# Patient Record
Sex: MALE | Race: WHITE | HISPANIC OR LATINO | Employment: UNEMPLOYED | URBAN - NONMETROPOLITAN AREA
[De-identification: names, ages, dates, MRNs, and addresses within clinical notes are randomized per-mention and may not be internally consistent; named-entity substitution may affect disease eponyms.]

---

## 2023-07-16 ENCOUNTER — APPOINTMENT (EMERGENCY)
Dept: RADIOLOGY | Facility: HOSPITAL | Age: 88
End: 2023-07-16
Payer: MEDICARE

## 2023-07-16 ENCOUNTER — APPOINTMENT (EMERGENCY)
Dept: CT IMAGING | Facility: HOSPITAL | Age: 88
End: 2023-07-16
Payer: MEDICARE

## 2023-07-16 ENCOUNTER — HOSPITAL ENCOUNTER (EMERGENCY)
Facility: HOSPITAL | Age: 88
Discharge: HOME | End: 2023-07-16
Attending: EMERGENCY MEDICINE
Payer: MEDICARE

## 2023-07-16 VITALS
SYSTOLIC BLOOD PRESSURE: 137 MMHG | BODY MASS INDEX: 23.94 KG/M2 | HEIGHT: 71 IN | RESPIRATION RATE: 23 BRPM | OXYGEN SATURATION: 98 % | WEIGHT: 171 LBS | HEART RATE: 53 BPM | TEMPERATURE: 97.6 F | DIASTOLIC BLOOD PRESSURE: 60 MMHG

## 2023-07-16 DIAGNOSIS — R07.9 CHEST PAIN: ICD-10-CM

## 2023-07-16 DIAGNOSIS — C79.51 PROSTATE CANCER METASTATIC TO BONE (HCC): ICD-10-CM

## 2023-07-16 DIAGNOSIS — C61 PROSTATE CANCER METASTATIC TO BONE (HCC): ICD-10-CM

## 2023-07-16 DIAGNOSIS — R42 VERTIGO: Primary | ICD-10-CM

## 2023-07-16 LAB
2HR DELTA HS TROPONIN: -1 NG/L
4HR DELTA HS TROPONIN: -1 NG/L
ALBUMIN SERPL BCP-MCNC: 4.2 G/DL (ref 3.5–5)
ALP SERPL-CCNC: 75 U/L (ref 34–104)
ALT SERPL W P-5'-P-CCNC: 8 U/L (ref 7–52)
ANION GAP SERPL CALCULATED.3IONS-SCNC: 6 MMOL/L
AST SERPL W P-5'-P-CCNC: 17 U/L (ref 13–39)
BACTERIA UR QL AUTO: ABNORMAL /HPF
BASOPHILS # BLD AUTO: 0.03 THOUSANDS/ÂΜL (ref 0–0.1)
BASOPHILS NFR BLD AUTO: 1 % (ref 0–1)
BILIRUB SERPL-MCNC: 0.53 MG/DL (ref 0.2–1)
BILIRUB UR QL STRIP: NEGATIVE
BNP SERPL-MCNC: 519 PG/ML (ref 0–100)
BUN SERPL-MCNC: 29 MG/DL (ref 5–25)
CALCIUM SERPL-MCNC: 8.6 MG/DL (ref 8.4–10.2)
CARDIAC TROPONIN I PNL SERPL HS: 6 NG/L
CARDIAC TROPONIN I PNL SERPL HS: 6 NG/L
CARDIAC TROPONIN I PNL SERPL HS: 7 NG/L
CHLORIDE SERPL-SCNC: 105 MMOL/L (ref 96–108)
CLARITY UR: CLEAR
CO2 SERPL-SCNC: 26 MMOL/L (ref 21–32)
COLOR UR: YELLOW
CREAT SERPL-MCNC: 1.24 MG/DL (ref 0.6–1.3)
EOSINOPHIL # BLD AUTO: 0.06 THOUSAND/ÂΜL (ref 0–0.61)
EOSINOPHIL NFR BLD AUTO: 1 % (ref 0–6)
ERYTHROCYTE [DISTWIDTH] IN BLOOD BY AUTOMATED COUNT: 13 % (ref 11.6–15.1)
GFR SERPL CREATININE-BSD FRML MDRD: 49 ML/MIN/1.73SQ M
GLUCOSE SERPL-MCNC: 109 MG/DL (ref 65–140)
GLUCOSE UR STRIP-MCNC: NEGATIVE MG/DL
HCT VFR BLD AUTO: 30.6 % (ref 36.5–49.3)
HGB BLD-MCNC: 10.4 G/DL (ref 12–17)
HGB UR QL STRIP.AUTO: ABNORMAL
IMM GRANULOCYTES # BLD AUTO: 0.03 THOUSAND/UL (ref 0–0.2)
IMM GRANULOCYTES NFR BLD AUTO: 1 % (ref 0–2)
KETONES UR STRIP-MCNC: NEGATIVE MG/DL
LEUKOCYTE ESTERASE UR QL STRIP: NEGATIVE
LYMPHOCYTES # BLD AUTO: 0.87 THOUSANDS/ÂΜL (ref 0.6–4.47)
LYMPHOCYTES NFR BLD AUTO: 16 % (ref 14–44)
MCH RBC QN AUTO: 33.5 PG (ref 26.8–34.3)
MCHC RBC AUTO-ENTMCNC: 34 G/DL (ref 31.4–37.4)
MCV RBC AUTO: 99 FL (ref 82–98)
MONOCYTES # BLD AUTO: 0.64 THOUSAND/ÂΜL (ref 0.17–1.22)
MONOCYTES NFR BLD AUTO: 12 % (ref 4–12)
NEUTROPHILS # BLD AUTO: 3.78 THOUSANDS/ÂΜL (ref 1.85–7.62)
NEUTS SEG NFR BLD AUTO: 69 % (ref 43–75)
NITRITE UR QL STRIP: NEGATIVE
NON-SQ EPI CELLS URNS QL MICRO: ABNORMAL /HPF
NRBC BLD AUTO-RTO: 0 /100 WBCS
PH UR STRIP.AUTO: 5.5 [PH]
PLATELET # BLD AUTO: 159 THOUSANDS/UL (ref 149–390)
PMV BLD AUTO: 8.7 FL (ref 8.9–12.7)
POTASSIUM SERPL-SCNC: 3.9 MMOL/L (ref 3.5–5.3)
PROT SERPL-MCNC: 6.6 G/DL (ref 6.4–8.4)
PROT UR STRIP-MCNC: NEGATIVE MG/DL
RBC # BLD AUTO: 3.1 MILLION/UL (ref 3.88–5.62)
RBC #/AREA URNS AUTO: ABNORMAL /HPF
SODIUM SERPL-SCNC: 137 MMOL/L (ref 135–147)
SP GR UR STRIP.AUTO: <=1.005 (ref 1–1.03)
UROBILINOGEN UR QL STRIP.AUTO: 0.2 E.U./DL
WBC # BLD AUTO: 5.41 THOUSAND/UL (ref 4.31–10.16)
WBC #/AREA URNS AUTO: ABNORMAL /HPF

## 2023-07-16 PROCEDURE — 93005 ELECTROCARDIOGRAM TRACING: CPT

## 2023-07-16 PROCEDURE — 70496 CT ANGIOGRAPHY HEAD: CPT

## 2023-07-16 PROCEDURE — 99285 EMERGENCY DEPT VISIT HI MDM: CPT | Performed by: EMERGENCY MEDICINE

## 2023-07-16 PROCEDURE — G1004 CDSM NDSC: HCPCS

## 2023-07-16 PROCEDURE — 71250 CT THORAX DX C-: CPT

## 2023-07-16 PROCEDURE — 85025 COMPLETE CBC W/AUTO DIFF WBC: CPT | Performed by: EMERGENCY MEDICINE

## 2023-07-16 PROCEDURE — 83880 ASSAY OF NATRIURETIC PEPTIDE: CPT | Performed by: EMERGENCY MEDICINE

## 2023-07-16 PROCEDURE — 81001 URINALYSIS AUTO W/SCOPE: CPT | Performed by: EMERGENCY MEDICINE

## 2023-07-16 PROCEDURE — 99285 EMERGENCY DEPT VISIT HI MDM: CPT

## 2023-07-16 PROCEDURE — 71045 X-RAY EXAM CHEST 1 VIEW: CPT

## 2023-07-16 PROCEDURE — 87086 URINE CULTURE/COLONY COUNT: CPT | Performed by: EMERGENCY MEDICINE

## 2023-07-16 PROCEDURE — 36415 COLL VENOUS BLD VENIPUNCTURE: CPT | Performed by: EMERGENCY MEDICINE

## 2023-07-16 PROCEDURE — 70498 CT ANGIOGRAPHY NECK: CPT

## 2023-07-16 PROCEDURE — 84484 ASSAY OF TROPONIN QUANT: CPT | Performed by: EMERGENCY MEDICINE

## 2023-07-16 PROCEDURE — 80053 COMPREHEN METABOLIC PANEL: CPT | Performed by: EMERGENCY MEDICINE

## 2023-07-16 RX ORDER — ALENDRONATE SODIUM 70 MG/1
1 TABLET ORAL WEEKLY
COMMUNITY

## 2023-07-16 RX ORDER — PRIMIDONE 50 MG/1
50 TABLET ORAL 4 TIMES DAILY
COMMUNITY

## 2023-07-16 RX ORDER — ANTIOX #8/OM3/DHA/EPA/LUT/ZEAX 250-2.5 MG
CAPSULE ORAL
COMMUNITY

## 2023-07-16 RX ORDER — MECLIZINE HYDROCHLORIDE 25 MG/1
25 TABLET ORAL ONCE
Status: COMPLETED | OUTPATIENT
Start: 2023-07-16 | End: 2023-07-16

## 2023-07-16 RX ORDER — NITROGLYCERIN 0.4 MG/1
0.4 TABLET SUBLINGUAL
COMMUNITY

## 2023-07-16 RX ORDER — FOLIC ACID 1 MG/1
1 TABLET ORAL DAILY
COMMUNITY

## 2023-07-16 RX ORDER — CLOPIDOGREL BISULFATE 75 MG/1
75 TABLET ORAL DAILY
COMMUNITY

## 2023-07-16 RX ORDER — VIT A/VIT C/VIT E/ZINC/COPPER 4296-226
1 CAPSULE ORAL DAILY
COMMUNITY

## 2023-07-16 RX ORDER — MECLIZINE HYDROCHLORIDE 25 MG/1
25 TABLET ORAL EVERY 8 HOURS PRN
Qty: 30 TABLET | Refills: 0 | Status: SHIPPED | OUTPATIENT
Start: 2023-07-16

## 2023-07-16 RX ORDER — MULTIVIT-MINERALS/FA/LYCOPENE 0.4 MG-6
1 TABLET ORAL DAILY
COMMUNITY

## 2023-07-16 RX ADMIN — IOHEXOL 100 ML: 350 INJECTION, SOLUTION INTRAVENOUS at 10:54

## 2023-07-16 RX ADMIN — MECLIZINE HYDROCHLORIDE 25 MG: 25 TABLET ORAL at 10:10

## 2023-07-16 NOTE — ED PROVIDER NOTES
History  Chief Complaint   Patient presents with   • Dizziness     Dizziness and loss of balance since 0130 this morning     Mr. Massimo Lester is a very pleasant 60-year-old male presents emergency room with chief complaint of dizziness that began around 130 this morning when he woke up to go to the bathroom. Patient is also having urinary frequency. He does not painful. Patient reports that he felt as if he needed to hold onto objects in order to ambulate. He also reports that he needed to many more times last evening to go the bathroom than he normally would. Patient is also recently seen his PCP when he complained of intermittent episodes of chest pressure ongoing for short periods of time for several days prior to the office visit. Patient did not complain of any chest pressure last evening. No shortness of breath. No diaphoresis. Nausea or vomiting. No fevers or chills. Is not had similar episodes. He does deny unilateral weakness. History provided by:  Patient  Dizziness  Quality:  Lightheadedness  Severity:  Moderate  Onset quality:  Unable to specify  Progression:  Partially resolved  Chronicity:  New  Context: physical activity and standing up    Relieved by:  Lying down  Worsened by: Movement and standing up  Associated symptoms: chest pain and hearing loss (Chronic)    Associated symptoms: no blood in stool, no diarrhea, no headaches, no nausea, no palpitations, no shortness of breath, no syncope, no tinnitus, no vomiting and no weakness    Chest pain:     Quality: pressure      Quality comment:  None today      Prior to Admission Medications   Prescriptions Last Dose Informant Patient Reported? Taking?    Calcium Carbonate 1500 (600 Ca) MG TABS   Yes Yes   Sig: Take 1,500 mg by mouth daily   Ergocalciferol 50 MCG (2000 UT) TABS   Yes No   Sig: Take 2,000 Units by mouth daily   Multiple Vitamins-Minerals (PX Mens Multivitamins) TABS   Yes No   Sig: Take 1 tablet by mouth daily   Multiple Vitamins-Minerals (PreserVision AREDS 2) CAPS   Yes No   Sig: Take by mouth   Multiple Vitamins-Minerals (PreserVision AREDS) CAPS   Yes No   Sig: Take 1 capsule by mouth daily   alendronate (FOSAMAX) 70 mg tablet   Yes No   Sig: Take 1 tablet by mouth once a week   clopidogrel (PLAVIX) 75 mg tablet   Yes No   Sig: Take 75 mg by mouth daily   dronedarone (Multaq) 400 mg tablet   Yes No   Sig: Take 400 mg by mouth   folic acid (FOLVITE) 1 mg tablet   Yes No   Sig: Take 1 tablet by mouth daily   nitroglycerin (NITROSTAT) 0.4 mg SL tablet   Yes Yes   Sig: Place 0.4 mg under the tongue every 5 (five) minutes as needed for chest pain   primidone (MYSOLINE) 50 mg tablet   Yes No   Sig: Take 50 mg by mouth 4 (four) times a day      Facility-Administered Medications: None       Past Medical History:   Diagnosis Date   • A-fib (HCC)    • History of angina    • Prostate cancer ([address] [facility])        Past Surgical History:   Procedure Laterality Date   • CORONARY ARTERY BYPASS GRAFT  1995   • TONSILLECTOMY         History reviewed. No pertinent family history. I have reviewed and agree with the history as documented. E-Cigarette/Vaping     E-Cigarette/Vaping Substances     Social History     Tobacco Use   • Smoking status: Never   • Smokeless tobacco: Never   Substance Use Topics   • Alcohol use: Not Currently   • Drug use: Not Currently       Review of Systems   HENT: Positive for hearing loss (Chronic). Negative for tinnitus. Respiratory: Negative. Negative for chest tightness and shortness of breath. Cardiovascular: Positive for chest pain. Negative for palpitations and syncope. Gastrointestinal: Negative for blood in stool, diarrhea, nausea and vomiting. Neurological: Positive for dizziness. Negative for syncope, weakness, light-headedness, numbness and headaches. All other systems reviewed and are negative. Physical Exam  Physical Exam  Vitals and nursing note reviewed.    Constitutional:       Appearance: Normal appearance. He is well-developed. He is not ill-appearing or toxic-appearing. HENT:      Head: Normocephalic and atraumatic. Hair is normal.      Jaw: No pain on movement. Right Ear: External ear normal.      Left Ear: External ear normal.      Nose: Nose normal. No congestion. Mouth/Throat:      Mouth: Mucous membranes are moist.   Eyes:      General: Lids are normal. No scleral icterus. Extraocular Movements: Extraocular movements intact. Conjunctiva/sclera: Conjunctivae normal.      Pupils: Pupils are equal, round, and reactive to light. Cardiovascular:      Rate and Rhythm: Normal rate and regular rhythm. Heart sounds: Normal heart sounds. No murmur heard. Pulmonary:      Effort: Pulmonary effort is normal. No respiratory distress. Breath sounds: Normal breath sounds. No decreased breath sounds, wheezing, rhonchi or rales. Abdominal:      General: Abdomen is flat. There is no distension. Palpations: Abdomen is soft. Abdomen is not rigid. Tenderness: There is no abdominal tenderness. There is no guarding or rebound. Musculoskeletal:         General: No swelling, tenderness, deformity or signs of injury. Normal range of motion. Cervical back: Normal range of motion and neck supple. Skin:     General: Skin is warm and dry. Coloration: Skin is not pale. Findings: No rash. Neurological:      General: No focal deficit present. Mental Status: He is alert and oriented to person, place, and time. Mental status is at baseline. Cranial Nerves: Cranial nerves 2-12 are intact. Sensory: Sensation is intact. Motor: Motor function is intact. No weakness.       Coordination: Coordination normal. Finger-Nose-Finger Test normal.   Psychiatric:         Attention and Perception: Attention normal.         Mood and Affect: Mood normal.         Speech: Speech normal.         Behavior: Behavior normal.         Vital Signs  ED Triage Vitals Temperature Pulse Respirations Blood Pressure SpO2   07/16/23 0959 07/16/23 0942 07/16/23 0942 07/16/23 0942 07/16/23 0942   97.6 °F (36.4 °C) 65 18 146/67 99 %      Temp Source Heart Rate Source Patient Position - Orthostatic VS BP Location FiO2 (%)   07/16/23 0959 07/16/23 0942 07/16/23 1245 07/16/23 0942 --   Tympanic Monitor Lying Right arm       Pain Score       --                  Vitals:    07/16/23 1315 07/16/23 1330 07/16/23 1345 07/16/23 1400   BP: 145/66 141/63 147/63 137/60   Pulse: (!) 54 55 (!) 53 (!) 53   Patient Position - Orthostatic VS:    Lying         Visual Acuity  Visual Acuity    Flowsheet Row Most Recent Value   L Pupil Size (mm) 3   R Pupil Size (mm) 3          ED Medications  Medications   meclizine (ANTIVERT) tablet 25 mg (25 mg Oral Given 7/16/23 1010)   iohexol (OMNIPAQUE) 350 MG/ML injection (SINGLE-DOSE) 100 mL (100 mL Intravenous Given 7/16/23 1054)       Diagnostic Studies  Results Reviewed     Procedure Component Value Units Date/Time    HS Troponin I 4hr [[id number]] Collected: 07/16/23 1419    Lab Status:  In process Specimen: Blood from Arm, Left Updated: 07/16/23 1421    HS Troponin I 2hr [[id number]]  (Normal) Collected: 07/16/23 1204    Lab Status: Final result Specimen: Blood from Arm, Left Updated: 07/16/23 1232     hs TnI 2hr 6 ng/L      Delta 2hr hsTnI -1 ng/L     Urine Microscopic [[id number]]  (Abnormal) Collected: 07/16/23 1121    Lab Status: Final result Specimen: Urine, Clean Catch Updated: 07/16/23 1150     RBC, UA 10-20 /hpf      WBC, UA None Seen /hpf      Epithelial Cells Occasional /hpf      Bacteria, UA None Seen /hpf     UA (URINE) with reflex to Scope [[id number]]  (Abnormal) Collected: 07/16/23 1121    Lab Status: Final result Specimen: Urine, Clean Catch Updated: 07/16/23 1130     Color, UA Yellow     Clarity, UA Clear     Specific Gravity, UA <=1.005     pH, UA 5.5     Leukocytes, UA Negative     Nitrite, UA Negative     Protein, UA Negative mg/dl Glucose, UA Negative mg/dl      Ketones, UA Negative mg/dl      Urobilinogen, UA 0.2 E.U./dl      Bilirubin, UA Negative     Occult Blood, UA Moderate    Urine culture [[id number]] Collected: 07/16/23 1121    Lab Status:  In process Specimen: Urine, Clean Catch Updated: 07/16/23 1124    B-Type Natriuretic Peptide(BNP) [[id number]]  (Abnormal) Collected: 07/16/23 1009    Lab Status: Final result Specimen: Blood from Arm, Left Updated: 07/16/23 1046      pg/mL     HS Troponin 0hr (reflex protocol) [[id number]]  (Normal) Collected: 07/16/23 1009    Lab Status: Final result Specimen: Blood from Arm, Left Updated: 07/16/23 1041     hs TnI 0hr 7 ng/L     Comprehensive metabolic panel [[id number]]  (Abnormal) Collected: 07/16/23 1009    Lab Status: Final result Specimen: Blood from Arm, Left Updated: 07/16/23 1034     Sodium 137 mmol/L      Potassium 3.9 mmol/L      Chloride 105 mmol/L      CO2 26 mmol/L      ANION GAP 6 mmol/L      BUN 29 mg/dL      Creatinine 1.24 mg/dL      Glucose 109 mg/dL      Calcium 8.6 mg/dL      AST 17 U/L      ALT 8 U/L      Alkaline Phosphatase 75 U/L      Total Protein 6.6 g/dL      Albumin 4.2 g/dL      Total Bilirubin 0.53 mg/dL      eGFR 49 ml/min/1.73sq m     Narrative:      [facility] guidelines for Chronic Kidney Disease (CKD):   •  Stage 1 with normal or high GFR (GFR > 90 mL/min/1.73 square meters)  •  Stage 2 Mild CKD (GFR = 60-89 mL/min/1.73 square meters)  •  Stage 3A Moderate CKD (GFR = 45-59 mL/min/1.73 square meters)  •  Stage 3B Moderate CKD (GFR = 30-44 mL/min/1.73 square meters)  •  Stage 4 Severe CKD (GFR = 15-29 mL/min/1.73 square meters)  •  Stage 5 End Stage CKD (GFR <15 mL/min/1.73 square meters)  Note: GFR calculation is accurate only with a steady state creatinine    CBC and differential [[id number]]  (Abnormal) Collected: 07/16/23 1009    Lab Status: Final result Specimen: Blood from Arm, Left Updated: 07/16/23 1018     WBC 5.41 Thousand/uL RBC 3.10 Million/uL      Hemoglobin 10.4 g/dL      Hematocrit 30.6 %      MCV 99 fL      MCH 33.5 pg      MCHC 34.0 g/dL      RDW 13.0 %      MPV 8.7 fL      Platelets 534 Thousands/uL      nRBC 0 /100 WBCs      Neutrophils Relative 69 %      Immat GRANS % 1 %      Lymphocytes Relative 16 %      Monocytes Relative 12 %      Eosinophils Relative 1 %      Basophils Relative 1 %      Neutrophils Absolute 3.78 Thousands/µL      Immature Grans Absolute 0.03 Thousand/uL      Lymphocytes Absolute 0.87 Thousands/µL      Monocytes Absolute 0.64 Thousand/µL      Eosinophils Absolute 0.06 Thousand/µL      Basophils Absolute 0.03 Thousands/µL                  CT chest without contrast   Final Result by Luz Marina Bobby DO (07/16 2104)      1. No consolidation or infiltrate to suggest pneumonia. Chest x-ray findings are likely artifactual due to superimposition of shadows and background of sclerotic osseous metastases. 2. 8 mm right lower lobe nodule. Based on current Fleischner Society 2017 Guidelines on incidental pulmonary nodule, followup non-contrast CT is recommended at 6 months from the initial examination and, if stable at that time, an additional followup is    recommended for 18-24 months from the initial examination. 3. Several incidental findings as detailed above. The study was marked in [facility] for immediate notification and follow-up. Workstation performed: [id number]         CTA head and neck with and without contrast   Final Result by Mary Celeste MD (07/16 8389)      1. 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 No significant mass effect or midline shift. 2.  Chronic microangiopathic change. 3.  Patent major vasculature of the [place] without high-grade stenosis. No aneurysm. 4.  No hemodynamically significant stenosis or dissection of cervical carotid and vertebral arteries.    5.  Diffuse sclerotic and lytic osseous lesions of the visualized spine and chest wall consistent with metastasis. 6.  1.9 cm subpleural wedge-shaped pulmonary nodule in the superior segment of left lower lobe, most likely likely focal atelectasis or scarring. Correlate with prior outside chest CT for stability. I personally discussed this study with Tali Lugo on 7/16/2023 12:22 PM.               Workstation performed: [id number]         XR chest 1 view portable   Final Result by Luly Larsen MD (07/16 1126)      Bilateral patchy infiltrates suggested. Follow-up or CT chest recommended. Workstation performed: [id number]                    Procedures  ECG 12 Lead Documentation Only    Date/Time: 7/16/2023 10:39 AM    Performed by: Tali Lugo DO  Authorized by: Tali Lugo DO    ECG reviewed by me, the ED Provider: yes    Patient location:  ED  Previous ECG:     Previous ECG:  Unavailable  Interpretation:     Interpretation: non-specific    Rate:     ECG rate assessment: normal    Rhythm:     Rhythm: sinus rhythm    Ectopy:     Ectopy: none    QRS:     QRS axis:  Normal  Conduction:     Conduction: normal    ST segments:     ST segments:  Non-specific  T waves:     T waves: non-specific               ED Course  ED Course as of 07/16/23 1435   Sun Jul 16, 2023   1149 CT showing bilateral patchy infiltrates. Radiology recommending CT.   3918 CT of the head and neck showing evidence of metastatic disease. 1238 There is also a small right-sided hygroma versus chronic subdural with no midline shift. 1238 Patient reports that he is feeling better after medication. 1417 Patient CT shows no evidence of pneumonia. Findings are likely superimposed metastatic disease. 1419 I reviewed patient's record. Patient has a history of metastatic prostate cancer. [address] conversation with both the patient and his niece who is present at bedside at this time.   Patient has several previous traumatic brain injuries. Hygroma is likely secondary to that as patient was advised at 1 point that he may require intracranial surgery which was never done. [phone] Patient is also aware of his metastatic prostate cancer. We will follow-up with his primary care doctor. He was advised to stop the new medication he was given as this is likely contributing to his dizziness and the chest discomfort is likely secondary to the metastatic disease in the chest.   1433 Blood, UA(!): Moderate  In the absence of any bacteria and urinary symptoms this is likely secondary to the patient's chronic disease state. HEART Risk Score    Flowsheet Row Most Recent Value   Heart Score Risk Calculator    History 0 Filed at: 07/16/2023 1435   ECG 1 Filed at: 07/16/2023 1435   Age 2 Filed at: 07/16/2023 1435   Risk Factors 2 Filed at: 07/16/2023 1435   Troponin 0 Filed at: 07/16/2023 1435   HEART Score 5 Filed at: 07/16/2023 1435                        SBIRT 20yo+    Flowsheet Row Most Recent Value   Initial Alcohol Screen: US AUDIT-C     1. How often do you have a drink containing alcohol? 0 Filed at: 07/16/2023 1423   2. How many drinks containing alcohol do you have on a typical day you are drinking? 0 Filed at: 07/16/2023 1423   3a. Male UNDER 65: How often do you have five or more drinks on one occasion? 0 Filed at: 07/16/2023 1423   3b. FEMALE Any Age, or MALE 65+: How often do you have 4 or more drinks on one occassion? 0 Filed at: 07/16/2023 1423   Audit-C Score 0 Filed at: 07/16/2023 1423   AMBAR: How many times in the past year have you. .. Used an illegal drug or used a prescription medication for non-medical reasons? Never Filed at: 07/16/2023 1423                    Medical Decision Making  Patient presented to the emergency department and a MSE was performed. The patient was evaluated for complaint related to acute vertigo.   Patient is potentially at risk for, but not limited to, thrombotic stroke, embolic stroke, hemorrhagic stroke, hypertensive emergency, hypoglycemic episode, or migraine variant. Several of these diagnoses have been evaluated and ruled out by history and physical.  As needed, patient will be further evaluated with laboratory and imaging studies. Higher level diagnostics, such as CT imaging or ultrasound, may also be required. Please see work-up portion of the note for further evaluation of patient's risk. Socioeconomic factors were also considered as part of the decision-making process. Unless otherwise stated in the chart or patient is admitted as elsewhere documented, any previously prescribed medications will be maintained. Chest pain: complicated acute illness or injury with systemic symptoms  Prostate cancer metastatic to [facility]): chronic illness or injury with exacerbation, progression, or side effects of treatment  Vertigo: complicated acute illness or injury with systemic symptoms  Amount and/or Complexity of Data Reviewed  Labs: ordered. Decision-making details documented in ED Course. Radiology: ordered. Risk  Prescription drug management. Disposition  Final diagnoses:   Vertigo   Chest pain   Prostate cancer metastatic to [facility])     Time reflects when diagnosis was documented in both MDM as applicable and the Disposition within this note     Time User Action Codes Description Comment    7/16/2023  2:33 PM Virgin Cipro Add [R42] Vertigo     7/16/2023  2:33 PM Virgin Cipro Add [R07.9] Chest pain     7/16/2023  2:33 PM Virgin Cipro Add [C61,  C79.51] Prostate cancer metastatic to [facility])       ED Disposition     ED Disposition   Discharge    Condition   Stable    Date/Time   Sun Jul 16, 2023  2:30 PM    Comment   Maria C Garber discharge to home/self care.                Follow-up Information     Follow up With Specialties Details Why Contact Cayden Newton MD   As scheduled [address] [facility] [address]  [phone]            Patient's Medications   Discharge Prescriptions    MECLIZINE (ANTIVERT) 25 MG TABLET    Take 1 tablet (25 mg total) by mouth every 8 (eight) hours as needed for dizziness       Start Date: 7/16/2023 End Date: --       Order Dose: 25 mg       Quantity: 30 tablet    Refills: 0       No discharge procedures on file.     PDMP Review     None          ED Provider  Electronically Signed by           Mirian Pulido DO  07/16/23 1265

## 2023-07-16 NOTE — DISCHARGE INSTRUCTIONS
Keep your scheduled appointment with your primary care provider. Please stop the new medication that he provided to you for chest pain. Chest pain is likely secondary to the metastatic prostate cancer. We have prescribed you medication for dizziness which you may use as needed. Please return with any worsening.

## 2023-07-17 LAB
ATRIAL RATE: 67 BPM
P AXIS: 81 DEGREES
PR INTERVAL: 170 MS
QRS AXIS: 80 DEGREES
QRSD INTERVAL: 84 MS
QT INTERVAL: 392 MS
QTC INTERVAL: 414 MS
T WAVE AXIS: 39 DEGREES
VENTRICULAR RATE: 67 BPM

## 2023-07-17 PROCEDURE — 93010 ELECTROCARDIOGRAM REPORT: CPT | Performed by: INTERNAL MEDICINE

## 2023-07-18 LAB — BACTERIA UR CULT: NORMAL

## 2023-12-13 ENCOUNTER — APPOINTMENT (EMERGENCY)
Dept: CT IMAGING | Facility: HOSPITAL | Age: 88
End: 2023-12-13
Payer: MEDICARE

## 2023-12-13 ENCOUNTER — HOSPITAL ENCOUNTER (INPATIENT)
Facility: HOSPITAL | Age: 88
LOS: 22 days | Discharge: NURSING, REHAB OR LONG-TERM CARE | End: 2024-01-04
Attending: EMERGENCY MEDICINE | Admitting: FAMILY MEDICINE
Payer: MEDICARE

## 2023-12-13 DIAGNOSIS — I95.1 ORTHOSTATIC HYPOTENSION: ICD-10-CM

## 2023-12-13 DIAGNOSIS — K52.89 STERCORAL COLITIS: ICD-10-CM

## 2023-12-13 DIAGNOSIS — R11.10 ABDOMINAL PAIN, VOMITING, AND DIARRHEA: Primary | ICD-10-CM

## 2023-12-13 DIAGNOSIS — R78.81 GRAM-NEGATIVE BACTEREMIA: ICD-10-CM

## 2023-12-13 DIAGNOSIS — C79.51 PROSTATE CANCER METASTATIC TO BONE (HCC): ICD-10-CM

## 2023-12-13 DIAGNOSIS — I48.21 PERMANENT ATRIAL FIBRILLATION (HCC): ICD-10-CM

## 2023-12-13 DIAGNOSIS — C61 PROSTATE CANCER METASTATIC TO BONE (HCC): ICD-10-CM

## 2023-12-13 DIAGNOSIS — I95.2 HYPOTENSION DUE TO DRUGS: ICD-10-CM

## 2023-12-13 DIAGNOSIS — R10.9 ABDOMINAL PAIN, VOMITING, AND DIARRHEA: Primary | ICD-10-CM

## 2023-12-13 DIAGNOSIS — K56.41 FECAL IMPACTION (HCC): ICD-10-CM

## 2023-12-13 DIAGNOSIS — R19.7 ABDOMINAL PAIN, VOMITING, AND DIARRHEA: Primary | ICD-10-CM

## 2023-12-13 DIAGNOSIS — R31.0 GROSS HEMATURIA: ICD-10-CM

## 2023-12-13 DIAGNOSIS — R33.8 ACUTE URINARY RETENTION: ICD-10-CM

## 2023-12-13 DIAGNOSIS — C61 PROSTATE CANCER (HCC): ICD-10-CM

## 2023-12-13 DIAGNOSIS — D64.9 ACUTE ON CHRONIC ANEMIA: ICD-10-CM

## 2023-12-13 DIAGNOSIS — E87.1 HYPONATREMIA: ICD-10-CM

## 2023-12-13 LAB
ALBUMIN SERPL BCP-MCNC: 3.9 G/DL (ref 3.5–5)
ALP SERPL-CCNC: 62 U/L (ref 34–104)
ALT SERPL W P-5'-P-CCNC: 8 U/L (ref 7–52)
ANION GAP SERPL CALCULATED.3IONS-SCNC: 5 MMOL/L
AST SERPL W P-5'-P-CCNC: 17 U/L (ref 13–39)
BASOPHILS # BLD AUTO: 0.03 THOUSANDS/ÂΜL (ref 0–0.1)
BASOPHILS NFR BLD AUTO: 0 % (ref 0–1)
BILIRUB SERPL-MCNC: 0.56 MG/DL (ref 0.2–1)
BILIRUB UR QL STRIP: NEGATIVE
BUN SERPL-MCNC: 20 MG/DL (ref 5–25)
CALCIUM SERPL-MCNC: 8.4 MG/DL (ref 8.4–10.2)
CHLORIDE SERPL-SCNC: 98 MMOL/L (ref 96–108)
CLARITY UR: CLEAR
CO2 SERPL-SCNC: 25 MMOL/L (ref 21–32)
COLOR UR: YELLOW
CREAT SERPL-MCNC: 0.94 MG/DL (ref 0.6–1.3)
EOSINOPHIL # BLD AUTO: 0.07 THOUSAND/ÂΜL (ref 0–0.61)
EOSINOPHIL NFR BLD AUTO: 1 % (ref 0–6)
ERYTHROCYTE [DISTWIDTH] IN BLOOD BY AUTOMATED COUNT: 14.5 % (ref 11.6–15.1)
GFR SERPL CREATININE-BSD FRML MDRD: 68 ML/MIN/1.73SQ M
GLUCOSE SERPL-MCNC: 109 MG/DL (ref 65–140)
GLUCOSE UR STRIP-MCNC: NEGATIVE MG/DL
HCT VFR BLD AUTO: 30.1 % (ref 36.5–49.3)
HGB BLD-MCNC: 10.3 G/DL (ref 12–17)
HGB UR QL STRIP.AUTO: NEGATIVE
IMM GRANULOCYTES # BLD AUTO: 0.06 THOUSAND/UL (ref 0–0.2)
IMM GRANULOCYTES NFR BLD AUTO: 1 % (ref 0–2)
KETONES UR STRIP-MCNC: NEGATIVE MG/DL
LACTATE SERPL-SCNC: 1.1 MMOL/L (ref 0.5–2)
LEUKOCYTE ESTERASE UR QL STRIP: NEGATIVE
LYMPHOCYTES # BLD AUTO: 0.92 THOUSANDS/ÂΜL (ref 0.6–4.47)
LYMPHOCYTES NFR BLD AUTO: 10 % (ref 14–44)
MCH RBC QN AUTO: 32 PG (ref 26.8–34.3)
MCHC RBC AUTO-ENTMCNC: 34.2 G/DL (ref 31.4–37.4)
MCV RBC AUTO: 94 FL (ref 82–98)
MONOCYTES # BLD AUTO: 1.1 THOUSAND/ÂΜL (ref 0.17–1.22)
MONOCYTES NFR BLD AUTO: 12 % (ref 4–12)
NEUTROPHILS # BLD AUTO: 6.76 THOUSANDS/ÂΜL (ref 1.85–7.62)
NEUTS SEG NFR BLD AUTO: 76 % (ref 43–75)
NITRITE UR QL STRIP: NEGATIVE
NRBC BLD AUTO-RTO: 0 /100 WBCS
PH UR STRIP.AUTO: 7 [PH]
PLATELET # BLD AUTO: 221 THOUSANDS/UL (ref 149–390)
PMV BLD AUTO: 8.3 FL (ref 8.9–12.7)
POTASSIUM SERPL-SCNC: 4.5 MMOL/L (ref 3.5–5.3)
PROCALCITONIN SERPL-MCNC: <0.05 NG/ML
PROT SERPL-MCNC: 6.7 G/DL (ref 6.4–8.4)
PROT UR STRIP-MCNC: NEGATIVE MG/DL
RBC # BLD AUTO: 3.22 MILLION/UL (ref 3.88–5.62)
SODIUM SERPL-SCNC: 128 MMOL/L (ref 135–147)
SP GR UR STRIP.AUTO: 1.02 (ref 1–1.03)
UROBILINOGEN UR QL STRIP.AUTO: 0.2 E.U./DL
WBC # BLD AUTO: 8.94 THOUSAND/UL (ref 4.31–10.16)

## 2023-12-13 PROCEDURE — G1004 CDSM NDSC: HCPCS

## 2023-12-13 PROCEDURE — 80053 COMPREHEN METABOLIC PANEL: CPT | Performed by: EMERGENCY MEDICINE

## 2023-12-13 PROCEDURE — 99284 EMERGENCY DEPT VISIT MOD MDM: CPT

## 2023-12-13 PROCEDURE — 99285 EMERGENCY DEPT VISIT HI MDM: CPT | Performed by: EMERGENCY MEDICINE

## 2023-12-13 PROCEDURE — 87147 CULTURE TYPE IMMUNOLOGIC: CPT | Performed by: FAMILY MEDICINE

## 2023-12-13 PROCEDURE — 96374 THER/PROPH/DIAG INJ IV PUSH: CPT

## 2023-12-13 PROCEDURE — 36415 COLL VENOUS BLD VENIPUNCTURE: CPT | Performed by: EMERGENCY MEDICINE

## 2023-12-13 PROCEDURE — 81003 URINALYSIS AUTO W/O SCOPE: CPT | Performed by: EMERGENCY MEDICINE

## 2023-12-13 PROCEDURE — 87081 CULTURE SCREEN ONLY: CPT | Performed by: FAMILY MEDICINE

## 2023-12-13 PROCEDURE — 99222 1ST HOSP IP/OBS MODERATE 55: CPT | Performed by: FAMILY MEDICINE

## 2023-12-13 PROCEDURE — 74177 CT ABD & PELVIS W/CONTRAST: CPT

## 2023-12-13 PROCEDURE — 0T9B70Z DRAINAGE OF BLADDER WITH DRAINAGE DEVICE, VIA NATURAL OR ARTIFICIAL OPENING: ICD-10-PCS | Performed by: EMERGENCY MEDICINE

## 2023-12-13 PROCEDURE — 84145 PROCALCITONIN (PCT): CPT | Performed by: EMERGENCY MEDICINE

## 2023-12-13 PROCEDURE — 96361 HYDRATE IV INFUSION ADD-ON: CPT

## 2023-12-13 PROCEDURE — 85025 COMPLETE CBC W/AUTO DIFF WBC: CPT | Performed by: EMERGENCY MEDICINE

## 2023-12-13 PROCEDURE — 83605 ASSAY OF LACTIC ACID: CPT | Performed by: EMERGENCY MEDICINE

## 2023-12-13 PROCEDURE — 87040 BLOOD CULTURE FOR BACTERIA: CPT | Performed by: EMERGENCY MEDICINE

## 2023-12-13 RX ORDER — LIDOCAINE HYDROCHLORIDE 20 MG/ML
1 JELLY TOPICAL ONCE
Status: COMPLETED | OUTPATIENT
Start: 2023-12-13 | End: 2023-12-13

## 2023-12-13 RX ORDER — PANTOPRAZOLE SODIUM 40 MG/1
40 TABLET, DELAYED RELEASE ORAL DAILY
COMMUNITY

## 2023-12-13 RX ORDER — TAMSULOSIN HYDROCHLORIDE 0.4 MG/1
0.4 CAPSULE ORAL
Status: DISCONTINUED | OUTPATIENT
Start: 2023-12-13 | End: 2023-12-17

## 2023-12-13 RX ORDER — PANTOPRAZOLE SODIUM 40 MG/1
40 TABLET, DELAYED RELEASE ORAL DAILY
Status: DISCONTINUED | OUTPATIENT
Start: 2023-12-13 | End: 2024-01-04 | Stop reason: HOSPADM

## 2023-12-13 RX ORDER — PRIMIDONE 50 MG/1
50 TABLET ORAL EVERY 12 HOURS SCHEDULED
Status: DISCONTINUED | OUTPATIENT
Start: 2023-12-13 | End: 2024-01-04 | Stop reason: HOSPADM

## 2023-12-13 RX ORDER — CLOPIDOGREL BISULFATE 75 MG/1
75 TABLET ORAL DAILY
Status: DISCONTINUED | OUTPATIENT
Start: 2023-12-13 | End: 2023-12-18

## 2023-12-13 RX ORDER — ACETAMINOPHEN 325 MG/1
650 TABLET ORAL EVERY 6 HOURS PRN
Status: DISCONTINUED | OUTPATIENT
Start: 2023-12-13 | End: 2023-12-17

## 2023-12-13 RX ORDER — ONDANSETRON 2 MG/ML
4 INJECTION INTRAMUSCULAR; INTRAVENOUS EVERY 6 HOURS PRN
Status: DISCONTINUED | OUTPATIENT
Start: 2023-12-13 | End: 2024-01-04 | Stop reason: HOSPADM

## 2023-12-13 RX ORDER — BISACODYL 10 MG
10 SUPPOSITORY, RECTAL RECTAL 2 TIMES DAILY
Status: DISCONTINUED | OUTPATIENT
Start: 2023-12-13 | End: 2023-12-16

## 2023-12-13 RX ORDER — SODIUM CHLORIDE 9 MG/ML
50 INJECTION, SOLUTION INTRAVENOUS CONTINUOUS
Status: DISCONTINUED | OUTPATIENT
Start: 2023-12-13 | End: 2023-12-14

## 2023-12-13 RX ORDER — FOLIC ACID 1 MG/1
1000 TABLET ORAL DAILY
Status: DISCONTINUED | OUTPATIENT
Start: 2023-12-13 | End: 2024-01-04 | Stop reason: HOSPADM

## 2023-12-13 RX ORDER — POLYETHYLENE GLYCOL 3350 17 G/17G
17 POWDER, FOR SOLUTION ORAL DAILY
Status: DISCONTINUED | OUTPATIENT
Start: 2023-12-13 | End: 2023-12-18

## 2023-12-13 RX ORDER — ENOXAPARIN SODIUM 100 MG/ML
30 INJECTION SUBCUTANEOUS DAILY
Status: DISCONTINUED | OUTPATIENT
Start: 2023-12-13 | End: 2023-12-15

## 2023-12-13 RX ORDER — DIGOXIN 125 MCG
125 TABLET ORAL DAILY
Status: DISCONTINUED | OUTPATIENT
Start: 2023-12-13 | End: 2023-12-21

## 2023-12-13 RX ORDER — ONDANSETRON 2 MG/ML
4 INJECTION INTRAMUSCULAR; INTRAVENOUS ONCE
Status: COMPLETED | OUTPATIENT
Start: 2023-12-13 | End: 2023-12-13

## 2023-12-13 RX ORDER — DIGOXIN 250 MCG
125 TABLET ORAL DAILY
COMMUNITY
End: 2024-01-04

## 2023-12-13 RX ORDER — SENNOSIDES 8.6 MG
1 TABLET ORAL 2 TIMES DAILY
Status: DISCONTINUED | OUTPATIENT
Start: 2023-12-13 | End: 2023-12-16

## 2023-12-13 RX ORDER — HYDROCORTISONE 25 MG/G
CREAM TOPICAL 4 TIMES DAILY PRN
Status: DISCONTINUED | OUTPATIENT
Start: 2023-12-13 | End: 2024-01-04 | Stop reason: HOSPADM

## 2023-12-13 RX ADMIN — LIDOCAINE HYDROCHLORIDE 1 APPLICATION: 20 JELLY TOPICAL at 10:00

## 2023-12-13 RX ADMIN — ENOXAPARIN SODIUM 30 MG: 30 INJECTION SUBCUTANEOUS at 18:06

## 2023-12-13 RX ADMIN — DRONEDARONE 400 MG: 400 TABLET, FILM COATED ORAL at 18:06

## 2023-12-13 RX ADMIN — ONDANSETRON 4 MG: 2 INJECTION INTRAMUSCULAR; INTRAVENOUS at 09:35

## 2023-12-13 RX ADMIN — PRIMIDONE 50 MG: 50 TABLET ORAL at 18:06

## 2023-12-13 RX ADMIN — IOHEXOL 100 ML: 350 INJECTION, SOLUTION INTRAVENOUS at 10:20

## 2023-12-13 RX ADMIN — SENNOSIDES 8.6 MG: 8.6 TABLET ORAL at 17:43

## 2023-12-13 RX ADMIN — SODIUM CHLORIDE 50 ML/HR: 0.9 INJECTION, SOLUTION INTRAVENOUS at 16:13

## 2023-12-13 RX ADMIN — SODIUM CHLORIDE 1000 ML: 0.9 INJECTION, SOLUTION INTRAVENOUS at 09:35

## 2023-12-13 RX ADMIN — BISACODYL 10 MG: 10 SUPPOSITORY RECTAL at 17:43

## 2023-12-13 NOTE — H&P
[facility]  H&P  Name: Miguel Angel Ortega 89 y.o. male I MRN: [id number]  Unit/Bed#: [facility] I Date of Admission: 12/13/2023   Date of Service: 12/13/2023 I Hospital Day: 0      Assessment/Plan   * Stercoral colitis  Assessment & Plan  Patient presents with symptoms of constipation for the last few days.  He saw GI outpatient.  He was started on MiraLAX patient states he went 26 times to the bathroom yesterday had very small amounts of stool and felt like he just could not empty out his bowel movement.  Also had a fall yesterday.  Ct abd pelvis shows Abundant stool in the colon and rectal vault compatible with constipation in the appropriate clinical context. No bowel obstruction.     Minimal perirectal fat stranding suggestive of mild or early stercoral proctitis. No perirectal abscess.    Placed on MiraLAX daily and placed on Dulcolax suppository x 2 daily and also placed 3 enemas for today.  Patient has a large stool burden and will need to evacuate him.  No need for antibiotics at this time      Acute urinary retention  Assessment & Plan  Acute urinary retention secondary to severe constipation also known history of prostate cancer.  Ascencio catheter placed as patient bladder scan for 800 cc on presentation to ED and patient is feeling a little better now.  Also placed on Flomax and will do voiding trial in 2 to 3 days  After Ascencio catheter placed 1800 mL has come out so far      Hyponatremia  Assessment & Plan  secondary to urinary retention decreased appetite and constipation.  Will place on gentle hydration and recheck BMP tomorrow    Permanent atrial fibrillation (HCC)  Assessment & Plan  Continue Multaq, digoxin which are his chronic meds.  Not on anticoagulation    Prostate cancer metastatic to bone (HCC)  Assessment & Plan  Further outpatient follow-up with urology.  Placed on Flomax for acute urinary retention hold Casodex while inpatient       VTE Prophylaxis: Enoxaparin (Lovenox)  /  sequential compression device   Code Status: DNR/DNI  POLST: There is no POLST form on file for this patient (pre-hospital)  Discussion with family: Discussed with POA    Anticipated Length of Stay:  Patient will be admitted on an Inpatient basis with an anticipated length of stay of more than 2 midnights.   Justification for Hospital Stay: Severe constipation    Total Time for Visit, including Counseling / Coordination of Care: 60 minutes.  Greater than 50% of this total time spent on direct patient counseling and coordination of care.    Chief Complaint:   Constipation    History of Present Illness:    Miguel Angel Ortega is a 89 y.o. male who presents with constipation.  Patient states that he has not had a good bowel movement the last few days.  He was seen by GI outpatient and recommended MiraLAX daily.  Patient states that last night he went 26 times to the bathroom but could not defecate also having difficulty urinating.  Came to the ED bladder scan for 800 mL however 1800 mL came out when Ascencio catheter placed.  Denies any fevers or chills or sick contacts but states that he did feel weak and fell yesterday as he was trying multiple times to go to the bathroom.  He currently resides in an assisted living facility.    Review of Systems:    Review of Systems   Constitutional:  Positive for appetite change and fatigue. Negative for chills and fever.   HENT:  Negative for hearing loss, sore throat and trouble swallowing.    Eyes:  Negative for photophobia, discharge and visual disturbance.   Respiratory:  Negative for chest tightness and shortness of breath.    Cardiovascular:  Negative for chest pain and palpitations.   Gastrointestinal:  Positive for constipation. Negative for abdominal pain, blood in stool and vomiting.   Endocrine: Negative for polydipsia and polyuria.   Genitourinary:  Positive for difficulty urinating. Negative for dysuria, flank pain and hematuria.   Musculoskeletal:  Negative for back pain  and gait problem.   Skin:  Negative for rash.   Allergic/Immunologic: Negative for environmental allergies and food allergies.   Neurological:  Positive for weakness. Negative for dizziness, seizures, syncope and headaches.   Hematological:  Does not bruise/bleed easily.   Psychiatric/Behavioral:  Negative for behavioral problems.    All other systems reviewed and are negative.      Past Medical and Surgical History:     Past Medical History:   Diagnosis Date    A-fib (HCC)     History of angina     Prostate cancer (HCC)        Past Surgical History:   Procedure Laterality Date    CORONARY ARTERY BYPASS GRAFT  1995    TONSILLECTOMY         Meds/Allergies:    Prior to Admission medications    Medication Sig Start Date End Date Taking? Authorizing Provider   digoxin (LANOXIN) 0.25 mg tablet Take 125 mcg by mouth daily   Yes Historical Provider, MD   pantoprazole (PROTONIX) 40 mg tablet Take 40 mg by mouth daily   Yes Historical Provider, MD   alendronate (FOSAMAX) 70 mg tablet Take 1 tablet by mouth once a week    Historical Provider, MD   Calcium Carbonate 1500 (600 Ca) MG TABS Take 1,500 mg by mouth daily 1/26/23   Historical Provider, MD   clopidogrel (PLAVIX) 75 mg tablet Take 75 mg by mouth daily    Historical Provider, MD   dronedarone (Multaq) 400 mg tablet Take 400 mg by mouth 2 (two) times a day with meals    Historical Provider, MD   Ergocalciferol 50 MCG (2000 UT) TABS Take 2,000 Units by mouth daily    Historical Provider, MD   folic acid (FOLVITE) 1 mg tablet Take 1 tablet by mouth daily    Historical Provider, MD   meclizine (ANTIVERT) 25 mg tablet Take 1 tablet (25 mg total) by mouth every 8 (eight) hours as needed for dizziness 7/16/23   Ruben Toussaint,    Multiple Vitamins-Minerals (PreserVision AREDS 2) CAPS Take by mouth    Historical Provider, MD   Multiple Vitamins-Minerals (PreserVision AREDS) CAPS Take 1 capsule by mouth daily    Historical Provider, MD   Multiple Vitamins-Minerals (PX Mens  "Multivitamins) TABS Take 1 tablet by mouth daily    Historical Provider, MD   nitroglycerin (NITROSTAT) 0.4 mg SL tablet Place 0.4 mg under the tongue every 5 (five) minutes as needed for chest pain    Historical Provider, MD   primidone (MYSOLINE) 50 mg tablet Take 50 mg by mouth every 12 (twelve) hours    Historical Provider, MD     I have reviewed home medications with a medical source (PCP, Pharmacy, other).    Allergies: No Known Allergies    Social History:     Marital Status: Single     Social History     Substance and Sexual Activity   Alcohol Use Not Currently     Social History     Tobacco Use   Smoking Status Never   Smokeless Tobacco Never     Social History     Substance and Sexual Activity   Drug Use Not Currently       Family History:    Family History   Problem Relation Age of Onset    Hypertension Father        Physical Exam:     Vitals:   Blood Pressure: (!) 172/70 (12/13/23 1400)  Pulse: 67 (12/13/23 1400)  Temperature: (!) 97.4 °F (36.3 °C) (12/13/23 0909)  Temp Source: Temporal (12/13/23 0909)  Respirations: 16 (12/13/23 1400)  Height: 5' 11\" (180.3 cm) (12/13/23 0909)  Weight - Scale: 80.5 kg (177 lb 7.5 oz) (12/13/23 0909)  SpO2: 98 % (12/13/23 1400)    Physical Exam  Vitals and nursing note reviewed.   Constitutional:       Appearance: He is ill-appearing.   HENT:      Head: Normocephalic and atraumatic.      Right Ear: External ear normal.      Left Ear: External ear normal.      Nose: Nose normal.      Mouth/Throat:      Pharynx: Oropharynx is clear.   Eyes:      Pupils: Pupils are equal, round, and reactive to light.   Cardiovascular:      Rate and Rhythm: Normal rate and regular rhythm.      Heart sounds: Normal heart sounds.   Pulmonary:      Effort: Pulmonary effort is normal.      Breath sounds: Normal breath sounds.   Abdominal:      General: Bowel sounds are normal.      Palpations: Abdomen is soft.      Tenderness: There is abdominal tenderness.   Musculoskeletal:      Cervical " back: Normal range of motion and neck supple.      Right lower leg: No edema.      Left lower leg: No edema.   Skin:     General: Skin is warm and dry.      Capillary Refill: Capillary refill takes less than 2 seconds.   Neurological:      Mental Status: He is alert and oriented to person, place, and time. Mental status is at baseline.   Psychiatric:         Mood and Affect: Mood normal.           Additional Data:     Lab Results: I have personally reviewed pertinent reports.      Results from last 7 days   Lab Units 12/13/23  0928   WBC Thousand/uL 8.94   HEMOGLOBIN g/dL 10.3*   HEMATOCRIT % 30.1*   PLATELETS Thousands/uL 221   NEUTROS PCT % 76*   LYMPHS PCT % 10*   MONOS PCT % 12   EOS PCT % 1     Results from last 7 days   Lab Units 12/13/23  0928   SODIUM mmol/L 128*   POTASSIUM mmol/L 4.5   CHLORIDE mmol/L 98   CO2 mmol/L 25   BUN mg/dL 20   CREATININE mg/dL 0.94   ANION GAP mmol/L 5   CALCIUM mg/dL 8.4   ALBUMIN g/dL 3.9   TOTAL BILIRUBIN mg/dL 0.56   ALK PHOS U/L 62   ALT U/L 8   AST U/L 17   GLUCOSE RANDOM mg/dL 109                 Results from last 7 days   Lab Units 12/13/23  0939 12/13/23  0928   LACTIC ACID mmol/L 1.1  --    PROCALCITONIN ng/ml  --  <0.05       Imaging: I have personally reviewed pertinent reports.      CT abdomen pelvis w contrast   Final Result by Jorge Ardon MD (12/13 1122)      Trace basilar right lower lobe groundglass opacities suggestive of inflammatory or infectious bronchiolitis.      Abundant stool in the colon and rectal vault compatible with constipation in the appropriate clinical context. No bowel obstruction.      Minimal perirectal fat stranding suggestive of mild or early stercoral proctitis. No perirectal abscess.      Diffuse osseous sclerotic metastatic disease.      Additional chronic findings and negatives as above.      This study demonstrates a significant  finding and was documented as such in [facility] for liaison and referring practitioner  notification.      Workstation performed: [id number]             EKG, Pathology, and Other Studies Reviewed on Admission:   EKG: reviewed    Allscripts / Epic Records Reviewed: Yes     ** Please Note: This note has been constructed using a voice recognition system. **

## 2023-12-13 NOTE — ASSESSMENT & PLAN NOTE
Acute urinary retention secondary to severe constipation also known history of prostate cancer.  Ascencio catheter placed as patient bladder scan for 800 cc on presentation to ED and patient is feeling a little better now.  Also placed on Flomax and will do voiding trial in 2 to 3 days  After Ascencio catheter placed 1800 mL has come out so far

## 2023-12-13 NOTE — ASSESSMENT & PLAN NOTE
Further outpatient follow-up with urology.  Placed on Flomax for acute urinary retention hold Casodex while inpatient

## 2023-12-13 NOTE — ED PROVIDER NOTES
History  Chief Complaint   Patient presents with    Fall     Patient complains of urinary frequency over night with constipation, hemorrhoids. Skin tear to right leg per SNF. Takes plavix, no head strike.      89-year-old male via EMS accompanied by caregiver/power of  describes recent history of nausea and vomiting with some lower abdominal pain worse after MiraLAX this morning around 8 AM for constipation, fecal impaction per PA at assisted living and recent visit with gastroenterologist, Dr. Carter.  No chest pain or dyspnea.  No fever.  Also mentions urinary frequency is worse.      History provided by:  Patient  Medical Problem  Location:  Abdomen  Quality:  Constipation, diarrhea, urinary frequency  Severity:  Severe  Onset quality:  Gradual  Timing:  Constant  Progression:  Worsening  Chronicity:  Chronic  Relieved by:  Nothing  Worsened by:  Vomiting  Ineffective treatments:  Miralax  Associated symptoms: abdominal pain, diarrhea, nausea and vomiting    Associated symptoms: no chest pain, no fever and no shortness of breath        Prior to Admission Medications   Prescriptions Last Dose Informant Patient Reported? Taking?   Calcium Carbonate 1500 (600 Ca) MG TABS 12/13/2023  Yes Yes   Sig: Take 1,500 mg by mouth daily   Ergocalciferol 50 MCG (2000 UT) TABS 12/13/2023  Yes Yes   Sig: Take 2,000 Units by mouth daily   Multiple Vitamins-Minerals (PX Mens Multivitamins) TABS 12/13/2023  Yes Yes   Sig: Take 1 tablet by mouth daily   Multiple Vitamins-Minerals (PreserVision AREDS 2) CAPS 12/13/2023  Yes Yes   Sig: Take 1 capsule by mouth in the morning   Multiple Vitamins-Minerals (PreserVision AREDS) CAPS 12/13/2023  Yes Yes   Sig: Take 1 capsule by mouth daily   alendronate (FOSAMAX) 70 mg tablet Past Week  Yes Yes   Sig: Take 1 tablet by mouth once a week   clopidogrel (PLAVIX) 75 mg tablet 12/13/2023  Yes Yes   Sig: Take 75 mg by mouth daily   digoxin (LANOXIN) 0.25 mg tablet 12/13/2023  Yes Yes   Sig:  Take 125 mcg by mouth daily   dronedarone (Multaq) 400 mg tablet 12/13/2023  Yes Yes   Sig: Take 400 mg by mouth 2 (two) times a day with meals   folic acid (FOLVITE) 1 mg tablet 12/13/2023  Yes Yes   Sig: Take 1 tablet by mouth daily   meclizine (ANTIVERT) 25 mg tablet Unknown  No No   Sig: Take 1 tablet (25 mg total) by mouth every 8 (eight) hours as needed for dizziness   nitroglycerin (NITROSTAT) 0.4 mg SL tablet Unknown  Yes No   Sig: Place 0.4 mg under the tongue every 5 (five) minutes as needed for chest pain   pantoprazole (PROTONIX) 40 mg tablet 12/13/2023  Yes Yes   Sig: Take 40 mg by mouth daily   primidone (MYSOLINE) 50 mg tablet 12/13/2023  Yes Yes   Sig: Take 50 mg by mouth every 12 (twelve) hours      Facility-Administered Medications: None       Past Medical History:   Diagnosis Date    A-fib (HCC)     History of angina     Prostate cancer (HCC)        Past Surgical History:   Procedure Laterality Date    CORONARY ARTERY BYPASS GRAFT  1995    TONSILLECTOMY         Family History   Problem Relation Age of Onset    Hypertension Father      I have reviewed and agree with the history as documented.    E-Cigarette/Vaping    E-Cigarette Use Never User      E-Cigarette/Vaping Substances     Social History     Tobacco Use    Smoking status: Never    Smokeless tobacco: Never   Vaping Use    Vaping status: Never Used   Substance Use Topics    Alcohol use: Not Currently    Drug use: Not Currently       Review of Systems   Constitutional:  Negative for fever.   Respiratory:  Negative for shortness of breath.    Cardiovascular:  Negative for chest pain.   Gastrointestinal:  Positive for abdominal pain, diarrhea, nausea and vomiting.   All other systems reviewed and are negative.      Physical Exam  Physical Exam  Vitals and nursing note reviewed.   Constitutional:       General: He is in acute distress.      Comments: Pleasant, uncomfortable-appearing, actively vomiting   HENT:      Head: Normocephalic and  atraumatic.      Mouth/Throat:      Mouth: Mucous membranes are moist.      Pharynx: Oropharynx is clear.   Eyes:      Conjunctiva/sclera: Conjunctivae normal.      Pupils: Pupils are equal, round, and reactive to light.   Cardiovascular:      Rate and Rhythm: Normal rate and regular rhythm.      Heart sounds: Normal heart sounds.   Pulmonary:      Effort: Pulmonary effort is normal.      Breath sounds: Normal breath sounds.   Abdominal:      General: Bowel sounds are normal. There is distension.      Palpations: Abdomen is soft.      Tenderness: There is abdominal tenderness. There is no right CVA tenderness or left CVA tenderness.      Comments: Suprapubic tenderness, brown stool at rectum, diaper   Musculoskeletal:         General: No deformity.      Cervical back: Neck supple.      Right lower leg: No edema.      Left lower leg: No edema.   Skin:     General: Skin is warm and dry.      Findings: No rash.   Neurological:      General: No focal deficit present.      Mental Status: He is alert and oriented to person, place, and time.      Cranial Nerves: No cranial nerve deficit.      Coordination: Coordination normal.   Psychiatric:         Behavior: Behavior normal.         Thought Content: Thought content normal.         Judgment: Judgment normal.         Vital Signs  ED Triage Vitals   Temperature Pulse Respirations Blood Pressure SpO2   12/13/23 0909 12/13/23 0909 12/13/23 0909 12/13/23 0909 12/13/23 0909   (!) 97.4 °F (36.3 °C) 85 16 149/69 97 %      Temp Source Heart Rate Source Patient Position - Orthostatic VS BP Location FiO2 (%)   12/13/23 0909 12/13/23 0909 12/13/23 0909 12/13/23 0909 --   Temporal Monitor Lying Right arm       Pain Score       12/13/23 1453       No Pain           Vitals:    12/15/23 2213 12/16/23 0604 12/16/23 0908 12/16/23 1139   BP: (!) 115/43 (!) 118/43  137/53   Pulse: 57  68 65   Patient Position - Orthostatic VS:             Visual Acuity  Visual Acuity      Flowsheet Row Most  Recent Value   L Pupil Size (mm) 3   R Pupil Size (mm) 3            ED Medications  Medications   clopidogrel (PLAVIX) tablet 75 mg (75 mg Oral Given 12/16/23 0908)   digoxin (LANOXIN) tablet 125 mcg (125 mcg Oral Given 12/16/23 0908)   dronedarone (MULTAQ) tablet 400 mg (400 mg Oral Given 12/16/23 0914)   folic acid (FOLVITE) tablet 1,000 mcg (1,000 mcg Oral Given 12/16/23 0909)   pantoprazole (PROTONIX) EC tablet 40 mg (40 mg Oral Given 12/16/23 0909)   primidone (MYSOLINE) tablet 50 mg (50 mg Oral Given 12/16/23 0908)   acetaminophen (TYLENOL) tablet 650 mg (has no administration in time range)   polyethylene glycol (MIRALAX) packet 17 g (17 g Oral Given 12/16/23 0914)   ondansetron (ZOFRAN) injection 4 mg (has no administration in time range)   tamsulosin (FLOMAX) capsule 0.4 mg (0.4 mg Oral Given 12/15/23 1705)   hydrocortisone (ANUSOL-HC) 2.5 % rectal cream (has no administration in time range)   glycerin-hypromellose- (ARTIFICIAL TEARS) ophthalmic solution 1 drop (has no administration in time range)   cyanocobalamin injection 1,000 mcg (1,000 mcg Subcutaneous Given 12/16/23 0909)   iron sucrose (VENOFER) 100 mg in sodium chloride 0.9 % 100 mL IVPB (100 mg Intravenous New Bag 12/15/23 1824)   cyanocobalamin (VITAMIN B-12) tablet 1,000 mcg (1,000 mcg Oral Given 12/16/23 1156)   midodrine (PROAMATINE) tablet 2.5 mg (has no administration in time range)   senna-docusate sodium (SENOKOT S) 8.6-50 mg per tablet 1 tablet (has no administration in time range)   bisacodyl (DULCOLAX) rectal suppository 10 mg (has no administration in time range)   ondansetron (ZOFRAN) injection 4 mg (4 mg Intravenous Given 12/13/23 0935)   sodium chloride 0.9 % bolus 1,000 mL (0 mL Intravenous Stopped 12/13/23 1328)   lidocaine (URO-JET) 2 % urethral/mucosal gel 1 Application (1 Application Urethral Given 12/13/23 1000)   iohexol (OMNIPAQUE) 350 MG/ML injection (MULTI-DOSE) 100 mL (100 mL Intravenous Given 12/13/23 1020)        Diagnostic Studies  Results Reviewed       Procedure Component Value Units Date/Time    Blood culture #1 [[id number]] Collected: 12/13/23 0942    Lab Status: Preliminary result Specimen: Blood from Arm, Right Updated: 12/16/23 1301     Blood Culture No Growth at 72 hrs.    Blood culture #2 [[id number]] Collected: 12/13/23 0934    Lab Status: Preliminary result Specimen: Blood from Arm, Left Updated: 12/16/23 1301     Blood Culture No Growth at 72 hrs.    TSH, 3rd generation [[id number]]  (Abnormal) Collected: 12/14/23 0546    Lab Status: Final result Specimen: Blood from Arm, Right Updated: 12/14/23 0645     TSH 3RD GENERATON 4.950 uIU/mL     Comprehensive metabolic panel [[id number]]  (Abnormal) Collected: 12/14/23 0546    Lab Status: Final result Specimen: Blood from Arm, Right Updated: 12/14/23 0624     Sodium 133 mmol/L      Potassium 4.3 mmol/L      Chloride 105 mmol/L      CO2 25 mmol/L      ANION GAP 3 mmol/L      BUN 15 mg/dL      Creatinine 0.94 mg/dL      Glucose 89 mg/dL      Calcium 7.7 mg/dL      Corrected Calcium 8.3 mg/dL      AST 15 U/L      ALT 7 U/L      Alkaline Phosphatase 50 U/L      Total Protein 5.3 g/dL      Albumin 3.2 g/dL      Total Bilirubin 0.46 mg/dL      eGFR 68 ml/min/1.73sq m     Narrative:      National Kidney Disease Foundation guidelines for Chronic Kidney Disease (CKD):     Stage 1 with normal or high GFR (GFR > 90 mL/min/1.73 square meters)    Stage 2 Mild CKD (GFR = 60-89 mL/min/1.73 square meters)    Stage 3A Moderate CKD (GFR = 45-59 mL/min/1.73 square meters)    Stage 3B Moderate CKD (GFR = 30-44 mL/min/1.73 square meters)    Stage 4 Severe CKD (GFR = 15-29 mL/min/1.73 square meters)    Stage 5 End Stage CKD (GFR <15 mL/min/1.73 square meters)  Note: GFR calculation is accurate only with a steady state creatinine    CBC (With Platelets) [[id number]]  (Abnormal) Collected: 12/14/23 0546    Lab Status: Final result Specimen: Blood from Arm, Right Updated: 12/14/23 0602      WBC 6.30 Thousand/uL      RBC 2.73 Million/uL      Hemoglobin 8.7 g/dL      Hematocrit 26.3 %      MCV 96 fL      MCH 31.9 pg      MCHC 33.1 g/dL      RDW 14.6 %      Platelets 173 Thousands/uL      MPV 8.4 fL     UA w Reflex to Microscopic w Reflex to Culture [[id number]] Collected: 12/13/23 1000    Lab Status: Final result Specimen: Urine, Indwelling Ascencio Catheter Updated: 12/13/23 1008     Color, UA Yellow     Clarity, UA Clear     Specific Gravity, UA 1.020     pH, UA 7.0     Leukocytes, UA Negative     Nitrite, UA Negative     Protein, UA Negative mg/dl      Glucose, UA Negative mg/dl      Ketones, UA Negative mg/dl      Urobilinogen, UA 0.2 E.U./dl      Bilirubin, UA Negative     Occult Blood, UA Negative    Procalcitonin [[id number]]  (Normal) Collected: 12/13/23 0928    Lab Status: Final result Specimen: Blood from Arm, Right Updated: 12/13/23 1003     Procalcitonin <0.05 ng/ml     Comprehensive metabolic panel [[id number]]  (Abnormal) Collected: 12/13/23 0928    Lab Status: Final result Specimen: Blood from Arm, Right Updated: 12/13/23 0953     Sodium 128 mmol/L      Potassium 4.5 mmol/L      Chloride 98 mmol/L      CO2 25 mmol/L      ANION GAP 5 mmol/L      BUN 20 mg/dL      Creatinine 0.94 mg/dL      Glucose 109 mg/dL      Calcium 8.4 mg/dL      AST 17 U/L      ALT 8 U/L      Alkaline Phosphatase 62 U/L      Total Protein 6.7 g/dL      Albumin 3.9 g/dL      Total Bilirubin 0.56 mg/dL      eGFR 68 ml/min/1.73sq m     Narrative:      National Kidney Disease Foundation guidelines for Chronic Kidney Disease (CKD):     Stage 1 with normal or high GFR (GFR > 90 mL/min/1.73 square meters)    Stage 2 Mild CKD (GFR = 60-89 mL/min/1.73 square meters)    Stage 3A Moderate CKD (GFR = 45-59 mL/min/1.73 square meters)    Stage 3B Moderate CKD (GFR = 30-44 mL/min/1.73 square meters)    Stage 4 Severe CKD (GFR = 15-29 mL/min/1.73 square meters)    Stage 5 End Stage CKD (GFR <15 mL/min/1.73 square meters)  Note: GFR  calculation is accurate only with a steady state creatinine    Lactic acid, plasma (w/reflex if result > 2.0) [[id number]]  (Normal) Collected: 12/13/23 0939    Lab Status: Final result Specimen: Blood Updated: 12/13/23 0953     LACTIC ACID 1.1 mmol/L     Narrative:      Result may be elevated if tourniquet was used during collection.    CBC and differential [[id number]]  (Abnormal) Collected: 12/13/23 0928    Lab Status: Final result Specimen: Blood from Arm, Right Updated: 12/13/23 0937     WBC 8.94 Thousand/uL      RBC 3.22 Million/uL      Hemoglobin 10.3 g/dL      Hematocrit 30.1 %      MCV 94 fL      MCH 32.0 pg      MCHC 34.2 g/dL      RDW 14.5 %      MPV 8.3 fL      Platelets 221 Thousands/uL      nRBC 0 /100 WBCs      Neutrophils Relative 76 %      Immat GRANS % 1 %      Lymphocytes Relative 10 %      Monocytes Relative 12 %      Eosinophils Relative 1 %      Basophils Relative 0 %      Neutrophils Absolute 6.76 Thousands/µL      Immature Grans Absolute 0.06 Thousand/uL      Lymphocytes Absolute 0.92 Thousands/µL      Monocytes Absolute 1.10 Thousand/µL      Eosinophils Absolute 0.07 Thousand/µL      Basophils Absolute 0.03 Thousands/µL                    CT abdomen pelvis w contrast   Final Result by Jorge Ardon MD (12/13 1122)      Trace basilar right lower lobe groundglass opacities suggestive of inflammatory or infectious bronchiolitis.      Abundant stool in the colon and rectal vault compatible with constipation in the appropriate clinical context. No bowel obstruction.      Minimal perirectal fat stranding suggestive of mild or early stercoral proctitis. No perirectal abscess.      Diffuse osseous sclerotic metastatic disease.      Additional chronic findings and negatives as above.      This study demonstrates a significant  finding and was documented as such in [facility] for liaison and referring practitioner notification.      Workstation performed: [id number]                     Procedures  Procedures         ED Course  ED Course as of 12/16/23 1506   Wed Dec 13, 2023   0939 Bladder scan over 800 cc, likely constipation type changes impeding urine output and urinary catheter required   1032 LACTIC ACID: 1.1   1032 Leukocytes, UA: Negative   1032 Nitrite, UA: Negative   1032 Procalcitonin: <0.05   1032 Sodium(!): 128   1617 Nausea and comfort improved, abdomen benign, Ascencio output over 1 L.  Patient and caregiver agreeable with inpatient care                                SBIRT 22yo+      Flowsheet Row Most Recent Value   Initial Alcohol Screen: US AUDIT-C     1. How often do you have a drink containing alcohol? 0 Filed at: 12/13/2023 1327   2. How many drinks containing alcohol do you have on a typical day you are drinking?  0 Filed at: 12/13/2023 1327   3a. Male UNDER 65: How often do you have five or more drinks on one occasion? 0 Filed at: 12/13/2023 0911   3b. FEMALE Any Age, or MALE 65+: How often do you have 4 or more drinks on one occassion? 0 Filed at: 12/13/2023 1327   Audit-C Score 0 Filed at: 12/13/2023 1327   AMBAR: How many times in the past year have you...    Used an illegal drug or used a prescription medication for non-medical reasons? Never Filed at: 12/13/2023 1327                      Medical Decision Making  Amount and/or Complexity of Data Reviewed  Labs: ordered. Decision-making details documented in ED Course.  Radiology: ordered. Decision-making details documented in ED Course.  ECG/medicine tests: ordered and independent interpretation performed. Decision-making details documented in ED Course.    Risk  Prescription drug management.  Decision regarding hospitalization.             Disposition  Final diagnoses:   Abdominal pain, vomiting, and diarrhea   Fecal impaction (HCC)   Hyponatremia   Prostate cancer (HCC)     Time reflects when diagnosis was documented in both MDM as applicable and the Disposition within this note       Time User Action Codes  Description Comment    12/13/2023 11:52 Bret Burden Add [R10.9,  R11.10,  R19.7] Abdominal pain, vomiting, and diarrhea     12/13/2023 11:53 Bret Burden Add [K56.41] Fecal impaction (HCC)     12/13/2023 11:53 Bret Burden Add [E87.1] Hyponatremia     12/13/2023 11:53 Bret Burden Add [C61] Prostate cancer (HCC)           ED Disposition       ED Disposition   Admit    Condition   Stable    Date/Time   Wed Dec 13, 2023 1203    Comment   Case was discussed with Zarina and the patient's admission status was agreed to be Admission Status: inpatient status to the service of Dr. Martinez.                 Follow-up Information    None         Current Discharge Medication List        CONTINUE these medications which have NOT CHANGED    Details   alendronate (FOSAMAX) 70 mg tablet Take 1 tablet by mouth once a week      Calcium Carbonate 1500 (600 Ca) MG TABS Take 1,500 mg by mouth daily      clopidogrel (PLAVIX) 75 mg tablet Take 75 mg by mouth daily      digoxin (LANOXIN) 0.25 mg tablet Take 125 mcg by mouth daily      dronedarone (Multaq) 400 mg tablet Take 400 mg by mouth 2 (two) times a day with meals      Ergocalciferol 50 MCG (2000 UT) TABS Take 2,000 Units by mouth daily      folic acid (FOLVITE) 1 mg tablet Take 1 tablet by mouth daily      !! Multiple Vitamins-Minerals (PreserVision AREDS 2) CAPS Take 1 capsule by mouth in the morning      !! Multiple Vitamins-Minerals (PreserVision AREDS) CAPS Take 1 capsule by mouth daily      Multiple Vitamins-Minerals (PX Mens Multivitamins) TABS Take 1 tablet by mouth daily      pantoprazole (PROTONIX) 40 mg tablet Take 40 mg by mouth daily      primidone (MYSOLINE) 50 mg tablet Take 50 mg by mouth every 12 (twelve) hours      meclizine (ANTIVERT) 25 mg tablet Take 1 tablet (25 mg total) by mouth every 8 (eight) hours as needed for dizziness  Qty: 30 tablet, Refills: 0    Associated Diagnoses: Vertigo      nitroglycerin (NITROSTAT) 0.4 mg SL tablet  Place 0.4 mg under the tongue every 5 (five) minutes as needed for chest pain       !! - Potential duplicate medications found. Please discuss with provider.          No discharge procedures on file.    PDMP Review       None            ED Provider  Electronically Signed by             Bret Lawrence DO  12/16/23 5967

## 2023-12-13 NOTE — ASSESSMENT & PLAN NOTE
secondary to urinary retention decreased appetite and constipation.  Will place on gentle hydration and recheck BMP tomorrow

## 2023-12-13 NOTE — PLAN OF CARE
Problem: Potential for Falls  Goal: Patient will remain free of falls  Description: INTERVENTIONS:  - Educate patient/family on patient safety including physical limitations  - Instruct patient to call for assistance with activity   - Consult OT/PT to assist with strengthening/mobility   - Keep Call bell within reach  - Keep bed low and locked with side rails adjusted as appropriate  - Keep care items and personal belongings within reach  - Initiate and maintain comfort rounds  - Make Fall Risk Sign visible to staff  - Offer Toileting every 3 Hours, in advance of need  - Initiate/Maintain bedalarm  - Obtain necessary fall risk management equipment: walker  - Apply yellow socks and bracelet for high fall risk patients  - Consider moving patient to room near nurses station  Outcome: Progressing     Problem: PAIN - ADULT  Goal: Verbalizes/displays adequate comfort level or baseline comfort level  Description: Interventions:  - Encourage patient to monitor pain and request assistance  - Assess pain using appropriate pain scale  - Administer analgesics based on type and severity of pain and evaluate response  - Implement non-pharmacological measures as appropriate and evaluate response  - Consider cultural and social influences on pain and pain management  - Notify physician/advanced practitioner if interventions unsuccessful or patient reports new pain  Outcome: Progressing     Problem: INFECTION - ADULT  Goal: Absence or prevention of progression during hospitalization  Description: INTERVENTIONS:  - Assess and monitor for signs and symptoms of infection  - Monitor lab/diagnostic results  - Monitor all insertion sites, i.e. indwelling lines, tubes, and drains  - Monitor endotracheal if appropriate and nasal secretions for changes in amount and color  - [place] appropriate cooling/warming therapies per order  - Administer medications as ordered  - Instruct and encourage patient and family to use good hand hygiene  technique  - Identify and instruct in appropriate isolation precautions for identified infection/condition  Outcome: Progressing     Problem: SAFETY ADULT  Goal: Patient will remain free of falls  Description: INTERVENTIONS:  - Educate patient/family on patient safety including physical limitations  - Instruct patient to call for assistance with activity   - Consult OT/PT to assist with strengthening/mobility   - Keep Call bell within reach  - Keep bed low and locked with side rails adjusted as appropriate  - Keep care items and personal belongings within reach  - Initiate and maintain comfort rounds  - Make Fall Risk Sign visible to staff  - Offer Toileting every 3 Hours, in advance of need  - Initiate/Maintain bedalarm  - Obtain necessary fall risk management equipment: walker  - Apply yellow socks and bracelet for high fall risk patients  - Consider moving patient to room near nurses station  Outcome: Progressing  Goal: Maintain or return to baseline ADL function  Description: INTERVENTIONS:  -  Assess patient's ability to carry out ADLs; assess patient's baseline for ADL function and identify physical deficits which impact ability to perform ADLs (bathing, care of mouth/teeth, toileting, grooming, dressing, etc.)  - Assess/evaluate cause of self-care deficits   - Assess range of motion  - Assess patient's mobility; develop plan if impaired  - Assess patient's need for assistive devices and provide as appropriate  - Encourage maximum independence but intervene and supervise when necessary  - Involve family in performance of ADLs  - Assess for home care needs following discharge   - Consider OT consult to assist with ADL evaluation and planning for discharge  - Provide patient education as appropriate  Outcome: Progressing  Goal: Maintains/Returns to pre admission functional level  Description: INTERVENTIONS:  - Perform AM-PAC 6 Click Basic Mobility/ Daily Activity assessment daily.  - Set and communicate daily  mobility goal to care team and patient/family/caregiver.   - Collaborate with rehabilitation services on mobility goals if consulted  - Perform Range of Motion 3 times a day.  - Reposition patient every 3 hours.  - Dangle patient 3 times a day  - Stand patient 3 times a day  - Ambulate patient 3 times a day  - Out of bed to chair 3 times a day   - Out of bed for meals 3 times a day  - Out of bed for toileting  - Record patient progress and toleration of activity level   Outcome: Progressing     Problem: DISCHARGE PLANNING  Goal: Discharge to home or other facility with appropriate resources  Description: INTERVENTIONS:  - Identify barriers to discharge w/patient and caregiver  - Arrange for needed discharge resources and transportation as appropriate  - Identify discharge learning needs (meds, wound care, etc.)  - Arrange for interpretive services to assist at discharge as needed  - Refer to Case Management Department for coordinating discharge planning if the patient needs post-hospital services based on physician/advanced practitioner order or complex needs related to functional status, cognitive ability, or social support system  Outcome: Progressing     Problem: Knowledge Deficit  Goal: Patient/family/caregiver demonstrates understanding of disease process, treatment plan, medications, and discharge instructions  Description: Complete learning assessment and assess knowledge base.  Interventions:  - Provide teaching at level of understanding  - Provide teaching via preferred learning methods  Outcome: Progressing     Problem: GASTROINTESTINAL - ADULT  Goal: Minimal or absence of nausea and/or vomiting  Description: INTERVENTIONS:  - Administer IV fluids if ordered to ensure adequate hydration  - Maintain NPO status until nausea and vomiting are resolved  - Nasogastric tube if ordered  - Administer ordered antiemetic medications as needed  - Provide nonpharmacologic comfort measures as appropriate  - Advance diet  as tolerated, if ordered  - Consider nutrition services referral to assist patient with adequate nutrition and appropriate food choices  Outcome: Progressing  Goal: Maintains or returns to baseline bowel function  Description: INTERVENTIONS:  - Assess bowel function  - Encourage oral fluids to ensure adequate hydration  - Administer IV fluids if ordered to ensure adequate hydration  - Administer ordered medications as needed  - Encourage mobilization and activity  - Consider nutritional services referral to assist patient with adequate nutrition and appropriate food choices  Outcome: Progressing  Goal: Maintains adequate nutritional intake  Description: INTERVENTIONS:  - Monitor percentage of each meal consumed  - Identify factors contributing to decreased intake, treat as appropriate  - Assist with meals as needed  - Monitor I&O, weight, and lab values if indicated  - Obtain nutrition services referral as needed  Outcome: Progressing  Goal: Oral mucous membranes remain intact  Description: INTERVENTIONS  - Assess oral mucosa and hygiene practices  - Implement preventative oral hygiene regimen  - Implement oral medicated treatments as ordered  - Initiate Nutrition services referral as needed  Outcome: Progressing

## 2023-12-13 NOTE — ASSESSMENT & PLAN NOTE
Patient presents with symptoms of constipation for the last few days.  He saw GI outpatient.  He was started on MiraLAX patient states he went 26 times to the bathroom yesterday had very small amounts of stool and felt like he just could not empty out his bowel movement.  Also had a fall yesterday.  Ct abd pelvis shows Abundant stool in the colon and rectal vault compatible with constipation in the appropriate clinical context. No bowel obstruction.     Minimal perirectal fat stranding suggestive of mild or early stercoral proctitis. No perirectal abscess.    Placed on MiraLAX daily and placed on Dulcolax suppository x 2 daily and also placed 3 enemas for today.  Patient has a large stool burden and will need to evacuate him.  No need for antibiotics at this time

## 2023-12-14 PROBLEM — D64.9 ACUTE ON CHRONIC ANEMIA: Status: ACTIVE | Noted: 2023-12-14

## 2023-12-14 LAB
ALBUMIN SERPL BCP-MCNC: 3.2 G/DL (ref 3.5–5)
ALP SERPL-CCNC: 50 U/L (ref 34–104)
ALT SERPL W P-5'-P-CCNC: 7 U/L (ref 7–52)
ANION GAP SERPL CALCULATED.3IONS-SCNC: 3 MMOL/L
AST SERPL W P-5'-P-CCNC: 15 U/L (ref 13–39)
BILIRUB SERPL-MCNC: 0.46 MG/DL (ref 0.2–1)
BUN SERPL-MCNC: 15 MG/DL (ref 5–25)
CALCIUM ALBUM COR SERPL-MCNC: 8.3 MG/DL (ref 8.3–10.1)
CALCIUM SERPL-MCNC: 7.7 MG/DL (ref 8.4–10.2)
CHLORIDE SERPL-SCNC: 105 MMOL/L (ref 96–108)
CO2 SERPL-SCNC: 25 MMOL/L (ref 21–32)
CREAT SERPL-MCNC: 0.94 MG/DL (ref 0.6–1.3)
ERYTHROCYTE [DISTWIDTH] IN BLOOD BY AUTOMATED COUNT: 14.6 % (ref 11.6–15.1)
FERRITIN SERPL-MCNC: 163 NG/ML (ref 24–336)
GFR SERPL CREATININE-BSD FRML MDRD: 68 ML/MIN/1.73SQ M
GLUCOSE SERPL-MCNC: 89 MG/DL (ref 65–140)
HCT VFR BLD AUTO: 26.3 % (ref 36.5–49.3)
HGB BLD-MCNC: 8.7 G/DL (ref 12–17)
IRON SERPL-MCNC: 22 UG/DL (ref 50–212)
MCH RBC QN AUTO: 31.9 PG (ref 26.8–34.3)
MCHC RBC AUTO-ENTMCNC: 33.1 G/DL (ref 31.4–37.4)
MCV RBC AUTO: 96 FL (ref 82–98)
PLATELET # BLD AUTO: 173 THOUSANDS/UL (ref 149–390)
PMV BLD AUTO: 8.4 FL (ref 8.9–12.7)
POTASSIUM SERPL-SCNC: 4.3 MMOL/L (ref 3.5–5.3)
PROT SERPL-MCNC: 5.3 G/DL (ref 6.4–8.4)
RBC # BLD AUTO: 2.73 MILLION/UL (ref 3.88–5.62)
SODIUM SERPL-SCNC: 133 MMOL/L (ref 135–147)
TIBC SERPL-MCNC: <77 UG/DL (ref 250–450)
TSH SERPL DL<=0.05 MIU/L-ACNC: 4.95 UIU/ML (ref 0.45–4.5)
UIBC SERPL-MCNC: <55 UG/DL (ref 155–355)
VIT B12 SERPL-MCNC: 233 PG/ML (ref 180–914)
WBC # BLD AUTO: 6.3 THOUSAND/UL (ref 4.31–10.16)

## 2023-12-14 PROCEDURE — 83550 IRON BINDING TEST: CPT | Performed by: FAMILY MEDICINE

## 2023-12-14 PROCEDURE — 83540 ASSAY OF IRON: CPT | Performed by: FAMILY MEDICINE

## 2023-12-14 PROCEDURE — 97167 OT EVAL HIGH COMPLEX 60 MIN: CPT

## 2023-12-14 PROCEDURE — 80053 COMPREHEN METABOLIC PANEL: CPT | Performed by: FAMILY MEDICINE

## 2023-12-14 PROCEDURE — 84443 ASSAY THYROID STIM HORMONE: CPT | Performed by: FAMILY MEDICINE

## 2023-12-14 PROCEDURE — 99232 SBSQ HOSP IP/OBS MODERATE 35: CPT | Performed by: FAMILY MEDICINE

## 2023-12-14 PROCEDURE — 97535 SELF CARE MNGMENT TRAINING: CPT

## 2023-12-14 PROCEDURE — 82607 VITAMIN B-12: CPT | Performed by: FAMILY MEDICINE

## 2023-12-14 PROCEDURE — 97116 GAIT TRAINING THERAPY: CPT

## 2023-12-14 PROCEDURE — 85027 COMPLETE CBC AUTOMATED: CPT | Performed by: FAMILY MEDICINE

## 2023-12-14 PROCEDURE — 82728 ASSAY OF FERRITIN: CPT | Performed by: FAMILY MEDICINE

## 2023-12-14 PROCEDURE — 97163 PT EVAL HIGH COMPLEX 45 MIN: CPT

## 2023-12-14 RX ADMIN — SENNOSIDES 8.6 MG: 8.6 TABLET ORAL at 08:27

## 2023-12-14 RX ADMIN — BISACODYL 10 MG: 10 SUPPOSITORY RECTAL at 18:10

## 2023-12-14 RX ADMIN — POLYETHYLENE GLYCOL 3350 17 G: 17 POWDER, FOR SOLUTION ORAL at 08:27

## 2023-12-14 RX ADMIN — TAMSULOSIN HYDROCHLORIDE 0.4 MG: 0.4 CAPSULE ORAL at 17:11

## 2023-12-14 RX ADMIN — CLOPIDOGREL 75 MG: 75 TABLET ORAL at 08:27

## 2023-12-14 RX ADMIN — ENOXAPARIN SODIUM 30 MG: 30 INJECTION SUBCUTANEOUS at 08:27

## 2023-12-14 RX ADMIN — PRIMIDONE 50 MG: 50 TABLET ORAL at 08:27

## 2023-12-14 RX ADMIN — DRONEDARONE 400 MG: 400 TABLET, FILM COATED ORAL at 17:11

## 2023-12-14 RX ADMIN — FOLIC ACID 1000 MCG: 1 TABLET ORAL at 08:27

## 2023-12-14 RX ADMIN — BISACODYL 10 MG: 10 SUPPOSITORY RECTAL at 08:27

## 2023-12-14 RX ADMIN — PANTOPRAZOLE SODIUM 40 MG: 40 TABLET, DELAYED RELEASE ORAL at 08:27

## 2023-12-14 RX ADMIN — DRONEDARONE 400 MG: 400 TABLET, FILM COATED ORAL at 09:15

## 2023-12-14 RX ADMIN — PRIMIDONE 50 MG: 50 TABLET ORAL at 20:21

## 2023-12-14 RX ADMIN — DIGOXIN 125 MCG: 125 TABLET ORAL at 08:27

## 2023-12-14 RX ADMIN — SENNOSIDES 8.6 MG: 8.6 TABLET ORAL at 17:11

## 2023-12-14 NOTE — PLAN OF CARE
Problem: GASTROINTESTINAL - ADULT  Goal: Maintains or returns to baseline bowel function  Description: INTERVENTIONS:  - Assess bowel function  - Encourage oral fluids to ensure adequate hydration  - Administer IV fluids if ordered to ensure adequate hydration  - Administer ordered medications as needed  - Encourage mobilization and activity  - Consider nutritional services referral to assist patient with adequate nutrition and appropriate food choices  Outcome: Progressing

## 2023-12-14 NOTE — PLAN OF CARE
Problem: PHYSICAL THERAPY ADULT  Goal: Performs mobility at highest level of function for planned discharge setting.  See evaluation for individualized goals.  Description: Treatment/Interventions: ADL retraining, Functional transfer training, LE strengthening/ROM, Elevations, Therapeutic exercise, Endurance training, Patient/family training, Equipment eval/education, Bed mobility, Gait training, Compensatory technique education, Spoke to nursing, OT          See flowsheet documentation for full assessment, interventions and recommendations.  Note: Prognosis: Good  Problem List: Decreased strength, Decreased endurance, Impaired balance, Decreased mobility  Assessment: Pt is a 89 y.o. male seen for PT evaluation s/p admission to [facility] on 12/13/2023 with Stercoral colitis.  Order placed for PT services.  Upon evaluation: Pt is presenting with impaired functional mobility due to decreased strength, decreased endurance, impaired balance, gait deviations, and fall risk requiring  steadying - min assistance for transfers and ambulation with RW . Pt's clinical presentation is currently unstable/unpredictable given the functional mobility deficits above, especially gait deviations and decreased activity tolerance, coupled with fall risks as indicated by AM-PAC 6-Clicks: 18/24 as well as hx of falls and impaired balance and combined with medical complications of abnormal H&H, abnormal CBC, abnormal sodium values, and need for input for mobility technique/safety.  Pt's PMHx and comorbidities that may affect physical performance and progress include: A fib and cancer history and/or treatment. Pt will benefit from continued skilled PT services to address deficits as defined above and to maximize level of functional mobility to facilitate return toward PLOF and improved QOL. From PT/mobility standpoint, recommendation at time of d/c would be Level III (Minimum Resource Intensity) and with walker pending  progress in order to reduce fall risk and maximize pt's functional independence and consistency with mobility in order to facilitate return to PLOF.  Recommend trial with walker next 1-2 sessions and ther ex next 1-2 sessions.  Barriers to Discharge: Other (Comment) (Fall risk, recent fall)     Rehab Resource Intensity Level, PT: III (Minimum Resource Intensity)    See flowsheet documentation for full assessment.

## 2023-12-14 NOTE — ASSESSMENT & PLAN NOTE
Acute urinary retention secondary to severe constipation also known history of prostate cancer.  Zuniga catheter placed as patient bladder scan for 800 cc on presentation to ED and patient is feeling a little better now.  1800 ml removed in zuniga.Also placed on Flomax and will do voiding trial tomorrow.

## 2023-12-14 NOTE — PLAN OF CARE
Problem: Potential for Falls  Goal: Patient will remain free of falls  Description: INTERVENTIONS:  - Educate patient/family on patient safety including physical limitations  - Instruct patient to call for assistance with activity   - Consult OT/PT to assist with strengthening/mobility   - Keep Call bell within reach  - Keep bed low and locked with side rails adjusted as appropriate  - Keep care items and personal belongings within reach  - Initiate and maintain comfort rounds  - Make Fall Risk Sign visible to staff  - Offer Toileting every 2 Hours, in advance of need  - Initiate/Maintain 2alarm  - Obtain necessary fall risk management equipment: 2  - Apply yellow socks and bracelet for high fall risk patients  - Consider moving patient to room near nurses station  Outcome: Progressing

## 2023-12-14 NOTE — PHYSICAL THERAPY NOTE
PHYSICAL THERAPY EVALUATION    NAME:  Miguel Angel Ortega  DATE: 12/14/23    AGE:   89 y.o.  Mrn:   [id number]  ADMIT DX:  Fecal impaction (HCC) [K56.41]  Hyponatremia [E87.1]  Prostate cancer (HCC) [C61]  Abdominal pain, vomiting, and diarrhea [R10.9, R11.10, R19.7]    Past Medical History:   Diagnosis Date    A-fib (HCC)     History of angina     Prostate cancer (HCC)      Length Of Stay: 1  Performed at least 2 patient identifiers during session: Name and Birthday  PHYSICAL THERAPY EVALUATION:       12/14/23 1017   PT Last Visit   PT Visit Date 12/14/23   Note Type   Note type Evaluation   Pain Assessment   Pain Assessment Tool 0-10   Pain Score No Pain   Restrictions/Precautions   Weight Bearing Precautions Per Order No   Other Precautions Fall Risk;Multiple lines;Chair Alarm;Bed Alarm   Home Living   Type of Home Assisted living   Home Layout One level;Performs ADLs on one level;Able to live on main level with bedroom/bathroom   Bathroom Shower/Tub Walk-in shower   Bathroom Toilet Standard   Bathroom Equipment Grab bars around toilet;Grab bars in shower;Shower chair   Bathroom Accessibility Accessible via wheelchair   Home Equipment Wheelchair-manual;Other (Comment)  (rollator)   Prior Function   Level of [place] Needs assistance with ADLs;Needs assistance with IADLS;Modified independent with wheelchair;Independent with functional mobility  (Needs assist with donning/doffing shoes, getting clothing set up)   Lives With Facility staff   Receives Help From Personal care attendant   IADLs Family/Friend/Other provides meals;Family/Friend/Other provides medication management;Family/Friend/Other provides transportation   Falls in the last 6 months 1 to 4   Comments Takes W/C to dining room mod (I).  Transfers W/C level IND.  IND.  Reports using rollator short distances IND as well; however states he is receiving PT currently.   General   Family/Caregiver Present No   Cognition   Overall Cognitive Status WFL    Arousal/Participation Cooperative   Attention Within functional limits   Orientation Level Oriented X4   RLE Assessment   RLE Assessment WFL   LLE Assessment   LLE Assessment WFL   Light Touch   RLE Light Touch Grossly intact   LLE Light Touch Grossly intact   Bed Mobility   Additional Comments Not assessed.  Pt OOB in recliner chair upon arrival to room.   Transfers   Sit to Stand   (steadying assist)   Additional items Assist x 1;Verbal cues;Increased time required   Stand to Sit   (steadying assist)   Additional items Assist x 1;Increased time required;Verbal cues   Stand pivot   (steadying assist)   Additional items Assist x 1;Increased time required;Verbal cues   Additional Comments Using RW   Ambulation/Elevation   Gait pattern Decreased foot clearance;Improper Weight shift;Redundant gait at times;Inconsistent geoff;Short stride  (inconsistently step-to and step-through)   Gait Assistance   (steadying - min A)   Additional items Assist x 1;Verbal cues   Assistive Device Rolling walker   Distance 100 ft   Balance   Static Sitting Good   Dynamic Sitting Fair +   Static Standing Poor +   Dynamic Standing Poor   Ambulatory Poor   Endurance Deficit   Endurance Deficit Yes   Activity Tolerance   Activity Tolerance Patient limited by fatigue   Medical Staff Made Aware OT [place]   Nurse Made Aware Yes   Assessment   Prognosis Good   Problem List Decreased strength;Decreased endurance;Impaired balance;Decreased mobility   Barriers to Discharge Other (Comment)  (Fall risk, recent fall)   Goals   Patient Goals go back to [facility] Expiration Date 12/28/23   PT Treatment Day 1   Plan   Treatment/Interventions ADL retraining;Functional transfer training;LE strengthening/ROM;Elevations;Therapeutic exercise;Endurance training;Patient/family training;Equipment eval/education;Bed mobility;Gait training;Compensatory technique education;Spoke to nursing;OT   PT Frequency 3-5x/wk   Discharge Recommendation   Rehab  Resource Intensity Level, PT III (Minimum Resource Intensity)   Additional Comments Have assistance for mobility at [facility] to reduce fall risk.   AM-PAC Basic Mobility Inpatient   Turning in Flat Bed Without Bedrails 3   Lying on Back to Sitting on Edge of Flat Bed Without Bedrails 3   Moving Bed to Chair 3   Standing Up From Chair Using Arms 3   Walk in Room 3   Climb 3-5 Stairs With Railing 2   Basic Mobility Inpatient Raw Score 17   Basic Mobility Standardized Score 39.67   Highest Level Of Mobility   -HL Goal 5: Stand one or more mins   -HLM Achieved 7: Walk 25 feet or more   Additional Treatment Session   Start Time 1036   End Time 1047   Treatment Assessment Pt tolerates tx session fairly well.  Pt limited by standing balance deficits especially when not having B UE support.  Pt fully aware of such deficit.  PT recommending that pt have assistance at [facility] to reduce fall risk.  No reports of lightheadedness noted.   Equipment Use Pt performs sit to stand transfer with steadying - min A due to slight posterior LOB.  Pt ambulates 18 ft with RW and steadying assist.  Pt performs stand > sit transition with steadying - min A with cues for eccentric control on descent.   End of Consult   Patient Position at End of Consult Bedside chair;Bed/Chair alarm activated;All needs within reach     (Please find full objective findings from PT assessment regarding body systems outlined above).     Assessment: Pt is a 89 y.o. male seen for PT evaluation s/p admission to [facility] on 12/13/2023 with Stercoral colitis.  Order placed for PT services.  Upon evaluation: Pt is presenting with impaired functional mobility due to decreased strength, decreased endurance, impaired balance, gait deviations, and fall risk requiring  steadying - min assistance for transfers and ambulation with RW . Pt's clinical presentation is currently unstable/unpredictable given the functional mobility deficits above, especially gait  deviations and decreased activity tolerance, coupled with fall risks as indicated by -PAC 6-Clicks: 18/24 as well as hx of falls and impaired balance and combined with medical complications of abnormal H&H, abnormal CBC, abnormal sodium values, and need for input for mobility technique/safety.  Pt's PMHx and comorbidities that may affect physical performance and progress include: A fib and cancer history and/or treatment. Pt will benefit from continued skilled PT services to address deficits as defined above and to maximize level of functional mobility to facilitate return toward PLOF and improved QOL. From PT/mobility standpoint, recommendation at time of d/c would be Level III (Minimum Resource Intensity) and with walker pending progress in order to reduce fall risk and maximize pt's functional independence and consistency with mobility in order to facilitate return to PLOF.  Recommend trial with walker next 1-2 sessions and ther ex next 1-2 sessions.     The patient's -[facility] Basic Mobility Inpatient Short Form Raw Score is 17. A Raw score of greater than 16 suggests the patient may benefit from discharge to home. Please also refer to the recommendation of the Physical Therapist for safe discharge planning.       Goals: Pt will: Perform bed mobility tasks with modified I to reposition in bed and prepare for transfers. Pt will perform transfers with modified I to increase Indep in prior living environment and prepare for ambulation. Pt will ambulate with RW for >/= 150 ft with  Supervision  to decrease risk for falls and improve gait quality and to access home environment.  Pt will participate in objective balance assessment to determine baseline fall risk.        Steve Bills, PT,DPT

## 2023-12-14 NOTE — PROGRESS NOTES
[facility]  Progress Note  Name: Miguel Angel Ortega I  MRN: [id number]  Unit/Bed#: -Theo I Date of Admission: 12/13/2023   Date of Service: 12/14/2023 I Hospital Day: 1    Assessment/Plan   * Stercoral colitis  Assessment & Plan  Patient presents with symptoms of constipation for the last few days.  He saw GI outpatient.  He was started on MiraLAX patient states he went 26 times to the bathroom yesterday had very small amounts of stool and felt like he just could not empty out his bowel movement.  Also had a fall yesterday.  Ct abd pelvis shows Abundant stool in the colon and rectal vault compatible with constipation in the appropriate clinical context. No bowel obstruction.     Minimal perirectal fat stranding suggestive of mild or early stercoral proctitis. No perirectal abscess.    Placed on MiraLAX daily and placed on Dulcolax suppository x 2 daily and also placed 3 enemas  Patient has a large stool burden and will need to evacuate him.  No need for antibiotics at this time.  Has had 3-4 large bowel movements so far is feeling better.  Will continue MiraLAX and Dulcolax suppositories for now and see how he does      Acute urinary retention  Assessment & Plan  Acute urinary retention secondary to severe constipation also known history of prostate cancer.  Zuniga catheter placed as patient bladder scan for 800 cc on presentation to ED and patient is feeling a little better now.  1800 ml removed in zuniga.Also placed on Flomax and will do voiding trial tomorrow.        Acute on chronic anemia  Assessment & Plan  Hemoglobin dropped from 10.3-8.7.  Will check iron and B12 levels and repeat CBC in a.m. no evidence of bleeding noted    Hyponatremia  Assessment & Plan  secondary to urinary retention decreased appetite and constipation.  Will place on gentle hydration and recheck sodium 133.stop iv fluids and observe    Permanent atrial fibrillation (HCC)  Assessment & Plan  Continue Multaq,  digoxin which are his chronic meds.  Not on anticoagulation    Prostate cancer metastatic to bone (HCC)  Assessment & Plan  Further outpatient follow-up with urology.  Placed on Flomax for acute urinary retention hold Casodex while inpatient               VTE Pharmacologic Prophylaxis:   Moderate Risk (Score 3-4) - Pharmacological DVT Prophylaxis Ordered: enoxaparin (Lovenox).    Mobility:   Basic Mobility Inpatient Raw Score: 16  JH-HLM Goal: 5: Stand one or more mins  JH-HLM Achieved: 6: Walk 10 steps or more  HLM Goal achieved. Continue to encourage appropriate mobility.    Patient Centered Rounds: I performed bedside rounds with nursing staff today.   Discussions with Specialists or Other Care Team Provider: none    Education and Discussions with Family / Patient: Updated  (poa) via phone.    Total Time Spent on Date of Encounter in care of patient: 35 mins. This time was spent on one or more of the following: performing physical exam; counseling and coordination of care; obtaining or reviewing history; documenting in the medical record; reviewing/ordering tests, medications or procedures; communicating with other healthcare professionals and discussing with patient's family/caregivers.    Current Length of Stay: 1 day(s)  Current Patient Status: Inpatient   Certification Statement: The patient will continue to require additional inpatient hospital stay due to stercoral colitis  Discharge Plan: Anticipate discharge in 24-48 hrs to prior assisted or independent living facility.    Code Status: Level 3 - DNAR and DNI    Subjective:   Patient states he had several bowel movements yesterday and is feeling much better and had a good night sleep.    Objective:     Vitals:   Temp (24hrs), Av.7 °F (36.5 °C), Min:96.3 °F (35.7 °C), Max:98.8 °F (37.1 °C)    Temp:  [96.3 °F (35.7 °C)-98.8 °F (37.1 °C)] 96.3 °F (35.7 °C)  HR:  [63-66] 63  Resp:  [18] 18  BP: (123-129)/(45-48) 123/46  SpO2:  [97 %-99 %] 99  %  Body mass index is 24.75 kg/m².     Input and Output Summary (last 24 hours):     Intake/Output Summary (Last 24 hours) at 12/14/2023 1407  Last data filed at 12/14/2023 0900  Gross per 24 hour   Intake 420 ml   Output 3400 ml   Net -2980 ml       Physical Exam:   Physical Exam  Vitals and nursing note reviewed.   Constitutional:       Appearance: Normal appearance.   HENT:      Head: Normocephalic and atraumatic.      Right Ear: External ear normal.      Left Ear: External ear normal.      Nose: Nose normal.      Mouth/Throat:      Pharynx: Oropharynx is clear.   Eyes:      Pupils: Pupils are equal, round, and reactive to light.   Cardiovascular:      Rate and Rhythm: Normal rate and regular rhythm.      Heart sounds: Normal heart sounds.   Pulmonary:      Effort: Pulmonary effort is normal.      Breath sounds: Normal breath sounds.   Abdominal:      General: Bowel sounds are normal.      Palpations: Abdomen is soft.      Tenderness: There is no abdominal tenderness.   Musculoskeletal:         General: Normal range of motion.      Cervical back: Normal range of motion and neck supple.   Skin:     General: Skin is warm and dry.      Capillary Refill: Capillary refill takes less than 2 seconds.   Neurological:      Mental Status: He is alert. Mental status is at baseline.   Psychiatric:         Mood and Affect: Mood normal.            Additional Data:     Labs:  Results from last 7 days   Lab Units 12/14/23  0546 12/13/23  0928   WBC Thousand/uL 6.30 8.94   HEMOGLOBIN g/dL 8.7* 10.3*   HEMATOCRIT % 26.3* 30.1*   PLATELETS Thousands/uL 173 221   NEUTROS PCT %  --  76*   LYMPHS PCT %  --  10*   MONOS PCT %  --  12   EOS PCT %  --  1     Results from last 7 days   Lab Units 12/14/23  0546   SODIUM mmol/L 133*   POTASSIUM mmol/L 4.3   CHLORIDE mmol/L 105   CO2 mmol/L 25   BUN mg/dL 15   CREATININE mg/dL 0.94   ANION GAP mmol/L 3   CALCIUM mg/dL 7.7*   ALBUMIN g/dL 3.2*   TOTAL BILIRUBIN mg/dL 0.46   ALK PHOS U/L 50    ALT U/L 7   AST U/L 15   GLUCOSE RANDOM mg/dL 89                 Results from last 7 days   Lab Units 12/13/23  0939 12/13/23  0928   LACTIC ACID mmol/L 1.1  --    PROCALCITONIN ng/ml  --  <0.05       Lines/Drains:  Invasive Devices       Peripheral Intravenous Line  Duration             Peripheral IV 12/13/23 Left Antecubital 1 day              Drain  Duration             Urethral Catheter Latex 18 Fr. 1 day                  Urinary Catheter:  Goal for removal: Voiding trial when ambulation improves               Imaging: Reviewed radiology reports from this admission including: abdominal/pelvic CT    Recent Cultures (last 7 days):   Results from last 7 days   Lab Units 12/13/23  0942 12/13/23  0934   BLOOD CULTURE  No Growth at 24 hrs. No Growth at 24 hrs.       Last 24 Hours Medication List:   Current Facility-Administered Medications   Medication Dose Route Frequency Provider Last Rate    acetaminophen  650 mg Oral Q6H PRN Sherita Martinez MD      bisacodyl  10 mg Rectal BID Sherita Martinez MD      clopidogrel  75 mg Oral Daily Sherita Martinez MD      digoxin  125 mcg Oral Daily Sherita Martinez MD      dronedarone  400 mg Oral BID With Meals Sherita Martinez MD      enoxaparin  30 mg Subcutaneous Daily Sherita Martinez MD      folic acid  1,000 mcg Oral Daily Sherita Martinez MD      glycerin-hypromellose-  1 drop Both Eyes Q4H PRN Sherita Martinez MD      hydrocortisone   Topical 4x Daily PRN Sherita Martinez MD      ondansetron  4 mg Intravenous Q6H PRN Sherita Martinez MD      pantoprazole  40 mg Oral Daily Sherita Martinez MD      polyethylene glycol  17 g Oral Daily Sherita Martinez MD      primidone  50 mg Oral Q12H YAMINI Sherita Martinez MD      senna  1 tablet Oral BID Sherita Martinez MD      tamsulosin  0.4 mg Oral Daily With Dinner Sherita Martinez MD          Today, Patient Was Seen By: Sherita Martinez MD    **Please Note: This note may have been constructed using a voice recognition system.**

## 2023-12-14 NOTE — PLAN OF CARE
Problem: OCCUPATIONAL THERAPY ADULT  Goal: Performs self-care activities at highest level of function for planned discharge setting.  See evaluation for individualized goals.  Description: Treatment Interventions: ADL retraining, Functional transfer training, UE strengthening/ROM, Endurance training, Patient/family training, Equipment evaluation/education, Compensatory technique education, Continued evaluation, Energy conservation, Activityengagement          See flowsheet documentation for full assessment, interventions and recommendations.   Outcome: Progressing  Note: Limitation: Decreased ADL status, Decreased UE strength, Decreased Safe judgement during ADL, Decreased endurance, Decreased self-care trans, Decreased high-level ADLs     Assessment: Pt is a 89 y.o. male, admitted to [facility] 12/13/2023 d/t experiencing constipation and a fall. Dx: Stercoral colitis. Pt with PMHx impacting their performance during ADL tasks, including: acute urinary retention, hyponatremia, permanent Afib, prostate cancer metastatic to bone, angina, s/p CABG 1995. Prior to admission to the hospital Pt was performing ADLs without physical assistance. IADLs with physical assistance. Functional transfers/ambulation without physical assistance. Cognitive status PTA was WFL. OT order placed to assess Pt's ADLs, cognitive status, and performance during functional tasks in order to maximize safety and independence while making most appropriate d/c recommendations. PT/OT co-evaluation completed at this time d/t significant mobility deficits and safety concerns. Pt's clinical presentation is currently unstable/unpredictable given new onset deficits that affect Pt's occupational performance and ability to safely return to PLOF including decrease activity tolerance, decrease standing balance, decrease performance during ADL tasks, decrease safety awareness , decrease UB MS, decrease generalized strength, decrease activity engagement, decrease  performance during functional transfers, limited family support, high fall risk, and limited insight to deficits combined with medical complications of A-fib, abnormal H&H, abnormal CBC, abnormal sodium values, wounds, decreased skin integrity, incontinence, and need for input for mobility technique/safety.  Personal factors affecting Pt at time of initial evaluation include: limited home support, advanced age, inability to perform IADLs, inability to navigate community distances, limited insight into impairments, decreased initiation and engagement, and recent fall(s)/fall history. Pt will benefit from continued skilled OT services to address deficits as defined above and to maximize level independence/participation during ADLs and functional tasks to facilitate return toward PLOF and improved quality of life. From an occupational therapy standpoint, Level III (Minimum Resource Intensity) is recommended upon d/c.     Rehab Resource Intensity Level, OT: III (Minimum Resource Intensity)

## 2023-12-14 NOTE — ASSESSMENT & PLAN NOTE
secondary to urinary retention decreased appetite and constipation.  Will place on gentle hydration and recheck sodium 133.stop iv fluids and observe

## 2023-12-14 NOTE — ASSESSMENT & PLAN NOTE
Hemoglobin dropped from 10.3-8.7.  Will check iron and B12 levels and repeat CBC in a.m. no evidence of bleeding noted

## 2023-12-14 NOTE — ASSESSMENT & PLAN NOTE
Patient presents with symptoms of constipation for the last few days.  He saw GI outpatient.  He was started on MiraLAX patient states he went 26 times to the bathroom yesterday had very small amounts of stool and felt like he just could not empty out his bowel movement.  Also had a fall yesterday.  Ct abd pelvis shows Abundant stool in the colon and rectal vault compatible with constipation in the appropriate clinical context. No bowel obstruction.     Minimal perirectal fat stranding suggestive of mild or early stercoral proctitis. No perirectal abscess.    Placed on MiraLAX daily and placed on Dulcolax suppository x 2 daily and also placed 3 enemas  Patient has a large stool burden and will need to evacuate him.  No need for antibiotics at this time.  Has had 3-4 large bowel movements so far is feeling better.  Will continue MiraLAX and Dulcolax suppositories for now and see how he does

## 2023-12-14 NOTE — PLAN OF CARE
Problem: PHYSICAL THERAPY ADULT  Goal: Performs mobility at highest level of function for planned discharge setting.  See evaluation for individualized goals.  Description: Treatment/Interventions: ADL retraining, Functional transfer training, LE strengthening/ROM, Elevations, Therapeutic exercise, Endurance training, Patient/family training, Equipment eval/education, Bed mobility, Gait training, Compensatory technique education, Spoke to nursing, OT          See flowsheet documentation for full assessment, interventions and recommendations.  12/14/2023 1437 by Steve Bills PT  Outcome: Progressing  Note: Prognosis: Good  Problem List: Decreased strength, Decreased endurance, Impaired balance, Decreased mobility  Pt tolerates tx session fairly well.  Pt limited by standing balance deficits especially when not having B UE support.  Pt fully aware of such deficit.  PT recommending that pt have assistance at [facility] to reduce fall risk.  No reports of lightheadedness noted.  Barriers to Discharge: Other (Comment) (Fall risk, recent fall)     Rehab Resource Intensity Level, PT: III (Minimum Resource Intensity)    See flowsheet documentation for full assessment.

## 2023-12-14 NOTE — CASE MANAGEMENT
Case Management Assessment & Discharge Planning Note    Patient name Miguel Angel Ortega  Location /-01 MRN [id number]  : 1928 Date 2023       Current Admission Date: 2023  Current Admission Diagnosis:Stercoral colitis   Patient Active Problem List    Diagnosis Date Noted    Stercoral colitis 2023    Acute urinary retention 2023    Permanent atrial fibrillation (HCC) 2023    Hyponatremia 2023    Prostate cancer metastatic to bone (HCC) 2023      LOS (days): 1  Geometric Mean LOS (GMLOS) (days): 2.6  Days to GMLOS:1.5     OBJECTIVE:    Risk of Unplanned Readmission Score: 21.02         Current admission status: Inpatient       Preferred Pharmacy:   [id number] [address] [address]  Phone: [phone] Fax: [phone]    Primary Care Provider: Kisha Armstrong MD    Primary Insurance: MEDICARE  Secondary Insurance: [facility]    ASSESSMENT:  Active Health Care Proxies    There are no active Health Care Proxies on file.       Advance Directives  Does patient have a Health Care POA?: Yes  Does patient have Advance Directives?: Yes  Advance Directives: Living will  Primary Contact: mina Luna         Readmission Root Cause  30 Day Readmission: No    Patient Information  Admitted from:: Other (comment) ([facility])  Mental Status: Alert  During Assessment patient was accompanied by: Not accompanied during assessment  Assessment information provided by:: Patient  Primary Caregiver: Self  Support Systems: Other (Comment), Friend ([facility] staff)  County of Residence: [facility]  What [facility] do you live in?: [place]  Home entry access options. Select all that apply.: No steps to enter home  Type of Current Residence: Other (Comment) ([facility])  Living Arrangements: Other (Comment) ([facility])  Is patient a ?: Yes ([employer])  [facility] with [facility]  ( [facility])?: Yes  Is patient service connected?: No    Activities of Daily Living Prior to Admission  Functional Status: Assistance  Completes ADLs independently?: No  Level of ADL dependence: Assistance  Ambulates independently?: No  Level of ambulatory dependence: Assistance  Does patient use assisted devices?: Yes  Assisted Devices (DME) used: Wheelchair, Walker  Does patient currently own DME?: Yes  What DME does the patient currently own?: Wheelchair, Walker  Does patient have a history of Outpatient Therapy (PT/OT)?: Yes  Does the patient have a history of Short-Term Rehab?: Yes ([facility])  Does patient have a history of HHC?: No  Does patient currently have HHC?: No         Patient Information Continued  Income Source: Pension/residential  Does patient have prescription coverage?: Yes  Does patient receive dialysis treatments?: No  Does patient have a history of substance abuse?: No  Does patient have a history of Mental Health Diagnosis?: No         Means of Transportation  Means of Transport to Appts:: Friends      Housing Stability: High Risk (12/14/2023)    Housing Stability Vital Sign     Unable to Pay for Housing in the Last Year: No     Number of Places Lived in the Last Year: 3     Unstable Housing in the Last Year: No   Food Insecurity: No Food Insecurity (12/14/2023)    Hunger Vital Sign     Worried About Running Out of Food in the Last Year: Never true     Ran Out of Food in the Last Year: Never true   Transportation Needs: No Transportation Needs (12/14/2023)    PRAPARE - Transportation     Lack of Transportation (Medical): No     Lack of Transportation (Non-Medical): No   Utilities: Not At Risk (12/14/2023)    [facility] Utilities     Threatened with loss of utilities: No       DISCHARGE DETAILS:    Discharge planning discussed with:: patient  Freedom of Choice: Yes     CM contacted family/caregiver?: No- see comments (pt declined)  Were Treatment Team discharge recommendations  reviewed with patient/caregiver?: Yes  Did patient/caregiver verbalize understanding of patient care needs?: Yes  Were patient/caregiver advised of the risks associated with not following Treatment Team discharge recommendations?: Yes         Requested Home Health Care         Is the patient interested in HHC at discharge?: No    DME Referral Provided  Referral made for DME?: No    Other Referral/Resources/Interventions Provided:  Interventions: Outpatient OT, Outpatient PT  Referral Comments: Therapy at [facility]    Would you like to participate in our [facility] service program?  : No - Declined    Treatment Team Recommendation: Facility Return, Home  Discharge Destination Plan:: Facility Return, Home              CM met with patient at the bedside, baseline information was obtained. CM discussed the role of CM in helping the patient develop a discharge plan and assist the patient in carry out their plan.     Patient owns a home in [place] but in October 2023 patient went to [facility] and discharged to [facility] where patient desires to return upon discharge due to mobility issues.    CM to follow for CM discharge referral needs.

## 2023-12-14 NOTE — OCCUPATIONAL THERAPY NOTE
"Occupational Therapy Evaluation     Patient Name: Miguel Angel Ortega  Today's Date: 12/14/2023  Problem List  Principal Problem:    Stercoral colitis  Active Problems:    Prostate cancer metastatic to bone (HCC)    Acute urinary retention    Permanent atrial fibrillation (HCC)    Hyponatremia    Past Medical History  Past Medical History:   Diagnosis Date    A-fib (HCC)     History of angina     Prostate cancer (HCC)      Past Surgical History  Past Surgical History:   Procedure Laterality Date    CORONARY ARTERY BYPASS GRAFT  1995    TONSILLECTOMY          12/14/23 1018   Note Type   Note type Evaluation   Pain Assessment   Pain Assessment Tool 0-10   Pain Score No Pain   Restrictions/Precautions   Other Precautions Chair Alarm;Bed Alarm;Fall Risk;Multiple lines;Hard of hearing  (B hearing aides)   Home Living   Type of Home Assisted living  (Pending sale to [facility])   Home Layout One level;Performs ADLs on one level;Able to live on main level with bedroom/bathroom   Bathroom Shower/Tub Walk-in shower   Bathroom Toilet Standard   Bathroom Equipment Grab bars in shower;Grab bars around toilet;Shower chair   Bathroom Accessibility Accessible via wheelchair   Home Equipment Wheelchair-manual;Other (Comment)  (Rollator)   Additional Comments Pt reports living at [facility]. Pt reports using rollator and W/C for functional mobility (will typically use rollator in his room and W/C when going down to the dining room).   Prior Function   Level of [place] Modified independent with wheelchair;Independent with functional mobility;Independent with ADLs;Needs assistance with IADLS   Lives With Facility staff   Receives Help From Other (Comment)  (Facility staff)   IADLs Family/Friend/Other provides transportation;Family/Friend/Other provides meals;Family/Friend/Other provides medication management   Falls in the last 6 months 1 to 4   Comments PTA, pt reports that he completes \"95%\" of ADLs, had assistance for shoes only, " independence with functional mobility with rollator / W/C, has assistance for IADLs.   ADL   UB Dressing Assistance 5  Supervision/Setup   LB Dressing Assistance 5  Supervision/Setup   LB Dressing Deficit Don/doff R sock   Additional Comments Pt able to doff/don R sock while seated in chair with cues. LB ADLs in standing at steadying/min assist. UB ADLs with setup.   Bed Mobility   Additional Comments Not assessed, pt received sitting OOB in recliner chair upon start of session.   Transfers   Sit to Stand   (Steadying assist)   Additional items Assist x 1;Increased time required;Verbal cues   Stand to Sit   (Steadying assist)   Additional items Assist x 1;Increased time required;Verbal cues   Stand pivot   (Steadying assist)   Additional items Assist x 1;Increased time required;Verbal cues   Additional Comments All functional transfers with RW. Pt sit > stand from recliner chair with steadying assist and increased time. Posterior lean noted. Pt spt in room with steadying assist and increased time.   Functional Mobility   Functional Mobility   (Steadying assist)   Additional Comments Pt participated in short functional distance in room, walking from chair to bathroom with steadying assist, RW and increased time.   Balance   Static Sitting Good   Dynamic Sitting Fair +   Static Standing Fair -   Dynamic Standing Poor +   Activity Tolerance   Activity Tolerance Patient limited by fatigue   Medical Staff Made Aware PT, Steve   Nurse Made Aware RNLiz Assessment   RUE Assessment WFL  (Strength grossly 4-/5)   LUE Assessment   LUE Assessment WFL  (Strength grossly 4-/5)   Hand Function   Gross Motor Coordination Functional   Fine Motor Coordination Functional   Hand Function Comments Pt is R hand dominant.   Vision-Basic Assessment   Current Vision Wears glasses all the time   Psychosocial   Psychosocial (WDL) WDL   Cognition   Overall Cognitive Status WFL   Arousal/Participation Alert;Cooperative   Attention  Within functional limits   Orientation Level Oriented X4   Memory Within functional limits   Following Commands Follows one step commands without difficulty   Assessment   Limitation Decreased ADL status;Decreased UE strength;Decreased Safe judgement during ADL;Decreased endurance;Decreased self-care trans;Decreased high-level ADLs   Assessment Pt is a 89 y.o. male, admitted to [facility] 12/13/2023 d/t experiencing constipation and a fall. Dx: Stercoral colitis. Pt with PMHx impacting their performance during ADL tasks, including: acute urinary retention, hyponatremia, permanent Afib, prostate cancer metastatic to bone, angina, s/p CABG 1995. Prior to admission to the hospital Pt was performing ADLs without physical assistance. IADLs with physical assistance. Functional transfers/ambulation without physical assistance. Cognitive status PTA was WFL. OT order placed to assess Pt's ADLs, cognitive status, and performance during functional tasks in order to maximize safety and independence while making most appropriate d/c recommendations. PT/OT co-evaluation completed at this time d/t significant mobility deficits and safety concerns. Pt's clinical presentation is currently unstable/unpredictable given new onset deficits that affect Pt's occupational performance and ability to safely return to OF including decrease activity tolerance, decrease standing balance, decrease performance during ADL tasks, decrease safety awareness , decrease UB MS, decrease generalized strength, decrease activity engagement, decrease performance during functional transfers, limited family support, high fall risk, and limited insight to deficits combined with medical complications of A-fib, abnormal H&H, abnormal CBC, abnormal sodium values, wounds, decreased skin integrity, incontinence, and need for input for mobility technique/safety.  Personal factors affecting Pt at time of initial evaluation include: limited home support, advanced age,  inability to perform IADLs, inability to navigate community distances, limited insight into impairments, decreased initiation and engagement, and recent fall(s)/fall history. Pt will benefit from continued skilled OT services to address deficits as defined above and to maximize level independence/participation during ADLs and functional tasks to facilitate return toward PLOF and improved quality of life. From an occupational therapy standpoint, Level III (Minimum Resource Intensity) is recommended upon d/c.   Plan   Treatment Interventions ADL retraining;Functional transfer training;UE strengthening/ROM;Endurance training;Patient/family training;Equipment evaluation/education;Compensatory technique education;Continued evaluation;Energy conservation;Activityengagement   Goal Expiration Date 12/28/23   OT Treatment Day 1   OT Frequency 2-3x/wk   Discharge Recommendation   Rehab Resource Intensity Level, OT III (Minimum Resource Intensity)   AM-PAC Daily Activity Inpatient   Lower Body Dressing 3   Bathing 3   Toileting 2   Upper Body Dressing 3   Grooming 3   Eating 4   Daily Activity Raw Score 18   Daily Activity Standardized Score (Calc for Raw Score >=11) 38.66   AM-PAC Applied Cognition Inpatient   Following a Speech/Presentation 3   Understanding Ordinary Conversation 4   Taking Medications 2   Remembering Where Things Are Placed or Put Away 3   Remembering List of 4-5 Errands 2   Taking Care of Complicated Tasks 2   Applied Cognition Raw Score 16   Applied Cognition Standardized Score 35.03   Additional Treatment Session   Start Time 1038   End Time 1048   Treatment Assessment Pt participated in tx session #1 focused on ADLs and functional mobility. PT/OT co-treat completed due to safety concerns and mobility deficits. Pt alert and agreeable to participate. All functional transfers/mobility with RW. Pt participated in functional transfer to/from standard toilet with steadying assist and increased time. Pt  participated in perineal hygiene in standing with assist from therapist for thoroughness as when pt attempting to wipe, mild posterior LOB noted requiring min assist to recover. Pt requiring minimal assistance for donning new briefs at this time. Pt participated in short functional distance in room and in hallway with steadying assist. Pt tolerated treatment fairly but is limited by fatigue. Pt requiring verbal cues for safe transfer techniques and for hand placement on RW at times. At end of session, pt seated in chair with chair alarm on, call bell in reach, and all needs met.     The patient's raw score on the -PAC Daily Activity Inpatient Short Form is 18. A raw score of less than 19 suggests the patient may benefit from discharge to post-acute rehabilitation services. Please refer to the recommendation of the Occupational Therapist for safe discharge planning.    Pt goals to be met by 12/28/2023:    Pt will demonstrate ability to complete grooming/hygiene tasks @ mod I after set-up.  Pt will demonstrate ability to complete supine<>sit @ mod I in order to increase safety and independence during ADL tasks.  Pt will demonstrate ability to complete UB ADLs including washing/dressing @ mod I in order to increase performance and participation during meaningful tasks  Pt will demonstrate ability to complete LB dressing @ mod I in order to increase safety and independence during meaningful tasks.   Pt will demonstrate ability to bernardo/doff socks/shoes while sitting EOB/chair @ mod I in order to increase safety and independence during meaningful tasks.   Pt will demonstrate ability to complete toileting tasks including CM and pericare @ mod I in order to increase safety and independence during meaningful tasks.  Pt will demonstrate ability to complete EOB, chair, toilet/commode transfers @ mod I in order to increase performance and participation during functional tasks.  Pt will demonstrate ability to stand for 10  minutes while maintaining F+ balance with use of RW for UB support PRN.  Pt will demonstrate ability to tolerate 30 minute OT session with no vc'ing for deep breathing or use of energy conservation techniques in order to increase activity tolerance during functional tasks.   Pt will demonstrate Good carryover of use of energy conservation/compensatory strategies during ADLs and functional tasks in order to increase safety and reduce risk for falls.   Pt will demonstrate Good attention and participation in continued evaluation of functional ambulation house hold distances in order to assist with safe d/c planning.  Pt will attend to continued cognitive assessments 100% of the time in order to provide most appropriate d/c recommendations.   Pt will follow 100% simple 2-step commands and be A&O x4 consistently with environmental cues to increase participation in functional activities.   Pt will identify 3 areas of interest/hobbies and 1 intervention on how to incorporate into daily life in order to increase interaction with environment and peers as well as increase participation in meaningful tasks.   Pt will demonstrate 100% carryover of BUE HEP in order to increase BUE MS and increase performance during functional tasks upon d/c home.    Haile Augustin MS, OTR/L

## 2023-12-15 LAB
ANION GAP SERPL CALCULATED.3IONS-SCNC: 5 MMOL/L
BUN SERPL-MCNC: 19 MG/DL (ref 5–25)
CALCIUM SERPL-MCNC: 7.8 MG/DL (ref 8.4–10.2)
CHLORIDE SERPL-SCNC: 105 MMOL/L (ref 96–108)
CO2 SERPL-SCNC: 24 MMOL/L (ref 21–32)
CREAT SERPL-MCNC: 0.96 MG/DL (ref 0.6–1.3)
DIGOXIN SERPL-MCNC: 1.1 NG/ML (ref 0.8–2)
ERYTHROCYTE [DISTWIDTH] IN BLOOD BY AUTOMATED COUNT: 14.6 % (ref 11.6–15.1)
GFR SERPL CREATININE-BSD FRML MDRD: 66 ML/MIN/1.73SQ M
GLUCOSE SERPL-MCNC: 101 MG/DL (ref 65–140)
HCT VFR BLD AUTO: 25.9 % (ref 36.5–49.3)
HGB BLD-MCNC: 8.7 G/DL (ref 12–17)
MCH RBC QN AUTO: 31.9 PG (ref 26.8–34.3)
MCHC RBC AUTO-ENTMCNC: 33.6 G/DL (ref 31.4–37.4)
MCV RBC AUTO: 95 FL (ref 82–98)
MRSA NOSE QL CULT: ABNORMAL
MRSA NOSE QL CULT: ABNORMAL
PLATELET # BLD AUTO: 159 THOUSANDS/UL (ref 149–390)
PMV BLD AUTO: 8.4 FL (ref 8.9–12.7)
POTASSIUM SERPL-SCNC: 4.1 MMOL/L (ref 3.5–5.3)
RBC # BLD AUTO: 2.73 MILLION/UL (ref 3.88–5.62)
SODIUM SERPL-SCNC: 134 MMOL/L (ref 135–147)
WBC # BLD AUTO: 5.55 THOUSAND/UL (ref 4.31–10.16)

## 2023-12-15 PROCEDURE — 80048 BASIC METABOLIC PNL TOTAL CA: CPT | Performed by: FAMILY MEDICINE

## 2023-12-15 PROCEDURE — 99232 SBSQ HOSP IP/OBS MODERATE 35: CPT | Performed by: FAMILY MEDICINE

## 2023-12-15 PROCEDURE — 85027 COMPLETE CBC AUTOMATED: CPT | Performed by: FAMILY MEDICINE

## 2023-12-15 PROCEDURE — 80162 ASSAY OF DIGOXIN TOTAL: CPT | Performed by: FAMILY MEDICINE

## 2023-12-15 RX ORDER — CYANOCOBALAMIN 1000 UG/ML
1000 INJECTION, SOLUTION INTRAMUSCULAR; SUBCUTANEOUS DAILY
Status: COMPLETED | OUTPATIENT
Start: 2023-12-15 | End: 2023-12-17

## 2023-12-15 RX ORDER — MIDODRINE HYDROCHLORIDE 5 MG/1
5 TABLET ORAL
Status: DISCONTINUED | OUTPATIENT
Start: 2023-12-15 | End: 2023-12-16

## 2023-12-15 RX ADMIN — TAMSULOSIN HYDROCHLORIDE 0.4 MG: 0.4 CAPSULE ORAL at 17:05

## 2023-12-15 RX ADMIN — CYANOCOBALAMIN 1000 MCG: 1000 INJECTION, SOLUTION INTRAMUSCULAR; SUBCUTANEOUS at 17:05

## 2023-12-15 RX ADMIN — MIDODRINE HYDROCHLORIDE 5 MG: 5 TABLET ORAL at 17:04

## 2023-12-15 RX ADMIN — DRONEDARONE 400 MG: 400 TABLET, FILM COATED ORAL at 08:40

## 2023-12-15 RX ADMIN — POLYETHYLENE GLYCOL 3350 17 G: 17 POWDER, FOR SOLUTION ORAL at 08:40

## 2023-12-15 RX ADMIN — ENOXAPARIN SODIUM 30 MG: 30 INJECTION SUBCUTANEOUS at 08:40

## 2023-12-15 RX ADMIN — DRONEDARONE 400 MG: 400 TABLET, FILM COATED ORAL at 17:05

## 2023-12-15 RX ADMIN — CLOPIDOGREL 75 MG: 75 TABLET ORAL at 08:40

## 2023-12-15 RX ADMIN — FOLIC ACID 1000 MCG: 1 TABLET ORAL at 08:40

## 2023-12-15 RX ADMIN — PANTOPRAZOLE SODIUM 40 MG: 40 TABLET, DELAYED RELEASE ORAL at 08:40

## 2023-12-15 RX ADMIN — DIGOXIN 125 MCG: 125 TABLET ORAL at 08:40

## 2023-12-15 RX ADMIN — PRIMIDONE 50 MG: 50 TABLET ORAL at 08:40

## 2023-12-15 RX ADMIN — SENNOSIDES 8.6 MG: 8.6 TABLET ORAL at 17:04

## 2023-12-15 RX ADMIN — IRON SUCROSE 100 MG: 20 INJECTION, SOLUTION INTRAVENOUS at 18:24

## 2023-12-15 RX ADMIN — PRIMIDONE 50 MG: 50 TABLET ORAL at 19:55

## 2023-12-15 RX ADMIN — MIDODRINE HYDROCHLORIDE 5 MG: 5 TABLET ORAL at 11:40

## 2023-12-15 NOTE — ASSESSMENT & PLAN NOTE
Continue Multaq, digoxin which are his chronic meds.  Not on anticoagulation  Dig level acceptable

## 2023-12-15 NOTE — ASSESSMENT & PLAN NOTE
Acute urinary retention secondary to severe constipation also known history of prostate cancer.  Zuniga catheter placed as patient bladder scan for 800 cc on presentation to ED and 1800 ml removed in zuniga.Also placed on Flomax   Zuniga removed this morning and placed on voiding trial so far patient is having 25 to 50 mL of urine with 190 mL noted on bladder scans.  Will continue voiding trial today.  Some hypotension noted today which may be secondary to Flomax and placed on midodrine and observe

## 2023-12-15 NOTE — PROGRESS NOTES
[facility]  Progress Note  Name: Miguel Angel Ortega I  MRN: [id number]  Unit/Bed#: -01 I Date of Admission: 12/13/2023   Date of Service: 12/15/2023 I Hospital Day: 2    Assessment/Plan   * Stercoral colitis  Assessment & Plan  Patient presents with symptoms of constipation for the last few days.  He saw GI outpatient.  He was started on MiraLAX patient states he went 26 times to the bathroom yesterday had very small amounts of stool and felt like he just could not empty out his bowel movement.  Also had a fall yesterday.  Ct abd pelvis shows Abundant stool in the colon and rectal vault compatible with constipation in the appropriate clinical context. No bowel obstruction.     Minimal perirectal fat stranding suggestive of mild or early stercoral proctitis. No perirectal abscess.    Placed on MiraLAX daily and placed on Dulcolax suppository x 2 daily and also placed 3 enemas  Patient has a large stool burden and will need to evacuate him.  No need for antibiotics at this time.  Has had 3-4 large bowel movements so far is feeling better.  Will continue MiraLAX and Dulcolax suppositories for now and see how he does      Acute urinary retention  Assessment & Plan  Acute urinary retention secondary to severe constipation also known history of prostate cancer.  Zuniga catheter placed as patient bladder scan for 800 cc on presentation to ED and 1800 ml removed in zuniga.Also placed on Flomax   Zuniga removed this morning and placed on voiding trial so far patient is having 25 to 50 mL of urine with 190 mL noted on bladder scans.  Will continue voiding trial today.  Some hypotension noted today which may be secondary to Flomax and placed on midodrine and observe        Acute on chronic anemia  Assessment & Plan  Hemoglobin dropped from 10.3-8.7.  low iron and B12 levels .placed on vit b12 and venofer and repeat CBC in a.m. no evidence of bleeding noted  Ordered stool  occult    Hyponatremia  Assessment & Plan  secondary to urinary retention decreased appetite and constipation.  Will place on gentle hydration and recheck sodium 133.stop iv fluids and observe    Permanent atrial fibrillation (HCC)  Assessment & Plan  Continue Multaq, digoxin which are his chronic meds.  Not on anticoagulation  Dig level acceptable    Prostate cancer metastatic to bone (HCC)  Assessment & Plan  Further outpatient follow-up with urology.  Placed on Flomax for acute urinary retention hold Casodex while inpatient               VTE Pharmacologic Prophylaxis:   Moderate Risk (Score 3-4) - Pharmacological DVT Prophylaxis Contraindicated. Sequential Compression Devices Ordered.    Mobility:   Basic Mobility Inpatient Raw Score: 17  JH-HLM Goal: 5: Stand one or more mins  JH-HLM Achieved: 6: Walk 10 steps or more  HLM Goal achieved. Continue to encourage appropriate mobility.    Patient Centered Rounds: I performed bedside rounds with nursing staff today.   Discussions with Specialists or Other Care Team Provider: none    Education and Discussions with Family / Patient:  update poa.     Total Time Spent on Date of Encounter in care of patient: 35 mins. This time was spent on one or more of the following: performing physical exam; counseling and coordination of care; obtaining or reviewing history; documenting in the medical record; reviewing/ordering tests, medications or procedures; communicating with other healthcare professionals and discussing with patient's family/caregivers.    Current Length of Stay: 2 day(s)  Current Patient Status: Inpatient   Certification Statement: The patient will continue to require additional inpatient hospital stay due to stercoral colitis  Discharge Plan: Anticipate discharge in 24-48 hrs to prior assisted or independent living facility.    Code Status: Level 3 - DNAR and DNI    Subjective:   Patient was having some low blood pressures this morning he states he did have  bowel movements and feels comfortable and now had his Ascencio tube removed and is trying to urinate.  Denies any evidence of bleeding    Objective:     Vitals:   Temp (24hrs), Av.9 °F (36.6 °C), Min:97.5 °F (36.4 °C), Max:98.2 °F (36.8 °C)    Temp:  [97.5 °F (36.4 °C)-98.2 °F (36.8 °C)] 97.9 °F (36.6 °C)  HR:  [61-69] 61  Resp:  [16-18] 18  BP: ()/(42-52) 133/44  SpO2:  [96 %-99 %] 97 %  Body mass index is 24.75 kg/m².     Input and Output Summary (last 24 hours):     Intake/Output Summary (Last 24 hours) at 12/15/2023 1449  Last data filed at 12/15/2023 1224  Gross per 24 hour   Intake 1200 ml   Output 400 ml   Net 800 ml       Physical Exam:   Physical Exam  Vitals and nursing note reviewed.   Constitutional:       Appearance: Normal appearance.   HENT:      Head: Normocephalic and atraumatic.      Right Ear: External ear normal.      Left Ear: External ear normal.      Nose: Nose normal.      Mouth/Throat:      Pharynx: Oropharynx is clear.   Cardiovascular:      Rate and Rhythm: Normal rate and regular rhythm.      Heart sounds: Normal heart sounds.   Pulmonary:      Effort: Pulmonary effort is normal.      Breath sounds: Normal breath sounds.   Abdominal:      General: Bowel sounds are normal.      Palpations: Abdomen is soft.      Tenderness: There is no abdominal tenderness.   Musculoskeletal:         General: Normal range of motion.      Cervical back: Normal range of motion and neck supple.   Skin:     General: Skin is warm and dry.      Capillary Refill: Capillary refill takes less than 2 seconds.   Neurological:      General: No focal deficit present.      Mental Status: He is alert and oriented to person, place, and time.   Psychiatric:         Mood and Affect: Mood normal.            Additional Data:     Labs:  Results from last 7 days   Lab Units 12/15/23  0453 23  0546 23  0928   WBC Thousand/uL 5.55   < > 8.94   HEMOGLOBIN g/dL 8.7*   < > 10.3*   HEMATOCRIT % 25.9*   < > 30.1*    PLATELETS Thousands/uL 159   < > 221   NEUTROS PCT %  --   --  76*   LYMPHS PCT %  --   --  10*   MONOS PCT %  --   --  12   EOS PCT %  --   --  1    < > = values in this interval not displayed.     Results from last 7 days   Lab Units 12/15/23  0453 12/14/23  0546   SODIUM mmol/L 134* 133*   POTASSIUM mmol/L 4.1 4.3   CHLORIDE mmol/L 105 105   CO2 mmol/L 24 25   BUN mg/dL 19 15   CREATININE mg/dL 0.96 0.94   ANION GAP mmol/L 5 3   CALCIUM mg/dL 7.8* 7.7*   ALBUMIN g/dL  --  3.2*   TOTAL BILIRUBIN mg/dL  --  0.46   ALK PHOS U/L  --  50   ALT U/L  --  7   AST U/L  --  15   GLUCOSE RANDOM mg/dL 101 89                 Results from last 7 days   Lab Units 12/13/23  0939 12/13/23  0928   LACTIC ACID mmol/L 1.1  --    PROCALCITONIN ng/ml  --  <0.05       Lines/Drains:  Invasive Devices       Peripheral Intravenous Line  Duration             Peripheral IV 12/13/23 Left Antecubital 2 days                          Imaging: Reviewed radiology reports from this admission including: abdominal/pelvic CT    Recent Cultures (last 7 days):   Results from last 7 days   Lab Units 12/13/23  0942 12/13/23  0934   BLOOD CULTURE  No Growth at 48 hrs. No Growth at 48 hrs.       Last 24 Hours Medication List:   Current Facility-Administered Medications   Medication Dose Route Frequency Provider Last Rate    acetaminophen  650 mg Oral Q6H PRN Sherita Martinez MD      bisacodyl  10 mg Rectal BID Sherita Martinez MD      clopidogrel  75 mg Oral Daily Sherita Martinez MD      cyanocobalamin  1,000 mcg Subcutaneous Daily Sherita Martinez MD      digoxin  125 mcg Oral Daily Sherita Martinez MD      dronedarone  400 mg Oral BID With Meals Sherita Martinez MD      folic acid  1,000 mcg Oral Daily Sherita Martinez MD      glycerin-hypromellose-  1 drop Both Eyes Q4H PRN Sherita Martinez MD      hydrocortisone   Topical 4x Daily PRN Sherita Martinez MD      iron sucrose  100 mg Intravenous Daily Sherita Martinez MD      midodrine  5 mg Oral TID AC Sherita Martinez MD      ondansetron   4 mg Intravenous Q6H PRN Sherita Martinez MD      pantoprazole  40 mg Oral Daily Sherita Martinez MD      polyethylene glycol  17 g Oral Daily Sherita Martinez MD      primidone  50 mg Oral Q12H YAMINI Sherita Martinez MD      senna  1 tablet Oral BID Sherita Martinez MD      tamsulosin  0.4 mg Oral Daily With Dinner Sherita Martinez MD          Today, Patient Was Seen By: Sherita Martinez MD    **Please Note: This note may have been constructed using a voice recognition system.**

## 2023-12-15 NOTE — ASSESSMENT & PLAN NOTE
Hemoglobin dropped from 10.3-8.7.  low iron and B12 levels .placed on vit b12 and venofer and repeat CBC in a.m. no evidence of bleeding noted  Ordered stool occult

## 2023-12-15 NOTE — PLAN OF CARE

## 2023-12-16 LAB
ERYTHROCYTE [DISTWIDTH] IN BLOOD BY AUTOMATED COUNT: 14.3 % (ref 11.6–15.1)
HCT VFR BLD AUTO: 26.2 % (ref 36.5–49.3)
HGB BLD-MCNC: 8.9 G/DL (ref 12–17)
MCH RBC QN AUTO: 32.4 PG (ref 26.8–34.3)
MCHC RBC AUTO-ENTMCNC: 34 G/DL (ref 31.4–37.4)
MCV RBC AUTO: 95 FL (ref 82–98)
PLATELET # BLD AUTO: 183 THOUSANDS/UL (ref 149–390)
PMV BLD AUTO: 8.6 FL (ref 8.9–12.7)
RBC # BLD AUTO: 2.75 MILLION/UL (ref 3.88–5.62)
WBC # BLD AUTO: 6.79 THOUSAND/UL (ref 4.31–10.16)

## 2023-12-16 PROCEDURE — 85027 COMPLETE CBC AUTOMATED: CPT | Performed by: FAMILY MEDICINE

## 2023-12-16 PROCEDURE — 99232 SBSQ HOSP IP/OBS MODERATE 35: CPT | Performed by: FAMILY MEDICINE

## 2023-12-16 PROCEDURE — 82272 OCCULT BLD FECES 1-3 TESTS: CPT | Performed by: FAMILY MEDICINE

## 2023-12-16 RX ORDER — AMOXICILLIN 250 MG
1 CAPSULE ORAL
Status: DISCONTINUED | OUTPATIENT
Start: 2023-12-17 | End: 2023-12-21

## 2023-12-16 RX ORDER — MIDODRINE HYDROCHLORIDE 5 MG/1
2.5 TABLET ORAL
Status: DISCONTINUED | OUTPATIENT
Start: 2023-12-16 | End: 2023-12-17

## 2023-12-16 RX ORDER — BISACODYL 10 MG
10 SUPPOSITORY, RECTAL RECTAL DAILY
Status: DISCONTINUED | OUTPATIENT
Start: 2023-12-17 | End: 2023-12-21

## 2023-12-16 RX ADMIN — CLOPIDOGREL 75 MG: 75 TABLET ORAL at 09:08

## 2023-12-16 RX ADMIN — SENNOSIDES 8.6 MG: 8.6 TABLET ORAL at 09:09

## 2023-12-16 RX ADMIN — FOLIC ACID 1000 MCG: 1 TABLET ORAL at 09:09

## 2023-12-16 RX ADMIN — GLYCERIN 1 DROP: .002; .002; .01 SOLUTION/ DROPS OPHTHALMIC at 21:44

## 2023-12-16 RX ADMIN — CYANOCOBALAMIN 1000 MCG: 1000 INJECTION, SOLUTION INTRAMUSCULAR; SUBCUTANEOUS at 09:09

## 2023-12-16 RX ADMIN — PRIMIDONE 50 MG: 50 TABLET ORAL at 09:08

## 2023-12-16 RX ADMIN — CYANOCOBALAMIN TAB 500 MCG 1000 MCG: 500 TAB at 11:56

## 2023-12-16 RX ADMIN — DIGOXIN 125 MCG: 125 TABLET ORAL at 09:08

## 2023-12-16 RX ADMIN — BISACODYL 10 MG: 10 SUPPOSITORY RECTAL at 09:10

## 2023-12-16 RX ADMIN — MIDODRINE HYDROCHLORIDE 5 MG: 5 TABLET ORAL at 06:04

## 2023-12-16 RX ADMIN — TAMSULOSIN HYDROCHLORIDE 0.4 MG: 0.4 CAPSULE ORAL at 16:47

## 2023-12-16 RX ADMIN — IRON SUCROSE 100 MG: 20 INJECTION, SOLUTION INTRAVENOUS at 17:24

## 2023-12-16 RX ADMIN — DRONEDARONE 400 MG: 400 TABLET, FILM COATED ORAL at 16:47

## 2023-12-16 RX ADMIN — DRONEDARONE 400 MG: 400 TABLET, FILM COATED ORAL at 09:14

## 2023-12-16 RX ADMIN — PRIMIDONE 50 MG: 50 TABLET ORAL at 21:37

## 2023-12-16 RX ADMIN — POLYETHYLENE GLYCOL 3350 17 G: 17 POWDER, FOR SOLUTION ORAL at 09:14

## 2023-12-16 RX ADMIN — PANTOPRAZOLE SODIUM 40 MG: 40 TABLET, DELAYED RELEASE ORAL at 09:09

## 2023-12-16 NOTE — ASSESSMENT & PLAN NOTE
secondary to urinary retention decreased appetite and constipation.  Will place on gentle hydration and recheck sodium 134.  Now resolved

## 2023-12-16 NOTE — CASE MANAGEMENT
Case Management Discharge Planning Note    Patient name Miguel Angel Ortega  Location /-01 MRN [id number]  : 1928 Date 2023       Current Admission Date: 2023  Current Admission Diagnosis:Stercoral colitis   Patient Active Problem List    Diagnosis Date Noted    Acute on chronic anemia 2023    Stercoral colitis 2023    Acute urinary retention 2023    Permanent atrial fibrillation (HCC) 2023    Hyponatremia 2023    Prostate cancer metastatic to bone (HCC) 2023      LOS (days): 3  Geometric Mean LOS (GMLOS) (days): 2.6  Days to GMLOS:-0.4     OBJECTIVE:  Risk of Unplanned Readmission Score: 21.82         Current admission status: Inpatient   Preferred Pharmacy:   [id number] [address] [address]  Phone: [phone] Fax: [phone]    Primary Care Provider: Kisha Armstrong MD    Primary Insurance: MEDICARE  Secondary Insurance: [facility]    DISCHARGE DETAILS:    Discharge planning discussed with:: patient and Kendra, [facility] Admissions  [facility] of Choice: Yes  Comments - Freedom of Choice: return to [facility]  CM contacted family/caregiver?: Yes  Were Treatment Team discharge recommendations reviewed with patient/caregiver?: Yes  Did patient/caregiver verbalize understanding of patient care needs?: Yes  Were patient/caregiver advised of the risks associated with not following Treatment Team discharge recommendations?: Yes         Requested Home Health Care         Is the patient interested in C at discharge?: No    DME Referral Provided  Referral made for DME?: No    Other Referral/Resources/Interventions Provided:  Interventions: Assisted Living, Facility Return, Outpatient PT, Outpatient OT  Referral Comments: [facility] with outpatient prescriptions    Would you like to participate in our [facility] service program?  : No - Declined    Treatment  Team Recommendation: Facility Return, Assisted Living  Discharge Destination Plan:: Assisted Living, Facility Return  Transport at Discharge : Family                             IMM Given (Date):: 12/16/23  IMM Given to:: Patient  Family notified:: appeal rights reviewed with patient           10:30 CM contacted [facility], inquiring if patient able to return to [facility] today.  She will attempt to locate DON on today and get back to .

## 2023-12-16 NOTE — PROGRESS NOTES
[facility]  Progress Note  Name: Miguel Angel Ortega I  MRN: [id number]  Unit/Bed#: -[facility]o I Date of Admission: 12/13/2023   Date of Service: 12/16/2023 I Hospital Day: 3    Assessment/Plan   * Stercoral colitis  Assessment & Plan  Patient presents with symptoms of constipation for the last few days.  He saw GI outpatient.  He was started on MiraLAX patient states he went 26 times to the bathroom yesterday had very small amounts of stool and felt like he just could not empty out his bowel movement.  Also had a fall yesterday.  Ct abd pelvis shows Abundant stool in the colon and rectal vault compatible with constipation in the appropriate clinical context. No bowel obstruction.     Minimal perirectal fat stranding suggestive of mild or early stercoral proctitis. No perirectal abscess.    Placed on MiraLAX daily and placed on Dulcolax suppository x 2 daily and also placed 3 enemas  Patient has a large stool burden and will need to evacuate him.  No need for antibiotics at this time.  Has had 3-4 large bowel movements so far is feeling better.  Will continue MiraLAX and Dulcolax suppositories for now and see how he does      Acute urinary retention  Assessment & Plan  Acute urinary retention secondary to severe constipation also known history of prostate cancer.  Zuniga catheter placed as patient bladder scan for 800 cc on presentation to ED and 1800 ml removed in zuniga.Also placed on Flomax   Zuniga removed yesterday morning and placed on voiding trial so far patient is voiding but he woke up 6 times to urinate overnight .Will continue voiding trial today.  Some hypotension noted which may be secondary to Flomax and placed on midodrine and now improving        Acute on chronic anemia  Assessment & Plan  Hemoglobin dropped from 10.3-8.7.  low iron and B12 levels .placed on vit b12 and venofer and repeat CBC in a.m.hb stable. no evidence of bleeding noted  Ordered stool  occult    Hyponatremia  Assessment & Plan  secondary to urinary retention decreased appetite and constipation.  Will place on gentle hydration and recheck sodium 134.  Now resolved    Permanent atrial fibrillation (HCC)  Assessment & Plan  Continue Multaq, digoxin which are his chronic meds.  Not on anticoagulation  Dig level acceptable    Prostate cancer metastatic to bone (HCC)  Assessment & Plan  Further outpatient follow-up with urology.  Placed on Flomax for acute urinary retention hold Casodex while inpatient               VTE Pharmacologic Prophylaxis:   Moderate Risk (Score 3-4) - Pharmacological DVT Prophylaxis Contraindicated. Sequential Compression Devices Ordered.    Mobility:   Basic Mobility Inpatient Raw Score: 17  JH-HLM Goal: 5: Stand one or more mins  JH-HLM Achieved: 7: Walk 25 feet or more  HLM Goal achieved. Continue to encourage appropriate mobility.    Patient Centered Rounds: I performed bedside rounds with nursing staff today.   Discussions with Specialists or Other Care Team Provider: none    Education and Discussions with Family / Patient: Updated  (friend) at bedside.    Total Time Spent on Date of Encounter in care of patient: 35 mins. This time was spent on one or more of the following: performing physical exam; counseling and coordination of care; obtaining or reviewing history; documenting in the medical record; reviewing/ordering tests, medications or procedures; communicating with other healthcare professionals and discussing with patient's family/caregivers.    Current Length of Stay: 3 day(s)  Current Patient Status: Inpatient   Certification Statement: The patient will continue to require additional inpatient hospital stay due to stercoral colitis  Discharge Plan: Anticipate discharge tomorrow to prior assisted or independent living facility.    Code Status: Level 3 - DNAR and DNI    Subjective:   Patient states that he woke up 6 times last night to urinate  otherwise he feels better.  Denies any chest pain or shortness of breath    Objective:     Vitals:   Temp (24hrs), Av.6 °F (36.4 °C), Min:97.3 °F (36.3 °C), Max:97.9 °F (36.6 °C)    Temp:  [97.3 °F (36.3 °C)-97.9 °F (36.6 °C)] 97.3 °F (36.3 °C)  HR:  [57-69] 65  Resp:  [18-19] 19  BP: (115-145)/(43-53) 137/53  SpO2:  [96 %-98 %] 97 %  Body mass index is 24.75 kg/m².     Input and Output Summary (last 24 hours):     Intake/Output Summary (Last 24 hours) at 2023 1146  Last data filed at 2023 1029  Gross per 24 hour   Intake 240 ml   Output 2148 ml   Net -1908 ml       Physical Exam:   Physical Exam  Vitals and nursing note reviewed.   Constitutional:       Appearance: Normal appearance.   HENT:      Head: Normocephalic and atraumatic.      Right Ear: External ear normal.      Left Ear: External ear normal.      Nose: Nose normal.      Mouth/Throat:      Pharynx: Oropharynx is clear.   Cardiovascular:      Rate and Rhythm: Normal rate and regular rhythm.      Heart sounds: Normal heart sounds.   Pulmonary:      Effort: Pulmonary effort is normal.      Breath sounds: Normal breath sounds.   Abdominal:      General: Bowel sounds are normal.      Palpations: Abdomen is soft.      Tenderness: There is no abdominal tenderness.   Musculoskeletal:         General: Normal range of motion.      Cervical back: Normal range of motion and neck supple.   Skin:     General: Skin is warm and dry.      Capillary Refill: Capillary refill takes less than 2 seconds.   Neurological:      General: No focal deficit present.      Mental Status: He is alert and oriented to person, place, and time.   Psychiatric:         Mood and Affect: Mood normal.            Additional Data:     Labs:  Results from last 7 days   Lab Units 23  0459 23  0546 23  0928   WBC Thousand/uL 6.79   < > 8.94   HEMOGLOBIN g/dL 8.9*   < > 10.3*   HEMATOCRIT % 26.2*   < > 30.1*   PLATELETS Thousands/uL 183   < > 221   NEUTROS PCT %  --    --  76*   LYMPHS PCT %  --   --  10*   MONOS PCT %  --   --  12   EOS PCT %  --   --  1    < > = values in this interval not displayed.     Results from last 7 days   Lab Units 12/15/23  0453 12/14/23  0546   SODIUM mmol/L 134* 133*   POTASSIUM mmol/L 4.1 4.3   CHLORIDE mmol/L 105 105   CO2 mmol/L 24 25   BUN mg/dL 19 15   CREATININE mg/dL 0.96 0.94   ANION GAP mmol/L 5 3   CALCIUM mg/dL 7.8* 7.7*   ALBUMIN g/dL  --  3.2*   TOTAL BILIRUBIN mg/dL  --  0.46   ALK PHOS U/L  --  50   ALT U/L  --  7   AST U/L  --  15   GLUCOSE RANDOM mg/dL 101 89                 Results from last 7 days   Lab Units 12/13/23  0939 12/13/23  0928   LACTIC ACID mmol/L 1.1  --    PROCALCITONIN ng/ml  --  <0.05       Lines/Drains:  Invasive Devices       Peripheral Intravenous Line  Duration             Peripheral IV 12/13/23 Left Antecubital 3 days                          Imaging: Reviewed radiology reports from this admission including: abdominal/pelvic CT    Recent Cultures (last 7 days):   Results from last 7 days   Lab Units 12/13/23  0942 12/13/23  0934   BLOOD CULTURE  No Growth at 48 hrs. No Growth at 48 hrs.       Last 24 Hours Medication List:   Current Facility-Administered Medications   Medication Dose Route Frequency Provider Last Rate    acetaminophen  650 mg Oral Q6H PRN Sherita Martinez MD      bisacodyl  10 mg Rectal BID Sherita Martinez MD      clopidogrel  75 mg Oral Daily Sherita Martinez MD      cyanocobalamin  1,000 mcg Oral Daily Sherita Martinez MD      cyanocobalamin  1,000 mcg Subcutaneous Daily Sherita Martinez MD      digoxin  125 mcg Oral Daily Sherita Martinez MD      dronedarone  400 mg Oral BID With Meals Sherita Martinez MD      folic acid  1,000 mcg Oral Daily Sherita Martinez MD      glycerin-hypromellose-  1 drop Both Eyes Q4H PRN Sherita Martinez MD      hydrocortisone   Topical 4x Daily PRN Sherita Martinez MD      iron sucrose  100 mg Intravenous Daily Sherita Martinez MD      midodrine  2.5 mg Oral BID AC Sherita Martinez MD      ondansetron   4 mg Intravenous Q6H PRN Sherita Mratinez MD      pantoprazole  40 mg Oral Daily Sherita Martinez MD      polyethylene glycol  17 g Oral Daily Sherita Martinez MD      primidone  50 mg Oral Q12H [facility] Sherita Martinez MD      [START ON 12/17/2023] senna-docusate sodium  1 tablet Oral Daily With Breakfast Sherita Martinez MD      tamsulosin  0.4 mg Oral Daily With Dinner Sherita Martinez MD          Today, Patient Was Seen By: Sherita Martinez MD    **Please Note: This note may have been constructed using a voice recognition system.**

## 2023-12-16 NOTE — PLAN OF CARE
Problem: Potential for Falls  Goal: Patient will remain free of falls  Description: INTERVENTIONS:  - Educate patient/family on patient safety including physical limitations  - Instruct patient to call for assistance with activity   - Consult OT/PT to assist with strengthening/mobility   - Keep Call bell within reach  - Keep bed low and locked with side rails adjusted as appropriate  - Keep care items and personal belongings within reach  - Initiate and maintain comfort rounds  - Make Fall Risk Sign visible to staff  -

## 2023-12-16 NOTE — ASSESSMENT & PLAN NOTE
Acute urinary retention secondary to severe constipation also known history of prostate cancer.  Zuniga catheter placed as patient bladder scan for 800 cc on presentation to ED and 1800 ml removed in zuniga.Also placed on Flomax   Zuniga removed yesterday morning and placed on voiding trial so far patient is voiding but he woke up 6 times to urinate overnight .Will continue voiding trial today.  Some hypotension noted which may be secondary to Flomax and placed on midodrine and now improving

## 2023-12-16 NOTE — ASSESSMENT & PLAN NOTE
Hemoglobin dropped from 10.3-8.7.  low iron and B12 levels .placed on vit b12 and venofer and repeat CBC in a.m.hb stable. no evidence of bleeding noted  Ordered stool occult

## 2023-12-16 NOTE — PLAN OF CARE
Problem: Potential for Falls  Goal: Patient will remain free of falls  Description: INTERVENTIONS:  - Educate patient/family on patient safety including physical limitations  - Instruct patient to call for assistance with activity   - Consult OT/PT to assist with strengthening/mobility   - Keep Call bell within reach  - Keep bed low and locked with side rails adjusted as appropriate  - Keep care items and personal belongings within reach  - Initiate and maintain comfort rounds  - Make Fall Risk Sign visible to staff  - Offer Toileting every 2 Hours, in advance of need  - Initiate/Maintain bed/chair alarm  - Obtain necessary fall risk management equipment: bed/chair alarm, walker  - Apply yellow socks and bracelet for high fall risk patients  - Consider moving patient to room near nurses station  Outcome: Progressing     Problem: PAIN - ADULT  Goal: Verbalizes/displays adequate comfort level or baseline comfort level  Description: Interventions:  - Encourage patient to monitor pain and request assistance  - Assess pain using appropriate pain scale  - Administer analgesics based on type and severity of pain and evaluate response  - Implement non-pharmacological measures as appropriate and evaluate response  - Consider cultural and social influences on pain and pain management  - Notify physician/advanced practitioner if interventions unsuccessful or patient reports new pain  Outcome: Progressing     Problem: SAFETY ADULT  Goal: Patient will remain free of falls  Description: INTERVENTIONS:  - Educate patient/family on patient safety including physical limitations  - Instruct patient to call for assistance with activity   - Consult OT/PT to assist with strengthening/mobility   - Keep Call bell within reach  - Keep bed low and locked with side rails adjusted as appropriate  - Keep care items and personal belongings within reach  - Initiate and maintain comfort rounds  - Make Fall Risk Sign visible to staff  - Offer  Toileting every 2 Hours, in advance of need  - Initiate/Maintain bed/chair alarm  - Obtain necessary fall risk management equipment: bed/chair alarm, walker  - Apply yellow socks and bracelet for high fall risk patients  - Consider moving patient to room near nurses station  Outcome: Progressing     Problem: DISCHARGE PLANNING  Goal: Discharge to home or other facility with appropriate resources  Description: INTERVENTIONS:  - Identify barriers to discharge w/patient and caregiver  - Arrange for needed discharge resources and transportation as appropriate  - Identify discharge learning needs (meds, wound care, etc.)  - Arrange for interpretive services to assist at discharge as needed  - Refer to Case Management Department for coordinating discharge planning if the patient needs post-hospital services based on physician/advanced practitioner order or complex needs related to functional status, cognitive ability, or social support system  Outcome: Progressing

## 2023-12-16 NOTE — PLAN OF CARE
Problem: Potential for Falls  Goal: Patient will remain free of falls  Description: INTERVENTIONS:  - Educate patient/family on patient safety including physical limitations  - Instruct patient to call for assistance with activity   - Consult OT/PT to assist with strengthening/mobility   - Keep Call bell within reach  - Keep bed low and locked with side rails adjusted as appropriate  - Keep care items and personal belongings within reach  - Initiate and maintain comfort rounds  - Make Fall Risk Sign visible to staff  - Offer Toileting every 2 Hours, in advance of need  - Initiate/Maintain bed/chair alarm  - Obtain necessary fall risk management equipment: non skid socks  - Apply yellow socks and bracelet for high fall risk patients  - Consider moving patient to room near nurses station  Outcome: Progressing     Problem: PAIN - ADULT  Goal: Verbalizes/displays adequate comfort level or baseline comfort level  Description: Interventions:  - Encourage patient to monitor pain and request assistance  - Assess pain using appropriate pain scale  - Administer analgesics based on type and severity of pain and evaluate response  - Implement non-pharmacological measures as appropriate and evaluate response  - Consider cultural and social influences on pain and pain management  - Notify physician/advanced practitioner if interventions unsuccessful or patient reports new pain  Outcome: Progressing     Problem: INFECTION - ADULT  Goal: Absence or prevention of progression during hospitalization  Description: INTERVENTIONS:  - Assess and monitor for signs and symptoms of infection  - Monitor lab/diagnostic results  - Monitor all insertion sites, i.e. indwelling lines, tubes, and drains  - Monitor endotracheal if appropriate and nasal secretions for changes in amount and color  - [place] appropriate cooling/warming therapies per order  - Administer medications as ordered  - Instruct and encourage patient and family to use good  hand hygiene technique  - Identify and instruct in appropriate isolation precautions for identified infection/condition  Outcome: Progressing     Problem: SAFETY ADULT  Goal: Patient will remain free of falls  Description: INTERVENTIONS:  - Educate patient/family on patient safety including physical limitations  - Instruct patient to call for assistance with activity   - Consult OT/PT to assist with strengthening/mobility   - Keep Call bell within reach  - Keep bed low and locked with side rails adjusted as appropriate  - Keep care items and personal belongings within reach  - Initiate and maintain comfort rounds  - Make Fall Risk Sign visible to staff  - Offer Toileting every 2 Hours, in advance of need  - Initiate/Maintain bed/chair alarm  - Obtain necessary fall risk management equipment: non skid socks  - Apply yellow socks and bracelet for high fall risk patients  - Consider moving patient to room near nurses station  Outcome: Progressing  Goal: Maintain or return to baseline ADL function  Description: INTERVENTIONS:  -  Assess patient's ability to carry out ADLs; assess patient's baseline for ADL function and identify physical deficits which impact ability to perform ADLs (bathing, care of mouth/teeth, toileting, grooming, dressing, etc.)  - Assess/evaluate cause of self-care deficits   - Assess range of motion  - Assess patient's mobility; develop plan if impaired  - Assess patient's need for assistive devices and provide as appropriate  - Encourage maximum independence but intervene and supervise when necessary  - Involve family in performance of ADLs  - Assess for home care needs following discharge   - Consider OT consult to assist with ADL evaluation and planning for discharge  - Provide patient education as appropriate  Outcome: Progressing  Goal: Maintains/Returns to pre admission functional level  Description: INTERVENTIONS:  - Perform AM-PAC 6 Click Basic Mobility/ Daily Activity assessment daily.  -  Set and communicate daily mobility goal to care team and patient/family/caregiver.   - Collaborate with rehabilitation services on mobility goals if consulted  - Perform Range of Motion 3 times a day.  - Reposition patient every 2 hours.  - Dangle patient 2 times a day  - Stand patient 2 times a day  - Ambulate patient 2 times a day  - Out of bed to chair 2 times a day   - Out of bed for meals 2 times a day  - Out of bed for toileting  - Record patient progress and toleration of activity level   Outcome: Progressing     Problem: DISCHARGE PLANNING  Goal: Discharge to home or other facility with appropriate resources  Description: INTERVENTIONS:  - Identify barriers to discharge w/patient and caregiver  - Arrange for needed discharge resources and transportation as appropriate  - Identify discharge learning needs (meds, wound care, etc.)  - Arrange for interpretive services to assist at discharge as needed  - Refer to Case Management Department for coordinating discharge planning if the patient needs post-hospital services based on physician/advanced practitioner order or complex needs related to functional status, cognitive ability, or social support system  Outcome: Progressing     Problem: Knowledge Deficit  Goal: Patient/family/caregiver demonstrates understanding of disease process, treatment plan, medications, and discharge instructions  Description: Complete learning assessment and assess knowledge base.  Interventions:  - Provide teaching at level of understanding  - Provide teaching via preferred learning methods  Outcome: Progressing     Problem: GASTROINTESTINAL - ADULT  Goal: Minimal or absence of nausea and/or vomiting  Description: INTERVENTIONS:  - Administer IV fluids if ordered to ensure adequate hydration  - Maintain NPO status until nausea and vomiting are resolved  - Nasogastric tube if ordered  - Administer ordered antiemetic medications as needed  - Provide nonpharmacologic comfort measures as  appropriate  - Advance diet as tolerated, if ordered  - Consider nutrition services referral to assist patient with adequate nutrition and appropriate food choices  Outcome: Progressing  Goal: Maintains or returns to baseline bowel function  Description: INTERVENTIONS:  - Assess bowel function  - Encourage oral fluids to ensure adequate hydration  - Administer IV fluids if ordered to ensure adequate hydration  - Administer ordered medications as needed  - Encourage mobilization and activity  - Consider nutritional services referral to assist patient with adequate nutrition and appropriate food choices  Outcome: Progressing  Goal: Maintains adequate nutritional intake  Description: INTERVENTIONS:  - Monitor percentage of each meal consumed  - Identify factors contributing to decreased intake, treat as appropriate  - Assist with meals as needed  - Monitor I&O, weight, and lab values if indicated  - Obtain nutrition services referral as needed  Outcome: Progressing  Goal: Oral mucous membranes remain intact  Description: INTERVENTIONS  - Assess oral mucosa and hygiene practices  - Implement preventative oral hygiene regimen  - Implement oral medicated treatments as ordered  - Initiate Nutrition services referral as needed  Outcome: Progressing     Problem: GENITOURINARY - ADULT  Goal: Maintains or returns to baseline urinary function  Description: INTERVENTIONS:  - Assess urinary function  - Encourage oral fluids to ensure adequate hydration if ordered  - Administer IV fluids as ordered to ensure adequate hydration  - Administer ordered medications as needed  - Offer frequent toileting  - Follow urinary retention protocol if ordered  Outcome: Progressing  Goal: Absence of urinary retention  Description: INTERVENTIONS:  - Assess patient’s ability to void and empty bladder  - Monitor I/O  - Bladder scan as needed  - Discuss with physician/AP medications to alleviate retention as needed  - Discuss catheterization for long  term situations as appropriate  Outcome: Progressing  Goal: Urinary catheter remains patent  Description: INTERVENTIONS:  - Assess patency of urinary catheter  - If patient has a chronic zuniga, consider changing catheter if non-functioning  - Follow guidelines for intermittent irrigation of non-functioning urinary catheter  Outcome: Progressing     Problem: Prexisting or High Potential for Compromised Skin Integrity  Goal: Skin integrity is maintained or improved  Description: INTERVENTIONS:  - Identify patients at risk for skin breakdown  - Assess and monitor skin integrity  - Assess and monitor nutrition and hydration status  - Monitor labs   - Assess for incontinence   - Turn and reposition patient  - Assist with mobility/ambulation  - Relieve pressure over bony prominences  - Avoid friction and shearing  - Provide appropriate hygiene as needed including keeping skin clean and dry  - Evaluate need for skin moisturizer/barrier cream  - Collaborate with interdisciplinary team   - Patient/family teaching  - Consider wound care consult   Outcome: Progressing     Problem: Nutrition/Hydration-ADULT  Goal: Nutrient/Hydration intake appropriate for improving, restoring or maintaining nutritional needs  Description: Monitor and assess patient's nutrition/hydration status for malnutrition. Collaborate with interdisciplinary team and initiate plan and interventions as ordered.  Monitor patient's weight and dietary intake as ordered or per policy. Utilize nutrition screening tool and intervene as necessary. Determine patient's food preferences and provide high-protein, high-caloric foods as appropriate.     INTERVENTIONS:  - Monitor oral intake, urinary output, labs, and treatment plans  - Assess nutrition and hydration status and recommend course of action  - Evaluate amount of meals eaten  - Assist patient with eating if necessary   - Allow adequate time for meals  - Recommend/ encourage appropriate diets, oral nutritional  supplements, and vitamin/mineral supplements  - Order, calculate, and assess calorie counts as needed  - Recommend, monitor, and adjust tube feedings and TPN/PPN based on assessed needs  - Assess need for intravenous fluids  - Provide specific nutrition/hydration education as appropriate  - Include patient/family/caregiver in decisions related to nutrition  Outcome: Progressing

## 2023-12-17 ENCOUNTER — APPOINTMENT (INPATIENT)
Dept: RADIOLOGY | Facility: HOSPITAL | Age: 88
End: 2023-12-17
Payer: MEDICARE

## 2023-12-17 LAB
ERYTHROCYTE [DISTWIDTH] IN BLOOD BY AUTOMATED COUNT: 14.4 % (ref 11.6–15.1)
HCT VFR BLD AUTO: 26 % (ref 36.5–49.3)
HEMOCCULT STL QL: NEGATIVE
HGB BLD-MCNC: 8.7 G/DL (ref 12–17)
LACTATE SERPL-SCNC: 1.2 MMOL/L (ref 0.5–2)
MCH RBC QN AUTO: 31.4 PG (ref 26.8–34.3)
MCHC RBC AUTO-ENTMCNC: 33.5 G/DL (ref 31.4–37.4)
MCV RBC AUTO: 94 FL (ref 82–98)
PLATELET # BLD AUTO: 170 THOUSANDS/UL (ref 149–390)
PMV BLD AUTO: 8.2 FL (ref 8.9–12.7)
RBC # BLD AUTO: 2.77 MILLION/UL (ref 3.88–5.62)
WBC # BLD AUTO: 5.35 THOUSAND/UL (ref 4.31–10.16)

## 2023-12-17 PROCEDURE — 99232 SBSQ HOSP IP/OBS MODERATE 35: CPT | Performed by: FAMILY MEDICINE

## 2023-12-17 PROCEDURE — 87186 SC STD MICRODIL/AGAR DIL: CPT | Performed by: FAMILY MEDICINE

## 2023-12-17 PROCEDURE — 74022 RADEX COMPL AQT ABD SERIES: CPT

## 2023-12-17 PROCEDURE — 83605 ASSAY OF LACTIC ACID: CPT | Performed by: FAMILY MEDICINE

## 2023-12-17 PROCEDURE — 87154 CUL TYP ID BLD PTHGN 6+ TRGT: CPT | Performed by: FAMILY MEDICINE

## 2023-12-17 PROCEDURE — 87040 BLOOD CULTURE FOR BACTERIA: CPT | Performed by: FAMILY MEDICINE

## 2023-12-17 PROCEDURE — 87077 CULTURE AEROBIC IDENTIFY: CPT | Performed by: FAMILY MEDICINE

## 2023-12-17 PROCEDURE — 85027 COMPLETE CBC AUTOMATED: CPT | Performed by: FAMILY MEDICINE

## 2023-12-17 RX ORDER — CEFTRIAXONE 1 G/50ML
1000 INJECTION, SOLUTION INTRAVENOUS EVERY 24 HOURS
Status: DISCONTINUED | OUTPATIENT
Start: 2023-12-17 | End: 2023-12-19

## 2023-12-17 RX ORDER — ACETAMINOPHEN 325 MG/1
650 TABLET ORAL EVERY 6 HOURS PRN
Status: DISCONTINUED | OUTPATIENT
Start: 2023-12-17 | End: 2023-12-17

## 2023-12-17 RX ORDER — ACETAMINOPHEN 325 MG/1
650 TABLET ORAL EVERY 6 HOURS PRN
Status: DISCONTINUED | OUTPATIENT
Start: 2023-12-17 | End: 2024-01-04 | Stop reason: HOSPADM

## 2023-12-17 RX ADMIN — PRIMIDONE 50 MG: 50 TABLET ORAL at 21:13

## 2023-12-17 RX ADMIN — SENNOSIDES AND DOCUSATE SODIUM 1 TABLET: 8.6; 5 TABLET ORAL at 08:08

## 2023-12-17 RX ADMIN — DIGOXIN 125 MCG: 125 TABLET ORAL at 08:08

## 2023-12-17 RX ADMIN — ACETAMINOPHEN 650 MG: 325 TABLET, FILM COATED ORAL at 16:07

## 2023-12-17 RX ADMIN — FOLIC ACID 1000 MCG: 1 TABLET ORAL at 08:08

## 2023-12-17 RX ADMIN — DRONEDARONE 400 MG: 400 TABLET, FILM COATED ORAL at 08:10

## 2023-12-17 RX ADMIN — MIDODRINE HYDROCHLORIDE 2.5 MG: 5 TABLET ORAL at 06:15

## 2023-12-17 RX ADMIN — PANTOPRAZOLE SODIUM 40 MG: 40 TABLET, DELAYED RELEASE ORAL at 08:09

## 2023-12-17 RX ADMIN — CYANOCOBALAMIN TAB 500 MCG 1000 MCG: 500 TAB at 08:08

## 2023-12-17 RX ADMIN — PRIMIDONE 50 MG: 50 TABLET ORAL at 08:08

## 2023-12-17 RX ADMIN — CLOPIDOGREL 75 MG: 75 TABLET ORAL at 08:08

## 2023-12-17 RX ADMIN — CYANOCOBALAMIN 1000 MCG: 1000 INJECTION, SOLUTION INTRAMUSCULAR; SUBCUTANEOUS at 08:09

## 2023-12-17 RX ADMIN — POLYETHYLENE GLYCOL 3350 17 G: 17 POWDER, FOR SOLUTION ORAL at 08:17

## 2023-12-17 RX ADMIN — DRONEDARONE 400 MG: 400 TABLET, FILM COATED ORAL at 16:08

## 2023-12-17 RX ADMIN — BISACODYL 10 MG: 10 SUPPOSITORY RECTAL at 16:27

## 2023-12-17 RX ADMIN — IRON SUCROSE 100 MG: 20 INJECTION, SOLUTION INTRAVENOUS at 16:00

## 2023-12-17 RX ADMIN — GLYCERIN 1 DROP: .002; .002; .01 SOLUTION/ DROPS OPHTHALMIC at 08:09

## 2023-12-17 RX ADMIN — CEFTRIAXONE 1000 MG: 1 INJECTION, SOLUTION INTRAVENOUS at 17:07

## 2023-12-17 NOTE — ASSESSMENT & PLAN NOTE
Acute urinary retention secondary to severe constipation also known history of prostate cancer.  Zuniga catheter placed as patient bladder scan for 800 cc on presentation to ED and 1800 ml removed in zuniga.Also placed on Flomax   Zuniga removed 12/15 and placed on voiding trial so far patient is voiding but he woke up multiple times to urinate overnight .Will continue voiding trial today.  Some hypotension noted which may be secondary to Flomax and placed on midodrine.  Will now stop both Flomax and midodrine continue to monitor voiding and postvoid residuals and asked urology for evaluation.

## 2023-12-17 NOTE — NURSING NOTE
Sepsis alert called (7815) Dr. Martinez made aware vs 103.6 (rectal) , resp 17, bp 120/88 map 99, blood cultures drawn x 2q CBC, Lactic acid

## 2023-12-17 NOTE — ASSESSMENT & PLAN NOTE
Further outpatient follow-up with urology.  Placed on Flomax for acute urinary retention hold Casodex while inpatient.stop flomax due to urinary urgency

## 2023-12-17 NOTE — NURSING NOTE
Lactic 1.2, vs 102.7 rectally, hr 85, resp l4, bp 121/41 , map 68 Dr. Martinez notified will continue to monitor patient and advise

## 2023-12-17 NOTE — PLAN OF CARE
Problem: Potential for Falls  Goal: Patient will remain free of falls  Description: INTERVENTIONS:  - Educate patient/family on patient safety including physical limitations  - Instruct patient to call for assistance with activity   - Consult OT/PT to assist with strengthening/mobility   - Keep Call bell within reach  - Keep bed low and locked with side rails adjusted as appropriate  - Keep care items and personal belongings within reach  - Initiate and maintain comfort rounds  - Make Fall Risk Sign visible to staff    - Apply yellow socks and bracelet for high fall risk patients  - Consider moving patient to room near nurses station  12/17/2023 0406 by Misty Valle RN  Outcome: Progressing  12/17/2023 0406 by Misty Valle RN  Outcome: Progressing     Problem: PAIN - ADULT  Goal: Verbalizes/displays adequate comfort level or baseline comfort level  Description: Interventions:  - Encourage patient to monitor pain and request assistance  - Assess pain using appropriate pain scale  - Administer analgesics based on type and severity of pain and evaluate response  - Implement non-pharmacological measures as appropriate and evaluate response  - Consider cultural and social influences on pain and pain management  - Notify physician/advanced practitioner if interventions unsuccessful or patient reports new pain  12/17/2023 0406 by Misty Valle RN  Outcome: Progressing  12/17/2023 0406 by Misty Valle RN  Outcome: Progressing     Problem: INFECTION - ADULT  Goal: Absence or prevention of progression during hospitalization  Description: INTERVENTIONS:  - Assess and monitor for signs and symptoms of infection  - Monitor lab/diagnostic results  - Monitor all insertion sites, i.e. indwelling lines, tubes, and drains  - Monitor endotracheal if appropriate and nasal secretions for changes in amount and color  - [place] appropriate cooling/warming therapies per order  - Administer medications as ordered  - Instruct and  encourage patient and family to use good hand hygiene technique  - Identify and instruct in appropriate isolation precautions for identified infection/condition  12/17/2023 0406 by Misty Valle RN  Outcome: Progressing  12/17/2023 0406 by Misty Valle RN  Outcome: Progressing     Problem: SAFETY ADULT  Goal: Patient will remain free of falls  Description: INTERVENTIONS:  - Educate patient/family on patient safety including physical limitations  - Instruct patient to call for assistance with activity   - Consult OT/PT to assist with strengthening/mobility   - Keep Call bell within reach  - Keep bed low and locked with side rails adjusted as appropriate  - Keep care items and personal belongings within reach  - Initiate and maintain comfort rounds  - Make Fall Risk Sign visible to staff    - Apply yellow socks and bracelet for high fall risk patients  - Consider moving patient to room near nurses station  12/17/2023 0406 by Misty Valle RN  Outcome: Progressing  12/17/2023 0406 by Misty Valle RN  Outcome: Progressing  Goal: Maintain or return to baseline ADL function  Description: INTERVENTIONS:  -  Assess patient's ability to carry out ADLs; assess patient's baseline for ADL function and identify physical deficits which impact ability to perform ADLs (bathing, care of mouth/teeth, toileting, grooming, dressing, etc.)  - Assess/evaluate cause of self-care deficits   - Assess range of motion  - Assess patient's mobility; develop plan if impaired  - Assess patient's need for assistive devices and provide as appropriate  - Encourage maximum independence but intervene and supervise when necessary  - Involve family in performance of ADLs  - Assess for home care needs following discharge   - Consider OT consult to assist with ADL evaluation and planning for discharge  - Provide patient education as appropriate  12/17/2023 0406 by Misty Valle RN  Outcome: Progressing  12/17/2023 0406 by Misty Valle RN  Outcome:  Progressing  Goal: Maintains/Returns to pre admission functional level  Description: INTERVENTIONS:  - Perform AM-PAC 6 Click Basic Mobility/ Daily Activity assessment daily.  - Set and communicate daily mobility goal to care team and patient/family/caregiver.   - Collaborate with rehabilitation services on mobility goals if consulted    - Out of bed for toileting  - Record patient progress and toleration of activity level   12/17/2023 0406 by Misty Valle RN  Outcome: Progressing  12/17/2023 0406 by Misty Valle RN  Outcome: Progressing     Problem: DISCHARGE PLANNING  Goal: Discharge to home or other facility with appropriate resources  Description: INTERVENTIONS:  - Identify barriers to discharge w/patient and caregiver  - Arrange for needed discharge resources and transportation as appropriate  - Identify discharge learning needs (meds, wound care, etc.)  - Arrange for interpretive services to assist at discharge as needed  - Refer to Case Management Department for coordinating discharge planning if the patient needs post-hospital services based on physician/advanced practitioner order or complex needs related to functional status, cognitive ability, or social support system  12/17/2023 0406 by Misty Valle RN  Outcome: Progressing  12/17/2023 0406 by Misty Valle RN  Outcome: Progressing     Problem: Knowledge Deficit  Goal: Patient/family/caregiver demonstrates understanding of disease process, treatment plan, medications, and discharge instructions  Description: Complete learning assessment and assess knowledge base.  Interventions:  - Provide teaching at level of understanding  - Provide teaching via preferred learning methods  12/17/2023 0406 by Misty Valle RN  Outcome: Progressing  12/17/2023 0406 by Misty Valle RN  Outcome: Progressing     Problem: GASTROINTESTINAL - ADULT  Goal: Minimal or absence of nausea and/or vomiting  Description: INTERVENTIONS:  - Administer IV fluids if ordered to ensure  adequate hydration  - Maintain NPO status until nausea and vomiting are resolved  - Nasogastric tube if ordered  - Administer ordered antiemetic medications as needed  - Provide nonpharmacologic comfort measures as appropriate  - Advance diet as tolerated, if ordered  - Consider nutrition services referral to assist patient with adequate nutrition and appropriate food choices  12/17/2023 0406 by Misty Valle RN  Outcome: Progressing  12/17/2023 0406 by Misty Valle RN  Outcome: Progressing  Goal: Maintains or returns to baseline bowel function  Description: INTERVENTIONS:  - Assess bowel function  - Encourage oral fluids to ensure adequate hydration  - Administer IV fluids if ordered to ensure adequate hydration  - Administer ordered medications as needed  - Encourage mobilization and activity  - Consider nutritional services referral to assist patient with adequate nutrition and appropriate food choices  12/17/2023 0406 by Misty Valle RN  Outcome: Progressing  12/17/2023 0406 by Misty Valle RN  Outcome: Progressing  Goal: Maintains adequate nutritional intake  Description: INTERVENTIONS:  - Monitor percentage of each meal consumed  - Identify factors contributing to decreased intake, treat as appropriate  - Assist with meals as needed  - Monitor I&O, weight, and lab values if indicated  - Obtain nutrition services referral as needed  12/17/2023 0406 by Misty Valle RN  Outcome: Progressing  12/17/2023 0406 by Misty Valle RN  Outcome: Progressing  Goal: Oral mucous membranes remain intact  Description: INTERVENTIONS  - Assess oral mucosa and hygiene practices  - Implement preventative oral hygiene regimen  - Implement oral medicated treatments as ordered  - Initiate Nutrition services referral as needed  12/17/2023 0406 by Misty Valle RN  Outcome: Progressing  12/17/2023 0406 by Misty Valle RN  Outcome: Progressing     Problem: GENITOURINARY - ADULT  Goal: Maintains or returns to baseline urinary  function  Description: INTERVENTIONS:  - Assess urinary function  - Encourage oral fluids to ensure adequate hydration if ordered  - Administer IV fluids as ordered to ensure adequate hydration  - Administer ordered medications as needed  - Offer frequent toileting  - Follow urinary retention protocol if ordered  12/17/2023 0406 by Misty Valle RN  Outcome: Progressing  12/17/2023 0406 by Misty Valle RN  Outcome: Progressing  Goal: Absence of urinary retention  Description: INTERVENTIONS:  - Assess patient’s ability to void and empty bladder  - Monitor I/O  - Bladder scan as needed  - Discuss with physician/AP medications to alleviate retention as needed  - Discuss catheterization for long term situations as appropriate  12/17/2023 0406 by Misty Valle RN  Outcome: Progressing  12/17/2023 0406 by Misty Valle RN  Outcome: Progressing  Goal: Urinary catheter remains patent  Description: INTERVENTIONS:  - Assess patency of urinary catheter  - If patient has a chronic zuniga, consider changing catheter if non-functioning  - Follow guidelines for intermittent irrigation of non-functioning urinary catheter  12/17/2023 0406 by Misty Valle RN  Outcome: Progressing  12/17/2023 0406 by Misty Valle RN  Outcome: Progressing     Problem: Prexisting or High Potential for Compromised Skin Integrity  Goal: Skin integrity is maintained or improved  Description: INTERVENTIONS:  - Identify patients at risk for skin breakdown  - Assess and monitor skin integrity  - Assess and monitor nutrition and hydration status  - Monitor labs   - Assess for incontinence   - Turn and reposition patient  - Assist with mobility/ambulation  - Relieve pressure over bony prominences  - Avoid friction and shearing  - Provide appropriate hygiene as needed including keeping skin clean and dry  - Evaluate need for skin moisturizer/barrier cream  - Collaborate with interdisciplinary team   - Patient/family teaching  - Consider wound care consult    12/17/2023 0406 by Misty Valle RN  Outcome: Progressing  12/17/2023 0406 by Misty Valle RN  Outcome: Progressing     Problem: Nutrition/Hydration-ADULT  Goal: Nutrient/Hydration intake appropriate for improving, restoring or maintaining nutritional needs  Description: Monitor and assess patient's nutrition/hydration status for malnutrition. Collaborate with interdisciplinary team and initiate plan and interventions as ordered.  Monitor patient's weight and dietary intake as ordered or per policy. Utilize nutrition screening tool and intervene as necessary. Determine patient's food preferences and provide high-protein, high-caloric foods as appropriate.     INTERVENTIONS:  - Monitor oral intake, urinary output, labs, and treatment plans  - Assess nutrition and hydration status and recommend course of action  - Evaluate amount of meals eaten  - Assist patient with eating if necessary   - Allow adequate time for meals  - Recommend/ encourage appropriate diets, oral nutritional supplements, and vitamin/mineral supplements  - Order, calculate, and assess calorie counts as needed  - Recommend, monitor, and adjust tube feedings and TPN/PPN based on assessed needs  - Assess need for intravenous fluids  - Provide specific nutrition/hydration education as appropriate  - Include patient/family/caregiver in decisions related to nutrition  12/17/2023 0406 by Misty Valle RN  Outcome: Progressing  12/17/2023 0406 by Misty Valle RN  Outcome: Progressing

## 2023-12-17 NOTE — PROGRESS NOTES
Patient spiked a fever with elevated heart rate of 101 however normal respiratory rate vitals normal white count normal lactic acid.  Suspect it is secondary to the stercoral colitis.  Placed on IV Rocephin, for now enema given and closely monitor.

## 2023-12-17 NOTE — NURSING NOTE
Repeat vital signs 102.7 ,85,14,121/41,map 68, Lactic 1.2 - ice packs provided for temperature, will recheck in an hour -dr. Martinez made aware

## 2023-12-17 NOTE — PLAN OF CARE
Problem: GASTROINTESTINAL - ADULT  Goal: Maintains or returns to baseline bowel function  Description: INTERVENTIONS:  - Assess bowel function monitor bowel regimen  - Encourage oral fluids to ensure adequate hydration  - Administer IV fluids if ordered to ensure adequate hydration  - Administer ordered medications as needed  - Encourage mobilization and activity  - Consider nutritional services referral to assist patient with adequate nutrition and appropriate food choices  Outcome: Progressing

## 2023-12-17 NOTE — ASSESSMENT & PLAN NOTE
Continue Multaq, digoxin which are his chronic meds.  Not on anticoagulation  Dig level acceptable  Patient requesting cardio eval to make sure he doesn't have side effects of meds.he has not seen a cardiologist in years

## 2023-12-17 NOTE — ASSESSMENT & PLAN NOTE
Hemoglobin dropped from 10.3-8.7.  low iron and B12 levels .placed on vit b12 and venofer and repeat CBC in a.m.hb stable. no evidence of bleeding noted  neg stool occult

## 2023-12-17 NOTE — PROGRESS NOTES
[facility]  Progress Note  Name: Miguel Angel Ortega I  MRN: [id number]  Unit/Bed#: -01 I Date of Admission: 12/13/2023   Date of Service: 12/17/2023 I Hospital Day: 4    Assessment/Plan   * Stercoral colitis  Assessment & Plan  Patient presents with symptoms of constipation for the last few days.  He saw GI outpatient.  He was started on MiraLAX patient states he went 26 times to the bathroom yesterday had very small amounts of stool and felt like he just could not empty out his bowel movement.  Also had a fall yesterday.  Ct abd pelvis shows Abundant stool in the colon and rectal vault compatible with constipation in the appropriate clinical context. No bowel obstruction.     Minimal perirectal fat stranding suggestive of mild or early stercoral proctitis. No perirectal abscess.    Placed on MiraLAX daily and placed on Dulcolax suppository x 2 daily and also placed 3 enemas  Patient has a large stool burden and will need to evacuate him.  No need for antibiotics at this time.  Has had 3-4 large bowel movements so far is feeling better.  Will continue MiraLAX and Dulcolax suppositories for now and see how he does      Acute urinary retention  Assessment & Plan  Acute urinary retention secondary to severe constipation also known history of prostate cancer.  Zuniga catheter placed as patient bladder scan for 800 cc on presentation to ED and 1800 ml removed in zuniga.Also placed on Flomax   Zuniga removed 12/15 and placed on voiding trial so far patient is voiding but he woke up multiple times to urinate overnight .Will continue voiding trial today.  Some hypotension noted which may be secondary to Flomax and placed on midodrine.  Will now stop both Flomax and midodrine continue to monitor voiding and postvoid residuals and asked urology for evaluation.        Acute on chronic anemia  Assessment & Plan  Hemoglobin dropped from 10.3-8.7.  low iron and B12 levels .placed on vit b12 and venofer  and repeat CBC in a.m.hb stable. no evidence of bleeding noted  neg stool occult    Permanent atrial fibrillation (HCC)  Assessment & Plan  Continue Multaq, digoxin which are his chronic meds.  Not on anticoagulation  Dig level acceptable  Patient requesting cardio eval to make sure he doesn't have side effects of meds.he has not seen a cardiologist in years    Prostate cancer metastatic to bone (HCC)  Assessment & Plan  Further outpatient follow-up with urology.  Placed on Flomax for acute urinary retention hold Casodex while inpatient.stop flomax due to urinary urgency           VTE Pharmacologic Prophylaxis:   Moderate Risk (Score 3-4) - Pharmacological DVT Prophylaxis Contraindicated. Sequential Compression Devices Ordered.    Mobility:   Basic Mobility Inpatient Raw Score: 19  JH-HLM Goal: 6: Walk 10 steps or more  JH-HLM Achieved: 6: Walk 10 steps or more  HLM Goal achieved. Continue to encourage appropriate mobility.    Patient Centered Rounds: I performed bedside rounds with nursing staff today.   Discussions with Specialists or Other Care Team Provider: none    Education and Discussions with Family / Patient: Updated  (friend) at bedside.    Total Time Spent on Date of Encounter in care of patient: 35 mins. This time was spent on one or more of the following: performing physical exam; counseling and coordination of care; obtaining or reviewing history; documenting in the medical record; reviewing/ordering tests, medications or procedures; communicating with other healthcare professionals and discussing with patient's family/caregivers.    Current Length of Stay: 4 day(s)  Current Patient Status: Inpatient   Certification Statement: The patient will continue to require additional inpatient hospital stay due to stercoral colitis  Discharge Plan: Anticipate discharge tomorrow to home with home services.    Code Status: Level 3 - DNAR and DNI    Subjective:   Patient states that he had a very  uncomfortable night as he had to wake up several times at night to go to urinate fortunately did not require straight catheterization and was able to urinate on his own every time but he barely got any sleep because of doing this 2 nights in a row    Objective:     Vitals:   Temp (24hrs), Av °F (36.7 °C), Min:97.7 °F (36.5 °C), Max:98.2 °F (36.8 °C)    Temp:  [97.7 °F (36.5 °C)-98.2 °F (36.8 °C)] 98.2 °F (36.8 °C)  HR:  [65-69] 69  Resp:  [17-18] 17  BP: (124-145)/(46-55) 124/46  SpO2:  [96 %-99 %] 97 %  Body mass index is 24.75 kg/m².     Input and Output Summary (last 24 hours):     Intake/Output Summary (Last 24 hours) at 2023 1345  Last data filed at 2023 1340  Gross per 24 hour   Intake 240 ml   Output 950 ml   Net -710 ml       Physical Exam:   Physical Exam  Vitals and nursing note reviewed.   Constitutional:       Appearance: Normal appearance.   HENT:      Head: Normocephalic and atraumatic.      Right Ear: External ear normal.      Left Ear: External ear normal.      Nose: Nose normal.      Mouth/Throat:      Pharynx: Oropharynx is clear.   Cardiovascular:      Rate and Rhythm: Normal rate and regular rhythm.      Heart sounds: Normal heart sounds.   Pulmonary:      Effort: Pulmonary effort is normal.      Breath sounds: Normal breath sounds.   Abdominal:      General: Bowel sounds are normal.      Palpations: Abdomen is soft.      Tenderness: There is no abdominal tenderness.   Musculoskeletal:         General: Normal range of motion.      Cervical back: Normal range of motion and neck supple.   Skin:     General: Skin is warm and dry.      Capillary Refill: Capillary refill takes less than 2 seconds.   Neurological:      General: No focal deficit present.      Mental Status: He is alert and oriented to person, place, and time.   Psychiatric:         Mood and Affect: Mood normal.            Additional Data:     Labs:  Results from last 7 days   Lab Units 23  0459 23  0546  12/13/23  0928   WBC Thousand/uL 6.79   < > 8.94   HEMOGLOBIN g/dL 8.9*   < > 10.3*   HEMATOCRIT % 26.2*   < > 30.1*   PLATELETS Thousands/uL 183   < > 221   NEUTROS PCT %  --   --  76*   LYMPHS PCT %  --   --  10*   MONOS PCT %  --   --  12   EOS PCT %  --   --  1    < > = values in this interval not displayed.     Results from last 7 days   Lab Units 12/15/23  0453 12/14/23  0546   SODIUM mmol/L 134* 133*   POTASSIUM mmol/L 4.1 4.3   CHLORIDE mmol/L 105 105   CO2 mmol/L 24 25   BUN mg/dL 19 15   CREATININE mg/dL 0.96 0.94   ANION GAP mmol/L 5 3   CALCIUM mg/dL 7.8* 7.7*   ALBUMIN g/dL  --  3.2*   TOTAL BILIRUBIN mg/dL  --  0.46   ALK PHOS U/L  --  50   ALT U/L  --  7   AST U/L  --  15   GLUCOSE RANDOM mg/dL 101 89                 Results from last 7 days   Lab Units 12/13/23  0939 12/13/23  0928   LACTIC ACID mmol/L 1.1  --    PROCALCITONIN ng/ml  --  <0.05       Lines/Drains:  Invasive Devices       Peripheral Intravenous Line  Duration             Peripheral IV 12/17/23 Right Arm <1 day              Drain  Duration             External Urinary Catheter <1 day                          Imaging: Reviewed radiology reports from this admission including: abdominal/pelvic CT    Recent Cultures (last 7 days):   Results from last 7 days   Lab Units 12/13/23  0942 12/13/23  0934   BLOOD CULTURE  No Growth After 4 Days. No Growth After 4 Days.       Last 24 Hours Medication List:   Current Facility-Administered Medications   Medication Dose Route Frequency Provider Last Rate    acetaminophen  650 mg Oral Q6H PRN Sherita Martinez MD      bisacodyl  10 mg Rectal Daily Sherita Martinez MD      clopidogrel  75 mg Oral Daily Sherita Martinez MD      cyanocobalamin  1,000 mcg Oral Daily Sherita Martinez MD      digoxin  125 mcg Oral Daily Sherita Martinez MD      dronedarone  400 mg Oral BID With Meals Sherita Martinez MD      folic acid  1,000 mcg Oral Daily Sherita Martinez MD      glycerin-hypromellose-  1 drop Both Eyes Q4H PRN Sherita Martinez MD       hydrocortisone   Topical 4x Daily PRN Sherita Martinez MD      iron sucrose  100 mg Intravenous Daily Sherita Martinez MD      ondansetron  4 mg Intravenous Q6H PRN Sherita Martinez MD      pantoprazole  40 mg Oral Daily Sherita Martinez MD      polyethylene glycol  17 g Oral Daily Sherita Martinez MD      primidone  50 mg Oral Q12H YAMINI Sherita Martinez MD      senna-docusate sodium  1 tablet Oral Daily With Breakfast Sherita Martinez MD          Today, Patient Was Seen By: Sherita Martinez MD    **Please Note: This note may have been constructed using a voice recognition system.**

## 2023-12-18 PROBLEM — R78.81 GRAM-NEGATIVE BACTEREMIA: Status: ACTIVE | Noted: 2023-12-18

## 2023-12-18 LAB
ANION GAP SERPL CALCULATED.3IONS-SCNC: 5 MMOL/L
ATRIAL RATE: 58 BPM
BACTERIA BLD CULT: NORMAL
BACTERIA BLD CULT: NORMAL
BUN SERPL-MCNC: 25 MG/DL (ref 5–25)
CALCIUM SERPL-MCNC: 8 MG/DL (ref 8.4–10.2)
CHLORIDE SERPL-SCNC: 102 MMOL/L (ref 96–108)
CO2 SERPL-SCNC: 25 MMOL/L (ref 21–32)
CREAT SERPL-MCNC: 1.1 MG/DL (ref 0.6–1.3)
ERYTHROCYTE [DISTWIDTH] IN BLOOD BY AUTOMATED COUNT: 14.8 % (ref 11.6–15.1)
GFR SERPL CREATININE-BSD FRML MDRD: 56 ML/MIN/1.73SQ M
GLUCOSE SERPL-MCNC: 100 MG/DL (ref 65–140)
HCT VFR BLD AUTO: 25.4 % (ref 36.5–49.3)
HGB BLD-MCNC: 8.4 G/DL (ref 12–17)
MCH RBC QN AUTO: 31.7 PG (ref 26.8–34.3)
MCHC RBC AUTO-ENTMCNC: 33.1 G/DL (ref 31.4–37.4)
MCV RBC AUTO: 96 FL (ref 82–98)
P AXIS: 24 DEGREES
PLATELET # BLD AUTO: 155 THOUSANDS/UL (ref 149–390)
PMV BLD AUTO: 8.1 FL (ref 8.9–12.7)
POTASSIUM SERPL-SCNC: 4.2 MMOL/L (ref 3.5–5.3)
PR INTERVAL: 162 MS
QRS AXIS: 61 DEGREES
QRSD INTERVAL: 88 MS
QT INTERVAL: 416 MS
QTC INTERVAL: 408 MS
RBC # BLD AUTO: 2.65 MILLION/UL (ref 3.88–5.62)
SODIUM SERPL-SCNC: 132 MMOL/L (ref 135–147)
T WAVE AXIS: 66 DEGREES
VENTRICULAR RATE: 58 BPM
WBC # BLD AUTO: 8.65 THOUSAND/UL (ref 4.31–10.16)

## 2023-12-18 PROCEDURE — 99222 1ST HOSP IP/OBS MODERATE 55: CPT | Performed by: INTERNAL MEDICINE

## 2023-12-18 PROCEDURE — 85027 COMPLETE CBC AUTOMATED: CPT | Performed by: FAMILY MEDICINE

## 2023-12-18 PROCEDURE — 80048 BASIC METABOLIC PNL TOTAL CA: CPT | Performed by: FAMILY MEDICINE

## 2023-12-18 PROCEDURE — 93005 ELECTROCARDIOGRAM TRACING: CPT

## 2023-12-18 PROCEDURE — 99222 1ST HOSP IP/OBS MODERATE 55: CPT | Performed by: UROLOGY

## 2023-12-18 PROCEDURE — 93010 ELECTROCARDIOGRAM REPORT: CPT | Performed by: INTERNAL MEDICINE

## 2023-12-18 PROCEDURE — 99232 SBSQ HOSP IP/OBS MODERATE 35: CPT | Performed by: FAMILY MEDICINE

## 2023-12-18 RX ORDER — POLYETHYLENE GLYCOL 3350 17 G/17G
17 POWDER, FOR SOLUTION ORAL 2 TIMES DAILY
Status: DISCONTINUED | OUTPATIENT
Start: 2023-12-18 | End: 2024-01-04 | Stop reason: HOSPADM

## 2023-12-18 RX ORDER — TAMSULOSIN HYDROCHLORIDE 0.4 MG/1
0.4 CAPSULE ORAL
Status: DISCONTINUED | OUTPATIENT
Start: 2023-12-18 | End: 2023-12-18

## 2023-12-18 RX ADMIN — CYANOCOBALAMIN TAB 500 MCG 1000 MCG: 500 TAB at 09:03

## 2023-12-18 RX ADMIN — BISACODYL 10 MG: 10 SUPPOSITORY RECTAL at 09:04

## 2023-12-18 RX ADMIN — CLOPIDOGREL 75 MG: 75 TABLET ORAL at 09:03

## 2023-12-18 RX ADMIN — CEFTRIAXONE 1000 MG: 1 INJECTION, SOLUTION INTRAVENOUS at 17:54

## 2023-12-18 RX ADMIN — DRONEDARONE 400 MG: 400 TABLET, FILM COATED ORAL at 09:05

## 2023-12-18 RX ADMIN — GLYCERIN 1 DROP: .002; .002; .01 SOLUTION/ DROPS OPHTHALMIC at 12:43

## 2023-12-18 RX ADMIN — DIGOXIN 125 MCG: 125 TABLET ORAL at 09:04

## 2023-12-18 RX ADMIN — FOLIC ACID 1000 MCG: 1 TABLET ORAL at 09:04

## 2023-12-18 RX ADMIN — PRIMIDONE 50 MG: 50 TABLET ORAL at 21:35

## 2023-12-18 RX ADMIN — POLYETHYLENE GLYCOL 3350 17 G: 17 POWDER, FOR SOLUTION ORAL at 17:53

## 2023-12-18 RX ADMIN — DRONEDARONE 400 MG: 400 TABLET, FILM COATED ORAL at 17:54

## 2023-12-18 RX ADMIN — PANTOPRAZOLE SODIUM 40 MG: 40 TABLET, DELAYED RELEASE ORAL at 09:03

## 2023-12-18 RX ADMIN — POLYETHYLENE GLYCOL 3350 17 G: 17 POWDER, FOR SOLUTION ORAL at 09:04

## 2023-12-18 RX ADMIN — PRIMIDONE 50 MG: 50 TABLET ORAL at 09:04

## 2023-12-18 RX ADMIN — SENNOSIDES AND DOCUSATE SODIUM 1 TABLET: 8.6; 5 TABLET ORAL at 09:15

## 2023-12-18 NOTE — CONSULTS
ENCOUNTER DATE: 12/18/23 12:56 PM  PATIENT NAME: Miguel Angel Ortega   2/28/1928    [id number]  Age: 89 y.o.      Sex: male  ENCOUNTER PROVIDER & AUTHOR: Kp Holman MD  INPATIENT ATTENDING PHYSICIAN: Sherita Martinez MD; PRIMARYCARE PHYSICIAN: Kisha Armstrong MD  DATE OF ADMISSION: 12/13/2023  9:06 AM; LENGTH OF STAY: 5 days  *-*-*-*-*-*-*-*-*-*-*-*-*-*-*-*-*-*-*-*-*-*-*-*-*-*-*-*-*-*-*-*-*-*-*-*-*-*-*-*-*-*-*-*-*-*-*-*-*-*-*-*-*-*-   REASON FOR CONSULTATION:   Symptomatic orthostatic hypotension  *-*-*-*-*-*-*-*-*-*-*-*-*-*-*-*-*-*-*-*-*-*-*-*-*-*-*-*-*-*-*-*-*-*-*-*-*-*-*-*-*-*-*-*-*-*-*-*-*-*-*-*-*-*-  CARDIAC ASSESSMENT:     Low blood pressures.  Wide pulse pressure, possibly related to aortic valve regurgitation  Chronic urinary retention  Metastatic prostate cancer  Subclinical hypothyroidism  Chronic hyponatremia  Stercoral colitis       Patient Active Problem List   Diagnosis    Prostate cancer metastatic to bone (HCC)    Stercoral colitis    Acute urinary retention    Permanent atrial fibrillation (HCC)    Hyponatremia    Acute on chronic anemia        Patient clinically appears to be dry on examination.  No severe hypotension or is noted.  Patient may have had low blood pressure response to Flomax.  No recent orthostatic vital signs noted.  No recent ECG noted.  Electrolytes are overall stable except for hyponatremia.    CARDIAC PLAN:     -- Recommend increase fluid intake and knee-high compression stockings.  -- Request orthostatic vital signs including heart rate and blood pressure tomorrow.  -- For now should continue dronedarone therapy.  -- Request ECG as I do not see recent ECG in the system.  -- Will review orthostatic vital signs tomorrow and provide further recommendations as needed.    *-*-*-*-*-*-*-*-*-*-*-*-*-*-*-*-*-*-*-*-*-*-*-*-*-*-*-*-*-*-*-*-*-*-*-*-*-*-*-*-*-*-*-*-*-*-*-*-*-*-*-*-*-*-  HISTORY OF PRESENT ILLNESS     Patient is a 89-year-old gentleman with medical history significant  for:    Coronary artery disease history of CABG in the remote past  2.  Paroxysmal atrial fibrillation, on antiarrhythmic therapy with dronedarone  3.  Chronic hyponatremia  4.  Chronic anemia  5.  History of prostate cancer with metastasis to bone    Emergency room on 12/13/2023 with complaints of increased urinary frequency and feeling of constipation.  He has been diagnosed with chronic urinary retention and stercoral colitis.  He has been noted to have intermittently low blood pressures and cardiology was consulted for advice regarding comanagement.  Patient  and is by himself at an assisted living facility and reports that he has been fairly independent.  He reports for the last several weeks he has been having increased urinary frequency and this has been bothering him.  He has had no recent chest pain or unusual shortness of breath or dizziness.  He reports that he did have a fall recently due to mechanical reasons.  He reports that he had been doing well until he was diagnosed with prostate cancer with bones to metastasis.  He is currently on chemotherapy.    He is unsure about his cardiologist but records indicate that he has not seen 1 for last several years.  He reports the diagnosis of atrial fibrillation which occurred following his bypass surgery.  He mentions that he has been on Xarelto therapy for almost 20 years or so.    He reports having bypass surgery in the remote past of unspecified vessels.  He denies having any recent stress test.    His current cardiac specific medications include digoxin 125 mcg daily dronedarone 400 mg twice daily.  He is on primidone therapy.  He is not on any beta-blocker therapy or any blood pressure reducing agents.  Denies any history of congestive heart failure in the past.    No recent ECG is noted.  Last ECG is from July 20, 2023 and indicated sinus rhythm heart rate 67 bpm, normal QT interval, nonspecific ST-T wave abnormalities.    His blood work during this  admission is significant for sodium 132 potassium 4.2 chloride 102 bicarb 25 BUN 25 creatinine 1.1 GFR 56.  Last BNP level was 519 on 7/16/2023.  Recent TSH slightly increased at 4.5    Echocardiogram 9/27/2023 [facility]:  Left ventricle not well-visualized.  Mild left concentric left ventricle hypertrophy, normal left ventricular systolic function, EF 55 to 60% grade 1 diastolic dysfunction.  Moderately dilated right ventricle with normal right ventricular systolic function.  Normal left and right atrial cavity size  Trileaflet aortic valve with mild to moderate aortic valve regurgitation  Posterior mitral valve annular calcification, mild mitral valve regurgitation  Mild tricuspid valve regurgitation  No obvious pulmonary hypertension  No pericardial effusion  Dilated aortic root measuring 4.6 cm.  Ascending aorta was not well-visualized.  *-*-*-*-*-*-*-*-*-*-*-*-*-*-*-*-*-*-*-*-*-*-*-*-*-*-*-*-*-*-*-*-*-*-*-*-*-*-*-*-*-*-*-*-*-*-*-*-*-*-*-*-*-*  PAST MEDICAL HISTORY:     Past Medical History:   Diagnosis Date    A-fib (HCC)     History of angina     Prostate cancer (HCC)     PAST SURGICAL HISTORY     Past Surgical History:   Procedure Laterality Date    CORONARY ARTERY BYPASS GRAFT  1995    TONSILLECTOMY            FAMILY HISTORY     Family History   Problem Relation Age of Onset    Hypertension Father      SOCIAL HISTORY     Social History     Tobacco Use   Smoking Status Never   Smokeless Tobacco Never      Social History     Substance and Sexual Activity   Alcohol Use Not Currently     Social History     Substance and Sexual Activity   Drug Use Not Currently    [unfilled]     *-*-*-*-*-*-*-*-*-*-*-*-*-*-*-*-*-*-*-*-*-*-*-*-*-*-*-*-*-*-*-*-*-*-*-*-*-*-*-*-*-*-*-*-*-*-*-*-*-*-*-*-*-*  ALLERGIES     No Known Allergies  CURRENT SCHEDULED MEDICATIONS       Current Facility-Administered Medications:     acetaminophen (TYLENOL) tablet 650 mg, 650 mg, Oral, Q6H PRN, Sherita Martinez MD    bisacodyl  (DULCOLAX) rectal suppository 10 mg, 10 mg, Rectal, Daily, Sherita Martinez MD, 10 mg at 12/18/23 0904    cefTRIAXone (ROCEPHIN) IVPB (premix in dextrose) 1,000 mg 50 mL, 1,000 mg, Intravenous, Q24H, Sherita Martinez MD, Stopped at 12/17/23 1750    clopidogrel (PLAVIX) tablet 75 mg, 75 mg, Oral, Daily, Sherita Martinez MD, 75 mg at 12/18/23 0903    cyanocobalamin (VITAMIN B-12) tablet 1,000 mcg, 1,000 mcg, Oral, Daily, Sherita Martinez MD, 1,000 mcg at 12/18/23 0903    digoxin (LANOXIN) tablet 125 mcg, 125 mcg, Oral, Daily, Sherita Martinez MD, 125 mcg at 12/18/23 0904    dronedarone (MULTAQ) tablet 400 mg, 400 mg, Oral, BID With Meals, Sherita Martinez MD, 400 mg at 12/18/23 0905    folic acid (FOLVITE) tablet 1,000 mcg, 1,000 mcg, Oral, Daily, Sherita Martinez MD, 1,000 mcg at 12/18/23 0904    glycerin-hypromellose- (ARTIFICIAL TEARS) ophthalmic solution 1 drop, 1 drop, Both Eyes, Q4H PRN, Sherita Martinez MD, 1 drop at 12/18/23 1243    hydrocortisone (ANUSOL-HC) 2.5 % rectal cream, , Topical, 4x Daily PRN, Sherita Martinez MD    ondansetron (ZOFRAN) injection 4 mg, 4 mg, Intravenous, Q6H PRN, Sherita Martinez MD    pantoprazole (PROTONIX) EC tablet 40 mg, 40 mg, Oral, Daily, Sherita Martinez MD, 40 mg at 12/18/23 0903    polyethylene glycol (MIRALAX) packet 17 g, 17 g, Oral, Daily, Sherita Martinez MD, 17 g at 12/18/23 0904    primidone (MYSOLINE) tablet 50 mg, 50 mg, Oral, Q12H YAMINI, Sherita Martinez MD, 50 mg at 12/18/23 0904    senna-docusate sodium (SENOKOT S) 8.6-50 mg per tablet 1 tablet, 1 tablet, Oral, Daily With Breakfast, Sherita Martinez MD, 1 tablet at 12/18/23 0915     *-*-*-*-*-*-*-*-*-*-*-*-*-*-*-*-*-*-*-*-*-*-*-*-*-*-*-*-*-*-*-*-*-*-*-*-*-*-*-*-*-*-*-*-*-*-*-*-*-*-*-*-*-*   REVIEW OF SYSTEMS     Positive for: As noted above in HPI  Negative for: All remaining as reviewed below and in HPI. SYSTEM SYMPTOMS REVIEWED:  General--weight change, fever, night sweats  Respiratoryl-- Wheezing, shortness of breath, cough, URI symptoms, sputum,  blood  Cardiovascular--chest pain, syncope, dyspnea on exertion, edema, decline in exercise tolerance, claudication   Gastrointestinal--persistent vomiting, diarrhea, abdominal distention, blood in stool   Muscular or skeletal--joint pain or swelling   Neurologic--headaches, syncope, abnormal movement  Hematologic--history of easy bruising and bleeding   Endocrine--thyroid enlargement, heat or cold intolerance, polyuria   Psychiatric--anxiety, depression      *-*-*-*-*-*-*-*-*-*-*-*-*-*-*-*-*-*-*-*-*-*-*-*-*-*-*-*-*-*-*-*-*-*-*-*-*-*-*-*-*-*-*-*-*-*-*-*-*-*-*-*-*-*-   VITAL SIGNS       Vitals:    23 0904 23 0909 23 1103 23 1235   BP:  (!) 101/34  (!) 117/43   BP Location:       Pulse: 68 72     Resp:       Temp:   97.8 °F (36.6 °C)    TempSrc:   Oral    SpO2:  96%     Weight:       Height:         TEMPERATURE HISTORY 24H: Temp (24hrs), Av.8 °F (38.2 °C), Min:97.8 °F (36.6 °C), Max:103.6 °F (39.8 °C)   . BLOOD PRESSURE HISTORY: Systolic (36hrs), Av , Min:101 , Max:130    Diastolic (36hrs), Av, Min:34, Max:110      Weight    23 0909 23 1100   Weight: 80.5 kg (177 lb 7.5 oz) 80.5 kg (177 lb 7.5 oz)      Body mass index is 24.75 kg/m². Wt Readings from Last 10 Encounters:   23 80.5 kg (177 lb 7.5 oz)   23 77.6 kg (171 lb)      Intake/Output Summary (Last 24 hours) at 2023 1256  Last data filed at 2023 0901  Gross per 24 hour   Intake 290 ml   Output 1275 ml   Net -985 ml        *-*-*-*-*-*-*-*-*-*-*-*-*-*-*-*-*-*-*-*-*-*-*-*-*-*-*-*-*-*-*-*-*-*-*-*-*-*-*-*-*-*-*-*-*-*-*-*-*-*-*-*-*-*-   PHYSICAL EXAMINATION:     General Appearance:    Alert, cooperative, no distress, appears stated age, normal built   Head, Eyes, ENT:    No obvious abnormality, dry mucous membranes.   Neck:   Supple, no carotid bruit or JVD   Back:     Symmetric, no curvature.   Lungs:     Respirations unlabored. Clear to auscultation bilaterally,    Chest wall:    No tenderness  "or deformity   Heart:    Regular rate and rhythm, S1 and S2 normal, no murmur, rub  or gallop.   Abdomen:     Soft, non-tender, No obvious masses, or organomegaly   Extremities:   Extremities warm, no cyanosis or edema    Skin:   Skin color, texture, turgor normal, no rashes or lesions     *-*-*-*-*-*-*-*-*-*-*-*-*-*-*-*-*-*-*-*-*-*-*-*-*-*-*-*-*-*-*-*-*-*-*-*-*-*-*-*-*-*-*-*-*-*-*-*-*-*-*-*-*-*-   LABORATORY DATA:   I have personally reviewed pertinent labs.    CMP:  Results from last 7 days   Lab Units 12/18/23  0454 12/15/23  0453 12/14/23  0546 12/13/23  0928   SODIUM mmol/L 132* 134* 133* 128*   POTASSIUM mmol/L 4.2 4.1 4.3 4.5   CHLORIDE mmol/L 102 105 105 98   CO2 mmol/L 25 24 25 25   BUN mg/dL 25 19 15 20   CREATININE mg/dL 1.10 0.96 0.94 0.94   CALCIUM mg/dL 8.0* 7.8* 7.7* 8.4   ALK PHOS U/L  --   --  50 62   ALT U/L  --   --  7 8   AST U/L  --   --  15 17               Cardiac Profile:   Results from last 7 days   Lab Units 12/15/23  0453   DIGOXIN LVL ng/mL 1.1             Results from last 7 days   Lab Units 12/14/23  0546   TSH 3RD GENERATON uIU/mL 4.950*        Blood Gas Analysis:             CBC:   Results from last 7 days   Lab Units 12/18/23  0454 12/17/23  1648 12/16/23  0459   WBC Thousand/uL 8.65 5.35 6.79   HEMOGLOBIN g/dL 8.4* 8.7* 8.9*   HEMATOCRIT % 25.4* 26.0* 26.2*   PLATELETS Thousands/uL 155 170 183     PT/INR: No results found for: \"PT\", \"INR\", Microbiology:   Results from last 7 days   Lab Units 12/17/23  1643 12/13/23  1811 12/13/23  0942 12/13/23  0934   BLOOD CULTURE   --   --  No Growth After 4 Days. No Growth After 4 Days.   GRAM STAIN RESULT  Gram negative rods*  Gram negative rods*  --   --   --    MRSA CULTURE ONLY   --  Methicillin Resistant Staphylococcus aureus isolated*  This patient requires contact isolation precautions per [place] law. Contact precautions are not required in [place] for nasal surveillance cultures.  --   --         "     *-*-*-*-*-*-*-*-*-*-*-*-*-*-*-*-*-*-*-*-*-*-*-*-*-*-*-*-*-*-*-*-*-*-*-*-*-*-*-*-*-*-*-*-*-*-*-*-*-*-*-*-*-*-  TELEMETRY, LAST ECG:  Telemetry reviewed. ... Not on telemetry     No results found for this visit on 12/13/23.     *-*-*-*-*-*-*-*-*-*-*-*-*-*-*-*-*-*-*-*-*-*-*-*-*-*-*-*-*-*-*-*-*-*-*-*-*-*-*-*-*-*-*-*-*-*-*-*-*-*-*-*-*-*-  IMAGING STUDIES REPORTS: Imaging studies results reviewed....  No valid procedures specified.  No Chest XR results available for this patient.    *-*-*-*-*-*-*-*-*-*-*-*-*-*-*-*-*-*-*-*-*-*-*-*-*-*-*-*-*-*-*-*-*-*-*-*-*-*-*-*-*-*-*-*-*-*-*-*-*-*-*-*-*-*-  AVAILABLE OLD CARDIAC TESTS REPORTS:   No results found for this or any previous visit.    No results found for this or any previous visit.    No results found for this or any previous visit.    No results found for this or any previous visit.         *-*-*-*-*-*-*-*-*-*-*-*-*-*-*-*-*-*-*-*-*-*-*-*-*-*-*-*-*-*-*-*-*-*-*-*-*-*-*-*-*-*-*-*-*-*-*-*-*-*-*-*-*-*-  SIGNATURES:   @SIG@   Kp Holman MD     CC:   Kisha Armstrong MD   No ref. provider found

## 2023-12-18 NOTE — ASSESSMENT & PLAN NOTE
Proteus bacteremia from gi source.will cont rocephin for now and probably treat for 10 day course and switch to oral antibiotics on discharge

## 2023-12-18 NOTE — ASSESSMENT & PLAN NOTE
Further outpatient follow-up with urology.  Placed on Flomax for acute urinary retention hold Casodex while inpatient.stop flomax due to urinary urgency.

## 2023-12-18 NOTE — PLAN OF CARE
Problem: Potential for Falls  Goal: Patient will remain free of falls  Description: INTERVENTIONS:  - Educate patient/family on patient safety including physical limitations  - Instruct patient to call for assistance with activity   - Consult OT/PT to assist with strengthening/mobility   - Keep Call bell within reach  - Keep bed low and locked with side rails adjusted as appropriate  - Keep care items and personal belongings within reach  - Initiate and maintain comfort rounds  - Make Fall Risk Sign visible to staff  - Apply yellow socks and bracelet for high fall risk patients  - Consider moving patient to room near nurses station  Outcome: Progressing     Problem: PAIN - ADULT  Goal: Verbalizes/displays adequate comfort level or baseline comfort level  Description: Interventions:  - Encourage patient to monitor pain and request assistance  - Assess pain using appropriate pain scale  - Administer analgesics based on type and severity of pain and evaluate response  - Implement non-pharmacological measures as appropriate and evaluate response  - Consider cultural and social influences on pain and pain management  - Notify physician/advanced practitioner if interventions unsuccessful or patient reports new pain  Outcome: Progressing     Problem: INFECTION - ADULT  Goal: Absence or prevention of progression during hospitalization  Description: INTERVENTIONS:  - Assess and monitor for signs and symptoms of infection  - Monitor lab/diagnostic results  - Monitor all insertion sites, i.e. indwelling lines, tubes, and drains  - Monitor endotracheal if appropriate and nasal secretions for changes in amount and color  - [place] appropriate cooling/warming therapies per order  - Administer medications as ordered  - Instruct and encourage patient and family to use good hand hygiene technique  - Identify and instruct in appropriate isolation precautions for identified infection/condition  Outcome: Progressing     Problem:  SAFETY ADULT  Goal: Patient will remain free of falls  Description: INTERVENTIONS:  - Educate patient/family on patient safety including physical limitations  - Instruct patient to call for assistance with activity   - Consult OT/PT to assist with strengthening/mobility   - Keep Call bell within reach  - Keep bed low and locked with side rails adjusted as appropriate  - Keep care items and personal belongings within reach  - Initiate and maintain comfort rounds  - Make Fall Risk Sign visible to staff  - Apply yellow socks and bracelet for high fall risk patients  - Consider moving patient to room near nurses station  Outcome: Progressing  Goal: Maintain or return to baseline ADL function  Description: INTERVENTIONS:  - Educate patient/family on patient safety including physical limitations  - Instruct patient to call for assistance with activity   - Consult OT/PT to assist with strengthening/mobility   - Keep Call bell within reach  - Keep bed low and locked with side rails adjusted as appropriate  - Keep care items and personal belongings within reach  - Initiate and maintain comfort rounds  - Make Fall Risk Sign visible to staff  - Apply yellow socks and bracelet for high fall risk patients  - Consider moving patient to room near nurses station  Outcome: Progressing  Goal: Maintains/Returns to pre admission functional level  Description: INTERVENTIONS:  -  Assess patient's ability to carry out ADLs; assess patient's baseline for ADL function and identify physical deficits which impact ability to perform ADLs (bathing, care of mouth/teeth, toileting, grooming, dressing, etc.)  - Assess/evaluate cause of self-care deficits   - Assess range of motion  - Assess patient's mobility; develop plan if impaired  - Assess patient's need for assistive devices and provide as appropriate  - Encourage maximum independence but intervene and supervise when necessary  - Involve family in performance of ADLs  - Assess for home care  needs following discharge   - Consider OT consult to assist with ADL evaluation and planning for discharge  - Provide patient education as appropriate  Outcome: Progressing     Problem: DISCHARGE PLANNING  Goal: Discharge to home or other facility with appropriate resources  Description: INTERVENTIONS:  - Identify barriers to discharge w/patient and caregiver  - Arrange for needed discharge resources and transportation as appropriate  - Identify discharge learning needs (meds, wound care, etc.)  - Arrange for interpretive services to assist at discharge as needed  - Refer to Case Management Department for coordinating discharge planning if the patient needs post-hospital services based on physician/advanced practitioner order or complex needs related to functional status, cognitive ability, or social support system  Outcome: Progressing     Problem: Knowledge Deficit  Goal: Patient/family/caregiver demonstrates understanding of disease process, treatment plan, medications, and discharge instructions  Description: Complete learning assessment and assess knowledge base.  Interventions:  - Provide teaching at level of understanding  - Provide teaching via preferred learning methods  Outcome: Progressing     Problem: GASTROINTESTINAL - ADULT  Goal: Minimal or absence of nausea and/or vomiting  Description: INTERVENTIONS:  - Administer IV fluids if ordered to ensure adequate hydration  - Maintain NPO status until nausea and vomiting are resolved  - Nasogastric tube if ordered  - Administer ordered antiemetic medications as needed  - Provide nonpharmacologic comfort measures as appropriate  - Advance diet as tolerated, if ordered  - Consider nutrition services referral to assist patient with adequate nutrition and appropriate food choices  Outcome: Progressing  Goal: Maintains or returns to baseline bowel function  Description: INTERVENTIONS:  - Assess bowel function monitor bowel regimen  - Encourage oral fluids to  ensure adequate hydration  - Administer IV fluids if ordered to ensure adequate hydration  - Administer ordered medications as needed  - Encourage mobilization and activity  - Consider nutritional services referral to assist patient with adequate nutrition and appropriate food choices  Outcome: Progressing  Goal: Maintains adequate nutritional intake  Description: INTERVENTIONS:  - Monitor percentage of each meal consumed  - Identify factors contributing to decreased intake, treat as appropriate  - Assist with meals as needed  - Monitor I&O, weight, and lab values if indicated  - Obtain nutrition services referral as needed  Outcome: Progressing  Goal: Oral mucous membranes remain intact  Description: INTERVENTIONS  - Assess oral mucosa and hygiene practices  - Implement preventative oral hygiene regimen  - Implement oral medicated treatments as ordered  - Initiate Nutrition services referral as needed  Outcome: Progressing     Problem: GENITOURINARY - ADULT  Goal: Maintains or returns to baseline urinary function  Description: INTERVENTIONS:  - Assess urinary function  - Encourage oral fluids to ensure adequate hydration if ordered  - Administer IV fluids as ordered to ensure adequate hydration  - Administer ordered medications as needed  - Offer frequent toileting  - Follow urinary retention protocol if ordered  Outcome: Progressing  Goal: Absence of urinary retention  Description: INTERVENTIONS:  - Assess patient’s ability to void and empty bladder  - Monitor I/O  - Bladder scan as needed  - Discuss with physician/AP medications to alleviate retention as needed  - Discuss catheterization for long term situations as appropriate  Outcome: Progressing  Goal: Urinary catheter remains patent  Description: INTERVENTIONS:  - Assess patency of urinary catheter  - If patient has a chronic zuniga, consider changing catheter if non-functioning  - Follow guidelines for intermittent irrigation of non-functioning urinary  catheter  Outcome: Progressing     Problem: Prexisting or High Potential for Compromised Skin Integrity  Goal: Skin integrity is maintained or improved  Description: INTERVENTIONS:  - Identify patients at risk for skin breakdown  - Assess and monitor skin integrity  - Assess and monitor nutrition and hydration status  - Monitor labs   - Assess for incontinence   - Turn and reposition patient  - Assist with mobility/ambulation  - Relieve pressure over bony prominences  - Avoid friction and shearing  - Provide appropriate hygiene as needed including keeping skin clean and dry  - Evaluate need for skin moisturizer/barrier cream  - Collaborate with interdisciplinary team   - Patient/family teaching  - Consider wound care consult   Outcome: Progressing     Problem: Nutrition/Hydration-ADULT  Goal: Nutrient/Hydration intake appropriate for improving, restoring or maintaining nutritional needs  Description: Monitor and assess patient's nutrition/hydration status for malnutrition. Collaborate with interdisciplinary team and initiate plan and interventions as ordered.  Monitor patient's weight and dietary intake as ordered or per policy. Utilize nutrition screening tool and intervene as necessary. Determine patient's food preferences and provide high-protein, high-caloric foods as appropriate.     INTERVENTIONS:  - Monitor oral intake, urinary output, labs, and treatment plans  - Assess nutrition and hydration status and recommend course of action  - Evaluate amount of meals eaten  - Assist patient with eating if necessary   - Allow adequate time for meals  - Recommend/ encourage appropriate diets, oral nutritional supplements, and vitamin/mineral supplements  - Order, calculate, and assess calorie counts as needed  - Recommend, monitor, and adjust tube feedings and TPN/PPN based on assessed needs  - Assess need for intravenous fluids  - Provide specific nutrition/hydration education as appropriate  - Include  patient/family/caregiver in decisions related to nutrition  Outcome: Progressing

## 2023-12-18 NOTE — PLAN OF CARE
Problem: Potential for Falls  Goal: Patient will remain free of falls  Description: INTERVENTIONS:  - Educate patient/family on patient safety including physical limitations  - Instruct patient to call for assistance with activity   - Consult OT/PT to assist with strengthening/mobility   - Keep Call bell within reach  - Keep bed low and locked with side rails adjusted as appropriate  - Keep care items and personal belongings within reach  - Initiate and maintain comfort rounds  - Make Fall Risk Sign visible to staff  - Offer Toileting every 2 Hours, in advance of need  - Initiate/Maintain bed alarm  - Obtain necessary fall risk management equipment walker  - Apply yellow socks and bracelet for high fall risk patients  - Consider moving patient to room near nurses station  Outcome: Progressing     Problem: PAIN - ADULT  Goal: Verbalizes/displays adequate comfort level or baseline comfort level  Description: Interventions:  - Encourage patient to monitor pain and request assistance  - Assess pain using appropriate pain scale  - Administer analgesics based on type and severity of pain and evaluate response  - Implement non-pharmacological measures as appropriate and evaluate response  - Consider cultural and social influences on pain and pain management  - Notify physician/advanced practitioner if interventions unsuccessful or patient reports new pain  Outcome: Progressing     Problem: INFECTION - ADULT  Goal: Absence or prevention of progression during hospitalization  Description: INTERVENTIONS:  - Assess and monitor for signs and symptoms of infection  - Monitor lab/diagnostic results  - Monitor all insertion sites, i.e. indwelling lines, tubes, and drains  - Monitor endotracheal if appropriate and nasal secretions for changes in amount and color  - [place] appropriate cooling/warming therapies per order  - Administer medications as ordered  - Instruct and encourage patient and family to use good hand hygiene  technique  - Identify and instruct in appropriate isolation precautions for identified infection/condition  Outcome: Progressing     Problem: SAFETY ADULT  Goal: Patient will remain free of falls  Description: INTERVENTIONS:  - Educate patient/family on patient safety including physical limitations  - Instruct patient to call for assistance with activity   - Consult OT/PT to assist with strengthening/mobility   - Keep Call bell within reach  - Keep bed low and locked with side rails adjusted as appropriate  - Keep care items and personal belongings within reach  - Initiate and maintain comfort rounds  - Make Fall Risk Sign visible to staff  - Offer Toileting every 2 Hours, in advance of need  - Initiate/Maintain bed alarm  - Obtain necessary fall risk management equipment walker  - Apply yellow socks and bracelet for high fall risk patients  - Consider moving patient to room near nurses station  Outcome: Progressing  Goal: Maintain or return to baseline ADL function  Description: INTERVENTIONS:  -  Assess patient's ability to carry out ADLs; assess patient's baseline for ADL function and identify physical deficits which impact ability to perform ADLs (bathing, care of mouth/teeth, toileting, grooming, dressing, etc.)  - Assess/evaluate cause of self-care deficits   - Assess range of motion  - Assess patient's mobility; develop plan if impaired  - Assess patient's need for assistive devices and provide as appropriate  - Encourage maximum independence but intervene and supervise when necessary  - Involve family in performance of ADLs  - Assess for home care needs following discharge   - Consider OT consult to assist with ADL evaluation and planning for discharge  - Provide patient education as appropriate  Outcome: Progressing  Goal: Maintains/Returns to pre admission functional level  Description: INTERVENTIONS:  - Perform AM-PAC 6 Click Basic Mobility/ Daily Activity assessment daily.  - Set and communicate daily  mobility goal to care team and patient/family/caregiver.   - Collaborate with rehabilitation services on mobility goals if consulted  - Perform Range of Motion 3 times a day.  - Reposition patient every 2 hours.  - Dangle patient 3 times a day  - Stand patient 3 times a day  - Ambulate patient 3 times a day  - Out of bed to chair 3 times a day   - Out of bed for meals 3 times a day  - Out of bed for toileting  - Record patient progress and toleration of activity level   Outcome: Progressing     Problem: DISCHARGE PLANNING  Goal: Discharge to home or other facility with appropriate resources  Description: INTERVENTIONS:  - Identify barriers to discharge w/patient and caregiver  - Arrange for needed discharge resources and transportation as appropriate  - Identify discharge learning needs (meds, wound care, etc.)  - Arrange for interpretive services to assist at discharge as needed  - Refer to Case Management Department for coordinating discharge planning if the patient needs post-hospital services based on physician/advanced practitioner order or complex needs related to functional status, cognitive ability, or social support system  Outcome: Progressing     Problem: Knowledge Deficit  Goal: Patient/family/caregiver demonstrates understanding of disease process, treatment plan, medications, and discharge instructions  Description: Complete learning assessment and assess knowledge base.  Interventions:  - Provide teaching at level of understanding  - Provide teaching via preferred learning methods  Outcome: Progressing     Problem: GASTROINTESTINAL - ADULT  Goal: Minimal or absence of nausea and/or vomiting  Description: INTERVENTIONS:  - Administer IV fluids if ordered to ensure adequate hydration  - Maintain NPO status until nausea and vomiting are resolved  - Nasogastric tube if ordered  - Administer ordered antiemetic medications as needed  - Provide nonpharmacologic comfort measures as appropriate  - Advance diet  as tolerated, if ordered  - Consider nutrition services referral to assist patient with adequate nutrition and appropriate food choices  Outcome: Progressing  Goal: Maintains or returns to baseline bowel function  Description: INTERVENTIONS:  - Assess bowel function monitor bowel regimen  - Encourage oral fluids to ensure adequate hydration  - Administer IV fluids if ordered to ensure adequate hydration  - Administer ordered medications as needed  - Encourage mobilization and activity  - Consider nutritional services referral to assist patient with adequate nutrition and appropriate food choices  Outcome: Progressing  Goal: Maintains adequate nutritional intake  Description: INTERVENTIONS:  - Monitor percentage of each meal consumed  - Identify factors contributing to decreased intake, treat as appropriate  - Assist with meals as needed  - Monitor I&O, weight, and lab values if indicated  - Obtain nutrition services referral as needed  Outcome: Progressing  Goal: Oral mucous membranes remain intact  Description: INTERVENTIONS  - Assess oral mucosa and hygiene practices  - Implement preventative oral hygiene regimen  - Implement oral medicated treatments as ordered  - Initiate Nutrition services referral as needed  Outcome: Progressing     Problem: GENITOURINARY - ADULT  Goal: Maintains or returns to baseline urinary function  Description: INTERVENTIONS:  - Assess urinary function  - Encourage oral fluids to ensure adequate hydration if ordered  - Administer IV fluids as ordered to ensure adequate hydration  - Administer ordered medications as needed  - Offer frequent toileting  - Follow urinary retention protocol if ordered  Outcome: Progressing  Goal: Absence of urinary retention  Description: INTERVENTIONS:  - Assess patient’s ability to void and empty bladder  - Monitor I/O  - Bladder scan as needed  - Discuss with physician/AP medications to alleviate retention as needed  - Discuss catheterization for long term  situations as appropriate  Outcome: Progressing  Goal: Urinary catheter remains patent  Description: INTERVENTIONS:  - Assess patency of urinary catheter  - If patient has a chronic zuniga, consider changing catheter if non-functioning  - Follow guidelines for intermittent irrigation of non-functioning urinary catheter  Outcome: Progressing     Problem: Prexisting or High Potential for Compromised Skin Integrity  Goal: Skin integrity is maintained or improved  Description: INTERVENTIONS:  - Identify patients at risk for skin breakdown  - Assess and monitor skin integrity  - Assess and monitor nutrition and hydration status  - Monitor labs   - Assess for incontinence   - Turn and reposition patient  - Assist with mobility/ambulation  - Relieve pressure over bony prominences  - Avoid friction and shearing  - Provide appropriate hygiene as needed including keeping skin clean and dry  - Evaluate need for skin moisturizer/barrier cream  - Collaborate with interdisciplinary team   - Patient/family teaching  - Consider wound care consult   Outcome: Progressing     Problem: Nutrition/Hydration-ADULT  Goal: Nutrient/Hydration intake appropriate for improving, restoring or maintaining nutritional needs  Description: Monitor and assess patient's nutrition/hydration status for malnutrition. Collaborate with interdisciplinary team and initiate plan and interventions as ordered.  Monitor patient's weight and dietary intake as ordered or per policy. Utilize nutrition screening tool and intervene as necessary. Determine patient's food preferences and provide high-protein, high-caloric foods as appropriate.     INTERVENTIONS:  - Monitor oral intake, urinary output, labs, and treatment plans  - Assess nutrition and hydration status and recommend course of action  - Evaluate amount of meals eaten  - Assist patient with eating if necessary   - Allow adequate time for meals  - Recommend/ encourage appropriate diets, oral nutritional  supplements, and vitamin/mineral supplements  - Order, calculate, and assess calorie counts as needed  - Recommend, monitor, and adjust tube feedings and TPN/PPN based on assessed needs  - Assess need for intravenous fluids  - Provide specific nutrition/hydration education as appropriate  - Include patient/family/caregiver in decisions related to nutrition  Outcome: Progressing

## 2023-12-18 NOTE — PROGRESS NOTES
[facility]  Progress Note  Name: Miguel Angel Ortega I  MRN: [id number]  Unit/Bed#: MS 328Melisa I Date of Admission: 12/13/2023   Date of Service: 12/18/2023 I Hospital Day: 5    Assessment/Plan   * Stercoral colitis  Assessment & Plan  Patient presents with symptoms of constipation for the last few days.  He saw GI outpatient.  He was started on MiraLAX patient states he went 26 times to the bathroom yesterday had very small amounts of stool and felt like he just could not empty out his bowel movement.  Also had a fall yesterday.  Ct abd pelvis shows Abundant stool in the colon and rectal vault compatible with constipation in the appropriate clinical context. No bowel obstruction.     Minimal perirectal fat stranding suggestive of mild or early stercoral proctitis. No perirectal abscess.    Placed on MiraLAX daily and placed on Dulcolax suppository x 2 daily and also placed 3 enemas  Patient constipation has resolved      Gram-negative bacteremia  Assessment & Plan  Proteus bacteremia from gi source.will cont rocephin for now and probably treat for 10 day course and switch to oral antibiotics on discharge    Acute urinary retention  Assessment & Plan  Acute urinary retention secondary to severe constipation also known history of prostate cancer.  Zuniga catheter placed as patient bladder scan for 800 cc on presentation to ED and 1800 ml removed in zuniga.Also placed on Flomax   Zuniga removed 12/15 and placed on voiding trial so far patient is voiding but he woke up multiple times to urinate overnight .Will continue voiding trial today.  Some hypotension noted which may be secondary to Flomax and placed on midodrine.  Will now stop both Flomax and midodrine continue to monitor voiding and postvoid residuals and asked urology for evaluation.  Currently post void residuals are less than 100 today.patient has condom cath.urology recommends dc condom cath and monitor post void residuals.        Acute  on chronic anemia  Assessment & Plan  Hemoglobin dropped from 10.3-8.7.  low iron and B12 levels .placed on vit b12 and venofer and repeat CBC in a.m.hb stable. no evidence of bleeding noted  neg stool occult    Permanent atrial fibrillation (HCC)  Assessment & Plan  Continue Multaq, digoxin which are his chronic meds.  Not on anticoagulation  Dig level acceptable  Appreciate cardio eval    Prostate cancer metastatic to bone (HCC)  Assessment & Plan  Further outpatient follow-up with urology.  Placed on Flomax for acute urinary retention hold Casodex while inpatient.stop flomax due to urinary urgency.               VTE Pharmacologic Prophylaxis:   Moderate Risk (Score 3-4) - Pharmacological DVT Prophylaxis Contraindicated. Sequential Compression Devices Ordered.    Mobility:   Basic Mobility Inpatient Raw Score: 18  JH-HLM Goal: 6: Walk 10 steps or more  JH-HLM Achieved: 6: Walk 10 steps or more  HLM Goal achieved. Continue to encourage appropriate mobility.    Patient Centered Rounds: I performed bedside rounds with nursing staff today.   Discussions with Specialists or Other Care Team Provider: urology    Education and Discussions with Family / Patient: none today    Total Time Spent on Date of Encounter in care of patient: 35 mins. This time was spent on one or more of the following: performing physical exam; counseling and coordination of care; obtaining or reviewing history; documenting in the medical record; reviewing/ordering tests, medications or procedures; communicating with other healthcare professionals and discussing with patient's family/caregivers.    Current Length of Stay: 5 day(s)  Current Patient Status: Inpatient   Certification Statement: The patient will continue to require additional inpatient hospital stay due to stercoral colitis  Discharge Plan: Anticipate discharge in 24-48 hrs to prior assisted or independent living facility.    Code Status: Level 3 - DNAR and DNI    Subjective:   States  that he had fever yesterday but is doing a little better today is urinating in his condom catheter    Objective:     Vitals:   Temp (24hrs), Av.8 °F (38.2 °C), Min:97.8 °F (36.6 °C), Max:103.6 °F (39.8 °C)    Temp:  [97.8 °F (36.6 °C)-103.6 °F (39.8 °C)] 97.8 °F (36.6 °C)  HR:  [] 72  Resp:  [14-18] 18  BP: (101-130)/() 117/43  SpO2:  [90 %-97 %] 96 %  Body mass index is 24.75 kg/m².     Input and Output Summary (last 24 hours):     Intake/Output Summary (Last 24 hours) at 2023 1533  Last data filed at 2023 0901  Gross per 24 hour   Intake 170 ml   Output 875 ml   Net -705 ml       Physical Exam:   Physical Exam  Vitals and nursing note reviewed.   Constitutional:       Appearance: Normal appearance.   HENT:      Head: Normocephalic and atraumatic.      Right Ear: External ear normal.      Left Ear: External ear normal.      Nose: Nose normal.      Mouth/Throat:      Pharynx: Oropharynx is clear.   Eyes:      Pupils: Pupils are equal, round, and reactive to light.   Cardiovascular:      Rate and Rhythm: Normal rate and regular rhythm.      Heart sounds: Normal heart sounds.   Pulmonary:      Effort: Pulmonary effort is normal.      Breath sounds: Normal breath sounds.   Abdominal:      General: Bowel sounds are normal.      Palpations: Abdomen is soft.      Tenderness: There is no abdominal tenderness.   Genitourinary:     Comments: Condom cath  Musculoskeletal:         General: Normal range of motion.      Cervical back: Normal range of motion and neck supple.   Skin:     General: Skin is warm and dry.      Capillary Refill: Capillary refill takes less than 2 seconds.   Neurological:      General: No focal deficit present.      Mental Status: He is alert and oriented to person, place, and time.   Psychiatric:         Mood and Affect: Mood normal.            Additional Data:     Labs:  Results from last 7 days   Lab Units 23  0454 23  0546 23  0928   WBC Thousand/uL  8.65   < > 8.94   HEMOGLOBIN g/dL 8.4*   < > 10.3*   HEMATOCRIT % 25.4*   < > 30.1*   PLATELETS Thousands/uL 155   < > 221   NEUTROS PCT %  --   --  76*   LYMPHS PCT %  --   --  10*   MONOS PCT %  --   --  12   EOS PCT %  --   --  1    < > = values in this interval not displayed.     Results from last 7 days   Lab Units 12/18/23  0454 12/15/23  0453 12/14/23  0546   SODIUM mmol/L 132*   < > 133*   POTASSIUM mmol/L 4.2   < > 4.3   CHLORIDE mmol/L 102   < > 105   CO2 mmol/L 25   < > 25   BUN mg/dL 25   < > 15   CREATININE mg/dL 1.10   < > 0.94   ANION GAP mmol/L 5   < > 3   CALCIUM mg/dL 8.0*   < > 7.7*   ALBUMIN g/dL  --   --  3.2*   TOTAL BILIRUBIN mg/dL  --   --  0.46   ALK PHOS U/L  --   --  50   ALT U/L  --   --  7   AST U/L  --   --  15   GLUCOSE RANDOM mg/dL 100   < > 89    < > = values in this interval not displayed.                 Results from last 7 days   Lab Units 12/17/23  1648 12/13/23  0939 12/13/23  0928   LACTIC ACID mmol/L 1.2 1.1  --    PROCALCITONIN ng/ml  --   --  <0.05       Lines/Drains:  Invasive Devices       Peripheral Intravenous Line  Duration             Peripheral IV 12/17/23 Right Arm 1 day              Drain  Duration             External Urinary Catheter 1 day                          Imaging: Reviewed radiology reports from this admission including: xray(s)    Recent Cultures (last 7 days):   Results from last 7 days   Lab Units 12/17/23  1643 12/13/23  0942 12/13/23  0934   BLOOD CULTURE   --  No Growth After 5 Days. No Growth After 5 Days.   GRAM STAIN RESULT  Gram negative rods*  Gram negative rods*  --   --        Last 24 Hours Medication List:   Current Facility-Administered Medications   Medication Dose Route Frequency Provider Last Rate    acetaminophen  650 mg Oral Q6H PRN Sherita Martinez MD      bisacodyl  10 mg Rectal Daily Sherita Martinez MD      cefTRIAXone  1,000 mg Intravenous Q24H Sherita Martinez MD Stopped (12/17/23 2533)    clopidogrel  75 mg Oral Daily Sherita Martinez MD       cyanocobalamin  1,000 mcg Oral Daily Sherita Martinez MD      digoxin  125 mcg Oral Daily Sherita Martinez MD      dronedarone  400 mg Oral BID With Meals Sherita Martinez MD      folic acid  1,000 mcg Oral Daily Sherita Martinez MD      glycerin-hypromellose-  1 drop Both Eyes Q4H PRN Sherita Martinez MD      hydrocortisone   Topical 4x Daily PRN Sherita Martinez MD      ondansetron  4 mg Intravenous Q6H PRN Sherita Martinez MD      pantoprazole  40 mg Oral Daily Sherita Martinez MD      polyethylene glycol  17 g Oral Daily Sherita Martinez MD      primidone  50 mg Oral Q12H YAMINI Sherita Martinez MD      senna-docusate sodium  1 tablet Oral Daily With Breakfast Sherita Martinez MD          Today, Patient Was Seen By: Sherita Martinez MD    **Please Note: This note may have been constructed using a voice recognition system.**

## 2023-12-18 NOTE — ASSESSMENT & PLAN NOTE
Patient presents with symptoms of constipation for the last few days.  He saw GI outpatient.  He was started on MiraLAX patient states he went 26 times to the bathroom yesterday had very small amounts of stool and felt like he just could not empty out his bowel movement.  Also had a fall yesterday.  Ct abd pelvis shows Abundant stool in the colon and rectal vault compatible with constipation in the appropriate clinical context. No bowel obstruction.     Minimal perirectal fat stranding suggestive of mild or early stercoral proctitis. No perirectal abscess.    Placed on MiraLAX daily and placed on Dulcolax suppository x 2 daily and also placed 3 enemas  Patient constipation has resolved

## 2023-12-18 NOTE — ASSESSMENT & PLAN NOTE
Continue Multaq, digoxin which are his chronic meds.  Not on anticoagulation  Dig level acceptable  Appreciate cardio eval

## 2023-12-18 NOTE — ASSESSMENT & PLAN NOTE
Acute urinary retention secondary to severe constipation also known history of prostate cancer.  Zuniga catheter placed as patient bladder scan for 800 cc on presentation to ED and 1800 ml removed in zuniga.Also placed on Flomax   Zuniga removed 12/15 and placed on voiding trial so far patient is voiding but he woke up multiple times to urinate overnight .Will continue voiding trial today.  Some hypotension noted which may be secondary to Flomax and placed on midodrine.  Will now stop both Flomax and midodrine continue to monitor voiding and postvoid residuals and asked urology for evaluation.  Currently post void residuals are less than 100 today.patient has condom cath.urology recommends dc condom cath and monitor post void residuals.

## 2023-12-18 NOTE — CONSULTS
Consultation - Urology   Miguel Angel Ortega 89 y.o. male MRN: [id number]  Unit/Bed#: -01 Encounter: [id number]      Assessment/Plan      Assessment:  Prostate cancer, status post radiation with PSA recurrence in the past followed by Dr. Gorman on androgen deprivation therapy.  #2 urinary retention secondary to constipation, catheter currently out with condom catheter.  #3 bilateral simple cyst on CT scan.    Plan:  Plan recommend DC condom catheter and check some postvoid residuals x 3 if retaining greater than 350 would recommend straight cath.  He should have outpatient follow-up with his urologist regarding his prostate cancer.    History of Present Illness   Attending: Sherita Martinez MD  HPI: Miguel Angel Ortega is a 89 y.o. year old male who presents with urinary retention secondary to constipation.  Patient states he had prostate cancer radiation about 20 years ago, had a PSA recurrence and sees Dr. Gorman where he gets Lupron shots every 3 or so months.    At home he said he has been voiding fine at the nursing home.  No hematuria no dysuria.  No leakage also stated by the patient.  His catheter has been removed and now has a condom catheter on.      REVIEW OF SYSTEMS  Const: Denies chills, fever and weight loss.  CV: Denies chest pain.  Resp: Denies SOB.  GI: Denies abdominal pain, nausea and vomiting.  : Denies symptoms other than stated above.  Musculo: Denies back pain.    Historical Information   Past Medical History:   Diagnosis Date    A-fib (HCC)     History of angina     Prostate cancer (HCC)      Past Surgical History:   Procedure Laterality Date    CORONARY ARTERY BYPASS GRAFT  1995    TONSILLECTOMY       Social History   Social History     Substance and Sexual Activity   Alcohol Use Not Currently     E-Cigarette/Vaping    E-Cigarette Use Never User      E-Cigarette/Vaping Substances     Social History     Tobacco Use   Smoking Status Never   Smokeless Tobacco Never         Meds/Allergies   all  current active meds have been reviewed, current meds:   Current Facility-Administered Medications   Medication Dose Route Frequency    acetaminophen (TYLENOL) tablet 650 mg  650 mg Oral Q6H PRN    bisacodyl (DULCOLAX) rectal suppository 10 mg  10 mg Rectal Daily    cefTRIAXone (ROCEPHIN) IVPB (premix in dextrose) 1,000 mg 50 mL  1,000 mg Intravenous Q24H    clopidogrel (PLAVIX) tablet 75 mg  75 mg Oral Daily    cyanocobalamin (VITAMIN B-12) tablet 1,000 mcg  1,000 mcg Oral Daily    digoxin (LANOXIN) tablet 125 mcg  125 mcg Oral Daily    dronedarone (MULTAQ) tablet 400 mg  400 mg Oral BID With Meals    folic acid (FOLVITE) tablet 1,000 mcg  1,000 mcg Oral Daily    glycerin-hypromellose- (ARTIFICIAL TEARS) ophthalmic solution 1 drop  1 drop Both Eyes Q4H PRN    hydrocortisone (ANUSOL-HC) 2.5 % rectal cream   Topical 4x Daily PRN    ondansetron (ZOFRAN) injection 4 mg  4 mg Intravenous Q6H PRN    pantoprazole (PROTONIX) EC tablet 40 mg  40 mg Oral Daily    polyethylene glycol (MIRALAX) packet 17 g  17 g Oral Daily    primidone (MYSOLINE) tablet 50 mg  50 mg Oral Q12H YAMINI    senna-docusate sodium (SENOKOT S) 8.6-50 mg per tablet 1 tablet  1 tablet Oral Daily With Breakfast   , and PTA meds:   Prior to Admission Medications   Prescriptions Last Dose Informant Patient Reported? Taking?   Calcium Carbonate 1500 (600 Ca) MG TABS 12/13/2023  Yes Yes   Sig: Take 1,500 mg by mouth daily   Ergocalciferol 50 MCG (2000 UT) TABS 12/13/2023  Yes Yes   Sig: Take 2,000 Units by mouth daily   Multiple Vitamins-Minerals (PX Mens Multivitamins) TABS 12/13/2023  Yes Yes   Sig: Take 1 tablet by mouth daily   Multiple Vitamins-Minerals (PreserVision AREDS 2) CAPS 12/13/2023  Yes Yes   Sig: Take 1 capsule by mouth in the morning   Multiple Vitamins-Minerals (PreserVision AREDS) CAPS 12/13/2023  Yes Yes   Sig: Take 1 capsule by mouth daily   alendronate (FOSAMAX) 70 mg tablet Past Week  Yes Yes   Sig: Take 1 tablet by mouth once a  "week   clopidogrel (PLAVIX) 75 mg tablet 12/13/2023  Yes Yes   Sig: Take 75 mg by mouth daily   digoxin (LANOXIN) 0.25 mg tablet 12/13/2023  Yes Yes   Sig: Take 125 mcg by mouth daily   dronedarone (Multaq) 400 mg tablet 12/13/2023  Yes Yes   Sig: Take 400 mg by mouth 2 (two) times a day with meals   folic acid (FOLVITE) 1 mg tablet 12/13/2023  Yes Yes   Sig: Take 1 tablet by mouth daily   meclizine (ANTIVERT) 25 mg tablet Unknown  No No   Sig: Take 1 tablet (25 mg total) by mouth every 8 (eight) hours as needed for dizziness   nitroglycerin (NITROSTAT) 0.4 mg SL tablet Unknown  Yes No   Sig: Place 0.4 mg under the tongue every 5 (five) minutes as needed for chest pain   pantoprazole (PROTONIX) 40 mg tablet 12/13/2023  Yes Yes   Sig: Take 40 mg by mouth daily   primidone (MYSOLINE) 50 mg tablet 12/13/2023  Yes Yes   Sig: Take 50 mg by mouth every 12 (twelve) hours      Facility-Administered Medications: None     No Known Allergies    Objective   Vitals: Blood pressure (!) 122/40, pulse 72, temperature 99.9 °F (37.7 °C), temperature source Rectal, resp. rate 18, height 5' 11\" (1.803 m), weight 80.5 kg (177 lb 7.5 oz), SpO2 97%.    I/O last 24 hours:  In: 290 [P.O.:240; IV Piggyback:50]  Out: 975 [Urine:975]    Invasive Devices       Peripheral Intravenous Line  Duration             Peripheral IV 12/17/23 Right Arm <1 day              Drain  Duration             External Urinary Catheter <1 day                    PHYSICAL EXAM  Const: Appears healthy and well developed. No signs of acute distress present.  Resp: Respirations are regular and unlabored.   CV: Rate is regular. Rhythm is regular.  Abdomen: Abdomen is soft, nontender, and nondistended. Kidneys are not palpable.  : Normal external genitalia exam the bladder was nontender nondistended with a condom catheter.  Psych: Patient's attitude is cooperative. Mood is normal. Affect is normal.    Lab Results: I have personally reviewed pertinent reports.    CBC: " "  Lab Results   Component Value Date    WBC 8.65 12/18/2023    HGB 8.4 (L) 12/18/2023    HCT 25.4 (L) 12/18/2023    MCV 96 12/18/2023     12/18/2023    RBC 2.65 (L) 12/18/2023    MCH 31.7 12/18/2023    MCHC 33.1 12/18/2023    RDW 14.8 12/18/2023    MPV 8.1 (L) 12/18/2023     CMP:   Lab Results   Component Value Date    SODIUM 132 (L) 12/18/2023     12/18/2023    CO2 25 12/18/2023    BUN 25 12/18/2023    CREATININE 1.10 12/18/2023    CALCIUM 8.0 (L) 12/18/2023    EGFR 56 12/18/2023     Urinalysis: No results found for: \"COLORU\", \"CLARITYU\", \"SPECGRAV\", \"PHUR\", \"LEUKOCYTESUR\", \"NITRITE\", \"PROTEINUA\", \"GLUCOSEU\", \"KETONESU\", \"BILIRUBINUR\", \"BLOODU\"  Urine Culture: No results found for: \"URINECX\"  PSA: No results found for: \"PSA\"  Imaging Studies: I have personally reviewed pertinent reports.   and I have personally reviewed pertinent films in PACS  EKG, Pathology, and Other Studies: I have personally reviewed pertinent reports.   and I have personally reviewed pertinent films in PACS    Code Status: Level 3 - DNAR and DNI  Advance Directive and Living Will:      Power of :    POLST:      Counseling / Coordination of Care  Total floor / unit time spent today  minutes. Greater than 50% of total time was spent with the patient and / or family counseling and / or coordination of care. A description of the counseling / coordination of care:    "

## 2023-12-19 PROBLEM — I95.1 ORTHOSTATIC HYPOTENSION: Status: ACTIVE | Noted: 2023-12-19

## 2023-12-19 LAB
ANION GAP SERPL CALCULATED.3IONS-SCNC: 5 MMOL/L
BUN SERPL-MCNC: 27 MG/DL (ref 5–25)
CALCIUM SERPL-MCNC: 8.3 MG/DL (ref 8.4–10.2)
CHLORIDE SERPL-SCNC: 101 MMOL/L (ref 96–108)
CO2 SERPL-SCNC: 25 MMOL/L (ref 21–32)
CREAT SERPL-MCNC: 1.14 MG/DL (ref 0.6–1.3)
ERYTHROCYTE [DISTWIDTH] IN BLOOD BY AUTOMATED COUNT: 14.8 % (ref 11.6–15.1)
GFR SERPL CREATININE-BSD FRML MDRD: 54 ML/MIN/1.73SQ M
GLUCOSE SERPL-MCNC: 104 MG/DL (ref 65–140)
HCT VFR BLD AUTO: 26.4 % (ref 36.5–49.3)
HGB BLD-MCNC: 8.6 G/DL (ref 12–17)
MCH RBC QN AUTO: 31.5 PG (ref 26.8–34.3)
MCHC RBC AUTO-ENTMCNC: 32.6 G/DL (ref 31.4–37.4)
MCV RBC AUTO: 97 FL (ref 82–98)
PLATELET # BLD AUTO: 185 THOUSANDS/UL (ref 149–390)
PMV BLD AUTO: 8.7 FL (ref 8.9–12.7)
POTASSIUM SERPL-SCNC: 4.2 MMOL/L (ref 3.5–5.3)
RBC # BLD AUTO: 2.73 MILLION/UL (ref 3.88–5.62)
SODIUM SERPL-SCNC: 131 MMOL/L (ref 135–147)
WBC # BLD AUTO: 6.03 THOUSAND/UL (ref 4.31–10.16)

## 2023-12-19 PROCEDURE — 0T9B70Z DRAINAGE OF BLADDER WITH DRAINAGE DEVICE, VIA NATURAL OR ARTIFICIAL OPENING: ICD-10-PCS | Performed by: HOSPITALIST

## 2023-12-19 PROCEDURE — 87086 URINE CULTURE/COLONY COUNT: CPT | Performed by: UROLOGY

## 2023-12-19 PROCEDURE — 99232 SBSQ HOSP IP/OBS MODERATE 35: CPT | Performed by: INTERNAL MEDICINE

## 2023-12-19 PROCEDURE — 97530 THERAPEUTIC ACTIVITIES: CPT

## 2023-12-19 PROCEDURE — 97112 NEUROMUSCULAR REEDUCATION: CPT

## 2023-12-19 PROCEDURE — NC001 PR NO CHARGE: Performed by: INTERNAL MEDICINE

## 2023-12-19 PROCEDURE — 97110 THERAPEUTIC EXERCISES: CPT

## 2023-12-19 PROCEDURE — 97116 GAIT TRAINING THERAPY: CPT

## 2023-12-19 PROCEDURE — 87040 BLOOD CULTURE FOR BACTERIA: CPT

## 2023-12-19 PROCEDURE — 99231 SBSQ HOSP IP/OBS SF/LOW 25: CPT | Performed by: UROLOGY

## 2023-12-19 PROCEDURE — 85027 COMPLETE CBC AUTOMATED: CPT | Performed by: FAMILY MEDICINE

## 2023-12-19 PROCEDURE — 99232 SBSQ HOSP IP/OBS MODERATE 35: CPT

## 2023-12-19 PROCEDURE — 80048 BASIC METABOLIC PNL TOTAL CA: CPT | Performed by: FAMILY MEDICINE

## 2023-12-19 RX ORDER — LANOLIN ALCOHOL/MO/W.PET/CERES
3 CREAM (GRAM) TOPICAL
Status: DISCONTINUED | OUTPATIENT
Start: 2023-12-19 | End: 2024-01-04 | Stop reason: HOSPADM

## 2023-12-19 RX ORDER — SODIUM CHLORIDE, SODIUM GLUCONATE, SODIUM ACETATE, POTASSIUM CHLORIDE, MAGNESIUM CHLORIDE, SODIUM PHOSPHATE, DIBASIC, AND POTASSIUM PHOSPHATE .53; .5; .37; .037; .03; .012; .00082 G/100ML; G/100ML; G/100ML; G/100ML; G/100ML; G/100ML; G/100ML
50 INJECTION, SOLUTION INTRAVENOUS CONTINUOUS
Status: DISCONTINUED | OUTPATIENT
Start: 2023-12-19 | End: 2023-12-21

## 2023-12-19 RX ORDER — CEFTRIAXONE 2 G/50ML
2000 INJECTION, SOLUTION INTRAVENOUS EVERY 24 HOURS
Status: DISCONTINUED | OUTPATIENT
Start: 2023-12-19 | End: 2023-12-27

## 2023-12-19 RX ADMIN — DRONEDARONE 400 MG: 400 TABLET, FILM COATED ORAL at 16:58

## 2023-12-19 RX ADMIN — CEFTRIAXONE 2000 MG: 2 INJECTION, SOLUTION INTRAVENOUS at 16:41

## 2023-12-19 RX ADMIN — DRONEDARONE 400 MG: 400 TABLET, FILM COATED ORAL at 08:13

## 2023-12-19 RX ADMIN — DIGOXIN 125 MCG: 125 TABLET ORAL at 08:11

## 2023-12-19 RX ADMIN — SENNOSIDES AND DOCUSATE SODIUM 1 TABLET: 8.6; 5 TABLET ORAL at 08:11

## 2023-12-19 RX ADMIN — POLYETHYLENE GLYCOL 3350, SODIUM SULFATE ANHYDROUS, SODIUM BICARBONATE, SODIUM CHLORIDE, POTASSIUM CHLORIDE 4000 ML: 236; 22.74; 6.74; 5.86; 2.97 POWDER, FOR SOLUTION ORAL at 18:09

## 2023-12-19 RX ADMIN — PRIMIDONE 50 MG: 50 TABLET ORAL at 21:03

## 2023-12-19 RX ADMIN — POLYETHYLENE GLYCOL 3350 17 G: 17 POWDER, FOR SOLUTION ORAL at 16:41

## 2023-12-19 RX ADMIN — FOLIC ACID 1000 MCG: 1 TABLET ORAL at 08:11

## 2023-12-19 RX ADMIN — BISACODYL 10 MG: 10 SUPPOSITORY RECTAL at 08:11

## 2023-12-19 RX ADMIN — CYANOCOBALAMIN TAB 500 MCG 1000 MCG: 500 TAB at 08:11

## 2023-12-19 RX ADMIN — SODIUM CHLORIDE, SODIUM GLUCONATE, SODIUM ACETATE, POTASSIUM CHLORIDE, MAGNESIUM CHLORIDE, SODIUM PHOSPHATE, DIBASIC, AND POTASSIUM PHOSPHATE 50 ML/HR: .53; .5; .37; .037; .03; .012; .00082 INJECTION, SOLUTION INTRAVENOUS at 19:42

## 2023-12-19 RX ADMIN — POLYETHYLENE GLYCOL 3350 17 G: 17 POWDER, FOR SOLUTION ORAL at 08:11

## 2023-12-19 RX ADMIN — PANTOPRAZOLE SODIUM 40 MG: 40 TABLET, DELAYED RELEASE ORAL at 08:11

## 2023-12-19 RX ADMIN — PRIMIDONE 50 MG: 50 TABLET ORAL at 08:11

## 2023-12-19 NOTE — ASSESSMENT & PLAN NOTE
Continue Multaq, digoxin which are his chronic meds.  Not on anticoagulation  Dig level acceptable  Appreciate cardio eval - continue multaq and dig   Low Risk

## 2023-12-19 NOTE — CONSULTS
Consultation - Infectious Disease   Miguel Angel Ortega 89 y.o. male MRN: [id number]  Unit/Bed#: -01 Encounter: [id number]      Inpatient consult to Infectious Diseases  Consult performed by: Hodan Faye MD  Consult ordered by: Suha Alvarado PA-C            REQUIRED DOCUMENTATION:     1. This service was provided via Telemedicine.  2. Provider located at [place].  3. TeleMed provider: Hodan Faye MD.  4. Identify all parties in room with patient during tele consult:RN  5. After connecting through [employer], patient was identified by name and date of birth and assistant checked wristband.  Patient was then informed that this was a Telemedicine visit and that the exam was being conducted confidentially over secure lines. My office door was closed. No one else was in the room.  Patient acknowledged consent and understanding of privacy and security of the Telemedicine visit, and gave us permission to have the assistant stay in the room in order to assist with the history and to conduct the exam.  I informed the patient that I have reviewed their record in Epic and presented the opportunity for them to ask any questions regarding the visit today.  The patient agreed to participate.       Assessment/Recommendations     Sepsis, complicating hospital course  Proteus bacteremia  Probable complicated UTI  Acute urinary retention  Stercoral colitis  Metastatic prostate cancer    -Patient presenting with fecal impaction, stercoral proctitis and urinary retention requiring Ascencio.  Shortly after starting a voiding trial he developed fever, tachycardia with evidence of Proteus bacteremia.   -Suspect source of bacteremia is complicated UTI given hematuria and recurrent urinary retention in the setting of persistent constipation. Consider also gut translocation from sterocoral/inflammatory colitis, lower suspicion for ischemic colitis  -Clinically improving, afebrile without leukocytosis    Continue  ceftriaxone, increase dose to 2 q24h  FU blood and urine cultures  Recommend GI consult for persistent constipation/fecal impaction   Continue zuniga care per protocol and monitor hematuria  Continue supportive care, monitor clinical course, serial abdominal exams. Would repeat CT abd/pelvis if patient has any clinical worsening/fever  Anticipate 10 days of abx therapy     Discussed in detail with the primary service.    History     Reason for Consult: Bacteremia  HPI: Miguel Angel Ortega is a 89 y.o. year old male with prostate cancer s/p radiation, spinal mets on androgen deprivation therapy, A-fib.  He presented to the ER on 12/13 with 1 day of nausea, vomiting, lower abdominal pain, increased urinary frequency and few days of worsening constipation and fecal impaction with rectal urgency.  He had evidence of urinary retention requiring Zuniga with drainage of 800 cc of urine.  Clinically and on imaging he had evidence of stercoral proctitis.  He was admitted and started on laxatives.  Zuniga was removed on 12/15 for a voiding trial.  Due to ongoing constipation a Fleet enema was given on 12/17 and shortly after he became acutely sick with fever to 103.6 with tachycardia.  He was started on ceftriaxone.  Blood cultures were checked and are growing Proteus.  Urology was consulted, condom catheter removal was advised.  Overnight he developed gross hematuria and required straight cath for volumes around 400 and a Zuniga has been replaced. He also reported passing multiple small volume loose stools overnight and KUB notes large amount of feces within the colon. No reports of pain or tenesmus. He has been afebrile without leukocytosis overnight.  Infectious disease is being consulted for diagnostic work up and antibiotic management.    Review of Systems  Pertinent positives and negatives as noted in HPI. Rest complete 12 point system-based review of systems is otherwise negative.    PAST MEDICAL HISTORY:  Past Medical  History:   Diagnosis Date    A-fib (HCC)     History of angina     Prostate cancer (HCC)      Past Surgical History:   Procedure Laterality Date    CORONARY ARTERY BYPASS GRAFT      TONSILLECTOMY         FAMILY HISTORY:  Non-contributory    SOCIAL HISTORY:  Social History   Single  Social History     Substance and Sexual Activity   Alcohol Use Not Currently     Social History     Substance and Sexual Activity   Drug Use Not Currently     Social History     Tobacco Use   Smoking Status Never   Smokeless Tobacco Never       ALLERGIES:  No Known Allergies    MEDICATIONS:  All current active medications have been reviewed.    Physical Exam     Temp:  [97.7 °F (36.5 °C)-98.7 °F (37.1 °C)] 97.7 °F (36.5 °C)  HR:  [57-65] 65  Resp:  [17-18] 17  BP: (117-142)/(43-53) 126/46  SpO2:  [96 %-99 %] 99 %  Temp (24hrs), Av °F (36.7 °C), Min:97.7 °F (36.5 °C), Max:98.7 °F (37.1 °C)  Current: Temperature: 97.7 °F (36.5 °C)    Intake/Output Summary (Last 24 hours) at 2023 1016  Last data filed at 2023 0600  Gross per 24 hour   Intake --   Output 1555 ml   Net -1555 ml         Physical exam findings reported by bedside and primary medical team staff    General Appearance:  Appearing frail and thin, nontoxic, and in no distress, appears stated age   Head:  Normocephalic, without obvious abnormality, atraumatic   Eyes:  PERRL, conjunctiva pink and sclera anicteric, both eyes   Nose: Nares normal, mucosa normal, no drainage   Throat: Oropharynx moist without lesions; lips, mucosa, and tongue normal; teeth and gums normal   Neck: Supple, symmetrical, trachea midline, no adenopathy, no tenderness/mass/nodules   Back:   Symmetric, no curvature, ROM normal, no CVA tenderness   Lungs:   Clear and diminished to auscultation bilaterally, no audible wheezes, rhonchi and rales, respirations unlabored   Chest Wall:  No tenderness or deformity   Heart:  Regular rate and rhythm, S1, S2 normal, no murmur, rub or gallop   Abdomen:    Soft, non-tender, non-distended, positive bowel sounds, no masses, no organomegaly    No CVA tenderness, Ascencio in place with red cloudy urine   Extremities: Extremities normal, atraumatic, no cyanosis, clubbing , trace edema   Skin: Skin color, texture, turgor normal, no rashes or lesions. No draining wounds noted.   Lymph nodes: Cervical, supraclavicular, and axillary nodes normal   Neurologic: Alert and oriented times 3       Invasive Devices:   Peripheral IV 12/17/23 Right Arm (Active)   Site Assessment WDL 12/19/23 0300   Dressing Type Transparent 12/19/23 0300   Line Status Flushed 12/19/23 0300   Dressing Status Clean;Dry;Intact 12/19/23 0300   Dressing Change Due 12/21/23 12/19/23 0300   Reason Not Rotated Not due 12/19/23 0300       Urethral Catheter Double-lumen 18 Fr. (Active)       Labs, Imaging, & Other Studies     Lab Results:    I have personally reviewed pertinent labs.    Results from last 7 days   Lab Units 12/19/23 0447 12/18/23  0454 12/17/23  1648   WBC Thousand/uL 6.03 8.65 5.35   HEMOGLOBIN g/dL 8.6* 8.4* 8.7*   PLATELETS Thousands/uL 185 155 170     Results from last 7 days   Lab Units 12/19/23  0447 12/15/23  0453 12/14/23  0546 12/13/23  0928   POTASSIUM mmol/L 4.2   < > 4.3 4.5   CHLORIDE mmol/L 101   < > 105 98   CO2 mmol/L 25   < > 25 25   BUN mg/dL 27*   < > 15 20   CREATININE mg/dL 1.14   < > 0.94 0.94   EGFR ml/min/1.73sq m 54   < > 68 68   CALCIUM mg/dL 8.3*   < > 7.7* 8.4   AST U/L  --   --  15 17   ALT U/L  --   --  7 8   ALK PHOS U/L  --   --  50 62    < > = values in this interval not displayed.     Results from last 7 days   Lab Units 12/17/23  1643 12/13/23  1811 12/13/23  0942 12/13/23  0934   BLOOD CULTURE  Gram Negative Gonzalo*  Non lactose fermenting gram negative gonzalo*  --  No Growth After 5 Days. No Growth After 5 Days.   GRAM STAIN RESULT  Gram negative rods*  Gram negative rods*  --   --   --    MRSA CULTURE ONLY   --  Methicillin Resistant Staphylococcus aureus  isolated*  This patient requires contact isolation precautions per [place] law. Contact precautions are not required in [place] for nasal surveillance cultures.  --   --        Imaging Studies:   I have personally reviewed pertinent imaging study reports and images in PACS.      EKG, Pathology, and Other Studies:   I have personally reviewed pertinent reports and reviewed external records.    Counseling/Coordination of care:       Total 90 minutes communication with the patient via telehealth.  Labs, medical tests and imaging studies were independently and extensively reviewed by me as noted above in HPI and old records were obtained and summarized as noted above in HPI.   My recommendations were discussed with the patient in detail who verbalized understanding.

## 2023-12-19 NOTE — ASSESSMENT & PLAN NOTE
Hemoglobin dropped from 10.3-8.7.  low iron and B12 levels .placed on vit b12 and venofer and repeat CBC in a.m.hb stable. no evidence of bleeding noted  neg stool occult    Lab Results   Component Value Date    HGB 8.6 (L) 12/19/2023    HGB 8.4 (L) 12/18/2023    HGB 8.7 (L) 12/17/2023

## 2023-12-19 NOTE — CONSULTS
Consultation - [facility]  Miguel Angel Ortega 89 y.o. male MRN: [id number]  Unit/Bed#: -01 Encounter: [id number]      ASSESSMENT & PLAN    Stercoral colitis on CT  Chronic constipation  Proteus bacteremia  Patient presenting with constipation, noted to have stercoral colitis on CT continues with constipation despite bowel regimen  Possible concern for gut translocation from stercoral/inflammatory colitis as source of bacteremia  Recommend golytely bowel prep and an enema  Clear liquid diet for now  Consider starting amitiza 24mcg BID once cleaned out  Will get abd XR after golytely to re-assess stool burden  No plans for procedures at this time.    Reason for Consult / Principal Problem:     HPI: Miguel Angel Ortega is a 89 y.o. year old male with a PMHx prostate cancer s/p radiation with metastatic disease to bone, A-fib not on AC, who presented from assisted living facility with constipation.     In the ED, labs notable for Na 128, Hgb 10.3. CT AP w/ IV contrast 12/13/23 with abundant stool in the colon and rectal vault compatible with constipation, no bowel obstruction, minimal perirectal fat stranding suggestive of mild or early stercoral proctitis. Found to have acute urinary retention on bladder scan in ED, zuniga placed.     Patient started on miralax once daily, doculax suppository BID, senna BID. Despite this daily regimen for 5 days, evidence of large amounts of feces on abd XR.     Abd XR 12/17/23 with large amounts of feces within the colon compatible with constipation.    ID consulted today for bacteremia. They are suspecting source of bacteremia is complicated UTI given hematuria and recurrent urinary rentention in setting of persistent constipation. Also consider gut translocation from stercoral/inflammatory colitis, lower suspicion for ischemic colitis. Recommended GI consult for persistent constipation/fecal impaction. Consider repeat CT if clinical worsening.     Patient reports  hx of alternating constipation and diarrhea. He has been given mag citrate at his nursing home before due to severe constipation but then will have diarrhea. No blood in the stool that he knows of. Last colonoscopy 30 years ago, he believes it was normal.     We discussed doing a procedure like a flex sig or colonoscopy. Patient stated if we were to find a colon cancer, he would not want to pursue any treatments for it.       Past Medical History:   Diagnosis Date    A-fib (HCC)     History of angina     Prostate cancer (HCC)      Past Surgical History:   Procedure Laterality Date    CORONARY ARTERY BYPASS GRAFT  1995    TONSILLECTOMY       Social History   Social History     Substance and Sexual Activity   Alcohol Use Not Currently     Social History     Substance and Sexual Activity   Drug Use Not Currently     Social History     Tobacco Use   Smoking Status Never   Smokeless Tobacco Never       Family History   Problem Relation Age of Onset    Hypertension Father        Medications Prior to Admission   Medication    alendronate (FOSAMAX) 70 mg tablet    Calcium Carbonate 1500 (600 Ca) MG TABS    clopidogrel (PLAVIX) 75 mg tablet    digoxin (LANOXIN) 0.25 mg tablet    dronedarone (Multaq) 400 mg tablet    Ergocalciferol 50 MCG (2000 UT) TABS    folic acid (FOLVITE) 1 mg tablet    Multiple Vitamins-Minerals (PreserVision AREDS 2) CAPS    Multiple Vitamins-Minerals (PreserVision AREDS) CAPS    Multiple Vitamins-Minerals (PX Mens Multivitamins) TABS    pantoprazole (PROTONIX) 40 mg tablet    primidone (MYSOLINE) 50 mg tablet    meclizine (ANTIVERT) 25 mg tablet    nitroglycerin (NITROSTAT) 0.4 mg SL tablet     Current Facility-Administered Medications   Medication Dose Route Frequency    acetaminophen (TYLENOL) tablet 650 mg  650 mg Oral Q6H PRN    bisacodyl (DULCOLAX) rectal suppository 10 mg  10 mg Rectal Daily    cefTRIAXone (ROCEPHIN) IVPB (premix in dextrose) 2,000 mg 50 mL  2,000 mg Intravenous Q24H     cyanocobalamin (VITAMIN B-12) tablet 1,000 mcg  1,000 mcg Oral Daily    digoxin (LANOXIN) tablet 125 mcg  125 mcg Oral Daily    dronedarone (MULTAQ) tablet 400 mg  400 mg Oral BID With Meals    folic acid (FOLVITE) tablet 1,000 mcg  1,000 mcg Oral Daily    glycerin-hypromellose- (ARTIFICIAL TEARS) ophthalmic solution 1 drop  1 drop Both Eyes Q4H PRN    hydrocortisone (ANUSOL-HC) 2.5 % rectal cream   Topical 4x Daily PRN    ondansetron (ZOFRAN) injection 4 mg  4 mg Intravenous Q6H PRN    pantoprazole (PROTONIX) EC tablet 40 mg  40 mg Oral Daily    polyethylene glycol (MIRALAX) packet 17 g  17 g Oral BID    primidone (MYSOLINE) tablet 50 mg  50 mg Oral Q12H YAMINI    senna-docusate sodium (SENOKOT S) 8.6-50 mg per tablet 1 tablet  1 tablet Oral Daily With Breakfast     No Known Allergies    Physical Exam  Constitutional:       General: He is not in acute distress.     Appearance: He is not ill-appearing.   HENT:      Head: Normocephalic and atraumatic.   Cardiovascular:      Rate and Rhythm: Normal rate.   Pulmonary:      Effort: Pulmonary effort is normal. No respiratory distress.   Abdominal:      General: Abdomen is flat. Bowel sounds are normal. There is no distension.      Palpations: Abdomen is soft.      Tenderness: There is no abdominal tenderness.   Skin:     General: Skin is warm and dry.   Neurological:      Mental Status: He is alert and oriented to person, place, and time.       Most Recent Vital Signs:  Vitals:    12/18/23 2135 12/18/23 2153 12/19/23 0300 12/19/23 0846   BP: 137/51   (!) 126/46   Pulse: 57   65   Resp: 18   17   Temp: 97.9 °F (36.6 °C) 98.7 °F (37.1 °C)  97.7 °F (36.5 °C)   TempSrc:  Rectal     SpO2: 99%   99%   Weight:   73.9 kg (163 lb)    Height:           Intake/Output Summary (Last 24 hours) at 12/19/2023 1523  Last data filed at 12/19/2023 0600  Gross per 24 hour   Intake --   Output 1555 ml   Net -1555 ml       LABS/IMAGING  Lab Results: I have reviewed all relevant lab  results during this hospitalization.    Imaging Studies:  I have reviewed all the relevant images during this hospitalizations    Counseling / Coordination of Care  Total time spent today 30 minutes. Greater than 50% of total time was spent with the patient and / or family counseling and / or coordination of care.    Mora Xiao PA-C

## 2023-12-19 NOTE — PLAN OF CARE
Problem: Potential for Falls  Goal: Patient will remain free of falls  Description: INTERVENTIONS:  - Educate patient/family on patient safety including physical limitations  - Instruct patient to call for assistance with activity   - Consult OT/PT to assist with strengthening/mobility   - Keep Call bell within reach  - Keep bed low and locked with side rails adjusted as appropriate  - Keep care items and personal belongings within reach  - Initiate and maintain comfort rounds  - Make Fall Risk Sign visible to staff  - Offer Toileting every 2 Hours, in advance of need  - Initiate/Maintain bed alarm  - Obtain necessary fall risk management equipment walker  - Apply yellow socks and bracelet for high fall risk patients  - Consider moving patient to room near nurses station  Outcome: Progressing     Problem: PAIN - ADULT  Goal: Verbalizes/displays adequate comfort level or baseline comfort level  Description: Interventions:  - Encourage patient to monitor pain and request assistance  - Assess pain using appropriate pain scale  - Administer analgesics based on type and severity of pain and evaluate response  - Implement non-pharmacological measures as appropriate and evaluate response  - Consider cultural and social influences on pain and pain management  - Notify physician/advanced practitioner if interventions unsuccessful or patient reports new pain  Outcome: Progressing     Problem: INFECTION - ADULT  Goal: Absence or prevention of progression during hospitalization  Description: INTERVENTIONS:  - Assess and monitor for signs and symptoms of infection  - Monitor lab/diagnostic results  - Monitor all insertion sites, i.e. indwelling lines, tubes, and drains  - Monitor endotracheal if appropriate and nasal secretions for changes in amount and color  - [place] appropriate cooling/warming therapies per order  - Administer medications as ordered  - Instruct and encourage patient and family to use good hand hygiene  technique  - Identify and instruct in appropriate isolation precautions for identified infection/condition  Outcome: Progressing     Problem: SAFETY ADULT  Goal: Patient will remain free of falls  Description: INTERVENTIONS:  - Educate patient/family on patient safety including physical limitations  - Instruct patient to call for assistance with activity   - Consult OT/PT to assist with strengthening/mobility   - Keep Call bell within reach  - Keep bed low and locked with side rails adjusted as appropriate  - Keep care items and personal belongings within reach  - Initiate and maintain comfort rounds  - Make Fall Risk Sign visible to staff  - Offer Toileting every 2 Hours, in advance of need  - Initiate/Maintain bed alarm  - Obtain necessary fall risk management equipment walker  - Apply yellow socks and bracelet for high fall risk patients  - Consider moving patient to room near nurses station  Outcome: Progressing  Goal: Maintain or return to baseline ADL function  Description: INTERVENTIONS:  -  Assess patient's ability to carry out ADLs; assess patient's baseline for ADL function and identify physical deficits which impact ability to perform ADLs (bathing, care of mouth/teeth, toileting, grooming, dressing, etc.)  - Assess/evaluate cause of self-care deficits   - Assess range of motion  - Assess patient's mobility; develop plan if impaired  - Assess patient's need for assistive devices and provide as appropriate  - Encourage maximum independence but intervene and supervise when necessary  - Involve family in performance of ADLs  - Assess for home care needs following discharge   - Consider OT consult to assist with ADL evaluation and planning for discharge  - Provide patient education as appropriate  Outcome: Progressing  Goal: Maintains/Returns to pre admission functional level  Description: INTERVENTIONS:  - Perform AM-PAC 6 Click Basic Mobility/ Daily Activity assessment daily.  - Set and communicate daily  mobility goal to care team and patient/family/caregiver.   - Collaborate with rehabilitation services on mobility goals if consulted  - Perform Range of Motion 3 times a day.  - Reposition patient every 2 hours.  - Dangle patient 3 times a day  - Stand patient 3 times a day  - Ambulate patient 3 times a day  - Out of bed to chair 3 times a day   - Out of bed for meals 3 times a day  - Out of bed for toileting  - Record patient progress and toleration of activity level   Outcome: Progressing     Problem: DISCHARGE PLANNING  Goal: Discharge to home or other facility with appropriate resources  Description: INTERVENTIONS:  - Identify barriers to discharge w/patient and caregiver  - Arrange for needed discharge resources and transportation as appropriate  - Identify discharge learning needs (meds, wound care, etc.)  - Arrange for interpretive services to assist at discharge as needed  - Refer to Case Management Department for coordinating discharge planning if the patient needs post-hospital services based on physician/advanced practitioner order or complex needs related to functional status, cognitive ability, or social support system  Outcome: Progressing     Problem: Knowledge Deficit  Goal: Patient/family/caregiver demonstrates understanding of disease process, treatment plan, medications, and discharge instructions  Description: Complete learning assessment and assess knowledge base.  Interventions:  - Provide teaching at level of understanding  - Provide teaching via preferred learning methods  Outcome: Progressing     Problem: GASTROINTESTINAL - ADULT  Goal: Minimal or absence of nausea and/or vomiting  Description: INTERVENTIONS:  - Administer IV fluids if ordered to ensure adequate hydration  - Maintain NPO status until nausea and vomiting are resolved  - Nasogastric tube if ordered  - Administer ordered antiemetic medications as needed  - Provide nonpharmacologic comfort measures as appropriate  - Advance diet  as tolerated, if ordered  - Consider nutrition services referral to assist patient with adequate nutrition and appropriate food choices  Outcome: Progressing  Goal: Maintains or returns to baseline bowel function  Description: INTERVENTIONS:  - Assess bowel function monitor bowel regimen  - Encourage oral fluids to ensure adequate hydration  - Administer IV fluids if ordered to ensure adequate hydration  - Administer ordered medications as needed  - Encourage mobilization and activity  - Consider nutritional services referral to assist patient with adequate nutrition and appropriate food choices  Outcome: Progressing  Goal: Maintains adequate nutritional intake  Description: INTERVENTIONS:  - Monitor percentage of each meal consumed  - Identify factors contributing to decreased intake, treat as appropriate  - Assist with meals as needed  - Monitor I&O, weight, and lab values if indicated  - Obtain nutrition services referral as needed  Outcome: Progressing  Goal: Oral mucous membranes remain intact  Description: INTERVENTIONS  - Assess oral mucosa and hygiene practices  - Implement preventative oral hygiene regimen  - Implement oral medicated treatments as ordered  - Initiate Nutrition services referral as needed  Outcome: Progressing     Problem: GENITOURINARY - ADULT  Goal: Maintains or returns to baseline urinary function  Description: INTERVENTIONS:  - Assess urinary function  - Encourage oral fluids to ensure adequate hydration if ordered  - Administer IV fluids as ordered to ensure adequate hydration  - Administer ordered medications as needed  - Offer frequent toileting  - Follow urinary retention protocol if ordered  Outcome: Progressing  Goal: Absence of urinary retention  Description: INTERVENTIONS:  - Assess patient’s ability to void and empty bladder  - Monitor I/O  - Bladder scan as needed  - Discuss with physician/AP medications to alleviate retention as needed  - Discuss catheterization for long term  situations as appropriate  Outcome: Progressing  Goal: Urinary catheter remains patent  Description: INTERVENTIONS:  - Assess patency of urinary catheter  - If patient has a chronic zuniga, consider changing catheter if non-functioning  - Follow guidelines for intermittent irrigation of non-functioning urinary catheter  Outcome: Progressing     Problem: Prexisting or High Potential for Compromised Skin Integrity  Goal: Skin integrity is maintained or improved  Description: INTERVENTIONS:  - Identify patients at risk for skin breakdown  - Assess and monitor skin integrity  - Assess and monitor nutrition and hydration status  - Monitor labs   - Assess for incontinence   - Turn and reposition patient  - Assist with mobility/ambulation  - Relieve pressure over bony prominences  - Avoid friction and shearing  - Provide appropriate hygiene as needed including keeping skin clean and dry  - Evaluate need for skin moisturizer/barrier cream  - Collaborate with interdisciplinary team   - Patient/family teaching  - Consider wound care consult   Outcome: Progressing     Problem: Nutrition/Hydration-ADULT  Goal: Nutrient/Hydration intake appropriate for improving, restoring or maintaining nutritional needs  Description: Monitor and assess patient's nutrition/hydration status for malnutrition. Collaborate with interdisciplinary team and initiate plan and interventions as ordered.  Monitor patient's weight and dietary intake as ordered or per policy. Utilize nutrition screening tool and intervene as necessary. Determine patient's food preferences and provide high-protein, high-caloric foods as appropriate.     INTERVENTIONS:  - Monitor oral intake, urinary output, labs, and treatment plans  - Assess nutrition and hydration status and recommend course of action  - Evaluate amount of meals eaten  - Assist patient with eating if necessary   - Allow adequate time for meals  - Recommend/ encourage appropriate diets, oral nutritional  supplements, and vitamin/mineral supplements  - Order, calculate, and assess calorie counts as needed  - Recommend, monitor, and adjust tube feedings and TPN/PPN based on assessed needs  - Assess need for intravenous fluids  - Provide specific nutrition/hydration education as appropriate  - Include patient/family/caregiver in decisions related to nutrition  Outcome: Progressing

## 2023-12-19 NOTE — ASSESSMENT & PLAN NOTE
Acute urinary retention secondary to severe constipation also known history of prostate cancer.  Zuniga catheter placed as patient bladder scan for 800 cc on presentation to ED and 1800 ml removed in zuniga. Also placed on Flomax   Zuniga removed 12/15 and placed on voiding trial so far patient is voiding but he woke up multiple times to urinate overnight .Will continue voiding trial today.  Some hypotension noted which may be secondary to Flomax and placed on midodrine.  Will now stop both Flomax and midodrine continue to monitor voiding and postvoid residuals and asked urology for evaluation.  Currently post void residuals are less than 100 today.patient has condom cath.urology recommends dc condom cath and monitor post void residuals.  Patient with significant amount of hematuria overnight. Urology saw patient and recommended inserting catheter again. Urology also sent urine culture

## 2023-12-19 NOTE — PROGRESS NOTES
Progress Note:Cardiology  Miguel Angel Ortega 2/28/1928, 89 y.o. male MRN: [id number]    Unit/Bed#: -01 Encounter: [id number]  Attending Physician: Jennifer Warner MD   Primary Care Provider: Kisha Armstrong MD   Date admitted to hospital: 12/13/2023  Length of stay: 6     Assessment:  Hypotension  Wide pulse pressure, possibly related to aortic valve regurgitation  History of atrial fibrillation  Chronic urinary retention, now with neva hematuria  Metatatic prostate cancer  Subclinical hypothyroidism  Chronic hyponatremia  colitis    Plan:  Patient does exhibit orthostatic hypotension today with 20 point drop when he moves from sitting to standing. He is asymptomatic with this. No profound hypotension observed. Suspect this is secondary to poor PO intake and blood loss. Please encourage PO intake. Appears dehydrated and may benefit from a bolus of 500-1000 mL of supplemental IV fluids. Nursing instructed to apply compression socks while awake. ECG demonstrates sinus bradycardia. HR's controlled. OK to continue digoxin and Multaq. Appears he has been on Xarelto in the past. No AC currently due to hematuria, will need to verify use of home AC prior to discharge.       Subjective:   Patient seen and examined.  No significant events overnight. He denies lightheadedness. He has neva hematuria noted in his zuniga catheter. Orthostatic BP check done while I am in his hospital room shows a drop in his systolic BP from 120 to 101 from sitting to standing. HR's stayed around 60 bpm with position change. He denied lightheadedness with this drop. I asked nursing staff to apply compression socks while in the room. He admits he could do better with oral intake.     Review of Systems   Constitutional: Negative.   HENT: Negative.     Cardiovascular:  Negative for chest pain, dyspnea on exertion, irregular heartbeat, near-syncope, orthopnea and palpitations.   Respiratory:  Negative for cough and snoring.    Endocrine: Negative.   "  Skin: Negative.    Musculoskeletal: Negative.    Gastrointestinal: Negative.    Genitourinary:  Positive for hematuria.   Neurological: Negative.    Psychiatric/Behavioral: Negative.           Objective:     Vitals: Blood pressure (!) 126/46, pulse 65, temperature 97.7 °F (36.5 °C), resp. rate 17, height 5' 11\" (1.803 m), weight 73.9 kg (163 lb), SpO2 99%., Body mass index is 22.73 kg/m².,     Orthostatic Blood Pressures      Flowsheet Row Most Recent Value   Blood Pressure 126/46 filed at 12/19/2023 0846   Patient Position - Orthostatic VS Sitting filed at 12/18/2023 0700            Physical Exam  Vitals and nursing note reviewed.   Constitutional:       General: He is not in acute distress.     Appearance: He is well-developed.   HENT:      Head: Normocephalic and atraumatic.   Eyes:      Conjunctiva/sclera: Conjunctivae normal.   Neck:      Vascular: No JVD.   Cardiovascular:      Rate and Rhythm: Regular rhythm. Bradycardia present.      Heart sounds: No murmur heard.  Pulmonary:      Effort: Pulmonary effort is normal. No respiratory distress.      Breath sounds: Normal breath sounds.   Abdominal:      Palpations: Abdomen is soft.      Tenderness: There is no abdominal tenderness.   Genitourinary:     Comments: Edgar bloody urine in zuniga bag  Musculoskeletal:         General: No swelling.      Cervical back: Neck supple.      Right lower leg: No edema.      Left lower leg: No edema.   Skin:     General: Skin is warm and dry.      Capillary Refill: Capillary refill takes less than 2 seconds.   Neurological:      Mental Status: He is alert.   Psychiatric:         Mood and Affect: Mood normal.         Speech: Speech normal.         Behavior: Behavior normal. Behavior is cooperative.         Cognition and Memory: Cognition normal.            Intake/Output Summary (Last 24 hours) at 12/19/2023 1332  Last data filed at 12/19/2023 0600  Gross per 24 hour   Intake --   Output 1555 ml   Net -1555 ml       Weight " (last 2 days)       Date/Time Weight    12/19/23 0300 73.9 (163)               Medications:      Current Facility-Administered Medications:     acetaminophen (TYLENOL) tablet 650 mg, 650 mg, Oral, Q6H PRN, Sherita Martinez MD    bisacodyl (DULCOLAX) rectal suppository 10 mg, 10 mg, Rectal, Daily, Sherita Martinez MD, 10 mg at 12/19/23 0811    cefTRIAXone (ROCEPHIN) IVPB (premix in dextrose) 2,000 mg 50 mL, 2,000 mg, Intravenous, Q24H, Suha Alvarado PA-C    cyanocobalamin (VITAMIN B-12) tablet 1,000 mcg, 1,000 mcg, Oral, Daily, Sherita Martinez MD, 1,000 mcg at 12/19/23 0811    digoxin (LANOXIN) tablet 125 mcg, 125 mcg, Oral, Daily, Sherita Martinez MD, 125 mcg at 12/19/23 0811    dronedarone (MULTAQ) tablet 400 mg, 400 mg, Oral, BID With Meals, Sherita Martinez MD, 400 mg at 12/19/23 0813    folic acid (FOLVITE) tablet 1,000 mcg, 1,000 mcg, Oral, Daily, Sherita Martinez MD, 1,000 mcg at 12/19/23 0811    glycerin-hypromellose- (ARTIFICIAL TEARS) ophthalmic solution 1 drop, 1 drop, Both Eyes, Q4H PRN, Sherita Martinez MD, 1 drop at 12/18/23 1243    hydrocortisone (ANUSOL-HC) 2.5 % rectal cream, , Topical, 4x Daily PRN, Sherita Martinez MD    ondansetron (ZOFRAN) injection 4 mg, 4 mg, Intravenous, Q6H PRN, Sherita Martinez MD    pantoprazole (PROTONIX) EC tablet 40 mg, 40 mg, Oral, Daily, Sherita Martinez MD, 40 mg at 12/19/23 0811    polyethylene glycol (MIRALAX) packet 17 g, 17 g, Oral, BID, Sherita Martinez MD, 17 g at 12/19/23 0811    primidone (MYSOLINE) tablet 50 mg, 50 mg, Oral, Q12H YAMINI, Sherita Martinez MD, 50 mg at 12/19/23 0811    senna-docusate sodium (SENOKOT S) 8.6-50 mg per tablet 1 tablet, 1 tablet, Oral, Daily With Breakfast, Sherita Martinez MD, 1 tablet at 12/19/23 0811     Labs & Results:        Results from last 7 days   Lab Units 12/19/23  0447 12/18/23  0454 12/17/23  1648   WBC Thousand/uL 6.03 8.65 5.35   HEMOGLOBIN g/dL 8.6* 8.4* 8.7*   HEMATOCRIT % 26.4* 25.4* 26.0*   PLATELETS Thousands/uL 185 155 170         Results from last 7  days   Lab Units 12/19/23  0447 12/18/23  0454 12/15/23  0453 12/14/23  0546 12/13/23  0928   POTASSIUM mmol/L 4.2 4.2 4.1 4.3 4.5   CHLORIDE mmol/L 101 102 105 105 98   CO2 mmol/L 25 25 24 25 25   BUN mg/dL 27* 25 19 15 20   CREATININE mg/dL 1.14 1.10 0.96 0.94 0.94   CALCIUM mg/dL 8.3* 8.0* 7.8* 7.7* 8.4   ALK PHOS U/L  --   --   --  50 62   ALT U/L  --   --   --  7 8   AST U/L  --   --   --  15 17                    EKG personally reviewed by FRAN Toussaint EKG: sinus bradycardia, nonspecific ST abnormality.     Counseling / Coordination of Care  Total floor / unit time spent today 25 minutes.  Greater than 50% of total time was spent with the patient and / or family counseling and / or coordination of care.  A description of the counseling / coordination of care: Discussed case with primary team.

## 2023-12-19 NOTE — ASSESSMENT & PLAN NOTE
Patient presents with symptoms of constipation for the last few days.  He saw GI outpatient.  He was started on MiraLAX patient states he went 26 times to the bathroom yesterday had very small amounts of stool and felt like he just could not empty out his bowel movement.  Also had a fall yesterday.  Ct abd pelvis shows Abundant stool in the colon and rectal vault compatible with constipation in the appropriate clinical context. No bowel obstruction.  Minimal perirectal fat stranding suggestive of mild or early stercoral proctitis. No perirectal abscess  Placed on MiraLAX daily and placed on Dulcolax suppository x 2 daily and also placed 3 enemas    Patient still having multiple small loose bowel movements. ID recommending GI consult  GI consult placed - going to see patient tomorrow, recommending Golytely bowel prep and another enema, if improving can do amitiza

## 2023-12-19 NOTE — PROGRESS NOTES
[facility]  Progress Note  Name: Miguel Angel Ortega I  MRN: [id number]  Unit/Bed#: -Theo I Date of Admission: 12/13/2023   Date of Service: 12/19/2023 I Hospital Day: 6    Assessment/Plan   * Stercoral colitis  Assessment & Plan  Patient presents with symptoms of constipation for the last few days.  He saw GI outpatient.  He was started on MiraLAX patient states he went 26 times to the bathroom yesterday had very small amounts of stool and felt like he just could not empty out his bowel movement.  Also had a fall yesterday.  Ct abd pelvis shows Abundant stool in the colon and rectal vault compatible with constipation in the appropriate clinical context. No bowel obstruction.  Minimal perirectal fat stranding suggestive of mild or early stercoral proctitis. No perirectal abscess  Placed on MiraLAX daily and placed on Dulcolax suppository x 2 daily and also placed 3 enemas    Patient still having multiple small loose bowel movements. ID recommending GI consult  GI consult placed - going to see patient tomorrow, recommending Golytely bowel prep and another enema, if improving can do amitiza     Gram-negative bacteremia  Assessment & Plan  Proteus bacteremia from gi source.will cont rocephin for now and probably treat for 10 day course and switch to oral antibiotics on discharge  ID consulted: increase rocephin to 2g q24h, f/u blood and urine cultures, recommend GI consult for persistent constipation/fecal impaction, continue zuniga, anticipate 10 days abx therapy.   Repeat BC ordered    Acute urinary retention  Assessment & Plan  Acute urinary retention secondary to severe constipation also known history of prostate cancer.  Zuniga catheter placed as patient bladder scan for 800 cc on presentation to ED and 1800 ml removed in zuniga. Also placed on Flomax   Zuniga removed 12/15 and placed on voiding trial so far patient is voiding but he woke up multiple times to urinate overnight .Will continue  voiding trial today.  Some hypotension noted which may be secondary to Flomax and placed on midodrine.  Will now stop both Flomax and midodrine continue to monitor voiding and postvoid residuals and asked urology for evaluation.  Currently post void residuals are less than 100 today.patient has condom cath.urology recommends dc condom cath and monitor post void residuals.  Patient with significant amount of hematuria overnight. Urology saw patient and recommended inserting catheter again. Urology also sent urine culture    Acute on chronic anemia  Assessment & Plan  Hemoglobin dropped from 10.3-8.7.  low iron and B12 levels .placed on vit b12 and venofer and repeat CBC in a.m.hb stable. no evidence of bleeding noted  neg stool occult    Lab Results   Component Value Date    HGB 8.6 (L) 12/19/2023    HGB 8.4 (L) 12/18/2023    HGB 8.7 (L) 12/17/2023       Orthostatic hypotension  Assessment & Plan  Patient has been having episodes of orthostatic hypotension outpatient. Orthostatic vitals inpatient positive  Cardiology consult: Suspect this is secondary to poor PO intake and blood loss. Please encourage PO intake. Nursing instructed to apply compression socks while awake. ECG demonstrates sinus bradycardia. HR's controlled. OK to continue digoxin and Multaq.   Will place on gentle IVF rehydration  If persistent hypotension, may benefit from limited echo    Permanent atrial fibrillation (HCC)  Assessment & Plan  Continue Multaq, digoxin which are his chronic meds.  Not on anticoagulation  Dig level acceptable  Appreciate cardio eval - continue multaq and dig    Prostate cancer metastatic to bone (HCC)  Assessment & Plan  Further outpatient follow-up with urology.  Placed on Flomax for acute urinary retention hold Casodex while inpatient.stop flomax due to urinary urgency.         VTE Pharmacologic Prophylaxis:   Moderate Risk (Score 3-4) - Pharmacological DVT Prophylaxis Contraindicated. Sequential Compression Devices  Ordered.    Mobility:   Basic Mobility Inpatient Raw Score: 17  -HLM Goal: 5: Stand one or more mins  JH-HLM Achieved: 6: Walk 10 steps or more  HLM Goal achieved. Continue to encourage appropriate mobility.    Patient Centered Rounds: I performed bedside rounds with nursing staff today.   Discussions with Specialists or Other Care Team Provider: nursing, case management, urology, ID, cardiology, GI    Education and Discussions with Family / Patient: Updated  (friend) at bedside.    Total Time Spent on Date of Encounter in care of patient: 50 mins. This time was spent on one or more of the following: performing physical exam; counseling and coordination of care; obtaining or reviewing history; documenting in the medical record; reviewing/ordering tests, medications or procedures; communicating with other healthcare professionals and discussing with patient's family/caregivers.    Current Length of Stay: 6 day(s)  Current Patient Status: Inpatient   Certification Statement: The patient will continue to require additional inpatient hospital stay due to bacteremia, urinary retention, hematuria, constipation  Discharge Plan: Anticipate discharge in 48-72 hrs to prior assisted or independent living facility.    Code Status: Level 3 - DNAR and DNI    Subjective:   Seen and examined at bedside this morning.  He states that he is doing well today.  He is very tired.  States that he has not been sleeping well overnight.  Endorses that he was having lots of blood in his urine today.  Also with complaints of multiple mucousy bowel movements overnight.  Stating that he still feels that he is constipated and not passing all of his stool.  Does not offer any complaints of abdominal pain.  No dizziness, headaches, lightheadedness, nausea, vomiting, chest pain, shortness of breath.    Objective:     Vitals:   Temp (24hrs), Av.1 °F (36.7 °C), Min:97.7 °F (36.5 °C), Max:98.7 °F (37.1 °C)    Temp:  [97.7 °F (36.5  °C)-98.7 °F (37.1 °C)] 98.1 °F (36.7 °C)  HR:  [57-66] 66  Resp:  [17-18] 17  BP: (107-137)/(41-51) 129/45  SpO2:  [99 %] 99 %  Body mass index is 22.73 kg/m².     Input and Output Summary (last 24 hours):     Intake/Output Summary (Last 24 hours) at 12/19/2023 1847  Last data filed at 12/19/2023 1834  Gross per 24 hour   Intake 600 ml   Output 1555 ml   Net -955 ml       Physical Exam:   Physical Exam  Vitals reviewed.   Constitutional:       General: He is not in acute distress.     Appearance: He is not toxic-appearing.      Comments: Laying in bed, hard of hearing.   HENT:      Head: Normocephalic and atraumatic.      Mouth/Throat:      Mouth: Mucous membranes are moist.   Cardiovascular:      Rate and Rhythm: Normal rate and regular rhythm.      Heart sounds: No murmur heard.  Pulmonary:      Effort: No respiratory distress.      Breath sounds: No stridor. No wheezing.      Comments: Saturating well on room air  Abdominal:      General: There is no distension.      Palpations: Abdomen is soft. There is no mass.      Tenderness: There is no abdominal tenderness.   Genitourinary:     Comments: Edgar blood present in Ascencio catheter  Musculoskeletal:      Right lower leg: No edema.      Left lower leg: No edema.   Skin:     General: Skin is warm and dry.   Neurological:      General: No focal deficit present.      Mental Status: He is alert and oriented to person, place, and time.   Psychiatric:         Mood and Affect: Mood normal.         Behavior: Behavior normal.          Additional Data:     Labs:  Results from last 7 days   Lab Units 12/19/23  0447 12/14/23  0546 12/13/23  0928   WBC Thousand/uL 6.03   < > 8.94   HEMOGLOBIN g/dL 8.6*   < > 10.3*   HEMATOCRIT % 26.4*   < > 30.1*   PLATELETS Thousands/uL 185   < > 221   NEUTROS PCT %  --   --  76*   LYMPHS PCT %  --   --  10*   MONOS PCT %  --   --  12   EOS PCT %  --   --  1    < > = values in this interval not displayed.     Results from last 7 days   Lab  Units 12/19/23  0447 12/15/23  0453 12/14/23  0546   SODIUM mmol/L 131*   < > 133*   POTASSIUM mmol/L 4.2   < > 4.3   CHLORIDE mmol/L 101   < > 105   CO2 mmol/L 25   < > 25   BUN mg/dL 27*   < > 15   CREATININE mg/dL 1.14   < > 0.94   ANION GAP mmol/L 5   < > 3   CALCIUM mg/dL 8.3*   < > 7.7*   ALBUMIN g/dL  --   --  3.2*   TOTAL BILIRUBIN mg/dL  --   --  0.46   ALK PHOS U/L  --   --  50   ALT U/L  --   --  7   AST U/L  --   --  15   GLUCOSE RANDOM mg/dL 104   < > 89    < > = values in this interval not displayed.                 Results from last 7 days   Lab Units 12/17/23  1648 12/13/23  0939 12/13/23  0928   LACTIC ACID mmol/L 1.2 1.1  --    PROCALCITONIN ng/ml  --   --  <0.05       Lines/Drains:  Invasive Devices       Peripheral Intravenous Line  Duration             Peripheral IV 12/17/23 Right Arm 2 days              Drain  Duration             Urethral Catheter Double-lumen 18 Fr. <1 day                  Urinary Catheter:  Goal for removal:  Voiding trials outpatient with urology               Imaging: Reviewed radiology reports from this admission including: xray(s)    Recent Cultures (last 7 days):   Results from last 7 days   Lab Units 12/17/23  1643 12/13/23  0942 12/13/23  0934   BLOOD CULTURE  Proteus mirabilis*  Proteus mirabilis* No Growth After 5 Days. No Growth After 5 Days.   GRAM STAIN RESULT  Gram negative rods*  Gram negative rods*  --   --        Last 24 Hours Medication List:   Current Facility-Administered Medications   Medication Dose Route Frequency Provider Last Rate    acetaminophen  650 mg Oral Q6H PRN Sherita Martinez MD      bisacodyl  10 mg Rectal Daily Sherita Martinez MD      cefTRIAXone  2,000 mg Intravenous Q24H Suha Alvarado PA-C 2,000 mg (12/19/23 1641)    cyanocobalamin  1,000 mcg Oral Daily Sherita Martinez MD      digoxin  125 mcg Oral Daily Sherita Martinez MD      dronedarone  400 mg Oral BID With Meals Sherita Martinez MD      folic acid  1,000 mcg Oral Daily Sherita Martinez MD       glycerin-hypromellose-  1 drop Both Eyes Q4H PRN Sherita Martinez MD      hydrocortisone   Topical 4x Daily PRN Sherita Martinez MD      melatonin  3 mg Oral HS PRN Suha Alvarado PA-C      multi-electrolyte  50 mL/hr Intravenous Continuous Suha Alvarado PA-C      ondansetron  4 mg Intravenous Q6H PRN Sherita Martinez MD      pantoprazole  40 mg Oral Daily Sherita Martinez MD      polyethylene glycol  17 g Oral BID Sherita Martinez MD      primidone  50 mg Oral Q12H YAMINI Sherita Martinez MD      senna-docusate sodium  1 tablet Oral Daily With Breakfast Sherita Martinez MD          Today, Patient Was Seen By: Suha Alvarado PA-C    **Please Note: This note may have been constructed using a voice recognition system.**

## 2023-12-19 NOTE — ASSESSMENT & PLAN NOTE
Proteus bacteremia from gi source.will cont rocephin for now and probably treat for 10 day course and switch to oral antibiotics on discharge  ID consulted: increase rocephin to 2g q24h, f/u blood and urine cultures, recommend GI consult for persistent constipation/fecal impaction, continue zuniga, anticipate 10 days abx therapy.   Repeat BC ordered

## 2023-12-19 NOTE — ASSESSMENT & PLAN NOTE
Patient has been having episodes of orthostatic hypotension outpatient. Orthostatic vitals inpatient positive  Cardiology consult: Suspect this is secondary to poor PO intake and blood loss. Please encourage PO intake. Nursing instructed to apply compression socks while awake. ECG demonstrates sinus bradycardia. HR's controlled. OK to continue digoxin and Multaq.   Will place on gentle IVF rehydration  If persistent hypotension, may benefit from limited echo

## 2023-12-19 NOTE — PLAN OF CARE
Problem: PHYSICAL THERAPY ADULT  Goal: Performs mobility at highest level of function for planned discharge setting.  See evaluation for individualized goals.  Description: Treatment/Interventions: ADL retraining, Functional transfer training, LE strengthening/ROM, Elevations, Therapeutic exercise, Endurance training, Patient/family training, Equipment eval/education, Bed mobility, Gait training, Compensatory technique education, Spoke to nursing, OT          See flowsheet documentation for full assessment, interventions and recommendations.  Outcome: Progressing  Note: Prognosis: Good  Problem List: Decreased strength, Decreased endurance, Impaired balance, Decreased mobility  Assessment: Pt seen for PT treatment session this date with interventions consisting of bed mobility tasks, transfer training, toilet transfers, seated TE, neuromuscular re-education of movement performed to improve dynamic sitting balance, and education provided as needed for safety and direction to improve functional mobility, safety awareness, and activity tolerance. Pt agreeable to PT treatment session upon arrival, pt found resting in bed. At end of session, pt left in bed, positioned for comfort, bed alarm activated, and with all needs in reach. In comparison to previous session, pt with improvements in ambulation distance . Continue to recommend Level III (Minimum Resource Intensity) at time of d/c in order to maximize pt's functional independence and safety w/ mobility. Pt continues to be functioning below baseline level. PT will continue to see pt while here in order to address the deficits listed above and provide interventions consistent w/ POC in effort to achieve STGs.  Barriers to Discharge: Other (Comment) (Fall risk, recent fall)     Rehab Resource Intensity Level, PT: III (Minimum Resource Intensity)    See flowsheet documentation for full assessment.

## 2023-12-19 NOTE — PROGRESS NOTES
Patient developed gross hematuria overnight with a condom catheter was removed.  Patient was straight cath for volumes around 400 grossly bloody.  Complaining of also some frequency and dysuria as well as urgency.    On exam the abdomen was soft the bladder was nondistended.    Impression new onset gross hematuria, history urinary retention, constipation, prostate cancer radiation in the past follows with Dr. Gorman.     plan patient is requesting a catheter, does not want to be straight cath.  Will place order for 18 [place] Ascencio and hand irrigate by the nurses and the call urology if concerns.  Also will send a culture.    Patient is on IV Rocephin.  Recommend voiding trial in a couple days and follow-up with outpatient urology Dr. Gorman.

## 2023-12-19 NOTE — PLAN OF CARE
Problem: Potential for Falls  Goal: Patient will remain free of falls  Description: INTERVENTIONS:  - Educate patient/family on patient safety including physical limitations  - Instruct patient to call for assistance with activity   - Consult OT/PT to assist with strengthening/mobility   - Keep Call bell within reach  - Keep bed low and locked with side rails adjusted as appropriate  - Keep care items and personal belongings within reach  - Initiate and maintain comfort rounds  - Make Fall Risk Sign visible to staff  - Offer Toileting every 2 Hours, in advance of need  - Initiate/Maintain bed/chair alarm  - Obtain necessary fall risk management equipment: bracelet & grippy socks  - Apply yellow socks and bracelet for high fall risk patients  - Consider moving patient to room near nurses station  Outcome: Progressing     Problem: PAIN - ADULT  Goal: Verbalizes/displays adequate comfort level or baseline comfort level  Description: Interventions:  - Encourage patient to monitor pain and request assistance  - Assess pain using appropriate pain scale  - Administer analgesics based on type and severity of pain and evaluate response  - Implement non-pharmacological measures as appropriate and evaluate response  - Consider cultural and social influences on pain and pain management  - Notify physician/advanced practitioner if interventions unsuccessful or patient reports new pain  Outcome: Progressing     Problem: INFECTION - ADULT  Goal: Absence or prevention of progression during hospitalization  Description: INTERVENTIONS:  - Assess and monitor for signs and symptoms of infection  - Monitor lab/diagnostic results  - Monitor all insertion sites, i.e. indwelling lines, tubes, and drains  - Monitor endotracheal if appropriate and nasal secretions for changes in amount and color  - [place] appropriate cooling/warming therapies per order  - Administer medications as ordered  - Instruct and encourage patient and family to  use good hand hygiene technique  - Identify and instruct in appropriate isolation precautions for identified infection/condition  Outcome: Progressing     Problem: SAFETY ADULT  Goal: Patient will remain free of falls  Description: INTERVENTIONS:  - Educate patient/family on patient safety including physical limitations  - Instruct patient to call for assistance with activity   - Consult OT/PT to assist with strengthening/mobility   - Keep Call bell within reach  - Keep bed low and locked with side rails adjusted as appropriate  - Keep care items and personal belongings within reach  - Initiate and maintain comfort rounds  - Make Fall Risk Sign visible to staff  - Offer Toileting every 2 Hours, in advance of need  - Initiate/Maintain bed/ chair alarm  - Obtain necessary fall risk management equipment: bracelet & grippy socks`  - Apply yellow socks and bracelet for high fall risk patients  - Consider moving patient to room near nurses station  Outcome: Progressing  Goal: Maintain or return to baseline ADL function  Description: INTERVENTIONS:  -  Assess patient's ability to carry out ADLs; assess patient's baseline for ADL function and identify physical deficits which impact ability to perform ADLs (bathing, care of mouth/teeth, toileting, grooming, dressing, etc.)  - Assess/evaluate cause of self-care deficits   - Assess range of motion  - Assess patient's mobility; develop plan if impaired  - Assess patient's need for assistive devices and provide as appropriate  - Encourage maximum independence but intervene and supervise when necessary  - Involve family in performance of ADLs  - Assess for home care needs following discharge   - Consider OT consult to assist with ADL evaluation and planning for discharge  - Provide patient education as appropriate  Outcome: Progressing  Goal: Maintains/Returns to pre admission functional level  Description: INTERVENTIONS:  - Perform AM-PAC 6 Click Basic Mobility/ Daily Activity  assessment daily.  - Set and communicate daily mobility goal to care team and patient/family/caregiver.   - Collaborate with rehabilitation services on mobility goals if consulted  - Perform Range of Motion 3 times a day.  - Reposition patient every 3 hours.  - Dangle patient 3 times a day  - Stand patient 3 times a day  - Ambulate patient 3 times a day  - Out of bed to chair 3 times a day   - Out of bed for meals 3 times a day  - Out of bed for toileting  - Record patient progress and toleration of activity level   Outcome: Progressing     Problem: DISCHARGE PLANNING  Goal: Discharge to home or other facility with appropriate resources  Description: INTERVENTIONS:  - Identify barriers to discharge w/patient and caregiver  - Arrange for needed discharge resources and transportation as appropriate  - Identify discharge learning needs (meds, wound care, etc.)  - Arrange for interpretive services to assist at discharge as needed  - Refer to Case Management Department for coordinating discharge planning if the patient needs post-hospital services based on physician/advanced practitioner order or complex needs related to functional status, cognitive ability, or social support system  Outcome: Progressing     Problem: Knowledge Deficit  Goal: Patient/family/caregiver demonstrates understanding of disease process, treatment plan, medications, and discharge instructions  Description: Complete learning assessment and assess knowledge base.  Interventions:  - Provide teaching at level of understanding  - Provide teaching via preferred learning methods  Outcome: Progressing     Problem: GASTROINTESTINAL - ADULT  Goal: Minimal or absence of nausea and/or vomiting  Description: INTERVENTIONS:  - Administer IV fluids if ordered to ensure adequate hydration  - Maintain NPO status until nausea and vomiting are resolved  - Nasogastric tube if ordered  - Administer ordered antiemetic medications as needed  - Provide nonpharmacologic  comfort measures as appropriate  - Advance diet as tolerated, if ordered  - Consider nutrition services referral to assist patient with adequate nutrition and appropriate food choices  Outcome: Progressing  Goal: Maintains or returns to baseline bowel function  Description: INTERVENTIONS:  - Assess bowel function monitor bowel regimen  - Encourage oral fluids to ensure adequate hydration  - Administer IV fluids if ordered to ensure adequate hydration  - Administer ordered medications as needed  - Encourage mobilization and activity  - Consider nutritional services referral to assist patient with adequate nutrition and appropriate food choices  Outcome: Progressing  Goal: Maintains adequate nutritional intake  Description: INTERVENTIONS:  - Monitor percentage of each meal consumed  - Identify factors contributing to decreased intake, treat as appropriate  - Assist with meals as needed  - Monitor I&O, weight, and lab values if indicated  - Obtain nutrition services referral as needed  Outcome: Progressing  Goal: Oral mucous membranes remain intact  Description: INTERVENTIONS  - Assess oral mucosa and hygiene practices  - Implement preventative oral hygiene regimen  - Implement oral medicated treatments as ordered  - Initiate Nutrition services referral as needed  Outcome: Progressing     Problem: GENITOURINARY - ADULT  Goal: Maintains or returns to baseline urinary function  Description: INTERVENTIONS:  - Assess urinary function  - Encourage oral fluids to ensure adequate hydration if ordered  - Administer IV fluids as ordered to ensure adequate hydration  - Administer ordered medications as needed  - Offer frequent toileting  - Follow urinary retention protocol if ordered  Outcome: Progressing  Goal: Absence of urinary retention  Description: INTERVENTIONS:  - Assess patient’s ability to void and empty bladder  - Monitor I/O  - Bladder scan as needed  - Discuss with physician/AP medications to alleviate retention as  needed  - Discuss catheterization for long term situations as appropriate  Outcome: Progressing  Goal: Urinary catheter remains patent  Description: INTERVENTIONS:  - Assess patency of urinary catheter  - If patient has a chronic zuniga, consider changing catheter if non-functioning  - Follow guidelines for intermittent irrigation of non-functioning urinary catheter  Outcome: Progressing     Problem: Prexisting or High Potential for Compromised Skin Integrity  Goal: Skin integrity is maintained or improved  Description: INTERVENTIONS:  - Identify patients at risk for skin breakdown  - Assess and monitor skin integrity  - Assess and monitor nutrition and hydration status  - Monitor labs   - Assess for incontinence   - Turn and reposition patient  - Assist with mobility/ambulation  - Relieve pressure over bony prominences  - Avoid friction and shearing  - Provide appropriate hygiene as needed including keeping skin clean and dry  - Evaluate need for skin moisturizer/barrier cream  - Collaborate with interdisciplinary team   - Patient/family teaching  - Consider wound care consult   Outcome: Progressing     Problem: Nutrition/Hydration-ADULT  Goal: Nutrient/Hydration intake appropriate for improving, restoring or maintaining nutritional needs  Description: Monitor and assess patient's nutrition/hydration status for malnutrition. Collaborate with interdisciplinary team and initiate plan and interventions as ordered.  Monitor patient's weight and dietary intake as ordered or per policy. Utilize nutrition screening tool and intervene as necessary. Determine patient's food preferences and provide high-protein, high-caloric foods as appropriate.     INTERVENTIONS:  - Monitor oral intake, urinary output, labs, and treatment plans  - Assess nutrition and hydration status and recommend course of action  - Evaluate amount of meals eaten  - Assist patient with eating if necessary   - Allow adequate time for meals  - Recommend/  encourage appropriate diets, oral nutritional supplements, and vitamin/mineral supplements  - Order, calculate, and assess calorie counts as needed  - Recommend, monitor, and adjust tube feedings and TPN/PPN based on assessed needs  - Assess need for intravenous fluids  - Provide specific nutrition/hydration education as appropriate  - Include patient/family/caregiver in decisions related to nutrition  Outcome: Progressing

## 2023-12-19 NOTE — PHYSICAL THERAPY NOTE
"   PHYSICAL THERAPY TREATMENT NOTE  NAME:  Miguel Angel Ortega  DATE: 12/19/23    Length Of Stay: 6  Performed at least 2 patient identifiers during session: Name and ID bracelet    TREATMENT FLOWSHEET:    12/19/23 1418   PT Last Visit   PT Visit Date 12/19/23   Note Type   Note Type Treatment   Pain Assessment   Pain Assessment Tool 0-10   Pain Score No Pain   General   Chart Reviewed Yes   Response to Previous Treatment Patient with no complaints from previous session.   Family/Caregiver Present No   Cognition   Overall Cognitive Status WFL   Arousal/Participation Alert;Cooperative   Attention Within functional limits   Orientation Level Oriented X4   Memory Within functional limits   Following Commands Follows one step commands without difficulty   Subjective   Subjective \"I was planning on taking a nap but I guess I'll try to clementina a little with you.\"   Bed Mobility   Rolling R 5  Supervision   Additional items Assist x 1;Increased time required;Verbal cues;Bedrails   Rolling L 5  Supervision   Additional items Assist x 1;Bedrails;Increased time required;Verbal cues   Supine to Sit 5  Supervision   Additional items Assist x 1;Bedrails;Increased time required;Verbal cues   Sit to Supine 5  Supervision   Additional items Assist x 1;Bedrails;Increased time required;Verbal cues   Additional Comments Pt. tolerated sitting at EOB unsupported to perform BLE TE and dynamic sitting balance activities   Transfers   Sit to Stand   (Steadying assist)   Additional items Assist x 1;Increased time required;Verbal cues  (RW)   Stand to Sit   (Steadying assist)   Additional items Assist x 1;Increased time required;Verbal cues   Stand pivot   (Steadying assist)   Additional items Assist x 1;Increased time required;Verbal cues  (RW)   Toilet transfer   (Steadying assist)   Additional items Assist x 1;Increased time required;Verbal cues;Standard toilet  (RW)   Additional Comments VC's provided for proper hand placement during transitions "   Ambulation/Elevation   Gait pattern Improper Weight shift;Decreased foot clearance;Forward Flexion;Narrow CORY;Short stride;Excessively slow;Decreased heel strike;Decreased toe off;Step through pattern;Step to;Inconsistent geoff   Gait Assistance   (Steadying assist)   Additional items Assist x 1;Verbal cues   Assistive Device Rolling walker   Distance 15 ft x2 and 115 ft   Balance   Static Sitting Good   Dynamic Sitting Fair +   Static Standing Fair   Dynamic Standing Fair -   Ambulatory Poor +   Endurance Deficit   Endurance Deficit Yes   Activity Tolerance   Activity Tolerance Patient limited by fatigue   Nurse Made Aware yes   Exercises   Quad Sets Sitting;15 reps;AROM;Bilateral   Heelslides Sitting;15 reps;AROM;Bilateral   Glute Sets Sitting;15 reps;AROM;Bilateral   Hip Flexion Sitting;15 reps;AROM;Bilateral   Hip Abduction Sitting;15 reps;AROM;Bilateral   Hip Adduction Sitting;15 reps;AROM;Bilateral   Knee AROM Long Arc Quad Sitting;15 reps;AROM;Bilateral   Ankle Pumps Sitting;15 reps;AROM;Bilateral   Marching Sitting;15 reps;AROM;Bilateral   Neuro re-ed Patient performed dynamic sitting balance activities while sitting at EOB unsupported with close S   including: multidirectional weight shifting, reaching, and scooting.   Assessment   Prognosis Good   Problem List Decreased strength;Decreased endurance;Impaired balance;Decreased mobility   Goals   Patient Goals to take a nap   PT Treatment Day 2   Plan   Treatment/Interventions Functional transfer training;LE strengthening/ROM;Therapeutic exercise;Endurance training;Patient/family training;Equipment eval/education;Bed mobility;Gait training;Compensatory technique education;Spoke to nursing   Progress Progressing toward goals   PT Frequency 3-5x/wk   Discharge Recommendation   Rehab Resource Intensity Level, PT III (Minimum Resource Intensity)   AM-PAC Basic Mobility Inpatient   Turning in Flat Bed Without Bedrails 3   Lying on Back to Sitting on Edge of  Flat Bed Without Bedrails 3   Moving Bed to Chair 3   Standing Up From Chair Using Arms 3   Walk in Room 3   Climb 3-5 Stairs With Railing 2   Basic Mobility Inpatient Raw Score 17   Basic Mobility Standardized Score 39.67   Highest Level Of Mobility   -HLM Goal 5: Stand one or more mins   JH-HLM Achieved 7: Walk 25 feet or more       The patient's AM-PAC Basic Mobility Inpatient Short Form Raw Score is 17. A raw score greater than 16 suggests the patient may benefit from discharge to home. Please also refer to the recommendation of the Physical Therapist for safe discharge planning.    Pt seen for PT treatment session this date with interventions consisting of bed mobility tasks, transfer training, toilet transfers, seated TE, neuromuscular re-education of movement performed to improve dynamic sitting balance, and education provided as needed for safety and direction to improve functional mobility, safety awareness, and activity tolerance. Pt agreeable to PT treatment session upon arrival, pt found resting in bed. At end of session, pt left in bed, positioned for comfort, bed alarm activated, and with all needs in reach. In comparison to previous session, pt with improvements in ambulation distance . Continue to recommend Level III (Minimum Resource Intensity) at time of d/c in order to maximize pt's functional independence and safety w/ mobility. Pt continues to be functioning below baseline level. PT will continue to see pt while here in order to address the deficits listed above and provide interventions consistent w/ POC in effort to achieve STGs.    Nadja Gonzalez, PTA

## 2023-12-20 ENCOUNTER — APPOINTMENT (INPATIENT)
Dept: RADIOLOGY | Facility: HOSPITAL | Age: 88
End: 2023-12-20
Payer: MEDICARE

## 2023-12-20 LAB
ANION GAP SERPL CALCULATED.3IONS-SCNC: 6 MMOL/L
BACTERIA BLD CULT: ABNORMAL
BACTERIA BLD CULT: ABNORMAL
BACTERIA UR CULT: NORMAL
BUN SERPL-MCNC: 21 MG/DL (ref 5–25)
CALCIUM SERPL-MCNC: 7.9 MG/DL (ref 8.4–10.2)
CHLORIDE SERPL-SCNC: 101 MMOL/L (ref 96–108)
CO2 SERPL-SCNC: 28 MMOL/L (ref 21–32)
CREAT SERPL-MCNC: 0.87 MG/DL (ref 0.6–1.3)
ERYTHROCYTE [DISTWIDTH] IN BLOOD BY AUTOMATED COUNT: 14.6 % (ref 11.6–15.1)
GFR SERPL CREATININE-BSD FRML MDRD: 73 ML/MIN/1.73SQ M
GLUCOSE SERPL-MCNC: 94 MG/DL (ref 65–140)
GRAM STN SPEC: ABNORMAL
GRAM STN SPEC: ABNORMAL
HCT VFR BLD AUTO: 24.6 % (ref 36.5–49.3)
HGB BLD-MCNC: 8.3 G/DL (ref 12–17)
MAGNESIUM SERPL-MCNC: 1.8 MG/DL (ref 1.9–2.7)
MCH RBC QN AUTO: 32.4 PG (ref 26.8–34.3)
MCHC RBC AUTO-ENTMCNC: 33.7 G/DL (ref 31.4–37.4)
MCV RBC AUTO: 96 FL (ref 82–98)
PLATELET # BLD AUTO: 177 THOUSANDS/UL (ref 149–390)
PMV BLD AUTO: 8.2 FL (ref 8.9–12.7)
POTASSIUM SERPL-SCNC: 3.8 MMOL/L (ref 3.5–5.3)
PROTEUS SP DNA BLD POS QL NAA+NON-PROBE: DETECTED
RBC # BLD AUTO: 2.56 MILLION/UL (ref 3.88–5.62)
SODIUM SERPL-SCNC: 135 MMOL/L (ref 135–147)
WBC # BLD AUTO: 7.42 THOUSAND/UL (ref 4.31–10.16)

## 2023-12-20 PROCEDURE — 99232 SBSQ HOSP IP/OBS MODERATE 35: CPT | Performed by: INTERNAL MEDICINE

## 2023-12-20 PROCEDURE — 83735 ASSAY OF MAGNESIUM: CPT

## 2023-12-20 PROCEDURE — 97535 SELF CARE MNGMENT TRAINING: CPT

## 2023-12-20 PROCEDURE — 97530 THERAPEUTIC ACTIVITIES: CPT

## 2023-12-20 PROCEDURE — NC001 PR NO CHARGE: Performed by: INTERNAL MEDICINE

## 2023-12-20 PROCEDURE — 80048 BASIC METABOLIC PNL TOTAL CA: CPT

## 2023-12-20 PROCEDURE — 97110 THERAPEUTIC EXERCISES: CPT

## 2023-12-20 PROCEDURE — 97116 GAIT TRAINING THERAPY: CPT

## 2023-12-20 PROCEDURE — NC001 PR NO CHARGE: Performed by: UROLOGY

## 2023-12-20 PROCEDURE — 99232 SBSQ HOSP IP/OBS MODERATE 35: CPT

## 2023-12-20 PROCEDURE — 85027 COMPLETE CBC AUTOMATED: CPT

## 2023-12-20 PROCEDURE — 74022 RADEX COMPL AQT ABD SERIES: CPT

## 2023-12-20 PROCEDURE — 97112 NEUROMUSCULAR REEDUCATION: CPT

## 2023-12-20 RX ORDER — MAGNESIUM SULFATE HEPTAHYDRATE 40 MG/ML
2 INJECTION, SOLUTION INTRAVENOUS ONCE
Qty: 50 ML | Refills: 0 | Status: COMPLETED | OUTPATIENT
Start: 2023-12-20 | End: 2023-12-20

## 2023-12-20 RX ADMIN — PRIMIDONE 50 MG: 50 TABLET ORAL at 10:05

## 2023-12-20 RX ADMIN — POLYETHYLENE GLYCOL 3350 17 G: 17 POWDER, FOR SOLUTION ORAL at 15:55

## 2023-12-20 RX ADMIN — DIGOXIN 125 MCG: 125 TABLET ORAL at 10:05

## 2023-12-20 RX ADMIN — DRONEDARONE 400 MG: 400 TABLET, FILM COATED ORAL at 15:57

## 2023-12-20 RX ADMIN — MAGNESIUM SULFATE HEPTAHYDRATE 2 G: 40 INJECTION, SOLUTION INTRAVENOUS at 10:42

## 2023-12-20 RX ADMIN — POLYETHYLENE GLYCOL 3350 17 G: 17 POWDER, FOR SOLUTION ORAL at 10:05

## 2023-12-20 RX ADMIN — GLYCERIN 1 DROP: .002; .002; .01 SOLUTION/ DROPS OPHTHALMIC at 20:15

## 2023-12-20 RX ADMIN — FOLIC ACID 1000 MCG: 1 TABLET ORAL at 10:05

## 2023-12-20 RX ADMIN — BISACODYL 10 MG: 10 SUPPOSITORY RECTAL at 10:03

## 2023-12-20 RX ADMIN — PRIMIDONE 50 MG: 50 TABLET ORAL at 20:51

## 2023-12-20 RX ADMIN — SENNOSIDES AND DOCUSATE SODIUM 1 TABLET: 8.6; 5 TABLET ORAL at 10:05

## 2023-12-20 RX ADMIN — DRONEDARONE 400 MG: 400 TABLET, FILM COATED ORAL at 10:05

## 2023-12-20 RX ADMIN — CEFTRIAXONE 2000 MG: 2 INJECTION, SOLUTION INTRAVENOUS at 15:55

## 2023-12-20 RX ADMIN — CYANOCOBALAMIN TAB 500 MCG 1000 MCG: 500 TAB at 10:06

## 2023-12-20 RX ADMIN — PANTOPRAZOLE SODIUM 40 MG: 40 TABLET, DELAYED RELEASE ORAL at 10:05

## 2023-12-20 NOTE — ASSESSMENT & PLAN NOTE
Patient has been having episodes of orthostatic hypotension outpatient. Orthostatic vitals inpatient positive  Cardiology consult: Suspect this is secondary to poor PO intake and blood loss. Please encourage PO intake. Nursing instructed to apply compression socks while awake. ECG demonstrates sinus bradycardia. HR's controlled. OK to continue digoxin and Multaq.   Will place on gentle IVF rehydration - stopped  If persistent hypotension, may benefit from limited echo

## 2023-12-20 NOTE — ASSESSMENT & PLAN NOTE
Further outpatient follow-up with urology.  Placed on Flomax for acute urinary retention hold Casodex while inpatient.  stop flomax due to urinary urgency and orthostatic hypotension

## 2023-12-20 NOTE — ASSESSMENT & PLAN NOTE
Patient presents with symptoms of constipation for the last few days.  He saw GI outpatient.  He was started on MiraLAX patient states he went 26 times to the bathroom yesterday had very small amounts of stool and felt like he just could not empty out his bowel movement.  Also had a fall yesterday.  Ct abd pelvis shows Abundant stool in the colon and rectal vault compatible with constipation in the appropriate clinical context. No bowel obstruction.  Minimal perirectal fat stranding suggestive of mild or early stercoral proctitis. No perirectal abscess  Placed on MiraLAX daily and placed on Dulcolax suppository x 2 daily and also placed 3 enemas    GI consult placed -repeat obstruction series without significant stool burden.  Starting on Amitiza twice daily.  Advance to regular diet

## 2023-12-20 NOTE — ASSESSMENT & PLAN NOTE
Hemoglobin dropped from 10.3-8.7.  low iron and B12 levels. placed on vit b12 and venofer  CBC stable  no evidence of bleeding noted  neg stool occult    Lab Results   Component Value Date    HGB 8.3 (L) 12/20/2023    HGB 8.6 (L) 12/19/2023    HGB 8.4 (L) 12/18/2023

## 2023-12-20 NOTE — PLAN OF CARE
Problem: Potential for Falls  Goal: Patient will remain free of falls  Description: INTERVENTIONS:  - Educate patient/family on patient safety including physical limitations  - Instruct patient to call for assistance with activity   - Consult OT/PT to assist with strengthening/mobility   - Keep Call bell within reach  - Keep bed low and locked with side rails adjusted as appropriate  - Keep care items and personal belongings within reach  - Initiate and maintain comfort rounds  - Make Fall Risk Sign visible to staff  - Offer Toileting every 2 Hours, in advance of need  - Initiate/Maintain bed alarm  - Obtain necessary fall risk management equipment walker  - Apply yellow socks and bracelet for high fall risk patients  - Consider moving patient to room near nurses station  Outcome: Progressing     Problem: PAIN - ADULT  Goal: Verbalizes/displays adequate comfort level or baseline comfort level  Description: Interventions:  - Encourage patient to monitor pain and request assistance  - Assess pain using appropriate pain scale  - Administer analgesics based on type and severity of pain and evaluate response  - Implement non-pharmacological measures as appropriate and evaluate response  - Consider cultural and social influences on pain and pain management  - Notify physician/advanced practitioner if interventions unsuccessful or patient reports new pain  Outcome: Progressing     Problem: INFECTION - ADULT  Goal: Absence or prevention of progression during hospitalization  Description: INTERVENTIONS:  - Assess and monitor for signs and symptoms of infection  - Monitor lab/diagnostic results  - Monitor all insertion sites, i.e. indwelling lines, tubes, and drains  - Monitor endotracheal if appropriate and nasal secretions for changes in amount and color  - Perry appropriate cooling/warming therapies per order  - Administer medications as ordered  - Instruct and encourage patient and family to use good hand hygiene  technique  - Identify and instruct in appropriate isolation precautions for identified infection/condition  Outcome: Progressing     Problem: SAFETY ADULT  Goal: Patient will remain free of falls  Description: INTERVENTIONS:  - Educate patient/family on patient safety including physical limitations  - Instruct patient to call for assistance with activity   - Consult OT/PT to assist with strengthening/mobility   - Keep Call bell within reach  - Keep bed low and locked with side rails adjusted as appropriate  - Keep care items and personal belongings within reach  - Initiate and maintain comfort rounds  - Make Fall Risk Sign visible to staff  - Offer Toileting every 3 Hours, in advance of need  - Initiate/Maintain  bed alarm  - Obtain necessary fall risk management equipment walker  - Apply yellow socks and bracelet for high fall risk patients  - Consider moving patient to room near nurses station  Outcome: Progressing  Goal: Maintain or return to baseline ADL function  Description: INTERVENTIONS:  -  Assess patient's ability to carry out ADLs; assess patient's baseline for ADL function and identify physical deficits which impact ability to perform ADLs (bathing, care of mouth/teeth, toileting, grooming, dressing, etc.)  - Assess/evaluate cause of self-care deficits   - Assess range of motion  - Assess patient's mobility; develop plan if impaired  - Assess patient's need for assistive devices and provide as appropriate  - Encourage maximum independence but intervene and supervise when necessary  - Involve family in performance of ADLs  - Assess for home care needs following discharge   - Consider OT consult to assist with ADL evaluation and planning for discharge  - Provide patient education as appropriate  Outcome: Progressing  Goal: Maintains/Returns to pre admission functional level  Description: INTERVENTIONS:  - Perform AM-PAC 6 Click Basic Mobility/ Daily Activity assessment daily.  - Set and communicate daily  mobility goal to care team and patient/family/caregiver.   - Collaborate with rehabilitation services on mobility goals if consulted  - Perform Range of Motion 3 times a day.  - Reposition patient every 2 hours.  - Dangle patient 3 times a day  - Stand patient 3 times a day  - Ambulate patient 3 times a day  - Out of bed to chair 3 times a day   - Out of bed for meals 3 times a day  - Out of bed for toileting  - Record patient progress and toleration of activity level   Outcome: Progressing     Problem: DISCHARGE PLANNING  Goal: Discharge to home or other facility with appropriate resources  Description: INTERVENTIONS:  - Identify barriers to discharge w/patient and caregiver  - Arrange for needed discharge resources and transportation as appropriate  - Identify discharge learning needs (meds, wound care, etc.)  - Arrange for interpretive services to assist at discharge as needed  - Refer to Case Management Department for coordinating discharge planning if the patient needs post-hospital services based on physician/advanced practitioner order or complex needs related to functional status, cognitive ability, or social support system  Outcome: Progressing     Problem: Knowledge Deficit  Goal: Patient/family/caregiver demonstrates understanding of disease process, treatment plan, medications, and discharge instructions  Description: Complete learning assessment and assess knowledge base.  Interventions:  - Provide teaching at level of understanding  - Provide teaching via preferred learning methods  Outcome: Progressing     Problem: GASTROINTESTINAL - ADULT  Goal: Minimal or absence of nausea and/or vomiting  Description: INTERVENTIONS:  - Administer IV fluids if ordered to ensure adequate hydration  - Maintain NPO status until nausea and vomiting are resolved  - Nasogastric tube if ordered  - Administer ordered antiemetic medications as needed  - Provide nonpharmacologic comfort measures as appropriate  - Advance diet  as tolerated, if ordered  - Consider nutrition services referral to assist patient with adequate nutrition and appropriate food choices  Outcome: Progressing  Goal: Maintains or returns to baseline bowel function  Description: INTERVENTIONS:  - Assess bowel function monitor bowel regimen  - Encourage oral fluids to ensure adequate hydration  - Administer IV fluids if ordered to ensure adequate hydration  - Administer ordered medications as needed  - Encourage mobilization and activity  - Consider nutritional services referral to assist patient with adequate nutrition and appropriate food choices  Outcome: Progressing  Goal: Maintains adequate nutritional intake  Description: INTERVENTIONS:  - Monitor percentage of each meal consumed  - Identify factors contributing to decreased intake, treat as appropriate  - Assist with meals as needed  - Monitor I&O, weight, and lab values if indicated  - Obtain nutrition services referral as needed  Outcome: Progressing  Goal: Oral mucous membranes remain intact  Description: INTERVENTIONS  - Assess oral mucosa and hygiene practices  - Implement preventative oral hygiene regimen  - Implement oral medicated treatments as ordered  - Initiate Nutrition services referral as needed  Outcome: Progressing     Problem: GENITOURINARY - ADULT  Goal: Maintains or returns to baseline urinary function  Description: INTERVENTIONS:  - Assess urinary function  - Encourage oral fluids to ensure adequate hydration if ordered  - Administer IV fluids as ordered to ensure adequate hydration  - Administer ordered medications as needed  - Offer frequent toileting  - Follow urinary retention protocol if ordered  Outcome: Progressing  Goal: Absence of urinary retention  Description: INTERVENTIONS:  - Assess patient’s ability to void and empty bladder  - Monitor I/O  - Bladder scan as needed  - Discuss with physician/AP medications to alleviate retention as needed  - Discuss catheterization for long term  situations as appropriate  Outcome: Progressing  Goal: Urinary catheter remains patent  Description: INTERVENTIONS:  - Assess patency of urinary catheter  - If patient has a chronic zuniga, consider changing catheter if non-functioning  - Follow guidelines for intermittent irrigation of non-functioning urinary catheter  Outcome: Progressing     Problem: Prexisting or High Potential for Compromised Skin Integrity  Goal: Skin integrity is maintained or improved  Description: INTERVENTIONS:  - Identify patients at risk for skin breakdown  - Assess and monitor skin integrity  - Assess and monitor nutrition and hydration status  - Monitor labs   - Assess for incontinence   - Turn and reposition patient  - Assist with mobility/ambulation  - Relieve pressure over bony prominences  - Avoid friction and shearing  - Provide appropriate hygiene as needed including keeping skin clean and dry  - Evaluate need for skin moisturizer/barrier cream  - Collaborate with interdisciplinary team   - Patient/family teaching  - Consider wound care consult   Outcome: Progressing     Problem: Nutrition/Hydration-ADULT  Goal: Nutrient/Hydration intake appropriate for improving, restoring or maintaining nutritional needs  Description: Monitor and assess patient's nutrition/hydration status for malnutrition. Collaborate with interdisciplinary team and initiate plan and interventions as ordered.  Monitor patient's weight and dietary intake as ordered or per policy. Utilize nutrition screening tool and intervene as necessary. Determine patient's food preferences and provide high-protein, high-caloric foods as appropriate.     INTERVENTIONS:  - Monitor oral intake, urinary output, labs, and treatment plans  - Assess nutrition and hydration status and recommend course of action  - Evaluate amount of meals eaten  - Assist patient with eating if necessary   - Allow adequate time for meals  - Recommend/ encourage appropriate diets, oral nutritional  supplements, and vitamin/mineral supplements  - Order, calculate, and assess calorie counts as needed  - Recommend, monitor, and adjust tube feedings and TPN/PPN based on assessed needs  - Assess need for intravenous fluids  - Provide specific nutrition/hydration education as appropriate  - Include patient/family/caregiver in decisions related to nutrition  Outcome: Progressing

## 2023-12-20 NOTE — PROGRESS NOTES
Progress Note:Cardiology  Connor Jordan 2/28/1928, 89 y.o. male MRN: [id number]    Unit/Bed#: -01 Encounter: [id number]  Attending Physician: Jennifer Warner MD   Primary Care Provider: Kisha Armstrong MD   Date admitted to hospital: 12/13/2023  Length of stay: 7     Assessment:  Hypotension  Wide pulse pressure, possibly related to aortic valve regurgitation  History of atrial fibrillation  Chronic urinary retention, now with neva hematuria  Metatatic prostate cancer  Subclinical hypothyroidism  Chronic hyponatremia  colitis     Plan:  Orthostatic BP check today pending. He remains asymptomatic. No profound hypotension observed. Suspect this is secondary to poor PO intake and blood loss. Please encourage PO intake. Received IV hydration yesterday. Now on iron supplementation. Transfuse for Hbg < 7. Nursing instructed to apply compression socks while awake. ECG demonstrates sinus bradycardia. HR's controlled. OK to continue digoxin and Multaq. Appears he has been on Xarelto in the past. No AC currently due to hematuria. Resumption of AC can be determined by his primary cardiologist once bleeding has been addressed. Cardiology will sign off. Feel free to re-consult as needed.       Subjective:   Patient seen and examined.  No significant events overnight. Continues with hematuria. GI also consulted due to stercoral proctitis and fecal impaction. Now on Golytey bowel prep with no significant stool burden on KUB today. He continues to deny lightheadedness. No chest pain, shortness of breath, or palpitations. HR's controlled.     Review of Systems   Constitutional: Negative.   HENT: Negative.     Cardiovascular:  Negative for chest pain, dyspnea on exertion, irregular heartbeat, leg swelling, near-syncope, orthopnea and palpitations.   Respiratory:  Negative for cough and snoring.    Endocrine: Negative.    Skin: Negative.    Musculoskeletal: Negative.    Gastrointestinal: Negative.    Genitourinary: Negative.   "  Neurological: Negative.    Psychiatric/Behavioral: Negative.           Objective:     Vitals: Blood pressure 137/55, pulse 64, temperature (!) 97.2 °F (36.2 °C), resp. rate 17, height 5' 11\" (1.803 m), weight 73.9 kg (163 lb), SpO2 98%., Body mass index is 22.73 kg/m².,     Orthostatic Blood Pressures      Flowsheet Row Most Recent Value   Blood Pressure 137/55 filed at 12/20/2023 0850   Patient Position - Orthostatic VS Standing for 3 minutes - Orthostatic VS filed at 12/19/2023 1152            Physical Exam  Vitals and nursing note reviewed.   Constitutional:       General: He is not in acute distress.     Appearance: He is well-developed.   HENT:      Head: Normocephalic and atraumatic.   Eyes:      Conjunctiva/sclera: Conjunctivae normal.      Comments: Mucosal pallor noted   Neck:      Vascular: No JVD.   Cardiovascular:      Rate and Rhythm: Normal rate and regular rhythm.      Heart sounds: No murmur heard.  Pulmonary:      Effort: Pulmonary effort is normal. No respiratory distress.      Breath sounds: Normal breath sounds.      Comments: Breathing comfortably on room air  Abdominal:      Palpations: Abdomen is soft.      Tenderness: There is no abdominal tenderness.   Genitourinary:     Comments: Bloody urine in zuniga bag  Musculoskeletal:         General: No swelling.      Cervical back: Neck supple.      Right lower leg: No edema.      Left lower leg: No edema.   Skin:     General: Skin is warm and dry.      Capillary Refill: Capillary refill takes less than 2 seconds.   Neurological:      Mental Status: He is alert.   Psychiatric:         Mood and Affect: Mood normal.            Intake/Output Summary (Last 24 hours) at 12/20/2023 1153  Last data filed at 12/20/2023 0543  Gross per 24 hour   Intake 600 ml   Output 1000 ml   Net -400 ml       Weight (last 2 days)       Date/Time Weight    12/19/23 0300 73.9 (163)               Medications:      Current Facility-Administered Medications:     acetaminophen " (TYLENOL) tablet 650 mg, 650 mg, Oral, Q6H PRN, Sherita Martinez MD    bisacodyl (DULCOLAX) rectal suppository 10 mg, 10 mg, Rectal, Daily, Sherita Martinez MD, 10 mg at 12/20/23 1003    cefTRIAXone (ROCEPHIN) IVPB (premix in dextrose) 2,000 mg 50 mL, 2,000 mg, Intravenous, Q24H, Suha Alvarado PA-C, Last Rate: 100 mL/hr at 12/19/23 1641, 2,000 mg at 12/19/23 1641    cyanocobalamin (VITAMIN B-12) tablet 1,000 mcg, 1,000 mcg, Oral, Daily, Sherita Martinez MD, 1,000 mcg at 12/20/23 1006    digoxin (LANOXIN) tablet 125 mcg, 125 mcg, Oral, Daily, Sherita Martinez MD, 125 mcg at 12/20/23 1005    dronedarone (MULTAQ) tablet 400 mg, 400 mg, Oral, BID With Meals, Sherita Martinez MD, 400 mg at 12/20/23 1005    folic acid (FOLVITE) tablet 1,000 mcg, 1,000 mcg, Oral, Daily, Sherita Martinez MD, 1,000 mcg at 12/20/23 1005    glycerin-hypromellose- (ARTIFICIAL TEARS) ophthalmic solution 1 drop, 1 drop, Both Eyes, Q4H PRN, Sherita Martinez MD, 1 drop at 12/18/23 1243    hydrocortisone (ANUSOL-HC) 2.5 % rectal cream, , Topical, 4x Daily PRN, Sherita Martinez MD    magnesium sulfate 2 g/50 mL IVPB (premix) 2 g, 2 g, Intravenous, Once, Remedios Cruz PA-C, Last Rate: 25 mL/hr at 12/20/23 1042, 2 g at 12/20/23 1042    melatonin tablet 3 mg, 3 mg, Oral, HS PRN, Suha Alvarado PA-C    multi-electrolyte (PLASMALYTE-A/ISOLYTE-S PH 7.4) IV solution, 50 mL/hr, Intravenous, Continuous, Suha Richard Gnall, PA-C, Last Rate: 50 mL/hr at 12/19/23 1942, 50 mL/hr at 12/19/23 1942    ondansetron (ZOFRAN) injection 4 mg, 4 mg, Intravenous, Q6H PRN, Sherita Martinez MD    pantoprazole (PROTONIX) EC tablet 40 mg, 40 mg, Oral, Daily, Sherita Martinez MD, 40 mg at 12/20/23 1005    polyethylene glycol (MIRALAX) packet 17 g, 17 g, Oral, BID, Sherita Martinez MD, 17 g at 12/20/23 1005    primidone (MYSOLINE) tablet 50 mg, 50 mg, Oral, Q12H YAMINI, Sherita Martinez MD, 50 mg at 12/20/23 1005    senna-docusate sodium (SENOKOT S) 8.6-50 mg per tablet 1 tablet, 1 tablet, Oral, Daily With  Breakfast, Sherita Martinez MD, 1 tablet at 12/20/23 1005     Labs & Results:        Results from last 7 days   Lab Units 12/20/23  0532 12/19/23 0447 12/18/23  0454   WBC Thousand/uL 7.42 6.03 8.65   HEMOGLOBIN g/dL 8.3* 8.6* 8.4*   HEMATOCRIT % 24.6* 26.4* 25.4*   PLATELETS Thousands/uL 177 185 155         Results from last 7 days   Lab Units 12/20/23  0532 12/19/23  0447 12/18/23  0454 12/15/23  0453 12/14/23  0546   POTASSIUM mmol/L 3.8 4.2 4.2   < > 4.3   CHLORIDE mmol/L 101 101 102   < > 105   CO2 mmol/L 28 25 25   < > 25   BUN mg/dL 21 27* 25   < > 15   CREATININE mg/dL 0.87 1.14 1.10   < > 0.94   CALCIUM mg/dL 7.9* 8.3* 8.0*   < > 7.7*   ALK PHOS U/L  --   --   --   --  50   ALT U/L  --   --   --   --  7   AST U/L  --   --   --   --  15    < > = values in this interval not displayed.         Results from last 7 days   Lab Units 12/20/23  0532   MAGNESIUM mg/dL 1.8*            Counseling / Coordination of Care  Total floor / unit time spent today 25 minutes.  Greater than 50% of total time was spent with the patient and / or family counseling and / or coordination of care.  A description of the counseling / coordination of care: Dicussed case with primary team.

## 2023-12-20 NOTE — PLAN OF CARE
Problem: Potential for Falls  Goal: Patient will remain free of falls  Description: INTERVENTIONS:  - Educate patient/family on patient safety including physical limitations  - Instruct patient to call for assistance with activity   - Consult OT/PT to assist with strengthening/mobility   - Keep Call bell within reach  - Keep bed low and locked with side rails adjusted as appropriate  - Keep care items and personal belongings within reach  - Initiate and maintain comfort rounds  - Make Fall Risk Sign visible to staff  - Offer Toileting every 2 Hours, in advance of need  - Initiate/Maintain bed/chair alarm  - Obtain necessary fall risk management equipment: nonskid footwear, bracelet  - Apply yellow socks and bracelet for high fall risk patients  - Consider moving patient to room near nurses station  Outcome: Progressing     Problem: PAIN - ADULT  Goal: Verbalizes/displays adequate comfort level or baseline comfort level  Description: Interventions:  - Encourage patient to monitor pain and request assistance  - Assess pain using appropriate pain scale  - Administer analgesics based on type and severity of pain and evaluate response  - Implement non-pharmacological measures as appropriate and evaluate response  - Consider cultural and social influences on pain and pain management  - Notify physician/advanced practitioner if interventions unsuccessful or patient reports new pain  Outcome: Progressing     Problem: INFECTION - ADULT  Goal: Absence or prevention of progression during hospitalization  Description: INTERVENTIONS:  - Assess and monitor for signs and symptoms of infection  - Monitor lab/diagnostic results  - Monitor all insertion sites, i.e. indwelling lines, tubes, and drains  - Monitor endotracheal if appropriate and nasal secretions for changes in amount and color  - [place] appropriate cooling/warming therapies per order  - Administer medications as ordered  - Instruct and encourage patient and family  to use good hand hygiene technique  - Identify and instruct in appropriate isolation precautions for identified infection/condition  Outcome: Progressing     Problem: SAFETY ADULT  Goal: Patient will remain free of falls  Description: INTERVENTIONS:  - Educate patient/family on patient safety including physical limitations  - Instruct patient to call for assistance with activity   - Consult OT/PT to assist with strengthening/mobility   - Keep Call bell within reach  - Keep bed low and locked with side rails adjusted as appropriate  - Keep care items and personal belongings within reach  - Initiate and maintain comfort rounds  - Make Fall Risk Sign visible to staff  - Offer Toileting every 2 Hours, in advance of need  - Initiate/Maintain bed/chair alarm  - Obtain necessary fall risk management equipment: nonskid footwear,  bracelet  - Apply yellow socks and bracelet for high fall risk patients  - Consider moving patient to room near nurses station  Outcome: Progressing  Goal: Maintain or return to baseline ADL function  Description: INTERVENTIONS:  -  Assess patient's ability to carry out ADLs; assess patient's baseline for ADL function and identify physical deficits which impact ability to perform ADLs (bathing, care of mouth/teeth, toileting, grooming, dressing, etc.)  - Assess/evaluate cause of self-care deficits   - Assess range of motion  - Assess patient's mobility; develop plan if impaired  - Assess patient's need for assistive devices and provide as appropriate  - Encourage maximum independence but intervene and supervise when necessary  - Involve family in performance of ADLs  - Assess for home care needs following discharge   - Consider OT consult to assist with ADL evaluation and planning for discharge  - Provide patient education as appropriate  Outcome: Progressing  Goal: Maintains/Returns to pre admission functional level  Description: INTERVENTIONS:  - Perform AM-PAC 6 Click Basic Mobility/ Daily  Activity assessment daily.  - Set and communicate daily mobility goal to care team and patient/family/caregiver.   - Collaborate with rehabilitation services on mobility goals if consulted  - Perform Range of Motion 3 times a day.  - Reposition patient every 2 hours.  - Dangle patient 3 times a day  - Stand patient 3 times a day  - Ambulate patient 3 times a day  - Out of bed to chair 3 times a day   - Out of bed for meals 3 times a day  - Out of bed for toileting  - Record patient progress and toleration of activity level   Outcome: Progressing     Problem: DISCHARGE PLANNING  Goal: Discharge to home or other facility with appropriate resources  Description: INTERVENTIONS:  - Identify barriers to discharge w/patient and caregiver  - Arrange for needed discharge resources and transportation as appropriate  - Identify discharge learning needs (meds, wound care, etc.)  - Arrange for interpretive services to assist at discharge as needed  - Refer to Case Management Department for coordinating discharge planning if the patient needs post-hospital services based on physician/advanced practitioner order or complex needs related to functional status, cognitive ability, or social support system  Outcome: Progressing     Problem: Knowledge Deficit  Goal: Patient/family/caregiver demonstrates understanding of disease process, treatment plan, medications, and discharge instructions  Description: Complete learning assessment and assess knowledge base.  Interventions:  - Provide teaching at level of understanding  - Provide teaching via preferred learning methods  Outcome: Progressing     Problem: GASTROINTESTINAL - ADULT  Goal: Minimal or absence of nausea and/or vomiting  Description: INTERVENTIONS:  - Administer IV fluids if ordered to ensure adequate hydration  - Maintain NPO status until nausea and vomiting are resolved  - Nasogastric tube if ordered  - Administer ordered antiemetic medications as needed  - Provide  nonpharmacologic comfort measures as appropriate  - Advance diet as tolerated, if ordered  - Consider nutrition services referral to assist patient with adequate nutrition and appropriate food choices  Outcome: Progressing  Goal: Maintains or returns to baseline bowel function  Description: INTERVENTIONS:  - Assess bowel function monitor bowel regimen  - Encourage oral fluids to ensure adequate hydration  - Administer IV fluids if ordered to ensure adequate hydration  - Administer ordered medications as needed  - Encourage mobilization and activity  - Consider nutritional services referral to assist patient with adequate nutrition and appropriate food choices  Outcome: Progressing  Goal: Maintains adequate nutritional intake  Description: INTERVENTIONS:  - Monitor percentage of each meal consumed  - Identify factors contributing to decreased intake, treat as appropriate  - Assist with meals as needed  - Monitor I&O, weight, and lab values if indicated  - Obtain nutrition services referral as needed  Outcome: Progressing  Goal: Oral mucous membranes remain intact  Description: INTERVENTIONS  - Assess oral mucosa and hygiene practices  - Implement preventative oral hygiene regimen  - Implement oral medicated treatments as ordered  - Initiate Nutrition services referral as needed  Outcome: Progressing     Problem: GENITOURINARY - ADULT  Goal: Maintains or returns to baseline urinary function  Description: INTERVENTIONS:  - Assess urinary function  - Encourage oral fluids to ensure adequate hydration if ordered  - Administer IV fluids as ordered to ensure adequate hydration  - Administer ordered medications as needed  - Offer frequent toileting  - Follow urinary retention protocol if ordered  Outcome: Progressing  Goal: Absence of urinary retention  Description: INTERVENTIONS:  - Assess patient’s ability to void and empty bladder  - Monitor I/O  - Bladder scan as needed  - Discuss with physician/AP medications to  alleviate retention as needed  - Discuss catheterization for long term situations as appropriate  Outcome: Progressing  Goal: Urinary catheter remains patent  Description: INTERVENTIONS:  - Assess patency of urinary catheter  - If patient has a chronic zuniga, consider changing catheter if non-functioning  - Follow guidelines for intermittent irrigation of non-functioning urinary catheter  Outcome: Progressing     Problem: Prexisting or High Potential for Compromised Skin Integrity  Goal: Skin integrity is maintained or improved  Description: INTERVENTIONS:  - Identify patients at risk for skin breakdown  - Assess and monitor skin integrity  - Assess and monitor nutrition and hydration status  - Monitor labs   - Assess for incontinence   - Turn and reposition patient  - Assist with mobility/ambulation  - Relieve pressure over bony prominences  - Avoid friction and shearing  - Provide appropriate hygiene as needed including keeping skin clean and dry  - Evaluate need for skin moisturizer/barrier cream  - Collaborate with interdisciplinary team   - Patient/family teaching  - Consider wound care consult   Outcome: Progressing     Problem: Nutrition/Hydration-ADULT  Goal: Nutrient/Hydration intake appropriate for improving, restoring or maintaining nutritional needs  Description: Monitor and assess patient's nutrition/hydration status for malnutrition. Collaborate with interdisciplinary team and initiate plan and interventions as ordered.  Monitor patient's weight and dietary intake as ordered or per policy. Utilize nutrition screening tool and intervene as necessary. Determine patient's food preferences and provide high-protein, high-caloric foods as appropriate.     INTERVENTIONS:  - Monitor oral intake, urinary output, labs, and treatment plans  - Assess nutrition and hydration status and recommend course of action  - Evaluate amount of meals eaten  - Assist patient with eating if necessary   - Allow adequate time for  meals  - Recommend/ encourage appropriate diets, oral nutritional supplements, and vitamin/mineral supplements  - Order, calculate, and assess calorie counts as needed  - Recommend, monitor, and adjust tube feedings and TPN/PPN based on assessed needs  - Assess need for intravenous fluids  - Provide specific nutrition/hydration education as appropriate  - Include patient/family/caregiver in decisions related to nutrition  Outcome: Progressing

## 2023-12-20 NOTE — ASSESSMENT & PLAN NOTE
Acute urinary retention secondary to severe constipation also known history of prostate cancer.  Zuniga catheter placed as patient bladder scan for 800 cc on presentation to ED and 1800 ml removed in zuniga. Also placed on Flomax   Zuniga removed 12/15 and placed on voiding trial so far patient is voiding but he woke up multiple times to urinate overnight .Will continue voiding trial today.  Some hypotension noted which may be secondary to Flomax and placed on midodrine - will dc flomax and midodrine  Patient with significant amount of hematuria still.   Urology initially recommending voiding trial in a few days.  Evaluated again on 12/20 - continue hand irrigation, may need to transition to CBI

## 2023-12-20 NOTE — OCCUPATIONAL THERAPY NOTE
Occupational Therapy Progress Note     Patient Name: Miguel Angel Ortega  Today's Date: 12/20/2023  Problem List  Principal Problem:    Stercoral colitis  Active Problems:    Prostate cancer metastatic to bone (HCC)    Acute urinary retention    Permanent atrial fibrillation (HCC)    Acute on chronic anemia    Gram-negative bacteremia    Orthostatic hypotension        12/20/23 0840   OT Last Visit   OT Visit Date 12/20/23   Note Type   Note Type Treatment   Pain Assessment   Pain Assessment Tool 0-10   Pain Score No Pain   Restrictions/Precautions   Weight Bearing Precautions Per Order No   Other Precautions Chair Alarm;Bed Alarm;Fall Risk   ADL   Where Assessed Chair   Grooming Assistance 5  Supervision/Setup   Grooming Deficit Setup;Verbal cueing;Supervision/safety;Increased time to complete;Wash/dry face;Wash/dry hands;Teeth care   Grooming Comments brushing teeth and deodrant applied seated in chair with supervision and setup   LB Dressing Assistance 5  Supervision/Setup   LB Dressing Deficit Setup;Supervision/safety   LB Dressing Comments don/doff socks seated in chair with supervision for safety   Toileting Assistance  4  Minimal Assistance   Toileting Deficit Setup;Supervison/safety;Increased time to complete;Bedside commode;Perineal hygiene   Bed Mobility   Additional Comments unable to assess, Pt greeted in bedside chair at start of session.   Transfers   Sit to Stand 5  Supervision   Additional items Assist x 1;Increased time required;Verbal cues  (RW)   Stand to Sit 5  Supervision   Additional items Assist x 1;Increased time required;Verbal cues  (RW)   Stand pivot 5  Supervision   Additional items Assist x 1;Increased time required;Verbal cues  (RW)   Additional Comments verbal cues provided for hand placement on walker   Functional Mobility   Functional Mobility 5  Supervision   Additional Comments short distance from chair to bedside commode with RW for support   Additional items Rolling walker   Toilet  Transfers   Toilet Transfer From Other (Comment)  (chair)   Toilet Transfer Type To   Toilet Transfer to Standard bedside commode   Toilet Transfer Technique Ambulating   Toilet Transfers Supervision   Toilet Transfers Comments   (RW)   Therapeutic Exercise - ROM   UE-ROM Yes   ROM- Right Upper Extremities   R Shoulder AROM;Flexion;Extension;Horizontal ABduction   R Elbow AROM;Elbow flexion;Elbow extension   R Weight/Reps/Sets 2x10 with each set of exercises   RUE ROM Comment increased verbal cues for slow pacing and correct body technique with each exercise   ROM - Left Upper Extremities    L Shoulder AROM;Flexion;Extension;Horizontal ABduction   L Elbow AROM;Elbow flexion;Elbow extension   L Weight/Reps/Sets 2x10 with each set of exercises   LUE ROM Comment increased verbal cues for slow pacing and correct body technique with each exercise   Cognition   Overall Cognitive Status WFL   Arousal/Participation Alert;Cooperative   Attention Within functional limits   Orientation Level Oriented X4   Memory Within functional limits   Following Commands Follows one step commands without difficulty   Activity Tolerance   Activity Tolerance Patient tolerated treatment well   Medical Staff Made Aware yes   Assessment   Assessment Pt seen for OT treatment session focusing on ADLs, transfers, UE strengthening, continued evaluation, energy conservation, and activity engagement. Pt greeted in bedside chair at start of session. Pt alert and cooperative throughout session. Pt with good sitting balance and poor + standing balance. Pt tolerated treatment well. Pt completed UE strengthening exercises, LB dressing (don/doff socks), grooming (oral care), toileting, transfers (sit <> stand, stand pivot, and toilet transfer to the commode with RW), functional mobility during treatment today. Pt completed UE strengthening AROM flexion/extension and horizontal abduction of shoulder and flexion/extension of elbow. 2x10 sets with increased  verbal cuing for slow pacing. Pt requires supervision grooming, supervision for LB dressing, Jo[place] for toileting, supervision for transfers, and supervision for functional mobility. Pt reports no pain and no dizziness. Pt's vitals include: STABLE, 137/55 seated in chair.     Pt ended session seated on bedside commode with call bell, PCA notified that pt is on commode and was within eyesight of pt. Call bell and phone within reach. All needs met and pt reports no further questions at this time. Continue to recommend minimum resource intensity when medically cleared. OT will continue to follow pt on caseload.   Plan   Treatment Interventions ADL retraining;Functional transfer training;UE strengthening/ROM;Endurance training;Patient/family training;Equipment evaluation/education;Compensatory technique education;Continued evaluation;Energy conservation;Activityengagement   Goal Expiration Date 12/28/23   OT Treatment Day 2   OT Frequency 2-3x/wk   Discharge Recommendation   Rehab Resource Intensity Level, OT III (Minimum Resource Intensity)   Additional Comments  The patient's raw score on the AM-PAC Daily Activity Inpatient Short Form is 18. A raw score of less than 19 suggests the patient may benefit from discharge to post-acute rehabilitation services. Please refer to the recommendation of the Occupational Therapist for safe discharge planning.   AM-PAC Daily Activity Inpatient   Lower Body Dressing 3   Bathing 3   Toileting 3   Upper Body Dressing 3   Grooming 3   Eating 3   Daily Activity Raw Score 18   Daily Activity Standardized Score (Calc for Raw Score >=11) 38.66   AM-PAC Applied Cognition Inpatient   Following a Speech/Presentation 3   Understanding Ordinary Conversation 4   Taking Medications 2   Remembering Where Things Are Placed or Put Away 3   Remembering List of 4-5 Errands 2   Taking Care of Complicated Tasks 2   Applied Cognition Raw Score 16   Applied Cognition Standardized Score 35.03   End of  Consult   Education Provided Yes   Nurse Communication Nurse aware of consult   Maria R Dodge, OT

## 2023-12-20 NOTE — PHYSICAL THERAPY NOTE
"   PHYSICAL THERAPY TREATMENT NOTE  NAME:  Miguel Angel Ortega  DATE: 12/20/23    Length Of Stay: 7  Performed at least 2 patient identifiers during session: Name and ID bracelet    TREATMENT FLOWSHEET:    12/20/23 1412   PT Last Visit   PT Visit Date 12/20/23   Note Type   Note Type Treatment   Pain Assessment   Pain Assessment Tool 0-10   Pain Score No Pain   Restrictions/Precautions   Weight Bearing Precautions Per Order No   Other Precautions Chair Alarm;Bed Alarm;Fall Risk;Multiple lines  (zuniga cath)   General   Chart Reviewed Yes   Response to Previous Treatment Patient with no complaints from previous session.   Family/Caregiver Present No   Cognition   Overall Cognitive Status WFL   Arousal/Participation Alert;Cooperative   Attention Within functional limits   Orientation Level Oriented X4   Memory Within functional limits   Following Commands Follows one step commands without difficulty   Subjective   Subjective \"I have been having trouble with my bowels so we may need to run to the BR so there is no mess.\"   Bed Mobility   Rolling R 6  Modified independent   Additional items HOB elevated;Bedrails;Increased time required   Rolling L 6  Modified independent   Additional items HOB elevated;Bedrails;Increased time required   Supine to Sit 6  Modified independent   Additional items HOB elevated;Bedrails;Increased time required   Additional Comments Pt. performed supine and seated ther ex f/b dynamic sitting balance activities.  Copious amounts of Bright red blood present in zuniga bag.  RN aware   Transfers   Sit to Stand 5  Supervision   Additional items Assist x 1;Increased time required;Verbal cues  (RW)   Stand to Sit 5  Supervision   Additional items Assist x 1;Armrests;Increased time required;Verbal cues   Stand pivot 5  Supervision   Additional items Assist x 1;Armrests;Increased time required;Verbal cues  (RW)   Additional Comments VC's provided for proper hand placememt during transitions "   Ambulation/Elevation   Gait pattern Improper Weight shift;Decreased foot clearance;Narrow CORY;Forward Flexion;Short stride;Excessively slow;Decreased heel strike;Decreased toe off;Step through pattern   Gait Assistance 5  Supervision   Additional items Assist x 1;Verbal cues   Assistive Device Rolling walker   Distance 150 ft   Balance   Static Sitting Good   Dynamic Sitting Fair +   Static Standing Fair   Dynamic Standing Fair -   Ambulatory Fair -   Endurance Deficit   Endurance Deficit Yes   Activity Tolerance   Activity Tolerance Patient limited by fatigue   Nurse Made Aware RN aware   Exercises   Quad Sets Supine;Sitting;20 reps;AROM;Bilateral   Heelslides Supine;Sitting;20 reps;AROM;Bilateral   Glute Sets Supine;Sitting;20 reps;AROM;Bilateral   Hip Flexion Supine;Sitting;20 reps;AROM;Bilateral   Hip Abduction Supine;Sitting;20 reps;AROM;Bilateral   Hip Adduction Supine;Sitting;20 reps;AROM;Bilateral   Knee AROM Short Arc Quad Supine;20 reps;AROM;Bilateral   Knee AROM Long Arc Quad Sitting;20 reps;AROM;Bilateral   Ankle Pumps Supine;Sitting;20 reps;AROM;Bilateral   Marching Sitting;20 reps;AROM;Bilateral   Neuro re-ed Patient performed dynamic sitting balance activities while sitting at EOB unsupported with close S  including: multidirectional weight shifting, reaching, and scooting.   Assessment   Prognosis Good   Problem List Decreased strength;Decreased endurance;Impaired balance;Decreased mobility   Goals   Patient Goals to get better and find out what is causing all his troubles   PT Treatment Day 3   Plan   Treatment/Interventions Functional transfer training;LE strengthening/ROM;Elevations;Therapeutic exercise;Endurance training;Patient/family training;Equipment eval/education;Bed mobility;Gait training;Compensatory technique education;Spoke to nursing   Progress Progressing toward goals   PT Frequency 3-5x/wk   Discharge Recommendation   Rehab Resource Intensity Level, PT III (Minimum Resource  Intensity)   AM-PAC Basic Mobility Inpatient   Turning in Flat Bed Without Bedrails 3   Lying on Back to Sitting on Edge of Flat Bed Without Bedrails 3   Moving Bed to Chair 3   Standing Up From Chair Using Arms 4   Walk in Room 3   Climb 3-5 Stairs With Railing 3   Basic Mobility Inpatient Raw Score 19   Basic Mobility Standardized Score 42.48   Highest Level Of Mobility   JH-HLM Goal 6: Walk 10 steps or more   JH-HLM Achieved 7: Walk 25 feet or more       The patient's AM-PAC Basic Mobility Inpatient Short Form Raw Score is 19. A raw score greater than 16 suggests the patient may benefit from discharge to home. Please also refer to the recommendation of the Physical Therapist for safe discharge planning.    Pt seen for PT treatment session this date with interventions consisting of bed mobility tasks, seated and supine TE, neuromuscular re-education of movement performed to improve dynamic sitting balance, gait training, and education provided as needed for safety and direction to improve functional mobility, safety awareness, and activity tolerance. Pt agreeable to PT treatment session upon arrival, pt found resting in bed. At end of session, pt left sitting OOB in recliner with BLE elevated, chair alarm activated, SCD's applied, and with all needs in reach. In comparison to previous session, pt with improvements in ambulation distance . Continue to recommend Level III (Minimum Resource Intensity) at time of d/c in order to maximize pt's functional independence and safety w/ mobility. Pt continues to be functioning below baseline level. PT will continue to see pt while here in order to address the deficits listed above and provide interventions consistent w/ POC in effort to achieve STGs.    Nadja Gonzalez, PTA

## 2023-12-20 NOTE — CASE MANAGEMENT
Case Management Discharge Planning Note    Patient name Miguel Angel Ortega  Location /-01 MRN [id number]  : 1928 Date 2023       Current Admission Date: 2023  Current Admission Diagnosis:Stercoral colitis   Patient Active Problem List    Diagnosis Date Noted    Orthostatic hypotension 2023    Gram-negative bacteremia 2023    Acute on chronic anemia 2023    Stercoral colitis 2023    Acute urinary retention 2023    Permanent atrial fibrillation (HCC) 2023    Hyponatremia 2023    Prostate cancer metastatic to bone (HCC) 2023      LOS (days): 7  Geometric Mean LOS (GMLOS) (days): 2.6  Days to GMLOS:-4.5     OBJECTIVE:  Risk of Unplanned Readmission Score: 23.31         Current admission status: Inpatient   Preferred Pharmacy:   [id number] - [address] [address]  Phone: [phone] Fax: [phone]    Primary Care Provider: Kisha Armstrong MD    Primary Insurance: MEDICARE  Secondary Insurance: [facility]    DISCHARGE DETAILS:        Chart reviewed aware of medical management. Case was discussed in multidisciplinary discharge meeting.  Clinical information supporting hospitalization: stercoral colitis, complicated UTI, pt now with hematuria- continue IV antibiotics    Barriers to discharge:  - medical management  Discharge plan: return to [facility] with outpatient PT and OT prescriptions for therapy at facility    CM will continue to follow plan of care.

## 2023-12-20 NOTE — PROGRESS NOTES
Patient with some continued gross hematuria.  Currently comfortable.  Will continue to hand irrigate him.  He might need to have a continuous bladder irrigation.  Will follow-up.  Culture pending.

## 2023-12-20 NOTE — PROGRESS NOTES
Progress Note - Infectious Disease   Miguel Angel Ortega 89 y.o. male MRN: [id number]  Unit/Bed#: -01 Encounter: [id number]        REQUIRED DOCUMENTATION:     1. This service was provided via Telemedicine.  2. Provider located at [place].  3. TeleMed provider: Hodan Faye MD.  4. Identify all parties in room with patient during tele consult:RN  5. After connecting through televideo, patient was identified by name and date of birth and assistant checked wristband.  Patient was then informed that this was a Telemedicine visit and that the exam was being conducted confidentially over secure lines. My office door was closed. No one else was in the room.  Patient acknowledged consent and understanding of privacy and security of the Telemedicine visit, and gave us permission to have the assistant stay in the room in order to assist with the history and to conduct the exam.  I informed the patient that I have reviewed their record in Epic and presented the opportunity for them to ask any questions regarding the visit today.  The patient agreed to participate.       Assessment/Recommendations     Sepsis, complicating hospital course  Proteus bacteremia  Probable complicated UTI  Acute urinary retention  Stercoral colitis  Metastatic prostate cancer     -Patient presenting with fecal impaction, stercoral proctitis and urinary retention requiring Zuniga.  Shortly after starting a voiding trial he developed fever, tachycardia with evidence of Proteus bacteremia.   -Suspect source of bacteremia is complicated UTI given hematuria and recurrent urinary retention in the setting of persistent constipation. Consider also gut translocation from sterocoral/inflammatory colitis  -Clinically improving, afebrile without leukocytosis, s/p golytely prep and fu KUB notes no significant stool burden     Continue ceftriaxone, 2 q24h  FU blood and urine cultures  Bowel regime per GI  Continue zuniga care per protocol and monitor  hematuria  Continue supportive care, monitor clinical course, serial abdominal exams.   Anticipate 10 days of abx therapy     Discussed in detail with the primary service.    History       Subjective:  The patient has no complaints, did not sleep well due to golytely prep, continues to note blood in urine.  Denies fevers, chills, or sweats.  Denies nausea, vomiting, or diarrhea.    Antibiotics:  ceftriaxone      Physical Exam     Temp:  [97.2 °F (36.2 °C)-98.1 °F (36.7 °C)] 97.2 °F (36.2 °C)  HR:  [64-73] 64  Resp:  [17] 17  BP: (100-137)/(35-55) 137/55  SpO2:  [97 %-100 %] 98 %  Temp (24hrs), Av.7 °F (36.5 °C), Min:97.2 °F (36.2 °C), Max:98.1 °F (36.7 °C)  Current: Temperature: (!) 97.2 °F (36.2 °C)    Intake/Output Summary (Last 24 hours) at 2023 1220  Last data filed at 2023 0543  Gross per 24 hour   Intake 600 ml   Output 1000 ml   Net -400 ml       Physical exam findings reported by bedside and primary medical team staff      General Appearance:  Appearing frail and elderly, nontoxic, and in no distress, appears stated age   Lungs:   Clear to auscultation bilaterally, no audible wheezes, rhonchi and rales, respirations unlabored   Heart:  Regular rate and rhythm, S1, S2 normal, no murmur, rub or gallop   Abdomen:   Soft, non-tender, non-distended, positive bowel sounds, no masses, no organomegaly    No CVA tenderness, zuniga with bloody urine   Extremities: Extremities normal, atraumatic, no cyanosis, clubbing or edema   Skin: Skin color, texture, turgor normal, no rashes or lesions. No draining wounds noted.   Neurologic: Alert and oriented times 3,         Invasive Devices:   Peripheral IV 23 Right Arm (Active)   Site Assessment WDL 23   Dressing Type Transparent 23   Line Status Flushed 23   Dressing Status Clean;Dry;Intact 23   Dressing Change Due 23 010   Reason Not Rotated Not due 23       Urethral Catheter  Double-lumen 18 Fr. (Active)   Ascencio Care Done 12/20/23 0900   Urethral Irrigation Intake (mL) 120 mL 12/20/23 0215   Output (mL) 750 mL 12/20/23 0543       Labs, Imaging, & Other Studies     Lab Results:    I have personally reviewed pertinent labs.    Results from last 7 days   Lab Units 12/20/23  0532 12/19/23  0447 12/18/23  0454   WBC Thousand/uL 7.42 6.03 8.65   HEMOGLOBIN g/dL 8.3* 8.6* 8.4*   PLATELETS Thousands/uL 177 185 155     Results from last 7 days   Lab Units 12/20/23  0532 12/15/23  0453 12/14/23  0546   POTASSIUM mmol/L 3.8   < > 4.3   CHLORIDE mmol/L 101   < > 105   CO2 mmol/L 28   < > 25   BUN mg/dL 21   < > 15   CREATININE mg/dL 0.87   < > 0.94   EGFR ml/min/1.73sq m 73   < > 68   CALCIUM mg/dL 7.9*   < > 7.7*   AST U/L  --   --  15   ALT U/L  --   --  7   ALK PHOS U/L  --   --  50    < > = values in this interval not displayed.     Results from last 7 days   Lab Units 12/19/23  1548 12/17/23  1643 12/13/23  1811   BLOOD CULTURE  Received in Microbiology Lab. Culture in Progress.  Received in Microbiology Lab. Culture in Progress. Proteus mirabilis*  Proteus mirabilis*  --    GRAM STAIN RESULT   --  Gram negative rods*  Gram negative rods*  --    MRSA CULTURE ONLY   --   --  Methicillin Resistant Staphylococcus aureus isolated*  This patient requires contact isolation precautions per [place] law. Contact precautions are not required in [place] for nasal surveillance cultures.       Imaging Studies:   I have personally reviewed pertinent imaging study reports and images in PACS.      EKG, Pathology, and Other Studies:   I have personally reviewed pertinent reports.        Counseling/Coordination of care:       Total 50 minutes communication with the patient via telehealth.  Labs, medical tests and imaging studies were independently reviewed by me as noted above.

## 2023-12-20 NOTE — ASSESSMENT & PLAN NOTE
Continue Multaq, digoxin which are his chronic meds.  Not on anticoagulation  Dig level acceptable  Appreciate cardio eval - continue multaq and dig. Was on xarelto in the past - discuss outpatient with his cardiologist about resumption

## 2023-12-20 NOTE — PLAN OF CARE
Problem: PHYSICAL THERAPY ADULT  Goal: Performs mobility at highest level of function for planned discharge setting.  See evaluation for individualized goals.  Description: Treatment/Interventions: ADL retraining, Functional transfer training, LE strengthening/ROM, Elevations, Therapeutic exercise, Endurance training, Patient/family training, Equipment eval/education, Bed mobility, Gait training, Compensatory technique education, Spoke to nursing, OT          See flowsheet documentation for full assessment, interventions and recommendations.  Outcome: Progressing  Note: Prognosis: Good  Problem List: Decreased strength, Decreased endurance, Impaired balance, Decreased mobility  Assessment: Pt seen for PT treatment session this date with interventions consisting of bed mobility tasks, seated and supine TE, neuromuscular re-education of movement performed to improve dynamic sitting balance, gait training, and education provided as needed for safety and direction to improve functional mobility, safety awareness, and activity tolerance. Pt agreeable to PT treatment session upon arrival, pt found resting in bed. At end of session, pt left sitting OOB in recliner with BLE elevated, chair alarm activated, SCD's applied, and with all needs in reach. In comparison to previous session, pt with improvements in ambulation distance . Continue to recommend Level III (Minimum Resource Intensity) at time of d/c in order to maximize pt's functional independence and safety w/ mobility. Pt continues to be functioning below baseline level. PT will continue to see pt while here in order to address the deficits listed above and provide interventions consistent w/ POC in effort to achieve STGs.  Barriers to Discharge: Other (Comment) (Fall risk, recent fall)     Rehab Resource Intensity Level, PT: III (Minimum Resource Intensity)    See flowsheet documentation for full assessment.

## 2023-12-20 NOTE — ASSESSMENT & PLAN NOTE
Proteus bacteremia from gi source.  ID consulted: increase rocephin to 2g q24h, f/u blood and urine cultures, GI consult for persistent constipation/fecal impaction, continue zuniga, anticipate 10 days abx therapy.   Repeat BC pending

## 2023-12-20 NOTE — PLAN OF CARE
Problem: OCCUPATIONAL THERAPY ADULT  Goal: Performs self-care activities at highest level of function for planned discharge setting.  See evaluation for individualized goals.  Description: Treatment Interventions: ADL retraining, Functional transfer training, UE strengthening/ROM, Endurance training, Patient/family training, Equipment evaluation/education, Compensatory technique education, Continued evaluation, Energy conservation, Activityengagement          See flowsheet documentation for full assessment, interventions and recommendations.   Outcome: Progressing  Note: Limitation: Decreased ADL status, Decreased UE strength, Decreased Safe judgement during ADL, Decreased endurance, Decreased self-care trans, Decreased high-level ADLs     Assessment: Pt seen for OT treatment session focusing on ADLs, transfers, UE strengthening, continued evaluation, energy conservation, and activity engagement. Pt greeted in bedside chair at start of session. Pt alert and cooperative throughout session. Pt with good sitting balance and poor + standing balance. Pt tolerated treatment well. Pt completed UE strengthening exercises, LB dressing (don/doff socks), grooming (oral care), toileting, transfers (sit <> stand, stand pivot, and toilet transfer to the commode with RW), functional mobility during treatment today. Pt completed UE strengthening AROM flexion/extension and horizontal abduction of shoulder and flexion/extension of elbow. 2x10 sets with increased verbal cuing for slow pacing. Pt requires supervision grooming, supervision for LB dressing, Joao for toileting, supervision for transfers, and supervision for functional mobility. Pt reports no pain and no dizziness. Pt's vitals include: STABLE, 137/55 seated in chair.     Pt ended session seated on bedside commode with call bell, PCA notified that pt is on commode and was within eyesight of pt. Call bell and phone within reach. All needs met and pt reports no further  questions at this time. Continue to recommend minimum resource intensity when medically cleared. OT will continue to follow pt on caseload.     Rehab Resource Intensity Level, OT: III (Minimum Resource Intensity)

## 2023-12-21 LAB
ANION GAP SERPL CALCULATED.3IONS-SCNC: 8 MMOL/L
BUN SERPL-MCNC: 18 MG/DL (ref 5–25)
CALCIUM SERPL-MCNC: 8.2 MG/DL (ref 8.4–10.2)
CHLORIDE SERPL-SCNC: 103 MMOL/L (ref 96–108)
CO2 SERPL-SCNC: 25 MMOL/L (ref 21–32)
CREAT SERPL-MCNC: 0.98 MG/DL (ref 0.6–1.3)
ERYTHROCYTE [DISTWIDTH] IN BLOOD BY AUTOMATED COUNT: 14.4 % (ref 11.6–15.1)
GFR SERPL CREATININE-BSD FRML MDRD: 65 ML/MIN/1.73SQ M
GLUCOSE SERPL-MCNC: 94 MG/DL (ref 65–140)
HCT VFR BLD AUTO: 23.4 % (ref 36.5–49.3)
HGB BLD-MCNC: 7.8 G/DL (ref 12–17)
MAGNESIUM SERPL-MCNC: 2.1 MG/DL (ref 1.9–2.7)
MCH RBC QN AUTO: 31.7 PG (ref 26.8–34.3)
MCHC RBC AUTO-ENTMCNC: 33.3 G/DL (ref 31.4–37.4)
MCV RBC AUTO: 95 FL (ref 82–98)
PLATELET # BLD AUTO: 183 THOUSANDS/UL (ref 149–390)
PMV BLD AUTO: 8.2 FL (ref 8.9–12.7)
POTASSIUM SERPL-SCNC: 3.9 MMOL/L (ref 3.5–5.3)
RBC # BLD AUTO: 2.46 MILLION/UL (ref 3.88–5.62)
SODIUM SERPL-SCNC: 136 MMOL/L (ref 135–147)
WBC # BLD AUTO: 7.09 THOUSAND/UL (ref 4.31–10.16)

## 2023-12-21 PROCEDURE — NC001 PR NO CHARGE: Performed by: INTERNAL MEDICINE

## 2023-12-21 PROCEDURE — 85027 COMPLETE CBC AUTOMATED: CPT

## 2023-12-21 PROCEDURE — 99232 SBSQ HOSP IP/OBS MODERATE 35: CPT

## 2023-12-21 PROCEDURE — 99232 SBSQ HOSP IP/OBS MODERATE 35: CPT | Performed by: UROLOGY

## 2023-12-21 PROCEDURE — 80048 BASIC METABOLIC PNL TOTAL CA: CPT

## 2023-12-21 PROCEDURE — 83735 ASSAY OF MAGNESIUM: CPT

## 2023-12-21 RX ORDER — DIGOXIN 125 MCG
125 TABLET ORAL EVERY OTHER DAY
Status: DISCONTINUED | OUTPATIENT
Start: 2023-12-22 | End: 2024-01-04 | Stop reason: HOSPADM

## 2023-12-21 RX ORDER — AMOXICILLIN 250 MG
1 CAPSULE ORAL 2 TIMES DAILY
Status: DISCONTINUED | OUTPATIENT
Start: 2023-12-21 | End: 2024-01-04 | Stop reason: HOSPADM

## 2023-12-21 RX ORDER — FERROUS SULFATE 325(65) MG
325 TABLET ORAL
Status: DISCONTINUED | OUTPATIENT
Start: 2023-12-21 | End: 2024-01-04 | Stop reason: HOSPADM

## 2023-12-21 RX ORDER — BISACODYL 10 MG
10 SUPPOSITORY, RECTAL RECTAL DAILY PRN
Status: DISCONTINUED | OUTPATIENT
Start: 2023-12-21 | End: 2024-01-04 | Stop reason: HOSPADM

## 2023-12-21 RX ORDER — LUBIPROSTONE 8 UG/1
8 CAPSULE ORAL 2 TIMES DAILY WITH MEALS
Status: DISCONTINUED | OUTPATIENT
Start: 2023-12-21 | End: 2024-01-04 | Stop reason: HOSPADM

## 2023-12-21 RX ADMIN — PRIMIDONE 50 MG: 50 TABLET ORAL at 08:40

## 2023-12-21 RX ADMIN — CYANOCOBALAMIN TAB 500 MCG 1000 MCG: 500 TAB at 08:39

## 2023-12-21 RX ADMIN — POLYETHYLENE GLYCOL 3350 17 G: 17 POWDER, FOR SOLUTION ORAL at 17:26

## 2023-12-21 RX ADMIN — CEFTRIAXONE 2000 MG: 2 INJECTION, SOLUTION INTRAVENOUS at 15:14

## 2023-12-21 RX ADMIN — DRONEDARONE 400 MG: 400 TABLET, FILM COATED ORAL at 08:53

## 2023-12-21 RX ADMIN — SENNOSIDES AND DOCUSATE SODIUM 1 TABLET: 8.6; 5 TABLET ORAL at 17:26

## 2023-12-21 RX ADMIN — PRIMIDONE 50 MG: 50 TABLET ORAL at 20:57

## 2023-12-21 RX ADMIN — DRONEDARONE 400 MG: 400 TABLET, FILM COATED ORAL at 15:16

## 2023-12-21 RX ADMIN — SENNOSIDES AND DOCUSATE SODIUM 1 TABLET: 8.6; 5 TABLET ORAL at 08:52

## 2023-12-21 RX ADMIN — FERROUS SULFATE TAB 325 MG (65 MG ELEMENTAL FE) 325 MG: 325 (65 FE) TAB at 10:33

## 2023-12-21 RX ADMIN — FOLIC ACID 1000 MCG: 1 TABLET ORAL at 08:39

## 2023-12-21 RX ADMIN — DIGOXIN 125 MCG: 125 TABLET ORAL at 08:39

## 2023-12-21 RX ADMIN — SODIUM CHLORIDE, SODIUM GLUCONATE, SODIUM ACETATE, POTASSIUM CHLORIDE, MAGNESIUM CHLORIDE, SODIUM PHOSPHATE, DIBASIC, AND POTASSIUM PHOSPHATE 50 ML/HR: .53; .5; .37; .037; .03; .012; .00082 INJECTION, SOLUTION INTRAVENOUS at 00:33

## 2023-12-21 RX ADMIN — POLYETHYLENE GLYCOL 3350 17 G: 17 POWDER, FOR SOLUTION ORAL at 08:39

## 2023-12-21 RX ADMIN — GLYCERIN 1 DROP: .002; .002; .01 SOLUTION/ DROPS OPHTHALMIC at 20:59

## 2023-12-21 RX ADMIN — PANTOPRAZOLE SODIUM 40 MG: 40 TABLET, DELAYED RELEASE ORAL at 08:40

## 2023-12-21 RX ADMIN — LUBIPROSTONE 8 MCG: 8 CAPSULE, GELATIN COATED ORAL at 15:12

## 2023-12-21 NOTE — ASSESSMENT & PLAN NOTE
Acute urinary retention secondary to severe constipation also known history of prostate cancer.  Zuniga catheter placed as patient bladder scan for 800 cc on presentation to ED and 1800 ml removed in zuniga. Also placed on Flomax   Zuniga removed 12/15 and placed on voiding trial so far patient is voiding but he woke up multiple times to urinate overnight .Will continue voiding trial today.  Some hypotension noted which may be secondary to Flomax and placed on midodrine - will dc flomax and midodrine  Patient with significant amount of hematuria still.   Urology recommending CBI due to hematuria and multiple clots causing blockages. Zuniga exchanged for 22Fr

## 2023-12-21 NOTE — ASSESSMENT & PLAN NOTE
Hemoglobin dropped from 10.3-8.7.  low iron and B12 levels. placed on vit b12 and venofer  Resumed home iron  CBC stable  no evidence of bleeding noted  neg stool occult    Lab Results   Component Value Date    HGB 7.8 (L) 12/21/2023    HGB 8.3 (L) 12/20/2023    HGB 8.6 (L) 12/19/2023

## 2023-12-21 NOTE — ASSESSMENT & PLAN NOTE
Proteus bacteremia from gi source.  ID consulted: increase rocephin to 2g q24h, f/u blood and urine cultures, GI consult for persistent constipation/fecal impaction, continue zuniga, anticipate 10 days abx therapy through 12/26 with cefuroxime 500 bid on dc.   BC no growth at 24 hours

## 2023-12-21 NOTE — NURSING NOTE
Pt's zuniga cath noted to not be draining and clots in the tubing. Tubing was milked and cath was hand irrigated, noted to have resistance with irrigation. There was no return with irrigation. TT was sent to provider.

## 2023-12-21 NOTE — PLAN OF CARE
Problem: Potential for Falls  Goal: Patient will remain free of falls  Description: INTERVENTIONS:  - Educate patient/family on patient safety including physical limitations  - Instruct patient to call for assistance with activity   - Consult OT/PT to assist with strengthening/mobility   - Keep Call bell within reach  - Keep bed low and locked with side rails adjusted as appropriate  - Keep care items and personal belongings within reach  - Initiate and maintain comfort rounds  - Make Fall Risk Sign visible to staff  - Offer Toileting every  Hours, in advance of need  - Initiate/Maintain alarm  - Obtain necessary fall risk management equipment:   - Apply yellow socks and bracelet for high fall risk patients  - Consider moving patient to room near nurses station  Outcome: Progressing     Problem: PAIN - ADULT  Goal: Verbalizes/displays adequate comfort level or baseline comfort level  Description: Interventions:  - Encourage patient to monitor pain and request assistance  - Assess pain using appropriate pain scale  - Administer analgesics based on type and severity of pain and evaluate response  - Implement non-pharmacological measures as appropriate and evaluate response  - Consider cultural and social influences on pain and pain management  - Notify physician/advanced practitioner if interventions unsuccessful or patient reports new pain  Outcome: Progressing     Problem: INFECTION - ADULT  Goal: Absence or prevention of progression during hospitalization  Description: INTERVENTIONS:  - Assess and monitor for signs and symptoms of infection  - Monitor lab/diagnostic results  - Monitor all insertion sites, i.e. indwelling lines, tubes, and drains  - Monitor endotracheal if appropriate and nasal secretions for changes in amount and color  - [place] appropriate cooling/warming therapies per order  - Administer medications as ordered  - Instruct and encourage patient and family to use good hand hygiene technique  -  Identify and instruct in appropriate isolation precautions for identified infection/condition  Outcome: Progressing     Problem: SAFETY ADULT  Goal: Patient will remain free of falls  Description: INTERVENTIONS:  - Educate patient/family on patient safety including physical limitations  - Instruct patient to call for assistance with activity   - Consult OT/PT to assist with strengthening/mobility   - Keep Call bell within reach  - Keep bed low and locked with side rails adjusted as appropriate  - Keep care items and personal belongings within reach  - Initiate and maintain comfort rounds  - Make Fall Risk Sign visible to staff  - Offer Toileting every  Hours, in advance of need  - Initiate/Maintain alarm  - Obtain necessary fall risk management equipment:   - Apply yellow socks and bracelet for high fall risk patients  - Consider moving patient to room near nurses station  Outcome: Progressing  Goal: Maintain or return to baseline ADL function  Description: INTERVENTIONS:  -  Assess patient's ability to carry out ADLs; assess patient's baseline for ADL function and identify physical deficits which impact ability to perform ADLs (bathing, care of mouth/teeth, toileting, grooming, dressing, etc.)  - Assess/evaluate cause of self-care deficits   - Assess range of motion  - Assess patient's mobility; develop plan if impaired  - Assess patient's need for assistive devices and provide as appropriate  - Encourage maximum independence but intervene and supervise when necessary  - Involve family in performance of ADLs  - Assess for home care needs following discharge   - Consider OT consult to assist with ADL evaluation and planning for discharge  - Provide patient education as appropriate  Outcome: Progressing  Goal: Maintains/Returns to pre admission functional level  Description: INTERVENTIONS:  - Perform AM-PAC 6 Click Basic Mobility/ Daily Activity assessment daily.  - Set and communicate daily mobility goal to care  team and patient/family/caregiver.   - Collaborate with rehabilitation services on mobility goals if consulted  - Perform Range of Motion  times a day.  - Reposition patient every  hours.  - Dangle patient  times a day  - Stand patient  times a day  - Ambulate patient times a day  - Out of bed to chair  times a day   - Out of bed for meals  times a day  - Out of bed for toileting  - Record patient progress and toleration of activity level   Outcome: Progressing     Problem: DISCHARGE PLANNING  Goal: Discharge to home or other facility with appropriate resources  Description: INTERVENTIONS:  - Identify barriers to discharge w/patient and caregiver  - Arrange for needed discharge resources and transportation as appropriate  - Identify discharge learning needs (meds, wound care, etc.)  - Arrange for interpretive services to assist at discharge as needed  - Refer to Case Management Department for coordinating discharge planning if the patient needs post-hospital services based on physician/advanced practitioner order or complex needs related to functional status, cognitive ability, or social support system  Outcome: Progressing     Problem: Knowledge Deficit  Goal: Patient/family/caregiver demonstrates understanding of disease process, treatment plan, medications, and discharge instructions  Description: Complete learning assessment and assess knowledge base.  Interventions:  - Provide teaching at level of understanding  - Provide teaching via preferred learning methods  Outcome: Progressing     Problem: GASTROINTESTINAL - ADULT  Goal: Minimal or absence of nausea and/or vomiting  Description: INTERVENTIONS:  - Administer IV fluids if ordered to ensure adequate hydration  - Maintain NPO status until nausea and vomiting are resolved  - Nasogastric tube if ordered  - Administer ordered antiemetic medications as needed  - Provide nonpharmacologic comfort measures as appropriate  - Advance diet as tolerated, if ordered  -  Consider nutrition services referral to assist patient with adequate nutrition and appropriate food choices  Outcome: Progressing  Goal: Maintains or returns to baseline bowel function  Description: INTERVENTIONS:  - Assess bowel function monitor bowel regimen  - Encourage oral fluids to ensure adequate hydration  - Administer IV fluids if ordered to ensure adequate hydration  - Administer ordered medications as needed  - Encourage mobilization and activity  - Consider nutritional services referral to assist patient with adequate nutrition and appropriate food choices  Outcome: Progressing  Goal: Maintains adequate nutritional intake  Description: INTERVENTIONS:  - Monitor percentage of each meal consumed  - Identify factors contributing to decreased intake, treat as appropriate  - Assist with meals as needed  - Monitor I&O, weight, and lab values if indicated  - Obtain nutrition services referral as needed  Outcome: Progressing  Goal: Oral mucous membranes remain intact  Description: INTERVENTIONS  - Assess oral mucosa and hygiene practices  - Implement preventative oral hygiene regimen  - Implement oral medicated treatments as ordered  - Initiate Nutrition services referral as needed  Outcome: Progressing     Problem: GENITOURINARY - ADULT  Goal: Maintains or returns to baseline urinary function  Description: INTERVENTIONS:  - Assess urinary function  - Encourage oral fluids to ensure adequate hydration if ordered  - Administer IV fluids as ordered to ensure adequate hydration  - Administer ordered medications as needed  - Offer frequent toileting  - Follow urinary retention protocol if ordered  Outcome: Progressing  Goal: Absence of urinary retention  Description: INTERVENTIONS:  - Assess patient’s ability to void and empty bladder  - Monitor I/O  - Bladder scan as needed  - Discuss with physician/AP medications to alleviate retention as needed  - Discuss catheterization for long term situations as  appropriate  Outcome: Progressing  Goal: Urinary catheter remains patent  Description: INTERVENTIONS:  - Assess patency of urinary catheter  - If patient has a chronic zuniga, consider changing catheter if non-functioning  - Follow guidelines for intermittent irrigation of non-functioning urinary catheter  Outcome: Progressing     Problem: Prexisting or High Potential for Compromised Skin Integrity  Goal: Skin integrity is maintained or improved  Description: INTERVENTIONS:  - Identify patients at risk for skin breakdown  - Assess and monitor skin integrity  - Assess and monitor nutrition and hydration status  - Monitor labs   - Assess for incontinence   - Turn and reposition patient  - Assist with mobility/ambulation  - Relieve pressure over bony prominences  - Avoid friction and shearing  - Provide appropriate hygiene as needed including keeping skin clean and dry  - Evaluate need for skin moisturizer/barrier cream  - Collaborate with interdisciplinary team   - Patient/family teaching  - Consider wound care consult   Outcome: Progressing     Problem: Nutrition/Hydration-ADULT  Goal: Nutrient/Hydration intake appropriate for improving, restoring or maintaining nutritional needs  Description: Monitor and assess patient's nutrition/hydration status for malnutrition. Collaborate with interdisciplinary team and initiate plan and interventions as ordered.  Monitor patient's weight and dietary intake as ordered or per policy. Utilize nutrition screening tool and intervene as necessary. Determine patient's food preferences and provide high-protein, high-caloric foods as appropriate.     INTERVENTIONS:  - Monitor oral intake, urinary output, labs, and treatment plans  - Assess nutrition and hydration status and recommend course of action  - Evaluate amount of meals eaten  - Assist patient with eating if necessary   - Allow adequate time for meals  - Recommend/ encourage appropriate diets, oral nutritional supplements, and  vitamin/mineral supplements  - Order, calculate, and assess calorie counts as needed  - Recommend, monitor, and adjust tube feedings and TPN/PPN based on assessed needs  - Assess need for intravenous fluids  - Provide specific nutrition/hydration education as appropriate  - Include patient/family/caregiver in decisions related to nutrition  Outcome: Progressing

## 2023-12-21 NOTE — PROGRESS NOTES
Progress Note - Infectious Disease   Miguel Angel Ortega 89 y.o. male MRN: [id number]  Unit/Bed#: -01 Encounter: [id number]        REQUIRED DOCUMENTATION:     1. This service was provided via Telemedicine.  2. Provider located at [place].  3. TeleMed provider: Hodan Faye MD.  4. Identify all parties in room with patient during tele consult:RN  5. After connecting through televideo, patient was identified by name and date of birth and assistant checked wristband.  Patient was then informed that this was a Telemedicine visit and that the exam was being conducted confidentially over secure lines. My office door was closed. No one else was in the room.  Patient acknowledged consent and understanding of privacy and security of the Telemedicine visit, and gave us permission to have the assistant stay in the room in order to assist with the history and to conduct the exam.  I informed the patient that I have reviewed their record in Epic and presented the opportunity for them to ask any questions regarding the visit today.  The patient agreed to participate.       Assessment/Recommendations     Sepsis, complicating hospital course  Proteus bacteremia  Probable complicated UTI  Acute urinary retention  Stercoral colitis  Metastatic prostate cancer     -Patient presenting with fecal impaction, stercoral proctitis and urinary retention requiring Ascencio.  Shortly after starting a voiding trial he developed fever, tachycardia with evidence of Proteus bacteremia.   -Suspect source of bacteremia is complicated UTI given hematuria and recurrent urinary retention in the setting of persistent constipation but urine cx is negative. Consider also gut translocation from sterocoral/inflammatory colitis  -Clinically improving, afebrile without leukocytosis, s/p golytely prep and fu KUB notes no significant stool burden. However continues to have hematuria     Continue ceftriaxone, 2 q24h  Bowel regime per GI, monitor stool  output  Await urology re-evaluation , zuniga may have been blocked by a clot. Defer to urology about timing for voiding trial.   Continue zuniga care per protocol and monitor hematuria, need for CBI as per urology  Continue supportive care, monitor clinical course, serial abdominal exams.   Anticipate 10 days of abx therapy through  with po cefuroxime 500 bid on discharge   Recommend outpatient oncology fu    Discussed in detail with the primary service.switched     History       Subjective:  The patient has no complaints, slept better and feels stronger, continues to note blood in urine. Zuniga appeared blocked and irrigation was not effective.   Denies fevers, chills, or sweats.  Denies nausea, vomiting.    Antibiotics:  ceftriaxone      Physical Exam     Temp:  [97.5 °F (36.4 °C)-97.7 °F (36.5 °C)] 97.5 °F (36.4 °C)  HR:  [60-67] 63  Resp:  [17-18] 18  BP: ()/(34-52) 96/35  SpO2:  [100 %] 100 %  Temp (24hrs), Av.6 °F (36.4 °C), Min:97.5 °F (36.4 °C), Max:97.7 °F (36.5 °C)  Current: Temperature: 97.5 °F (36.4 °C)    Intake/Output Summary (Last 24 hours) at 2023 0958  Last data filed at 2023 0045  Gross per 24 hour   Intake --   Output 2020 ml   Net -2020 ml       Physical exam findings reported by bedside and primary medical team staff      General Appearance:  Appearing frail and elderly, nontoxic, and in no distress, appears stated age   Lungs:   Clear to auscultation bilaterally, no audible wheezes, rhonchi and rales, respirations unlabored   Heart:  Regular rate and rhythm, S1, S2 normal, no murmur, rub or gallop   Abdomen:   Soft, non-tender, non-distended, positive bowel sounds, no masses, no organomegaly    No CVA tenderness, zuniga with bloody urine   Extremities: Extremities normal, atraumatic, no cyanosis, clubbing or edema   Skin: Skin color, texture, turgor normal, no rashes or lesions. No draining wounds noted.   Neurologic: Alert and oriented times 3,         Invasive  Devices:   Peripheral IV 12/17/23 Right Arm (Active)   Site Assessment WDL 12/20/23 0100   Dressing Type Transparent 12/20/23 0100   Line Status Flushed 12/20/23 0100   Dressing Status Clean;Dry;Intact 12/20/23 0100   Dressing Change Due 12/21/23 12/20/23 0100   Reason Not Rotated Not due 12/20/23 0100       Urethral Catheter Double-lumen 18 Fr. (Active)   Ascencio Care Done 12/20/23 0900   Urethral Irrigation Intake (mL) 120 mL 12/20/23 0215   Output (mL) 750 mL 12/20/23 0543       Labs, Imaging, & Other Studies     Lab Results:    I have personally reviewed pertinent labs.    Results from last 7 days   Lab Units 12/21/23  0510 12/20/23  0532 12/19/23  0447   WBC Thousand/uL 7.09 7.42 6.03   HEMOGLOBIN g/dL 7.8* 8.3* 8.6*   PLATELETS Thousands/uL 183 177 185     Results from last 7 days   Lab Units 12/21/23  0510   POTASSIUM mmol/L 3.9   CHLORIDE mmol/L 103   CO2 mmol/L 25   BUN mg/dL 18   CREATININE mg/dL 0.98   EGFR ml/min/1.73sq m 65   CALCIUM mg/dL 8.2*     Results from last 7 days   Lab Units 12/19/23  1548 12/19/23  0803 12/17/23  1643   BLOOD CULTURE  No Growth at 24 hrs.  No Growth at 24 hrs.  --  Proteus mirabilis*  Proteus mirabilis*   GRAM STAIN RESULT   --   --  Gram negative rods*  Gram negative rods*   URINE CULTURE   --  No Growth <1000 cfu/mL  --        Imaging Studies:   I have personally reviewed pertinent imaging study reports and images in PACS.      EKG, Pathology, and Other Studies:   I have personally reviewed pertinent reports.        Counseling/Coordination of care:       Total 50 minutes communication with the patient via telehealth.  Labs, medical tests and imaging studies were independently reviewed by me as noted above.

## 2023-12-21 NOTE — PLAN OF CARE
Problem: GENITOURINARY - ADULT  Goal: Maintains or returns to baseline urinary function  Description: INTERVENTIONS:  - Assess urinary function  - Encourage oral fluids to ensure adequate hydration if ordered  - Administer IV fluids as ordered to ensure adequate hydration  - Administer ordered medications as needed  - Offer frequent toileting  - Follow urinary retention protocol if ordered  Outcome: Progressing  Goal: Absence of urinary retention  Description: INTERVENTIONS:  - Assess patient’s ability to void and empty bladder  - Monitor I/O  - Bladder scan as needed  - Discuss with physician/AP medications to alleviate retention as needed  - Discuss catheterization for long term situations as appropriate  Outcome: Progressing  Goal: Urinary catheter remains patent  Description: INTERVENTIONS:  - Assess patency of urinary catheter  - If patient has a chronic zuniga, consider changing catheter if non-functioning  - Follow guidelines for intermittent irrigation of non-functioning urinary catheter  Outcome: Progressing

## 2023-12-21 NOTE — ASSESSMENT & PLAN NOTE
Patient presents with symptoms of constipation for the last few days.  He saw GI outpatient.  He was started on MiraLAX patient states he went 26 times to the bathroom yesterday had very small amounts of stool and felt like he just could not empty out his bowel movement.  Also had a fall yesterday.  Ct abd pelvis shows Abundant stool in the colon and rectal vault compatible with constipation in the appropriate clinical context. No bowel obstruction.  Minimal perirectal fat stranding suggestive of mild or early stercoral proctitis. No perirectal abscess  Placed on MiraLAX daily and placed on Dulcolax suppository x 2 daily and also placed 3 enemas    GI consult placed - miralax BID, colace BID and Amitiza 8mcg BID. Dulcolax suppository daily prn. Advance to regular diet. Outpatient follow up

## 2023-12-21 NOTE — NURSING NOTE
Spoke to surgery AP regarding zuniga cath being blocked. New orders were given, Zuniga cath was exchanged with a 22F 3 way and CBI was started. CBI is wide opened however noted to be dripping slow. Attempted to irrigate cath but was meeting a lot of resistance Dr. D'Amico was notified via TT.

## 2023-12-21 NOTE — PROGRESS NOTES
[facility]  Progress Note  Name: Miguel Angel Ortega I  MRN: [id number]  Unit/Bed#: -Theo I Date of Admission: 12/13/2023   Date of Service: 12/21/2023 I Hospital Day: 8    Assessment/Plan   * Stercoral colitis  Assessment & Plan  Patient presents with symptoms of constipation for the last few days.  He saw GI outpatient.  He was started on MiraLAX patient states he went 26 times to the bathroom yesterday had very small amounts of stool and felt like he just could not empty out his bowel movement.  Also had a fall yesterday.  Ct abd pelvis shows Abundant stool in the colon and rectal vault compatible with constipation in the appropriate clinical context. No bowel obstruction.  Minimal perirectal fat stranding suggestive of mild or early stercoral proctitis. No perirectal abscess  Placed on MiraLAX daily and placed on Dulcolax suppository x 2 daily and also placed 3 enemas    GI consult placed - miralax BID, colace BID and Amitiza 8mcg BID. Dulcolax suppository daily prn. Advance to regular diet. Outpatient follow up    Gram-negative bacteremia  Assessment & Plan  Proteus bacteremia from gi source.  ID consulted: increase rocephin to 2g q24h, f/u blood and urine cultures, GI consult for persistent constipation/fecal impaction, continue zuniga, anticipate 10 days abx therapy through 12/26 with cefuroxime 500 bid on dc.   BC no growth at 24 hours     Orthostatic hypotension  Assessment & Plan  Patient has been having episodes of orthostatic hypotension outpatient. Orthostatic vitals inpatient positive  Cardiology consult: Suspect this is secondary to poor PO intake and blood loss. Please encourage PO intake. Nursing instructed to apply compression socks while awake. ECG demonstrates sinus bradycardia. HR's controlled. OK to continue digoxin and Multaq.   Will place on gentle IVF rehydration - stopped  Tom stockings, slow position changes. Can add midodrine if becomes symptomatic from  hypotension    Acute on chronic anemia  Assessment & Plan  Hemoglobin dropped from 10.3-8.7.  low iron and B12 levels. placed on vit b12 and venofer  Resumed home iron  CBC stable  no evidence of bleeding noted  neg stool occult    Lab Results   Component Value Date    HGB 7.8 (L) 12/21/2023    HGB 8.3 (L) 12/20/2023    HGB 8.6 (L) 12/19/2023       Permanent atrial fibrillation (HCC)  Assessment & Plan  Continue Multaq, digoxin which are his chronic meds.  Not on anticoagulation  Dig level acceptable  Appreciate cardio eval - continue multaq. Change digoxin to every other day. Was on xarelto in the past - discuss outpatient with his cardiologist about resumption    Acute urinary retention  Assessment & Plan  Acute urinary retention secondary to severe constipation also known history of prostate cancer.  Zuniga catheter placed as patient bladder scan for 800 cc on presentation to ED and 1800 ml removed in zuniga. Also placed on Flomax   Zuniga removed 12/15 and placed on voiding trial so far patient is voiding but he woke up multiple times to urinate overnight .Will continue voiding trial today.  Some hypotension noted which may be secondary to Flomax and placed on midodrine - will dc flomax and midodrine  Patient with significant amount of hematuria still.   Urology recommending CBI due to hematuria and multiple clots causing blockages. Zuniga exchanged for 22Fr    Prostate cancer metastatic to bone (HCC)  Assessment & Plan  Further outpatient follow-up with urology.  Placed on Flomax for acute urinary retention hold Casodex while inpatient.  stop flomax due to urinary urgency and orthostatic hypotension             VTE Pharmacologic Prophylaxis:   High Risk (Score >/= 5) - Pharmacological DVT Prophylaxis Contraindicated. Sequential Compression Devices Ordered.    Mobility:   Basic Mobility Inpatient Raw Score: 19  JH-HLM Goal: 6: Walk 10 steps or more  JH-HLM Achieved: 6: Walk 10 steps or more  HLM Goal achieved.  Continue to encourage appropriate mobility.    Patient Centered Rounds: I performed bedside rounds with nursing staff today.   Discussions with Specialists or Other Care Team Provider: nursing and cm    Education and Discussions with Family / Patient: Updated  (friend) via phone.    Total Time Spent on Date of Encounter in care of patient: This time was spent on one or more of the following: performing physical exam; counseling and coordination of care; obtaining or reviewing history; documenting in the medical record; reviewing/ordering tests, medications or procedures; communicating with other healthcare professionals and discussing with patient's family/caregivers.    Current Length of Stay: 8 day(s)  Current Patient Status: Inpatient   Certification Statement: The patient will continue to require additional inpatient hospital stay due to requiring cbi for hematuria   Discharge Plan: Anticipate discharge in 48 hrs to prior assisted or independent living facility.    Code Status: Level 3 - DNAR and DNI    Subjective:   Patient states that he is feeling well today. States that he was having some pressure in his bladder.    Objective:     Vitals:   Temp (24hrs), Av.6 °F (36.4 °C), Min:97.5 °F (36.4 °C), Max:97.7 °F (36.5 °C)    Temp:  [97.5 °F (36.4 °C)-97.7 °F (36.5 °C)] 97.5 °F (36.4 °C)  HR:  [60-67] 63  Resp:  [17-18] 18  BP: ()/(34-52) 96/35  SpO2:  [100 %] 100 %  Body mass index is 22.73 kg/m².     Input and Output Summary (last 24 hours):     Intake/Output Summary (Last 24 hours) at 2023 1451  Last data filed at 2023 0045  Gross per 24 hour   Intake --   Output 2020 ml   Net -2020 ml       Physical Exam:   Physical Exam  Vitals reviewed.   Constitutional:       General: He is not in acute distress.     Appearance: Normal appearance. He is not ill-appearing.   HENT:      Head: Normocephalic and atraumatic.      Nose: Nose normal.      Mouth/Throat:      Mouth: Mucous  membranes are moist.      Pharynx: Oropharynx is clear.   Eyes:      Extraocular Movements: Extraocular movements intact.      Conjunctiva/sclera: Conjunctivae normal.   Cardiovascular:      Rate and Rhythm: Normal rate and regular rhythm.      Pulses: Normal pulses.      Heart sounds: Normal heart sounds. No murmur heard.  Pulmonary:      Effort: Pulmonary effort is normal. No respiratory distress.      Breath sounds: Normal breath sounds. No wheezing.   Abdominal:      General: Abdomen is flat. Bowel sounds are normal. There is no distension.      Palpations: Abdomen is soft.      Tenderness: There is no abdominal tenderness. There is no guarding.   Genitourinary:     Comments: Ascencio catheter with hematuria  Musculoskeletal:         General: Normal range of motion.      Cervical back: Normal range of motion.      Right lower leg: No edema.      Left lower leg: No edema.   Skin:     General: Skin is warm.      Coloration: Skin is pale.   Neurological:      General: No focal deficit present.      Mental Status: He is alert and oriented to person, place, and time. Mental status is at baseline.      Motor: No weakness.   Psychiatric:         Mood and Affect: Mood normal.         Behavior: Behavior normal.         Thought Content: Thought content normal.         Judgment: Judgment normal.          Additional Data:     Labs:  Results from last 7 days   Lab Units 12/21/23  0510   WBC Thousand/uL 7.09   HEMOGLOBIN g/dL 7.8*   HEMATOCRIT % 23.4*   PLATELETS Thousands/uL 183     Results from last 7 days   Lab Units 12/21/23  0510   SODIUM mmol/L 136   POTASSIUM mmol/L 3.9   CHLORIDE mmol/L 103   CO2 mmol/L 25   BUN mg/dL 18   CREATININE mg/dL 0.98   ANION GAP mmol/L 8   CALCIUM mg/dL 8.2*   GLUCOSE RANDOM mg/dL 94                 Results from last 7 days   Lab Units 12/17/23  1648   LACTIC ACID mmol/L 1.2       Lines/Drains:  Invasive Devices       Peripheral Intravenous Line  Duration             Peripheral IV 12/21/23  Dorsal (posterior);Left;Proximal Forearm <1 day              Drain  Duration             Continuous Bladder Irrigation Three-way <1 day                          Imaging: Reviewed radiology reports from this admission including: xray(s)    Recent Cultures (last 7 days):   Results from last 7 days   Lab Units 12/19/23  1548 12/19/23  0803 12/17/23  1643   BLOOD CULTURE  No Growth at 24 hrs.  No Growth at 24 hrs.  --  Proteus mirabilis*  Proteus mirabilis*   GRAM STAIN RESULT   --   --  Gram negative rods*  Gram negative rods*   URINE CULTURE   --  No Growth <1000 cfu/mL  --        Last 24 Hours Medication List:   Current Facility-Administered Medications   Medication Dose Route Frequency Provider Last Rate    acetaminophen  650 mg Oral Q6H PRN Sherita Martinez MD      bisacodyl  10 mg Rectal Daily PRN Remedios Cruz PA-C      cefTRIAXone  2,000 mg Intravenous Q24H Suha Alvarado PA-C 2,000 mg (12/20/23 1555)    cyanocobalamin  1,000 mcg Oral Daily Sherita Martinez MD      [START ON 12/22/2023] digoxin  125 mcg Oral Every Other Day Remedios Cruz PA-C      dronedarone  400 mg Oral BID With Meals Sherita Martinez MD      ferrous sulfate  325 mg Oral Daily With Breakfast Remedios Cruz PA-C      folic acid  1,000 mcg Oral Daily Sherita Martinez MD      glycerin-hypromellose-  1 drop Both Eyes Q4H PRN Sherita Martinez MD      hydrocortisone   Topical 4x Daily PRN Sherita Martinez MD      lubiprostone  8 mcg Oral BID With Meals Remedios Cruz PA-C      melatonin  3 mg Oral HS PRN Suha Alvarado PA-C      ondansetron  4 mg Intravenous Q6H PRN Sherita Martinez MD      pantoprazole  40 mg Oral Daily Sherita Martinez MD      polyethylene glycol  17 g Oral BID Sherita Martinez MD      primidone  50 mg Oral Q12H YAMINI Sherita Martinez MD      senna-docusate sodium  1 tablet Oral BID Remedios Cruz PA-C          Today, Patient Was Seen By: Remedios Cruz PA-C    **Please Note: This note may have been constructed using a voice recognition system.**

## 2023-12-21 NOTE — ASSESSMENT & PLAN NOTE
Continue Multaq, digoxin which are his chronic meds.  Not on anticoagulation  Dig level acceptable  Appreciate cardio eval - continue multaq. Change digoxin to every other day. Was on xarelto in the past - discuss outpatient with his cardiologist about resumption

## 2023-12-21 NOTE — ASSESSMENT & PLAN NOTE
Patient has been having episodes of orthostatic hypotension outpatient. Orthostatic vitals inpatient positive  Cardiology consult: Suspect this is secondary to poor PO intake and blood loss. Please encourage PO intake. Nursing instructed to apply compression socks while awake. ECG demonstrates sinus bradycardia. HR's controlled. OK to continue digoxin and Multaq.   Will place on gentle IVF rehydration - stopped  Tom stockings, slow position changes. Can add midodrine if becomes symptomatic from hypotension

## 2023-12-21 NOTE — PROGRESS NOTES
"Progress Note - Miguel Angel Ortega 89 y.o. 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  Patient has a history of prostate cancer treated with radiation in the distant past.  Spoke with patient and family.  Urine has cleared considerably today with CBI.  Very light pink color and no clots.  Continue and reassess tomorrow.    Plan:  Discussed hyperbaric oxygen treatment for probable radiation cystitis with patient and family which would be done as an outpatient.  Patient will also require cystoscopy in the office as outpatient to rule out other pathology as well.  Prior patient of Dr. Gorman.  He is on Lupron in regard to his prostate cancer.    Subjective:   Patient doing better today.  CBI started and urine much clear.  No clots.  Patient denies pain.  Family in room with patient as well.  Questions answered    Objective:     Vitals: Blood pressure (!) 96/35, pulse 63, temperature 97.5 °F (36.4 °C), resp. rate 18, height 5' 11\" (1.803 m), weight 73.9 kg (163 lb), SpO2 100%.,Body mass index is 22.73 kg/m².      Intake/Output Summary (Last 24 hours) at 12/21/2023 1530  Last data filed at 12/21/2023 0045  Gross per 24 hour   Intake --   Output 2020 ml   Net -2020 ml       Physical Exam:  Const: Appears healthy and well developed. No signs of acute distress present.  Resp: Respirations are regular and unlabored.   CV: Rate is regular. Rhythm is regular.  Abdomen: Abdomen is soft, nontender, and nondistended. Kidneys are not palpable.  : Three-way Ascencio catheter draining very light pink urine with slow CBI  Psych: Patient's attitude is cooperative. Mood is normal. Affect is normal.    Invasive Devices       Peripheral Intravenous Line  Duration             Peripheral IV 12/21/23 Dorsal (posterior);Left;Proximal Forearm <1 day              Drain  Duration             Continuous Bladder Irrigation Three-way <1 day              "       Lab, Imaging and other studies: I have personally reviewed pertinent reports.

## 2023-12-22 LAB
ANION GAP SERPL CALCULATED.3IONS-SCNC: 5 MMOL/L
BUN SERPL-MCNC: 16 MG/DL (ref 5–25)
CALCIUM SERPL-MCNC: 8 MG/DL (ref 8.4–10.2)
CHLORIDE SERPL-SCNC: 103 MMOL/L (ref 96–108)
CO2 SERPL-SCNC: 26 MMOL/L (ref 21–32)
CREAT SERPL-MCNC: 0.9 MG/DL (ref 0.6–1.3)
ERYTHROCYTE [DISTWIDTH] IN BLOOD BY AUTOMATED COUNT: 14.4 % (ref 11.6–15.1)
GFR SERPL CREATININE-BSD FRML MDRD: 72 ML/MIN/1.73SQ M
GLUCOSE SERPL-MCNC: 100 MG/DL (ref 65–140)
HCT VFR BLD AUTO: 23.3 % (ref 36.5–49.3)
HGB BLD-MCNC: 7.7 G/DL (ref 12–17)
MCH RBC QN AUTO: 31.4 PG (ref 26.8–34.3)
MCHC RBC AUTO-ENTMCNC: 33 G/DL (ref 31.4–37.4)
MCV RBC AUTO: 95 FL (ref 82–98)
PLATELET # BLD AUTO: 190 THOUSANDS/UL (ref 149–390)
PMV BLD AUTO: 8.1 FL (ref 8.9–12.7)
POTASSIUM SERPL-SCNC: 4.1 MMOL/L (ref 3.5–5.3)
RBC # BLD AUTO: 2.45 MILLION/UL (ref 3.88–5.62)
SODIUM SERPL-SCNC: 134 MMOL/L (ref 135–147)
WBC # BLD AUTO: 8.95 THOUSAND/UL (ref 4.31–10.16)

## 2023-12-22 PROCEDURE — 3C1ZX8Z IRRIGATION OF INDWELLING DEVICE USING IRRIGATING SUBSTANCE, EXTERNAL APPROACH: ICD-10-PCS | Performed by: UROLOGY

## 2023-12-22 PROCEDURE — 85027 COMPLETE CBC AUTOMATED: CPT

## 2023-12-22 PROCEDURE — 99232 SBSQ HOSP IP/OBS MODERATE 35: CPT

## 2023-12-22 PROCEDURE — 99232 SBSQ HOSP IP/OBS MODERATE 35: CPT | Performed by: UROLOGY

## 2023-12-22 PROCEDURE — 80048 BASIC METABOLIC PNL TOTAL CA: CPT

## 2023-12-22 PROCEDURE — 97110 THERAPEUTIC EXERCISES: CPT

## 2023-12-22 PROCEDURE — 97116 GAIT TRAINING THERAPY: CPT

## 2023-12-22 PROCEDURE — 97530 THERAPEUTIC ACTIVITIES: CPT

## 2023-12-22 RX ADMIN — LUBIPROSTONE 8 MCG: 8 CAPSULE, GELATIN COATED ORAL at 16:59

## 2023-12-22 RX ADMIN — FERROUS SULFATE TAB 325 MG (65 MG ELEMENTAL FE) 325 MG: 325 (65 FE) TAB at 08:14

## 2023-12-22 RX ADMIN — SENNOSIDES AND DOCUSATE SODIUM 1 TABLET: 8.6; 5 TABLET ORAL at 17:00

## 2023-12-22 RX ADMIN — PANTOPRAZOLE SODIUM 40 MG: 40 TABLET, DELAYED RELEASE ORAL at 08:14

## 2023-12-22 RX ADMIN — CEFTRIAXONE 2000 MG: 2 INJECTION, SOLUTION INTRAVENOUS at 16:03

## 2023-12-22 RX ADMIN — PRIMIDONE 50 MG: 50 TABLET ORAL at 21:21

## 2023-12-22 RX ADMIN — PRIMIDONE 50 MG: 50 TABLET ORAL at 08:14

## 2023-12-22 RX ADMIN — DIGOXIN 125 MCG: 125 TABLET ORAL at 08:14

## 2023-12-22 RX ADMIN — POLYETHYLENE GLYCOL 3350 17 G: 17 POWDER, FOR SOLUTION ORAL at 17:00

## 2023-12-22 RX ADMIN — DRONEDARONE 400 MG: 400 TABLET, FILM COATED ORAL at 08:15

## 2023-12-22 RX ADMIN — FOLIC ACID 1000 MCG: 1 TABLET ORAL at 08:14

## 2023-12-22 RX ADMIN — POLYETHYLENE GLYCOL 3350 17 G: 17 POWDER, FOR SOLUTION ORAL at 08:15

## 2023-12-22 RX ADMIN — SENNOSIDES AND DOCUSATE SODIUM 1 TABLET: 8.6; 5 TABLET ORAL at 08:15

## 2023-12-22 RX ADMIN — CYANOCOBALAMIN TAB 500 MCG 1000 MCG: 500 TAB at 08:14

## 2023-12-22 RX ADMIN — DRONEDARONE 400 MG: 400 TABLET, FILM COATED ORAL at 16:59

## 2023-12-22 RX ADMIN — LUBIPROSTONE 8 MCG: 8 CAPSULE, GELATIN COATED ORAL at 08:14

## 2023-12-22 NOTE — ASSESSMENT & PLAN NOTE
Proteus bacteremia from gi source.  ID consulted: increase rocephin to 2g q24h, f/u blood and urine cultures, GI consult for persistent constipation/fecal impaction, continue zuniga, anticipate 10 days abx therapy through 12/26 with cefuroxime 500 bid on dc.   BC no growth at 48 hours

## 2023-12-22 NOTE — ASSESSMENT & PLAN NOTE
Acute urinary retention secondary to severe constipation also known history of prostate cancer.  Zuniga catheter placed as patient bladder scan for 800 cc on presentation to ED and 1800 ml removed in zuniga. Also placed on Flomax   Zuniga removed 12/15 and placed on voiding trial so far patient is voiding but he woke up multiple times to urinate overnight .Will continue voiding trial today.  Some hypotension noted which may be secondary to Flomax and placed on midodrine - will dc flomax and midodrine  Patient with significant amount of hematuria still.   Urology recommending CBI due to hematuria and multiple clots causing blockages. Zuniga exchanged for 22Fr. Likely with voiding trial 12/23

## 2023-12-22 NOTE — PLAN OF CARE
Problem: GASTROINTESTINAL - ADULT  Goal: Minimal or absence of nausea and/or vomiting  Description: INTERVENTIONS:  - Administer IV fluids if ordered to ensure adequate hydration  - Maintain NPO status until nausea and vomiting are resolved  - Nasogastric tube if ordered  - Administer ordered antiemetic medications as needed  - Provide nonpharmacologic comfort measures as appropriate  - Advance diet as tolerated, if ordered  - Consider nutrition services referral to assist patient with adequate nutrition and appropriate food choices  Outcome: Progressing  Goal: Maintains or returns to baseline bowel function  Description: INTERVENTIONS:  - Assess bowel function monitor bowel regimen  - Encourage oral fluids to ensure adequate hydration  - Administer IV fluids if ordered to ensure adequate hydration  - Administer ordered medications as needed  - Encourage mobilization and activity  - Consider nutritional services referral to assist patient with adequate nutrition and appropriate food choices  Outcome: Progressing  Goal: Maintains adequate nutritional intake  Description: INTERVENTIONS:  - Monitor percentage of each meal consumed  - Identify factors contributing to decreased intake, treat as appropriate  - Assist with meals as needed  - Monitor I&O, weight, and lab values if indicated  - Obtain nutrition services referral as needed  Outcome: Progressing  Goal: Oral mucous membranes remain intact  Description: INTERVENTIONS  - Assess oral mucosa and hygiene practices  - Implement preventative oral hygiene regimen  - Implement oral medicated treatments as ordered  - Initiate Nutrition services referral as needed  Outcome: Progressing     Problem: GENITOURINARY - ADULT  Goal: Maintains or returns to baseline urinary function  Description: INTERVENTIONS:  - Assess urinary function  - Encourage oral fluids to ensure adequate hydration if ordered  - Administer IV fluids as ordered to ensure adequate hydration  -  Administer ordered medications as needed  - Offer frequent toileting  - Follow urinary retention protocol if ordered  Outcome: Progressing  Goal: Absence of urinary retention  Description: INTERVENTIONS:  - Assess patient’s ability to void and empty bladder  - Monitor I/O  - Bladder scan as needed  - Discuss with physician/AP medications to alleviate retention as needed  - Discuss catheterization for long term situations as appropriate  Outcome: Progressing  Goal: Urinary catheter remains patent  Description: INTERVENTIONS:  - Assess patency of urinary catheter  - If patient has a chronic zuniga, consider changing catheter if non-functioning  - Follow guidelines for intermittent irrigation of non-functioning urinary catheter  Outcome: Progressing

## 2023-12-22 NOTE — CASE MANAGEMENT
"   Case Management Discharge Planning Note    Patient name Miguel Angel Ortega  Location /-01 MRN [id number]  : 1928 Date 2023       Current Admission Date: 2023  Current Admission Diagnosis:Stercoral colitis   Patient Active Problem List    Diagnosis Date Noted    Orthostatic hypotension 2023    Gram-negative bacteremia 2023    Acute on chronic anemia 2023    Stercoral colitis 2023    Acute urinary retention 2023    Permanent atrial fibrillation (HCC) 2023    Hyponatremia 2023    Prostate cancer metastatic to bone (HCC) 2023      LOS (days): 9  Geometric Mean LOS (GMLOS) (days): 2.6  Days to GMLOS:-6.4     OBJECTIVE:  Risk of Unplanned Readmission Score: 23.72         Current admission status: Inpatient   Preferred Pharmacy:   [id number] - [address] [address]  Phone: [phone] Fax: [phone]    Primary Care Provider: Kisha Armstrong MD    Primary Insurance: MEDICARE  Secondary Insurance: [facility]    DISCHARGE DETAILS:           CM reaching out to [facility], was told by staff that Kendra in Admission will\"be out until next week\" and the DON \"is not at her desk\".  CM asking  if pt is medically clear over the weekend, can he return to the facility, as per , \"I wouldn't be able to answer that, Im just , I will give you to DON, you can leave a message.\"  CM leaving DON a message stating if we at [facility] do not receive a return call, we will assume pt can return to their facility when stable, requesting a return call.            MILTON spoke with Dianne at [facility], who sts pt can return with zuniga and OP therapies, however, Dianne is not sure she will have an admitting nurse to accept pt at their facility until Tuesday.  Cm to reach out to Dianne once pt is medically clear to inquire if pt may return to their facility.  Dianne " [phone]

## 2023-12-22 NOTE — PROGRESS NOTES
[facility]  Progress Note  Name: Miguel Angel Ortega I  MRN: [id number]  Unit/Bed#: -Theo I Date of Admission: 12/13/2023   Date of Service: 12/22/2023 I Hospital Day: 9    Assessment/Plan   * Stercoral colitis  Assessment & Plan  Patient presents with symptoms of constipation for the last few days.  He saw GI outpatient.  He was started on MiraLAX patient states he went 26 times to the bathroom yesterday had very small amounts of stool and felt like he just could not empty out his bowel movement.  Also had a fall yesterday.  Ct abd pelvis shows Abundant stool in the colon and rectal vault compatible with constipation in the appropriate clinical context. No bowel obstruction.  Minimal perirectal fat stranding suggestive of mild or early stercoral proctitis. No perirectal abscess  Placed on MiraLAX daily and placed on Dulcolax suppository x 2 daily and also placed 3 enemas    GI consult placed - miralax BID, colace BID and Amitiza 8mcg BID. Dulcolax suppository daily prn. Advance to regular diet. Outpatient follow up    Gram-negative bacteremia  Assessment & Plan  Proteus bacteremia from gi source.  ID consulted: increase rocephin to 2g q24h, f/u blood and urine cultures, GI consult for persistent constipation/fecal impaction, continue zuniga, anticipate 10 days abx therapy through 12/26 with cefuroxime 500 bid on dc.   BC no growth at 48 hours     Orthostatic hypotension  Assessment & Plan  Patient has been having episodes of orthostatic hypotension outpatient. Orthostatic vitals inpatient positive  Cardiology consult: Suspect this is secondary to poor PO intake and blood loss. Please encourage PO intake. Nursing instructed to apply compression socks while awake. ECG demonstrates sinus bradycardia. HR's controlled. OK to continue digoxin and Multaq.   Will place on gentle IVF rehydration - stopped  Tom stockings, slow position changes. Can add midodrine if becomes symptomatic from  hypotension    Acute on chronic anemia  Assessment & Plan  Hemoglobin dropped from 10.3-8.7.  low iron and B12 levels. placed on vit b12 and venofer  Resumed home iron supplement  CBC stable  no evidence of bleeding noted  neg stool occult    Lab Results   Component Value Date    HGB 7.7 (L) 12/22/2023    HGB 7.8 (L) 12/21/2023    HGB 8.3 (L) 12/20/2023       Permanent atrial fibrillation (HCC)  Assessment & Plan  Continue Multaq, digoxin which are his chronic meds.  Not on anticoagulation  Dig level acceptable  Appreciate cardio eval - continue multaq. Change digoxin to every other day. Was on xarelto in the past - discuss outpatient with his cardiologist about resumption    Acute urinary retention  Assessment & Plan  Acute urinary retention secondary to severe constipation also known history of prostate cancer.  Zuniga catheter placed as patient bladder scan for 800 cc on presentation to ED and 1800 ml removed in zuniga. Also placed on Flomax   Zuniga removed 12/15 and placed on voiding trial so far patient is voiding but he woke up multiple times to urinate overnight .Will continue voiding trial today.  Some hypotension noted which may be secondary to Flomax and placed on midodrine - will dc flomax and midodrine  Patient with significant amount of hematuria still.   Urology recommending CBI due to hematuria and multiple clots causing blockages. Zuniga exchanged for 22Fr. Likely with voiding trial 12/23    Prostate cancer metastatic to bone (HCC)  Assessment & Plan  Further outpatient follow-up with urology.  Placed on Flomax for acute urinary retention hold Casodex while inpatient.  stop flomax due to urinary urgency and orthostatic hypotension             VTE Pharmacologic Prophylaxis:   High Risk (Score >/= 5) - Pharmacological DVT Prophylaxis Contraindicated. Sequential Compression Devices Ordered.    Mobility:   Basic Mobility Inpatient Raw Score: 19  JH-HLM Goal: 6: Walk 10 steps or more  JH-HLM Achieved: 6:  Walk 10 steps or more  HLM Goal achieved. Continue to encourage appropriate mobility.    Patient Centered Rounds: I performed bedside rounds with nursing staff today.   Discussions with Specialists or Other Care Team Provider: nursing and cm    Education and Discussions with Family / Patient: Updated  (friend) via phone.    Total Time Spent on Date of Encounter in care of patient:  This time was spent on one or more of the following: performing physical exam; counseling and coordination of care; obtaining or reviewing history; documenting in the medical record; reviewing/ordering tests, medications or procedures; communicating with other healthcare professionals and discussing with patient's family/caregivers.    Current Length of Stay: 9 day(s)  Current Patient Status: Inpatient   Certification Statement: The patient will continue to require additional inpatient hospital stay due to hematuria requiring CBI  Discharge Plan: Anticipate discharge in 24-48 hrs to prior assisted or independent living facility.    Code Status: Level 3 - DNAR and DNI    Subjective:   Patient states that he is feeling well today.  Denies chest pain, shortness of breath, or abdominal pain.  States today when he got out of the chair to go to the bed he did get a little dizzy, and also states that this is the first time this is ever happened to him.  Does not offer any other complaints at this time.    Objective:     Vitals:   Temp (24hrs), Av.8 °F (36.6 °C), Min:97.3 °F (36.3 °C), Max:98.1 °F (36.7 °C)    Temp:  [97.3 °F (36.3 °C)-98.1 °F (36.7 °C)] 98.1 °F (36.7 °C)  HR:  [62-65] 63  Resp:  [17-18] 17  BP: (115-133)/(41-86) 118/44  SpO2:  [96 %-100 %] 100 %  Body mass index is 22.73 kg/m².     Input and Output Summary (last 24 hours):     Intake/Output Summary (Last 24 hours) at 2023 1526  Last data filed at 2023 1254  Gross per 24 hour   Intake 360 ml   Output 2200 ml   Net -1840 ml       Physical Exam:    Physical Exam  Vitals reviewed.   Constitutional:       General: He is not in acute distress.     Appearance: Normal appearance. He is not ill-appearing.   HENT:      Head: Normocephalic and atraumatic.      Nose: Nose normal.      Mouth/Throat:      Mouth: Mucous membranes are moist.      Pharynx: Oropharynx is clear.   Eyes:      Extraocular Movements: Extraocular movements intact.      Conjunctiva/sclera: Conjunctivae normal.   Cardiovascular:      Rate and Rhythm: Normal rate and regular rhythm.      Pulses: Normal pulses.      Heart sounds: Murmur heard.   Pulmonary:      Effort: Pulmonary effort is normal. No respiratory distress.      Breath sounds: Normal breath sounds. No wheezing or rhonchi.   Abdominal:      General: Abdomen is flat. Bowel sounds are normal. There is no distension.      Palpations: Abdomen is soft.      Tenderness: There is no abdominal tenderness. There is no guarding.   Genitourinary:     Comments: Ascencio with pink-tinged color urine  Musculoskeletal:         General: Normal range of motion.      Cervical back: Normal range of motion.      Right lower leg: No edema.      Left lower leg: No edema.   Skin:     General: Skin is warm.   Neurological:      General: No focal deficit present.      Mental Status: He is alert and oriented to person, place, and time. Mental status is at baseline.      Motor: No weakness.   Psychiatric:         Mood and Affect: Mood normal.         Behavior: Behavior normal.         Thought Content: Thought content normal.         Judgment: Judgment normal.          Additional Data:     Labs:  Results from last 7 days   Lab Units 12/22/23  0535   WBC Thousand/uL 8.95   HEMOGLOBIN g/dL 7.7*   HEMATOCRIT % 23.3*   PLATELETS Thousands/uL 190     Results from last 7 days   Lab Units 12/22/23  0535   SODIUM mmol/L 134*   POTASSIUM mmol/L 4.1   CHLORIDE mmol/L 103   CO2 mmol/L 26   BUN mg/dL 16   CREATININE mg/dL 0.90   ANION GAP mmol/L 5   CALCIUM mg/dL 8.0*   GLUCOSE  RANDOM mg/dL 100                 Results from last 7 days   Lab Units 12/17/23  1648   LACTIC ACID mmol/L 1.2       Lines/Drains:  Invasive Devices       Peripheral Intravenous Line  Duration             Peripheral IV 12/21/23 Dorsal (posterior);Left;Proximal Forearm 1 day              Drain  Duration             Continuous Bladder Irrigation Three-way 1 day                          Imaging: Reviewed radiology reports from this admission including: abdominal/pelvic CT and xray(s)    Recent Cultures (last 7 days):   Results from last 7 days   Lab Units 12/19/23  1548 12/19/23  0803 12/17/23  1643   BLOOD CULTURE  No Growth at 48 hrs.  No Growth at 48 hrs.  --  Proteus mirabilis*  Proteus mirabilis*   GRAM STAIN RESULT   --   --  Gram negative rods*  Gram negative rods*   URINE CULTURE   --  No Growth <1000 cfu/mL  --        Last 24 Hours Medication List:   Current Facility-Administered Medications   Medication Dose Route Frequency Provider Last Rate    acetaminophen  650 mg Oral Q6H PRN Sherita Martinez MD      bisacodyl  10 mg Rectal Daily PRN Remedios Cruz PA-C      cefTRIAXone  2,000 mg Intravenous Q24H Suha Alvarado PA-C 2,000 mg (12/21/23 1514)    cyanocobalamin  1,000 mcg Oral Daily Sherita Martinez MD      digoxin  125 mcg Oral Every Other Day Remedios Cruz PA-C      dronedarone  400 mg Oral BID With Meals Sherita Martinez MD      ferrous sulfate  325 mg Oral Daily With Breakfast Remedios Cruz PA-C      folic acid  1,000 mcg Oral Daily Sherita Martinez MD      glycerin-hypromellose-  1 drop Both Eyes Q4H PRN Sherita Martinez MD      hydrocortisone   Topical 4x Daily PRN Sherita Martinez MD      lubiprostone  8 mcg Oral BID With Meals Remedios Cruz PA-C      melatonin  3 mg Oral HS PRN Suha Alvarado PA-C      ondansetron  4 mg Intravenous Q6H PRN Sherita Martinez MD      pantoprazole  40 mg Oral Daily Sherita Martinez MD      polyethylene glycol  17 g Oral BID Sherita Martinez MD      primidone  50 mg Oral Q12H [facility] Sherita  MD Juan      senlucretia-docusate sodium  1 tablet Oral BID Remedios Cruz PA-C          Today, Patient Was Seen By: Remedios Cruz PA-C    **Please Note: This note may have been constructed using a voice recognition system.**

## 2023-12-22 NOTE — PROGRESS NOTES
"Progress Note - Miguel Angel Ortega 89 y.o. 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    Plan:  I hand irrigated his catheter.  I removed a few small clots but nothing more.  Urine clear thereafter.  Would continue CBI .  Stop CBI possibly tomorrow and if reasonably clear remove Ascencio for voiding trial.  Patient will need to return to office for a flexible cystoscopy under local anesthetic.  I will notify our staff to contact patient.  He should go back to his extended care facility on antibiotic for few days.  We will arrange referral to wound care/hyperbaric oxygen treatment for radiation cystitis if this is confirmed cystoscopically.  I did discuss this with patient and family.  Most recent CT scan reviewed and there were no significant clots in bladder.  Ascencio catheter in good position.  No other distinct source for hematuria.    Subjective:   Patient with intermittent gross hematuria.  On CBI at this point.  Doing well no complaints.  I had irrigated him at the bedside and got a few small clots out catheter is patent and functioning well.  Urine light pink and clears promptly with turning CBI on.    Objective:     Vitals: Blood pressure (!) 118/44, pulse 63, temperature 98.1 °F (36.7 °C), resp. rate 17, height 5' 11\" (1.803 m), weight 73.9 kg (163 lb), SpO2 100%.,Body mass index is 22.73 kg/m².      Intake/Output Summary (Last 24 hours) at 12/22/2023 1548  Last data filed at 12/22/2023 1254  Gross per 24 hour   Intake 360 ml   Output 2200 ml   Net -1840 ml       Physical Exam:  Const: Appears healthy and well developed. No signs of acute distress present.  Resp: Respirations are regular and unlabored.   CV: Rate is regular. Rhythm is regular.  Abdomen: Abdomen is soft, nontender, and nondistended. Kidneys are not palpable.  : Ascencio catheter in place and draining appropriately  Psych: Patient's " attitude is cooperative. Mood is normal. Affect is normal.    Invasive Devices       Peripheral Intravenous Line  Duration             Peripheral IV 12/21/23 Dorsal (posterior);Left;Proximal Forearm 1 day              Drain  Duration             Continuous Bladder Irrigation Three-way 1 day                    Lab, Imaging and other studies: I have personally reviewed pertinent reports.

## 2023-12-22 NOTE — PHYSICAL THERAPY NOTE
"   PHYSICAL THERAPY TREATMENT NOTE  NAME:  Miguel Angel Ortega  DATE: 12/22/23    Length Of Stay: 9  Performed at least 2 patient identifiers during session: Name and ID bracelet    TREATMENT FLOWSHEET:    12/22/23 1300   PT Last Visit   PT Visit Date 12/22/23   Note Type   Note Type Treatment   Pain Assessment   Pain Assessment Tool 0-10   Pain Score No Pain   Restrictions/Precautions   Weight Bearing Precautions Per Order No   Other Precautions Chair Alarm;Bed Alarm;Fall Risk  (zuniga cath)   General   Chart Reviewed Yes   Response to Previous Treatment Patient with no complaints from previous session.   Family/Caregiver Present No   Cognition   Overall Cognitive Status WFL   Arousal/Participation Alert;Cooperative   Attention Within functional limits   Orientation Level Oriented X4   Memory Within functional limits   Following Commands Follows one step commands without difficulty   Subjective   Subjective \"I can try.\"   Bed Mobility   Supine to Sit 6  Modified independent   Additional items HOB elevated;Bedrails;Increased time required   Sit to Supine 6  Modified independent   Additional items HOB elevated;Bedrails;Increased time required   Additional Comments Pt. tolerated sitting at EOB to perform BLE TE   Transfers   Sit to Stand 5  Supervision   Additional items Assist x 1;Increased time required;Verbal cues  (RW)   Stand to Sit 5  Supervision   Additional items Assist x 1;Increased time required;Verbal cues   Stand pivot 5  Supervision   Additional items Assist x 1;Increased time required;Verbal cues  (RW)   Additional Comments VC's provided for proper hand placemenmt during transitions   Ambulation/Elevation   Gait pattern Improper Weight shift;Decreased foot clearance;Narrow CORY;Short stride;Decreased heel strike;Decreased toe off;Step through pattern;Excessively slow;Foward flexed   Gait Assistance 5  Supervision   Additional items Assist x 1;Verbal cues   Assistive Device Rolling walker   Distance 165ft "   Balance   Static Sitting Good   Dynamic Sitting Fair +   Static Standing Fair   Dynamic Standing Fair -   Ambulatory Fair -   Endurance Deficit   Endurance Deficit Yes   Activity Tolerance   Activity Tolerance Patient limited by fatigue   Nurse Made Aware yes   Exercises   Quad Sets Sitting;20 reps;AROM;Bilateral   Heelslides Sitting;20 reps;AROM;Bilateral   Glute Sets Sitting;20 reps;AROM;Bilateral   Hip Flexion Sitting;20 reps;AROM;Bilateral   Hip Abduction Sitting;20 reps;AROM;Bilateral   Hip Adduction Sitting;20 reps;AROM;Bilateral   Knee AROM Long Arc Quad Sitting;20 reps;AROM;Bilateral   Ankle Pumps Sitting;20 reps;AROM;Bilateral   Marching Sitting;20 reps;AROM;Bilateral   Assessment   Prognosis Good   Problem List Decreased strength;Decreased endurance;Impaired balance;Decreased mobility   Goals   Patient Goals to get better   PT Treatment Day 4   Plan   Treatment/Interventions Functional transfer training;LE strengthening/ROM;Elevations;Therapeutic exercise;Endurance training;Patient/family training;Equipment eval/education;Bed mobility;Gait training;Compensatory technique education;Spoke to nursing   Progress Progressing toward goals   PT Frequency 3-5x/wk   Discharge Recommendation   Rehab Resource Intensity Level, PT III (Minimum Resource Intensity)   AM-PAC Basic Mobility Inpatient   Turning in Flat Bed Without Bedrails 4   Lying on Back to Sitting on Edge of Flat Bed Without Bedrails 4   Moving Bed to Chair 3   Standing Up From Chair Using Arms 4   Walk in Room 3   Climb 3-5 Stairs With Railing 3   Basic Mobility Inpatient Raw Score 21   Basic Mobility Standardized Score 45.55   Highest Level Of Mobility   JH-HLM Goal 6: Walk 10 steps or more   JH-HLM Achieved 7: Walk 25 feet or more       The patient's AM-PAC Basic Mobility Inpatient Short Form Raw Score is 21. A raw score greater than 16 suggests the patient may benefit from discharge to home. Please also refer to the recommendation of the Physical  Therapist for safe discharge planning.    Pt seen for PT treatment session this date with interventions consisting of bed mobility tasks, transfer training, seated TE, gait training, and education provided as needed for safety and direction to improve functional mobility, safety awareness, and activity tolerance. Pt agreeable to PT treatment session upon arrival, pt found resting in bed. At end of session, pt left in bed, positioned for comfort, bed alarm activated, and with all needs in reach. In comparison to previous session, pt with improvements in ambulation distance . Continue to recommend Level III (Minimum Resource Intensity) at time of d/c in order to maximize pt's functional independence and safety w/ mobility. Pt continues to be functioning below baseline level. PT will continue to see pt while here in order to address the deficits listed above and provide interventions consistent w/ POC in effort to achieve STGs.    Nadja Gonzalez, PTA

## 2023-12-22 NOTE — ASSESSMENT & PLAN NOTE
Hemoglobin dropped from 10.3-8.7.  low iron and B12 levels. placed on vit b12 and venofer  Resumed home iron supplement  CBC stable  no evidence of bleeding noted  neg stool occult    Lab Results   Component Value Date    HGB 7.7 (L) 12/22/2023    HGB 7.8 (L) 12/21/2023    HGB 8.3 (L) 12/20/2023

## 2023-12-22 NOTE — PLAN OF CARE
Problem: PHYSICAL THERAPY ADULT  Goal: Performs mobility at highest level of function for planned discharge setting.  See evaluation for individualized goals.  Description: Treatment/Interventions: ADL retraining, Functional transfer training, LE strengthening/ROM, Elevations, Therapeutic exercise, Endurance training, Patient/family training, Equipment eval/education, Bed mobility, Gait training, Compensatory technique education, Spoke to nursing, OT          See flowsheet documentation for full assessment, interventions and recommendations.  Outcome: Progressing  Note: Prognosis: Good  Problem List: Decreased strength, Decreased endurance, Impaired balance, Decreased mobility  Assessment: Pt seen for PT treatment session this date with interventions consisting of bed mobility tasks, transfer training, seated TE, gait training, and education provided as needed for safety and direction to improve functional mobility, safety awareness, and activity tolerance. Pt agreeable to PT treatment session upon arrival, pt found resting in bed. At end of session, pt left in bed, positioned for comfort, bed alarm activated, and with all needs in reach. In comparison to previous session, pt with improvements in ambulation distance . Continue to recommend Level III (Minimum Resource Intensity) at time of d/c in order to maximize pt's functional independence and safety w/ mobility. Pt continues to be functioning below baseline level. PT will continue to see pt while here in order to address the deficits listed above and provide interventions consistent w/ POC in effort to achieve STGs.  Barriers to Discharge: Other (Comment) (Fall risk, recent fall)     Rehab Resource Intensity Level, PT: III (Minimum Resource Intensity)    See flowsheet documentation for full assessment.

## 2023-12-22 NOTE — PLAN OF CARE
Problem: GENITOURINARY - ADULT  Goal: Maintains or returns to baseline urinary function  Description: INTERVENTIONS:  - Assess urinary function  - Encourage oral fluids to ensure adequate hydration if ordered  - Administer IV fluids as ordered to ensure adequate hydration  - Administer ordered medications as needed  - Offer frequent toileting  - Follow urinary retention protocol if ordered  Outcome: Progressing

## 2023-12-23 PROBLEM — R31.0 GROSS HEMATURIA: Status: ACTIVE | Noted: 2023-12-23

## 2023-12-23 LAB
ANION GAP SERPL CALCULATED.3IONS-SCNC: 5 MMOL/L
BUN SERPL-MCNC: 17 MG/DL (ref 5–25)
CALCIUM SERPL-MCNC: 8 MG/DL (ref 8.4–10.2)
CHLORIDE SERPL-SCNC: 103 MMOL/L (ref 96–108)
CO2 SERPL-SCNC: 26 MMOL/L (ref 21–32)
CREAT SERPL-MCNC: 0.99 MG/DL (ref 0.6–1.3)
ERYTHROCYTE [DISTWIDTH] IN BLOOD BY AUTOMATED COUNT: 14.7 % (ref 11.6–15.1)
GFR SERPL CREATININE-BSD FRML MDRD: 64 ML/MIN/1.73SQ M
GLUCOSE SERPL-MCNC: 96 MG/DL (ref 65–140)
HCT VFR BLD AUTO: 21.8 % (ref 36.5–49.3)
HGB BLD-MCNC: 7.2 G/DL (ref 12–17)
MCH RBC QN AUTO: 31.6 PG (ref 26.8–34.3)
MCHC RBC AUTO-ENTMCNC: 33 G/DL (ref 31.4–37.4)
MCV RBC AUTO: 96 FL (ref 82–98)
PLATELET # BLD AUTO: 193 THOUSANDS/UL (ref 149–390)
PMV BLD AUTO: 8.2 FL (ref 8.9–12.7)
POTASSIUM SERPL-SCNC: 4.4 MMOL/L (ref 3.5–5.3)
RBC # BLD AUTO: 2.28 MILLION/UL (ref 3.88–5.62)
SODIUM SERPL-SCNC: 134 MMOL/L (ref 135–147)
WBC # BLD AUTO: 6.74 THOUSAND/UL (ref 4.31–10.16)

## 2023-12-23 PROCEDURE — 80048 BASIC METABOLIC PNL TOTAL CA: CPT

## 2023-12-23 PROCEDURE — 99232 SBSQ HOSP IP/OBS MODERATE 35: CPT

## 2023-12-23 PROCEDURE — 85027 COMPLETE CBC AUTOMATED: CPT

## 2023-12-23 PROCEDURE — NC001 PR NO CHARGE

## 2023-12-23 RX ADMIN — FERROUS SULFATE TAB 325 MG (65 MG ELEMENTAL FE) 325 MG: 325 (65 FE) TAB at 08:46

## 2023-12-23 RX ADMIN — LUBIPROSTONE 8 MCG: 8 CAPSULE, GELATIN COATED ORAL at 16:38

## 2023-12-23 RX ADMIN — LUBIPROSTONE 8 MCG: 8 CAPSULE, GELATIN COATED ORAL at 08:46

## 2023-12-23 RX ADMIN — PRIMIDONE 50 MG: 50 TABLET ORAL at 21:06

## 2023-12-23 RX ADMIN — SENNOSIDES AND DOCUSATE SODIUM 1 TABLET: 8.6; 5 TABLET ORAL at 19:04

## 2023-12-23 RX ADMIN — DRONEDARONE 400 MG: 400 TABLET, FILM COATED ORAL at 08:47

## 2023-12-23 RX ADMIN — DRONEDARONE 400 MG: 400 TABLET, FILM COATED ORAL at 16:39

## 2023-12-23 RX ADMIN — FOLIC ACID 1000 MCG: 1 TABLET ORAL at 08:46

## 2023-12-23 RX ADMIN — POLYETHYLENE GLYCOL 3350 17 G: 17 POWDER, FOR SOLUTION ORAL at 08:47

## 2023-12-23 RX ADMIN — SENNOSIDES AND DOCUSATE SODIUM 1 TABLET: 8.6; 5 TABLET ORAL at 08:46

## 2023-12-23 RX ADMIN — POLYETHYLENE GLYCOL 3350 17 G: 17 POWDER, FOR SOLUTION ORAL at 19:04

## 2023-12-23 RX ADMIN — PRIMIDONE 50 MG: 50 TABLET ORAL at 08:46

## 2023-12-23 RX ADMIN — CEFTRIAXONE 2000 MG: 2 INJECTION, SOLUTION INTRAVENOUS at 16:39

## 2023-12-23 RX ADMIN — PANTOPRAZOLE SODIUM 40 MG: 40 TABLET, DELAYED RELEASE ORAL at 08:46

## 2023-12-23 RX ADMIN — CYANOCOBALAMIN TAB 500 MCG 1000 MCG: 500 TAB at 08:46

## 2023-12-23 NOTE — PROGRESS NOTES
"Progress Note - General Surgery   Miguel Angel Ortega 89 y.o. male MRN: [id number]  Unit/Bed#: -01 Encounter: [id number]    Assessment:  89 y.o. male with gross hematuria, likely related to radiation cystitis    - Afebrile, VSS on room air   - WBC 6 (8, 7, 7)   - Hgb 7.2 (7.7, 7.8, 8.3)   - Hyponatremia 134 (134)  - Hypocalcemia 8.0 (8.0)   - On CBI which has been running clear this AM     Plan:  - CBI to continue at this time, the patient has cherry red urine present in zuniga bag and nurse reports this is the appearance she has seen all morning  - Monitor hgb, baseline 8.1 - 10.3  - If urine does start to clear CBI rate could be slowed to monitor tolerance   - Would recommend void trial 24 hours after CBI d/c to allow for continued monitoring of urine output   - Would continue abx 3-5 days  - Outpatient flex cystoscopy recommended under local anesthetic to confirm radiation cystitis  - With confirmed diagnosis would need referral to wound care/hyperbaric oxygen treatment   - Co-morbid conditions per primary team    Subjective:   Patient states overall he is feeling well today.  He denies any pain, fevers, chills or other concerning symptoms aside from feeling unsteady on his feet.  He states this does not feel to be secondary to weakness rather due to feeling lightheaded.    Objective:   Blood pressure (!) 121/46, pulse 60, temperature (!) 97.3 °F (36.3 °C), resp. rate 18, height 5' 11\" (1.803 m), weight 73.9 kg (163 lb), SpO2 98%.,Body mass index is 22.73 kg/m².    Intake/Output Summary (Last 24 hours) at 12/23/2023 1131  Last data filed at 12/23/2023 0851  Gross per 24 hour   Intake 960 ml   Output 2500 ml   Net -1540 ml     Invasive Devices       Peripheral Intravenous Line  Duration             Peripheral IV 12/21/23 Dorsal (posterior);Left;Proximal Forearm 2 days              Drain  Duration             Continuous Bladder Irrigation Three-way 1 day                  Physical Exam:   General: no acute distress, " pt appears well and comfortable eating lunch oob in chair  Skin: warm and dry to touch  Pulmonary: normal effort  Abdominal: soft, non-distended, non-tender abdomen   : patient has CBI currently running but 3 L NSS are empty - urine in collection bag is cherry red although clear without clots  Neuro: alert and oriented     Lab, Imaging and other studies:I have personally reviewed pertinent lab results.  , CBC:   Lab Results   Component Value Date    WBC 6.74 12/23/2023    HGB 7.2 (L) 12/23/2023    HCT 21.8 (L) 12/23/2023    MCV 96 12/23/2023     12/23/2023    RBC 2.28 (L) 12/23/2023    MCH 31.6 12/23/2023    MCHC 33.0 12/23/2023    RDW 14.7 12/23/2023    MPV 8.2 (L) 12/23/2023   , CMP:   Lab Results   Component Value Date    SODIUM 134 (L) 12/23/2023    K 4.4 12/23/2023     12/23/2023    CO2 26 12/23/2023    BUN 17 12/23/2023    CREATININE 0.99 12/23/2023    CALCIUM 8.0 (L) 12/23/2023    EGFR 64 12/23/2023     VTE Pharmacologic Prophylaxis: Reason for no pharmacologic prophylaxis on hold second to hematuria  VTE Mechanical Prophylaxis: sequential compression device    Katrina Elizabeth Billig, PA-C  12/23/2023

## 2023-12-23 NOTE — ASSESSMENT & PLAN NOTE
Proteus bacteremia from gi source.  ID consulted: increase rocephin to 2g q24h, f/u blood and urine cultures, GI consult for persistent constipation/fecal impaction, continue zuniga, anticipate 10 days abx therapy through 12/26 with cefuroxime 500 bid on dc.   BC no growth at 72 hours

## 2023-12-23 NOTE — ASSESSMENT & PLAN NOTE
Hemoglobin dropped from 10.3-8.7.  low iron and B12 levels. placed on vit b12 and venofer  Resumed home iron supplement  CBC stable  no evidence of bleeding noted  neg stool occult    Lab Results   Component Value Date    HGB 7.2 (L) 12/23/2023    HGB 7.7 (L) 12/22/2023    HGB 7.8 (L) 12/21/2023

## 2023-12-23 NOTE — PLAN OF CARE
Problem: Potential for Falls  Goal: Patient will remain free of falls  Description: INTERVENTIONS:  - Educate patient/family on patient safety including physical limitations  - Instruct patient to call for assistance with activity   - Consult OT/PT to assist with strengthening/mobility   - Keep Call bell within reach  - Keep bed low and locked with side rails adjusted as appropriate  - Keep care items and personal belongings within reach  - Initiate and maintain comfort rounds  - Make Fall Risk Sign visible to staff  - Offer Toileting every 2 Hours, in advance of need  - Initiate/Maintain bed alarm  - Obtain necessary fall risk management equipment:   - Apply yellow socks and bracelet for high fall risk patients  - Consider moving patient to room near nurses station  Outcome: Progressing     Problem: PAIN - ADULT  Goal: Verbalizes/displays adequate comfort level or baseline comfort level  Description: Interventions:  - Encourage patient to monitor pain and request assistance  - Assess pain using appropriate pain scale  - Administer analgesics based on type and severity of pain and evaluate response  - Implement non-pharmacological measures as appropriate and evaluate response  - Consider cultural and social influences on pain and pain management  - Notify physician/advanced practitioner if interventions unsuccessful or patient reports new pain  Outcome: Progressing     Problem: INFECTION - ADULT  Goal: Absence or prevention of progression during hospitalization  Description: INTERVENTIONS:  - Assess and monitor for signs and symptoms of infection  - Monitor lab/diagnostic results  - Monitor all insertion sites, i.e. indwelling lines, tubes, and drains  - Monitor endotracheal if appropriate and nasal secretions for changes in amount and color  - Mentor appropriate cooling/warming therapies per order  - Administer medications as ordered  - Instruct and encourage patient and family to use good hand hygiene  technique  - Identify and instruct in appropriate isolation precautions for identified infection/condition  Outcome: Progressing     Problem: SAFETY ADULT  Goal: Patient will remain free of falls  Description: INTERVENTIONS:  - Educate patient/family on patient safety including physical limitations  - Instruct patient to call for assistance with activity   - Consult OT/PT to assist with strengthening/mobility   - Keep Call bell within reach  - Keep bed low and locked with side rails adjusted as appropriate  - Keep care items and personal belongings within reach  - Initiate and maintain comfort rounds  - Make Fall Risk Sign visible to staff  - Offer Toileting every 2 Hours, in advance of need  - Initiate/Maintain bed alarm  - Obtain necessary fall risk management equipment:   - Apply yellow socks and bracelet for high fall risk patients  - Consider moving patient to room near nurses station  Outcome: Progressing  Goal: Maintain or return to baseline ADL function  Description: INTERVENTIONS:  -  Assess patient's ability to carry out ADLs; assess patient's baseline for ADL function and identify physical deficits which impact ability to perform ADLs (bathing, care of mouth/teeth, toileting, grooming, dressing, etc.)  - Assess/evaluate cause of self-care deficits   - Assess range of motion  - Assess patient's mobility; develop plan if impaired  - Assess patient's need for assistive devices and provide as appropriate  - Encourage maximum independence but intervene and supervise when necessary  - Involve family in performance of ADLs  - Assess for home care needs following discharge   - Consider OT consult to assist with ADL evaluation and planning for discharge  - Provide patient education as appropriate  Outcome: Progressing  Goal: Maintains/Returns to pre admission functional level  Description: INTERVENTIONS:  - Perform AM-PAC 6 Click Basic Mobility/ Daily Activity assessment daily.  - Set and communicate daily  mobility goal to care team and patient/family/caregiver.   - Collaborate with rehabilitation services on mobility goals if consulted  - Perform Range of Motion 4 times a day.  - Reposition patient every 2 hours.  - Dangle patient 2 times a day  - Stand patient 2 times a day  - Ambulate patient 2 times a day  - Out of bed to chair 2 times a day   - Out of bed for meals 2 times a day  - Out of bed for toileting  - Record patient progress and toleration of activity level   Outcome: Progressing     Problem: DISCHARGE PLANNING  Goal: Discharge to home or other facility with appropriate resources  Description: INTERVENTIONS:  - Identify barriers to discharge w/patient and caregiver  - Arrange for needed discharge resources and transportation as appropriate  - Identify discharge learning needs (meds, wound care, etc.)  - Arrange for interpretive services to assist at discharge as needed  - Refer to Case Management Department for coordinating discharge planning if the patient needs post-hospital services based on physician/advanced practitioner order or complex needs related to functional status, cognitive ability, or social support system  Outcome: Progressing     Problem: Knowledge Deficit  Goal: Patient/family/caregiver demonstrates understanding of disease process, treatment plan, medications, and discharge instructions  Description: Complete learning assessment and assess knowledge base.  Interventions:  - Provide teaching at level of understanding  - Provide teaching via preferred learning methods  Outcome: Progressing     Problem: GASTROINTESTINAL - ADULT  Goal: Minimal or absence of nausea and/or vomiting  Description: INTERVENTIONS:  - Administer IV fluids if ordered to ensure adequate hydration  - Maintain NPO status until nausea and vomiting are resolved  - Nasogastric tube if ordered  - Administer ordered antiemetic medications as needed  - Provide nonpharmacologic comfort measures as appropriate  - Advance diet  as tolerated, if ordered  - Consider nutrition services referral to assist patient with adequate nutrition and appropriate food choices  Outcome: Progressing  Goal: Maintains or returns to baseline bowel function  Description: INTERVENTIONS:  - Assess bowel function monitor bowel regimen  - Encourage oral fluids to ensure adequate hydration  - Administer IV fluids if ordered to ensure adequate hydration  - Administer ordered medications as needed  - Encourage mobilization and activity  - Consider nutritional services referral to assist patient with adequate nutrition and appropriate food choices  Outcome: Progressing  Goal: Maintains adequate nutritional intake  Description: INTERVENTIONS:  - Monitor percentage of each meal consumed  - Identify factors contributing to decreased intake, treat as appropriate  - Assist with meals as needed  - Monitor I&O, weight, and lab values if indicated  - Obtain nutrition services referral as needed  Outcome: Progressing  Goal: Oral mucous membranes remain intact  Description: INTERVENTIONS  - Assess oral mucosa and hygiene practices  - Implement preventative oral hygiene regimen  - Implement oral medicated treatments as ordered  - Initiate Nutrition services referral as needed  Outcome: Progressing     Problem: Prexisting or High Potential for Compromised Skin Integrity  Goal: Skin integrity is maintained or improved  Description: INTERVENTIONS:  - Identify patients at risk for skin breakdown  - Assess and monitor skin integrity  - Assess and monitor nutrition and hydration status  - Monitor labs   - Assess for incontinence   - Turn and reposition patient  - Assist with mobility/ambulation  - Relieve pressure over bony prominences  - Avoid friction and shearing  - Provide appropriate hygiene as needed including keeping skin clean and dry  - Evaluate need for skin moisturizer/barrier cream  - Collaborate with interdisciplinary team   - Patient/family teaching  - Consider wound  care consult   Outcome: Progressing     Problem: Nutrition/Hydration-ADULT  Goal: Nutrient/Hydration intake appropriate for improving, restoring or maintaining nutritional needs  Description: Monitor and assess patient's nutrition/hydration status for malnutrition. Collaborate with interdisciplinary team and initiate plan and interventions as ordered.  Monitor patient's weight and dietary intake as ordered or per policy. Utilize nutrition screening tool and intervene as necessary. Determine patient's food preferences and provide high-protein, high-caloric foods as appropriate.     INTERVENTIONS:  - Monitor oral intake, urinary output, labs, and treatment plans  - Assess nutrition and hydration status and recommend course of action  - Evaluate amount of meals eaten  - Assist patient with eating if necessary   - Allow adequate time for meals  - Recommend/ encourage appropriate diets, oral nutritional supplements, and vitamin/mineral supplements  - Order, calculate, and assess calorie counts as needed  - Recommend, monitor, and adjust tube feedings and TPN/PPN based on assessed needs  - Assess need for intravenous fluids  - Provide specific nutrition/hydration education as appropriate  - Include patient/family/caregiver in decisions related to nutrition  Outcome: Progressing

## 2023-12-23 NOTE — ASSESSMENT & PLAN NOTE
Acute urinary retention secondary to severe constipation also known history of prostate cancer.  Zuniga catheter placed as patient bladder scan for 800 cc on presentation to ED and 1800 ml removed in zuniga. Also placed on Flomax   Zuniga removed 12/15 and placed on voiding trial so far patient is voiding but he woke up multiple times to urinate overnight .Will continue voiding trial today.  Some hypotension noted which may be secondary to Flomax and placed on midodrine - will dc flomax and midodrine  Patient with significant amount of hematuria still.   Urology recommending CBI due to hematuria and multiple clots causing blockages. Zuniga exchanged for 22Fr  12/23: still with hematuria, per urology: If urine starts to clear, CBI rate could be slowed - recommend void trial 24 hours after CBI d/c to allow for continued monitoring of urine output

## 2023-12-23 NOTE — PROGRESS NOTES
[facility]  Progress Note  Name: Miguel Angel Ortega I  MRN: [id number]  Unit/Bed#: -Theo I Date of Admission: 12/13/2023   Date of Service: 12/23/2023 I Hospital Day: 10    Assessment/Plan   * Stercoral colitis  Assessment & Plan  Patient presents with symptoms of constipation for the last few days.  He saw GI outpatient.  He was started on MiraLAX patient states he went 26 times to the bathroom yesterday had very small amounts of stool and felt like he just could not empty out his bowel movement.  Also had a fall yesterday.  Ct abd pelvis shows Abundant stool in the colon and rectal vault compatible with constipation in the appropriate clinical context. No bowel obstruction.  Minimal perirectal fat stranding suggestive of mild or early stercoral proctitis. No perirectal abscess  Placed on MiraLAX daily and placed on Dulcolax suppository x 2 daily and also placed 3 enemas    GI consult placed - miralax BID, colace BID and Amitiza 8mcg BID. Dulcolax suppository daily prn. Advance to regular diet. Outpatient follow up    Gram-negative bacteremia  Assessment & Plan  Proteus bacteremia from gi source.  ID consulted: increase rocephin to 2g q24h, f/u blood and urine cultures, GI consult for persistent constipation/fecal impaction, continue zuniga, anticipate 10 days abx therapy through 12/26 with cefuroxime 500 bid on dc.   BC no growth at 72 hours     Acute urinary retention  Assessment & Plan  Acute urinary retention secondary to severe constipation also known history of prostate cancer.  Zuniga catheter placed as patient bladder scan for 800 cc on presentation to ED and 1800 ml removed in zuniga. Also placed on Flomax   Zuniga removed 12/15 and placed on voiding trial so far patient is voiding but he woke up multiple times to urinate overnight .Will continue voiding trial today.  Some hypotension noted which may be secondary to Flomax and placed on midodrine - will dc flomax and  midodrine  Patient with significant amount of hematuria still.   Urology recommending CBI due to hematuria and multiple clots causing blockages. Ascencio exchanged for 22Fr  12/23: still with hematuria, per urology: If urine starts to clear, CBI rate could be slowed - recommend void trial 24 hours after CBI d/c to allow for continued monitoring of urine output     Acute on chronic anemia  Assessment & Plan  Hemoglobin dropped from 10.3-8.7.  low iron and B12 levels. placed on vit b12 and venofer  Resumed home iron supplement  CBC stable  no evidence of bleeding noted  neg stool occult    Lab Results   Component Value Date    HGB 7.2 (L) 12/23/2023    HGB 7.7 (L) 12/22/2023    HGB 7.8 (L) 12/21/2023       Orthostatic hypotension  Assessment & Plan  Patient has been having episodes of orthostatic hypotension outpatient. Orthostatic vitals inpatient positive  Cardiology consult: Suspect this is secondary to poor PO intake and blood loss. Please encourage PO intake. Nursing instructed to apply compression socks while awake. ECG demonstrates sinus bradycardia. HR's controlled. OK to continue digoxin and Multaq.   Will place on gentle IVF rehydration - stopped  Tom stockings, slow position changes. Can add midodrine if becomes symptomatic from hypotension    Permanent atrial fibrillation (HCC)  Assessment & Plan  Continue Multaq, digoxin which are his chronic meds.  Not on anticoagulation  Dig level acceptable  Appreciate cardio eval - continue multaq. Change digoxin to every other day. Was on xarelto in the past - discuss outpatient with his cardiologist about resumption    Prostate cancer metastatic to bone (HCC)  Assessment & Plan  Further outpatient follow-up with urology.  Placed on Flomax for acute urinary retention hold Casodex while inpatient.  stop flomax due to urinary urgency and orthostatic hypotension         VTE Pharmacologic Prophylaxis:   High Risk (Score >/= 5) - Pharmacological DVT Prophylaxis  Contraindicated. Sequential Compression Devices Ordered.    Mobility:   Basic Mobility Inpatient Raw Score: 21  JH-HLM Goal: 6: Walk 10 steps or more  JH-HLM Achieved: 6: Walk 10 steps or more  HLM Goal achieved. Continue to encourage appropriate mobility.    Patient Centered Rounds: I performed bedside rounds with nursing staff today.   Discussions with Specialists or Other Care Team Provider: nursing, case management, urology    Education and Discussions with Family / Patient: Updated  (friend) at bedside.    Total Time Spent on Date of Encounter in care of patient: 40 mins. This time was spent on one or more of the following: performing physical exam; counseling and coordination of care; obtaining or reviewing history; documenting in the medical record; reviewing/ordering tests, medications or procedures; communicating with other healthcare professionals and discussing with patient's family/caregivers.    Current Length of Stay: 10 day(s)  Current Patient Status: Inpatient   Certification Statement: The patient will continue to require additional inpatient hospital stay due to hematuria, CBI  Discharge Plan: Anticipate discharge in 48-72 hrs to prior assisted or independent living facility.    Code Status: Level 3 - DNAR and DNI    Subjective:   Patient seen and examined today. States that he was nervous about his urine and being discharged today. Discussed plan in depth that patient still is with CBI and not being discharged at this time. Did have an episode today of dizziness, but no other complaints and resolved after sitting. Denies nausea, vomiting, diarrhea, constipation, chest pain, SOB, fever, chills. Did have small bowel movement this AM.     Objective:     Vitals:   Temp (24hrs), Av.6 °F (36.4 °C), Min:97.3 °F (36.3 °C), Max:97.9 °F (36.6 °C)    Temp:  [97.3 °F (36.3 °C)-97.9 °F (36.6 °C)] 97.9 °F (36.6 °C)  HR:  [59-61] 59  Resp:  [18] 18  BP: (115-137)/(43-55) 115/43  SpO2:  [98 %-99  %] 98 %  Body mass index is 22.73 kg/m².     Input and Output Summary (last 24 hours):     Intake/Output Summary (Last 24 hours) at 12/23/2023 1428  Last data filed at 12/23/2023 1201  Gross per 24 hour   Intake 1320 ml   Output 3100 ml   Net -1780 ml       Physical Exam:   Physical Exam  Vitals reviewed.   Constitutional:       General: He is not in acute distress.     Appearance: He is not ill-appearing or toxic-appearing.   HENT:      Head: Normocephalic and atraumatic.      Mouth/Throat:      Mouth: Mucous membranes are moist.   Cardiovascular:      Rate and Rhythm: Normal rate. Rhythm irregular.   Pulmonary:      Effort: No respiratory distress.      Breath sounds: No stridor. No wheezing.      Comments: Saturating well on room air  Abdominal:      General: Bowel sounds are normal. There is no distension.      Palpations: Abdomen is soft. There is no mass.      Tenderness: There is no abdominal tenderness.   Genitourinary:     Comments: Cherry red urine in zuniga  Skin:     General: Skin is warm and dry.      Coloration: Skin is pale.   Neurological:      Mental Status: He is oriented to person, place, and time.   Psychiatric:         Mood and Affect: Mood normal.         Behavior: Behavior normal.          Additional Data:     Labs:  Results from last 7 days   Lab Units 12/23/23  0518   WBC Thousand/uL 6.74   HEMOGLOBIN g/dL 7.2*   HEMATOCRIT % 21.8*   PLATELETS Thousands/uL 193     Results from last 7 days   Lab Units 12/23/23  0518   SODIUM mmol/L 134*   POTASSIUM mmol/L 4.4   CHLORIDE mmol/L 103   CO2 mmol/L 26   BUN mg/dL 17   CREATININE mg/dL 0.99   ANION GAP mmol/L 5   CALCIUM mg/dL 8.0*   GLUCOSE RANDOM mg/dL 96                 Results from last 7 days   Lab Units 12/17/23  1648   LACTIC ACID mmol/L 1.2       Lines/Drains:  Invasive Devices       Peripheral Intravenous Line  Duration             Peripheral IV 12/21/23 Dorsal (posterior);Left;Proximal Forearm 2 days              Drain  Duration              Continuous Bladder Irrigation Three-way 2 days                          Imaging: Reviewed radiology reports from this admission including: xray(s)    Recent Cultures (last 7 days):   Results from last 7 days   Lab Units 12/19/23  1548 12/19/23  0803 12/17/23  1643   BLOOD CULTURE  No Growth at 72 hrs.  No Growth at 72 hrs.  --  Proteus mirabilis*  Proteus mirabilis*   GRAM STAIN RESULT   --   --  Gram negative rods*  Gram negative rods*   URINE CULTURE   --  No Growth <1000 cfu/mL  --        Last 24 Hours Medication List:   Current Facility-Administered Medications   Medication Dose Route Frequency Provider Last Rate    acetaminophen  650 mg Oral Q6H PRN Sherita Martinez MD      bisacodyl  10 mg Rectal Daily PRN Remedios Curz PA-C      cefTRIAXone  2,000 mg Intravenous Q24H Suha Alvarado PA-C 2,000 mg (12/22/23 1603)    cyanocobalamin  1,000 mcg Oral Daily Sherita Martinez MD      digoxin  125 mcg Oral Every Other Day Remedios Cruz PA-C      dronedarone  400 mg Oral BID With Meals Sherita Martinez MD      ferrous sulfate  325 mg Oral Daily With Breakfast Remedios Cruz PA-C      folic acid  1,000 mcg Oral Daily Sherita Martinez MD      glycerin-hypromellose-  1 drop Both Eyes Q4H PRN Sherita Martinez MD      hydrocortisone   Topical 4x Daily PRN Sherita Martinez MD      lubiprostone  8 mcg Oral BID With Meals Remedios Cruz PA-C      melatonin  3 mg Oral HS PRN Suha Alvarado PA-C      ondansetron  4 mg Intravenous Q6H PRN Sherita Martinez MD      pantoprazole  40 mg Oral Daily Sherita Martinez MD      polyethylene glycol  17 g Oral BID Sherita Martinez MD      primidone  50 mg Oral Q12H YAMINI Sherita Martinez MD      senna-docusate sodium  1 tablet Oral BID Remedios Cruz PA-C          Today, Patient Was Seen By: Suha Alvarado PA-C    **Please Note: This note may have been constructed using a voice recognition system.**

## 2023-12-24 LAB
ANION GAP SERPL CALCULATED.3IONS-SCNC: 5 MMOL/L
BACTERIA BLD CULT: NORMAL
BACTERIA BLD CULT: NORMAL
BUN SERPL-MCNC: 20 MG/DL (ref 5–25)
CALCIUM SERPL-MCNC: 7.9 MG/DL (ref 8.4–10.2)
CHLORIDE SERPL-SCNC: 103 MMOL/L (ref 96–108)
CO2 SERPL-SCNC: 25 MMOL/L (ref 21–32)
CREAT SERPL-MCNC: 1.06 MG/DL (ref 0.6–1.3)
ERYTHROCYTE [DISTWIDTH] IN BLOOD BY AUTOMATED COUNT: 14.9 % (ref 11.6–15.1)
GFR SERPL CREATININE-BSD FRML MDRD: 59 ML/MIN/1.73SQ M
GLUCOSE SERPL-MCNC: 92 MG/DL (ref 65–140)
HCT VFR BLD AUTO: 21.5 % (ref 36.5–49.3)
HGB BLD-MCNC: 7.2 G/DL (ref 12–17)
MCH RBC QN AUTO: 32.1 PG (ref 26.8–34.3)
MCHC RBC AUTO-ENTMCNC: 33.5 G/DL (ref 31.4–37.4)
MCV RBC AUTO: 96 FL (ref 82–98)
PLATELET # BLD AUTO: 213 THOUSANDS/UL (ref 149–390)
PMV BLD AUTO: 8.1 FL (ref 8.9–12.7)
POTASSIUM SERPL-SCNC: 4.1 MMOL/L (ref 3.5–5.3)
RBC # BLD AUTO: 2.24 MILLION/UL (ref 3.88–5.62)
SODIUM SERPL-SCNC: 133 MMOL/L (ref 135–147)
WBC # BLD AUTO: 7.11 THOUSAND/UL (ref 4.31–10.16)

## 2023-12-24 PROCEDURE — 99232 SBSQ HOSP IP/OBS MODERATE 35: CPT | Performed by: UROLOGY

## 2023-12-24 PROCEDURE — 99232 SBSQ HOSP IP/OBS MODERATE 35: CPT

## 2023-12-24 PROCEDURE — 80048 BASIC METABOLIC PNL TOTAL CA: CPT

## 2023-12-24 PROCEDURE — 85027 COMPLETE CBC AUTOMATED: CPT

## 2023-12-24 RX ADMIN — FOLIC ACID 1000 MCG: 1 TABLET ORAL at 08:57

## 2023-12-24 RX ADMIN — CEFTRIAXONE 2000 MG: 2 INJECTION, SOLUTION INTRAVENOUS at 16:37

## 2023-12-24 RX ADMIN — LUBIPROSTONE 8 MCG: 8 CAPSULE, GELATIN COATED ORAL at 06:35

## 2023-12-24 RX ADMIN — LUBIPROSTONE 8 MCG: 8 CAPSULE, GELATIN COATED ORAL at 16:36

## 2023-12-24 RX ADMIN — POLYETHYLENE GLYCOL 3350 17 G: 17 POWDER, FOR SOLUTION ORAL at 08:59

## 2023-12-24 RX ADMIN — POLYETHYLENE GLYCOL 3350 17 G: 17 POWDER, FOR SOLUTION ORAL at 16:34

## 2023-12-24 RX ADMIN — DIGOXIN 125 MCG: 125 TABLET ORAL at 08:58

## 2023-12-24 RX ADMIN — PRIMIDONE 50 MG: 50 TABLET ORAL at 08:57

## 2023-12-24 RX ADMIN — SENNOSIDES AND DOCUSATE SODIUM 1 TABLET: 8.6; 5 TABLET ORAL at 16:36

## 2023-12-24 RX ADMIN — PRIMIDONE 50 MG: 50 TABLET ORAL at 20:40

## 2023-12-24 RX ADMIN — FERROUS SULFATE TAB 325 MG (65 MG ELEMENTAL FE) 325 MG: 325 (65 FE) TAB at 06:35

## 2023-12-24 RX ADMIN — DRONEDARONE 400 MG: 400 TABLET, FILM COATED ORAL at 06:34

## 2023-12-24 RX ADMIN — GLYCERIN 1 DROP: .002; .002; .01 SOLUTION/ DROPS OPHTHALMIC at 22:21

## 2023-12-24 RX ADMIN — CYANOCOBALAMIN TAB 500 MCG 1000 MCG: 500 TAB at 08:57

## 2023-12-24 RX ADMIN — DRONEDARONE 400 MG: 400 TABLET, FILM COATED ORAL at 16:35

## 2023-12-24 RX ADMIN — SENNOSIDES AND DOCUSATE SODIUM 1 TABLET: 8.6; 5 TABLET ORAL at 08:57

## 2023-12-24 RX ADMIN — PANTOPRAZOLE SODIUM 40 MG: 40 TABLET, DELAYED RELEASE ORAL at 08:57

## 2023-12-24 RX ADMIN — MELATONIN 3 MG: 3 TAB ORAL at 20:40

## 2023-12-24 NOTE — PROGRESS NOTES
[facility]  Progress Note  Name: Miguel Angel Ortega I  MRN: [id number]  Unit/Bed#: -Theo I Date of Admission: 12/13/2023   Date of Service: 12/24/2023 I Hospital Day: 11    Assessment/Plan   * Stercoral colitis  Assessment & Plan  Patient presents with symptoms of constipation for the last few days.  He saw GI outpatient.  He was started on MiraLAX patient states he went 26 times to the bathroom yesterday had very small amounts of stool and felt like he just could not empty out his bowel movement.  Also had a fall yesterday.  Ct abd pelvis shows Abundant stool in the colon and rectal vault compatible with constipation in the appropriate clinical context. No bowel obstruction.  Minimal perirectal fat stranding suggestive of mild or early stercoral proctitis. No perirectal abscess  Placed on MiraLAX daily and placed on Dulcolax suppository x 2 daily and also placed 3 enemas    GI consult placed - miralax BID, colace BID and Amitiza 8mcg BID. Dulcolax suppository daily prn. Advance to regular diet. Outpatient follow up    Gram-negative bacteremia  Assessment & Plan  Proteus bacteremia from gi source.  ID consulted: increase rocephin to 2g q24h, f/u blood and urine cultures, GI consult for persistent constipation/fecal impaction, continue zuniga, anticipate 10 days abx therapy through 12/26 with cefuroxime 500 bid on dc.   BC no growth at 4 days     Acute urinary retention  Assessment & Plan  Acute urinary retention secondary to severe constipation also known history of prostate cancer.  Zuniga catheter placed as patient bladder scan for 800 cc on presentation to ED and 1800 ml removed in zuniga. Also placed on Flomax   Zuniga removed 12/15 and placed on voiding trial. Some hypotension noted which may be secondary to Flomax and placed on midodrine - will dc flomax and midodrine  Patient with significant amount of hematuria still.   Urology recommending CBI due to hematuria and multiple clots  causing blockages. Ascencio exchanged for 22Fr  Still with hematuria, per urology: If urine starts to clear, CBI rate could be slowed - recommend void trial 24 hours after CBI d/c to allow for continued monitoring of urine output.     Acute on chronic anemia  Assessment & Plan  Hemoglobin dropped from 10.3-8.7.  low iron and B12 levels. placed on vit b12 and venofer  Resumed home iron supplement  CBC stable  no evidence of bleeding noted  neg stool occult    Lab Results   Component Value Date    HGB 7.2 (L) 12/24/2023    HGB 7.2 (L) 12/23/2023    HGB 7.7 (L) 12/22/2023       Orthostatic hypotension  Assessment & Plan  Patient has been having episodes of orthostatic hypotension outpatient. Orthostatic vitals inpatient positive  Cardiology consult: Suspect this is secondary to poor PO intake and blood loss. Please encourage PO intake. Nursing instructed to apply compression socks while awake. ECG demonstrates sinus bradycardia. HR's controlled. OK to continue digoxin and Multaq.   Will place on gentle IVF rehydration - stopped  Tom stockings, slow position changes. Can add midodrine if becomes symptomatic from hypotension    Permanent atrial fibrillation (HCC)  Assessment & Plan  Continue Multaq, digoxin which are his chronic meds.  Not on anticoagulation  Dig level acceptable  Appreciate cardio eval - continue multaq. Change digoxin to every other day. Was on xarelto in the past - discuss outpatient with his cardiologist about resumption    Prostate cancer metastatic to bone (HCC)  Assessment & Plan  Further outpatient follow-up with urology.  Placed on Flomax for acute urinary retention hold Casodex while inpatient.  stop flomax due to urinary urgency and orthostatic hypotension         VTE Pharmacologic Prophylaxis:   High Risk (Score >/= 5) - Pharmacological DVT Prophylaxis Contraindicated. Sequential Compression Devices Ordered.    Mobility:   Basic Mobility Inpatient Raw Score: 13  -[facility] Goal: 4: Move to  chair/commode  JH-HLM Achieved: 5: Stand (1 or more minutes)  HLM Goal achieved. Continue to encourage appropriate mobility.    Patient Centered Rounds: I performed bedside rounds with nursing staff today.   Discussions with Specialists or Other Care Team Provider: nursing, case management    Education and Discussions with Family / Patient: Updated  (friend) via phone.    Total Time Spent on Date of Encounter in care of patient: 35 mins. This time was spent on one or more of the following: performing physical exam; counseling and coordination of care; obtaining or reviewing history; documenting in the medical record; reviewing/ordering tests, medications or procedures; communicating with other healthcare professionals and discussing with patient's family/caregivers.    Current Length of Stay: 11 day(s)  Current Patient Status: Inpatient   Certification Statement: The patient will continue to require additional inpatient hospital stay due to hematuria, CBI  Discharge Plan: Anticipate discharge in 48-72 hrs to prior assisted or independent living facility.    Code Status: Level 3 - DNAR and DNI    Subjective:   Patient seen and examined today. He states he overall is feeling well aside from still having blood in his urine. He is concerned that when he leaves he is going to have another clot and be unable to empty his bladder. He denies any pain currently. No nausea, vomiting, diarrhea, constipation, abdominal pain, CP, SOB, fever, chills.     Objective:     Vitals:   Temp (24hrs), Av.3 °F (36.3 °C), Min:97.3 °F (36.3 °C), Max:97.3 °F (36.3 °C)    Temp:  [97.3 °F (36.3 °C)] 97.3 °F (36.3 °C)  HR:  [63-66] 66  Resp:  [14] 14  BP: (111-121)/(39-43) 111/43  SpO2:  [96 %-99 %] 99 %  Body mass index is 22.73 kg/m².     Input and Output Summary (last 24 hours):     Intake/Output Summary (Last 24 hours) at 2023 4423  Last data filed at 2023 1443  Gross per 24 hour   Intake 600 ml   Output 4500 ml    Net -3900 ml       Physical Exam:   Physical Exam  Vitals reviewed.   Constitutional:       General: He is not in acute distress.     Appearance: He is not toxic-appearing.   HENT:      Head: Normocephalic and atraumatic.      Nose: Nose normal.      Mouth/Throat:      Mouth: Mucous membranes are moist.   Cardiovascular:      Rate and Rhythm: Normal rate and regular rhythm.      Heart sounds: No murmur heard.  Pulmonary:      Effort: No respiratory distress.      Breath sounds: No stridor. No wheezing.      Comments: Saturating well on room air  Abdominal:      General: Bowel sounds are normal. There is no distension.      Palpations: Abdomen is soft. There is no mass.      Tenderness: There is no abdominal tenderness.   Genitourinary:     Comments: Ascencio in place with cherry/fruit punch colored urine  Musculoskeletal:      Right lower leg: No edema.      Left lower leg: No edema.   Skin:     General: Skin is warm and dry.      Coloration: Skin is pale.   Neurological:      General: No focal deficit present.      Mental Status: He is alert and oriented to person, place, and time.   Psychiatric:         Mood and Affect: Mood normal.         Behavior: Behavior normal.          Additional Data:     Labs:  Results from last 7 days   Lab Units 12/24/23  0548   WBC Thousand/uL 7.11   HEMOGLOBIN g/dL 7.2*   HEMATOCRIT % 21.5*   PLATELETS Thousands/uL 213     Results from last 7 days   Lab Units 12/24/23  0548   SODIUM mmol/L 133*   POTASSIUM mmol/L 4.1   CHLORIDE mmol/L 103   CO2 mmol/L 25   BUN mg/dL 20   CREATININE mg/dL 1.06   ANION GAP mmol/L 5   CALCIUM mg/dL 7.9*   GLUCOSE RANDOM mg/dL 92                 Results from last 7 days   Lab Units 12/17/23  1648   LACTIC ACID mmol/L 1.2       Lines/Drains:  Invasive Devices       Peripheral Intravenous Line  Duration             Peripheral IV 12/21/23 Dorsal (posterior);Left;Proximal Forearm 3 days              Drain  Duration             Continuous Bladder Irrigation  Three-way 3 days                          Imaging: No pertinent imaging reviewed.    Recent Cultures (last 7 days):   Results from last 7 days   Lab Units 12/19/23  1548 12/19/23  0803 12/17/23  1643   BLOOD CULTURE  No Growth After 4 Days.  No Growth After 4 Days.  --  Proteus mirabilis*  Proteus mirabilis*   GRAM STAIN RESULT   --   --  Gram negative rods*  Gram negative rods*   URINE CULTURE   --  No Growth <1000 cfu/mL  --        Last 24 Hours Medication List:   Current Facility-Administered Medications   Medication Dose Route Frequency Provider Last Rate    acetaminophen  650 mg Oral Q6H PRN Sherita Martinez MD      bisacodyl  10 mg Rectal Daily PRN Remedios Cruz PA-C      cefTRIAXone  2,000 mg Intravenous Q24H Suha Alvarado PA-C 2,000 mg (12/23/23 1639)    cyanocobalamin  1,000 mcg Oral Daily Sherita Martinez MD      digoxin  125 mcg Oral Every Other Day Remedios Cruz PA-C      dronedarone  400 mg Oral BID With Meals Sherita Martinez MD      ferrous sulfate  325 mg Oral Daily With Breakfast Remedios Cruz PA-C      folic acid  1,000 mcg Oral Daily Sherita Martinez MD      glycerin-hypromellose-  1 drop Both Eyes Q4H PRN Sherita Martinez MD      hydrocortisone   Topical 4x Daily PRN Sherita Martinez MD      lubiprostone  8 mcg Oral BID With Meals Remedios Cruz PA-C      melatonin  3 mg Oral HS PRN Suha Alvarado PA-C      ondansetron  4 mg Intravenous Q6H PRN Sherita Martinez MD      pantoprazole  40 mg Oral Daily Sherita Martinez MD      polyethylene glycol  17 g Oral BID Sherita Martinez MD      primidone  50 mg Oral Q12H YAMINI Sherita Martinez MD      senna-docusate sodium  1 tablet Oral BID Remedios Cruz PA-C          Today, Patient Was Seen By: Suha Alvarado PA-C    **Please Note: This note may have been constructed using a voice recognition system.**

## 2023-12-24 NOTE — ASSESSMENT & PLAN NOTE
Acute urinary retention secondary to severe constipation also known history of prostate cancer.  Zuniga catheter placed as patient bladder scan for 800 cc on presentation to ED and 1800 ml removed in zuniga. Also placed on Flomax   Zuniga removed 12/15 and placed on voiding trial. Some hypotension noted which may be secondary to Flomax and placed on midodrine - will dc flomax and midodrine  Patient with significant amount of hematuria still.   Urology recommending CBI due to hematuria and multiple clots causing blockages. Zuniga exchanged for 22Fr  Still with hematuria, per urology: If urine starts to clear, CBI rate could be slowed - recommend void trial 24 hours after CBI d/c to allow for continued monitoring of urine output.

## 2023-12-24 NOTE — ASSESSMENT & PLAN NOTE
Hemoglobin dropped from 10.3-8.7.  low iron and B12 levels. placed on vit b12 and venofer  Resumed home iron supplement  CBC stable  no evidence of bleeding noted  neg stool occult    Lab Results   Component Value Date    HGB 7.2 (L) 12/24/2023    HGB 7.2 (L) 12/23/2023    HGB 7.7 (L) 12/22/2023

## 2023-12-24 NOTE — ASSESSMENT & PLAN NOTE
Proteus bacteremia from gi source.  ID consulted: increase rocephin to 2g q24h, f/u blood and urine cultures, GI consult for persistent constipation/fecal impaction, continue zuniga, anticipate 10 days abx therapy through 12/26 with cefuroxime 500 bid on dc.   BC no growth at 4 days

## 2023-12-24 NOTE — PLAN OF CARE
Problem: Potential for Falls  Goal: Patient will remain free of falls  Description: INTERVENTIONS:  - Educate patient/family on patient safety including physical limitations  - Instruct patient to call for assistance with activity   - Consult OT/PT to assist with strengthening/mobility   - Keep Call bell within reach  - Keep bed low and locked with side rails adjusted as appropriate  - Keep care items and personal belongings within reach  - Initiate and maintain comfort rounds  - Make Fall Risk Sign visible to staff  - Offer Toileting every 2 Hours, in advance of need  - Initiate/Maintain alarm  - Obtain necessary fall risk management equipment  - Apply yellow socks and bracelet for high fall risk patients  - Consider moving patient to room near nurses station  Outcome: Progressing     Problem: PAIN - ADULT  Goal: Verbalizes/displays adequate comfort level or baseline comfort level  Description: Interventions:  - Encourage patient to monitor pain and request assistance  - Assess pain using appropriate pain scale  - Administer analgesics based on type and severity of pain and evaluate response  - Implement non-pharmacological measures as appropriate and evaluate response  - Consider cultural and social influences on pain and pain management  - Notify physician/advanced practitioner if interventions unsuccessful or patient reports new pain  Outcome: Progressing     Problem: INFECTION - ADULT  Goal: Absence or prevention of progression during hospitalization  Description: INTERVENTIONS:  - Assess and monitor for signs and symptoms of infection  - Monitor lab/diagnostic results  - Monitor all insertion sites, i.e. indwelling lines, tubes, and drains  - Monitor endotracheal if appropriate and nasal secretions for changes in amount and color  - Selmer appropriate cooling/warming therapies per order  - Administer medications as ordered  - Instruct and encourage patient and family to use good hand hygiene technique  -  Identify and instruct in appropriate isolation precautions for identified infection/condition  Outcome: Progressing     Problem: SAFETY ADULT  Goal: Patient will remain free of falls  Description: INTERVENTIONS:  - Educate patient/family on patient safety including physical limitations  - Instruct patient to call for assistance with activity   - Consult OT/PT to assist with strengthening/mobility   - Keep Call bell within reach  - Keep bed low and locked with side rails adjusted as appropriate  - Keep care items and personal belongings within reach  - Initiate and maintain comfort rounds  - Make Fall Risk Sign visible to staff  - Offer Toileting every 2 Hours, in advance of need  - Initiate/Maintain alarm  - Obtain necessary fall risk management equipment  - Apply yellow socks and bracelet for high fall risk patients  - Consider moving patient to room near nurses station  Outcome: Progressing  Goal: Maintain or return to baseline ADL function  Description: INTERVENTIONS:  -  Assess patient's ability to carry out ADLs; assess patient's baseline for ADL function and identify physical deficits which impact ability to perform ADLs (bathing, care of mouth/teeth, toileting, grooming, dressing, etc.)  - Assess/evaluate cause of self-care deficits   - Assess range of motion  - Assess patient's mobility; develop plan if impaired  - Assess patient's need for assistive devices and provide as appropriate  - Encourage maximum independence but intervene and supervise when necessary  - Involve family in performance of ADLs  - Assess for home care needs following discharge   - Consider OT consult to assist with ADL evaluation and planning for discharge  - Provide patient education as appropriate  Outcome: Progressing  Goal: Maintains/Returns to pre admission functional level  Description: INTERVENTIONS:  - Perform AM-PAC 6 Click Basic Mobility/ Daily Activity assessment daily.  - Set and communicate daily mobility goal to care team  and patient/family/caregiver.   - Collaborate with rehabilitation services on mobility goals if consulted  - Perform Range of Motion 3 times a day.  - Reposition patient every 2 hours.  - Dangle patient 3 times a day  - Stand patient 3 times a day  - Ambulate patient 3 times a day  - Out of bed to chair 3 times a day   - Out of bed for meals 3 times a day  - Out of bed for toileting  - Record patient progress and toleration of activity level   Outcome: Progressing     Problem: DISCHARGE PLANNING  Goal: Discharge to home or other facility with appropriate resources  Description: INTERVENTIONS:  - Identify barriers to discharge w/patient and caregiver  - Arrange for needed discharge resources and transportation as appropriate  - Identify discharge learning needs (meds, wound care, etc.)  - Arrange for interpretive services to assist at discharge as needed  - Refer to Case Management Department for coordinating discharge planning if the patient needs post-hospital services based on physician/advanced practitioner order or complex needs related to functional status, cognitive ability, or social support system  Outcome: Progressing     Problem: Knowledge Deficit  Goal: Patient/family/caregiver demonstrates understanding of disease process, treatment plan, medications, and discharge instructions  Description: Complete learning assessment and assess knowledge base.  Interventions:  - Provide teaching at level of understanding  - Provide teaching via preferred learning methods  Outcome: Progressing     Problem: GASTROINTESTINAL - ADULT  Goal: Minimal or absence of nausea and/or vomiting  Description: INTERVENTIONS:  - Administer IV fluids if ordered to ensure adequate hydration  - Maintain NPO status until nausea and vomiting are resolved  - Nasogastric tube if ordered  - Administer ordered antiemetic medications as needed  - Provide nonpharmacologic comfort measures as appropriate  - Advance diet as tolerated, if  ordered  - Consider nutrition services referral to assist patient with adequate nutrition and appropriate food choices  Outcome: Progressing  Goal: Maintains or returns to baseline bowel function  Description: INTERVENTIONS:  - Assess bowel function monitor bowel regimen  - Encourage oral fluids to ensure adequate hydration  - Administer IV fluids if ordered to ensure adequate hydration  - Administer ordered medications as needed  - Encourage mobilization and activity  - Consider nutritional services referral to assist patient with adequate nutrition and appropriate food choices  Outcome: Progressing  Goal: Maintains adequate nutritional intake  Description: INTERVENTIONS:  - Monitor percentage of each meal consumed  - Identify factors contributing to decreased intake, treat as appropriate  - Assist with meals as needed  - Monitor I&O, weight, and lab values if indicated  - Obtain nutrition services referral as needed  Outcome: Progressing  Goal: Oral mucous membranes remain intact  Description: INTERVENTIONS  - Assess oral mucosa and hygiene practices  - Implement preventative oral hygiene regimen  - Implement oral medicated treatments as ordered  - Initiate Nutrition services referral as needed  Outcome: Progressing     Problem: GENITOURINARY - ADULT  Goal: Maintains or returns to baseline urinary function  Description: INTERVENTIONS:  - Assess urinary function  - Encourage oral fluids to ensure adequate hydration if ordered  - Administer IV fluids as ordered to ensure adequate hydration  - Administer ordered medications as needed  - Offer frequent toileting  - Follow urinary retention protocol if ordered  Outcome: Progressing  Goal: Absence of urinary retention  Description: INTERVENTIONS:  - Assess patient’s ability to void and empty bladder  - Monitor I/O  - Bladder scan as needed  - Discuss with physician/AP medications to alleviate retention as needed  - Discuss catheterization for long term situations as  appropriate  Outcome: Progressing  Goal: Urinary catheter remains patent  Description: INTERVENTIONS:  - Assess patency of urinary catheter  - If patient has a chronic zuniga, consider changing catheter if non-functioning  - Follow guidelines for intermittent irrigation of non-functioning urinary catheter  Outcome: Progressing     Problem: Prexisting or High Potential for Compromised Skin Integrity  Goal: Skin integrity is maintained or improved  Description: INTERVENTIONS:  - Identify patients at risk for skin breakdown  - Assess and monitor skin integrity  - Assess and monitor nutrition and hydration status  - Monitor labs   - Assess for incontinence   - Turn and reposition patient  - Assist with mobility/ambulation  - Relieve pressure over bony prominences  - Avoid friction and shearing  - Provide appropriate hygiene as needed including keeping skin clean and dry  - Evaluate need for skin moisturizer/barrier cream  - Collaborate with interdisciplinary team   - Patient/family teaching  - Consider wound care consult   Outcome: Progressing     Problem: Nutrition/Hydration-ADULT  Goal: Nutrient/Hydration intake appropriate for improving, restoring or maintaining nutritional needs  Description: Monitor and assess patient's nutrition/hydration status for malnutrition. Collaborate with interdisciplinary team and initiate plan and interventions as ordered.  Monitor patient's weight and dietary intake as ordered or per policy. Utilize nutrition screening tool and intervene as necessary. Determine patient's food preferences and provide high-protein, high-caloric foods as appropriate.     INTERVENTIONS:  - Monitor oral intake, urinary output, labs, and treatment plans  - Assess nutrition and hydration status and recommend course of action  - Evaluate amount of meals eaten  - Assist patient with eating if necessary   - Allow adequate time for meals  - Recommend/ encourage appropriate diets, oral nutritional supplements, and  vitamin/mineral supplements  - Order, calculate, and assess calorie counts as needed  - Recommend, monitor, and adjust tube feedings and TPN/PPN based on assessed needs  - Assess need for intravenous fluids  - Provide specific nutrition/hydration education as appropriate  - Include patient/family/caregiver in decisions related to nutrition  Outcome: Progressing

## 2023-12-24 NOTE — PROGRESS NOTES
"Progress Note - Urology  Miguel Angel Ortega 89 y.o. male MRN: [id number]  Unit/Bed#: -01 Encounter: [id number]    Assessment:  89 y.o. male with gross hematuria, likely related to radiation cystitis    - Afebrile, VSS on room air   - WBC 7 (6, 8, 7, 7)   - Hgb 7.2 (7.2, 7.7, 7.8, 8.3)   - Hyponatremia 133 (134, 134)  - Hypocalcemia 7.9 (8.0, 8.0)   - On CBI which is running at a moderate rate, draining light pink urine  -Pt c/o discomfort in penis, urine noted to be leaking around catheter. Zuniga hand irrigated for small clots by RN. Retracted foreskin and swelling of glans noted. Foreskin pulled back down easily. Pt reports resolution of discomfort. Will continue to monitor.     Plan:  - CBI to continue at this time, the patient has light pink urine present in zuniga bag   - Monitor hgb  - If urine does start to clear CBI rate could be slowed to monitor tolerance   - Would recommend void trial 24 hours after CBI d/c to allow for continued monitoring of urine output   - Would continue abx 3-5 days  - Outpatient flex cystoscopy recommended under local anesthetic to confirm radiation cystitis  - With confirmed diagnosis would need referral to wound care/hyperbaric oxygen treatment   - Co-morbid conditions per primary team    Subjective:   Patient states overall he is feeling well today.  He denies any pain, fevers, chills or other concerning symptoms aside from feeling unsteady on his feet.      Objective:   Blood pressure (!) 111/43, pulse 66, temperature (!) 97.3 °F (36.3 °C), resp. rate 14, height 5' 11\" (1.803 m), weight 73.9 kg (163 lb), SpO2 99%.,Body mass index is 22.73 kg/m².    Intake/Output Summary (Last 24 hours) at 12/24/2023 1447  Last data filed at 12/24/2023 1443  Gross per 24 hour   Intake 600 ml   Output 4500 ml   Net -3900 ml     Invasive Devices       Peripheral Intravenous Line  Duration             Peripheral IV 12/21/23 Dorsal (posterior);Left;Proximal Forearm 3 days              Drain  Duration "             Continuous Bladder Irrigation Three-way 3 days                  Physical Exam:   General: no acute distress, pt appears well and comfortable   Skin: warm and dry to touch  Pulmonary: normal effort  Abdominal: soft, non-distended, non-tender abdomen   : patient has CBI currently running  - urine in collection bag is pink without clots. urine noted to be leaking around catheter. Ascencio hand irrigated for small clots by RN. Retracted foreskin and swelling of glans noted. Foreskin pulled back down easily. Pt reports resolution of discomfort.   Neuro: alert and oriented     Lab, Imaging and other studies:I have personally reviewed pertinent lab results.  , CBC:   Lab Results   Component Value Date    WBC 7.11 12/24/2023    HGB 7.2 (L) 12/24/2023    HCT 21.5 (L) 12/24/2023    MCV 96 12/24/2023     12/24/2023    RBC 2.24 (L) 12/24/2023    MCH 32.1 12/24/2023    MCHC 33.5 12/24/2023    RDW 14.9 12/24/2023    MPV 8.1 (L) 12/24/2023   , CMP:   Lab Results   Component Value Date    SODIUM 133 (L) 12/24/2023    K 4.1 12/24/2023     12/24/2023    CO2 25 12/24/2023    BUN 20 12/24/2023    CREATININE 1.06 12/24/2023    CALCIUM 7.9 (L) 12/24/2023    EGFR 59 12/24/2023     VTE Pharmacologic Prophylaxis: Reason for no pharmacologic prophylaxis on hold second to hematuria  VTE Mechanical Prophylaxis: sequential compression device    Angelica Sosa PA-C  12/24/2023

## 2023-12-25 LAB
ANION GAP SERPL CALCULATED.3IONS-SCNC: 6 MMOL/L
BUN SERPL-MCNC: 18 MG/DL (ref 5–25)
CALCIUM SERPL-MCNC: 8.3 MG/DL (ref 8.4–10.2)
CHLORIDE SERPL-SCNC: 101 MMOL/L (ref 96–108)
CO2 SERPL-SCNC: 25 MMOL/L (ref 21–32)
CREAT SERPL-MCNC: 0.98 MG/DL (ref 0.6–1.3)
ERYTHROCYTE [DISTWIDTH] IN BLOOD BY AUTOMATED COUNT: 14.8 % (ref 11.6–15.1)
GFR SERPL CREATININE-BSD FRML MDRD: 65 ML/MIN/1.73SQ M
GLUCOSE SERPL-MCNC: 93 MG/DL (ref 65–140)
HCT VFR BLD AUTO: 22 % (ref 36.5–49.3)
HGB BLD-MCNC: 7.4 G/DL (ref 12–17)
MCH RBC QN AUTO: 32 PG (ref 26.8–34.3)
MCHC RBC AUTO-ENTMCNC: 33.6 G/DL (ref 31.4–37.4)
MCV RBC AUTO: 95 FL (ref 82–98)
PLATELET # BLD AUTO: 266 THOUSANDS/UL (ref 149–390)
PMV BLD AUTO: 8.3 FL (ref 8.9–12.7)
POTASSIUM SERPL-SCNC: 4.2 MMOL/L (ref 3.5–5.3)
RBC # BLD AUTO: 2.31 MILLION/UL (ref 3.88–5.62)
SODIUM SERPL-SCNC: 132 MMOL/L (ref 135–147)
WBC # BLD AUTO: 7.13 THOUSAND/UL (ref 4.31–10.16)

## 2023-12-25 PROCEDURE — 80048 BASIC METABOLIC PNL TOTAL CA: CPT

## 2023-12-25 PROCEDURE — 85027 COMPLETE CBC AUTOMATED: CPT

## 2023-12-25 PROCEDURE — 99232 SBSQ HOSP IP/OBS MODERATE 35: CPT | Performed by: UROLOGY

## 2023-12-25 PROCEDURE — 99232 SBSQ HOSP IP/OBS MODERATE 35: CPT

## 2023-12-25 RX ADMIN — PRIMIDONE 50 MG: 50 TABLET ORAL at 21:28

## 2023-12-25 RX ADMIN — SENNOSIDES AND DOCUSATE SODIUM 1 TABLET: 8.6; 5 TABLET ORAL at 16:03

## 2023-12-25 RX ADMIN — POLYETHYLENE GLYCOL 3350 17 G: 17 POWDER, FOR SOLUTION ORAL at 08:15

## 2023-12-25 RX ADMIN — FOLIC ACID 1000 MCG: 1 TABLET ORAL at 08:16

## 2023-12-25 RX ADMIN — POLYETHYLENE GLYCOL 3350 17 G: 17 POWDER, FOR SOLUTION ORAL at 16:03

## 2023-12-25 RX ADMIN — DRONEDARONE 400 MG: 400 TABLET, FILM COATED ORAL at 16:03

## 2023-12-25 RX ADMIN — LUBIPROSTONE 8 MCG: 8 CAPSULE, GELATIN COATED ORAL at 08:16

## 2023-12-25 RX ADMIN — LUBIPROSTONE 8 MCG: 8 CAPSULE, GELATIN COATED ORAL at 16:03

## 2023-12-25 RX ADMIN — CYANOCOBALAMIN TAB 500 MCG 1000 MCG: 500 TAB at 08:16

## 2023-12-25 RX ADMIN — PRIMIDONE 50 MG: 50 TABLET ORAL at 08:16

## 2023-12-25 RX ADMIN — SENNOSIDES AND DOCUSATE SODIUM 1 TABLET: 8.6; 5 TABLET ORAL at 08:16

## 2023-12-25 RX ADMIN — PANTOPRAZOLE SODIUM 40 MG: 40 TABLET, DELAYED RELEASE ORAL at 08:16

## 2023-12-25 RX ADMIN — CEFTRIAXONE 2000 MG: 2 INJECTION, SOLUTION INTRAVENOUS at 16:03

## 2023-12-25 RX ADMIN — DRONEDARONE 400 MG: 400 TABLET, FILM COATED ORAL at 08:16

## 2023-12-25 RX ADMIN — FERROUS SULFATE TAB 325 MG (65 MG ELEMENTAL FE) 325 MG: 325 (65 FE) TAB at 08:16

## 2023-12-25 NOTE — ASSESSMENT & PLAN NOTE
Acute urinary retention secondary to severe constipation also known history of prostate cancer.  Zuniga catheter placed as patient bladder scan for 800 cc on presentation to ED and 1800 ml removed in zuniga. Also placed on Flomax   Zuniga removed 12/15 and placed on voiding trial. Some hypotension noted which may be secondary to Flomax and placed on midodrine - will dc flomax and midodrine  Patient with significant amount of hematuria still.   Urology recommending CBI due to hematuria and multiple clots causing blockages. Zuniga exchanged for 22Fr  Still with hematuria, per urology: If urine starts to clear, CBI rate could be slowed - recommend void trial 24 hours after CBI d/c to allow for continued monitoring of urine output.  Can hand irrigate every 4 hours or more frequently as needed for clots.  If we cannot clear the urine within the next 24 to 48 hours, would consider cystoscopy for clot evacuation

## 2023-12-25 NOTE — PROGRESS NOTES
[facility]  Progress Note  Name: Miguel Angel Ortega I  MRN: [id number]  Unit/Bed#: -Theo I Date of Admission: 12/13/2023   Date of Service: 12/25/2023 I Hospital Day: 12    Assessment/Plan   * Stercoral colitis  Assessment & Plan  Patient presents with symptoms of constipation for the last few days.  He saw GI outpatient.  He was started on MiraLAX patient states he went 26 times to the bathroom yesterday had very small amounts of stool and felt like he just could not empty out his bowel movement.  Also had a fall yesterday.  Ct abd pelvis shows Abundant stool in the colon and rectal vault compatible with constipation in the appropriate clinical context. No bowel obstruction.  Minimal perirectal fat stranding suggestive of mild or early stercoral proctitis. No perirectal abscess  Placed on MiraLAX daily and placed on Dulcolax suppository x 2 daily and also placed 3 enemas    GI consult placed - miralax BID, colace BID and Amitiza 8mcg BID. Dulcolax suppository daily prn. Advance to regular diet. Outpatient follow up    Gram-negative bacteremia  Assessment & Plan  Proteus bacteremia from gi source.  ID consulted: increase rocephin to 2g q24h, f/u blood and urine cultures, GI consult for persistent constipation/fecal impaction, continue zuniga, anticipate 10 days abx therapy through 12/26 with cefuroxime 500 bid on dc.   BC no growth after 5 days    Acute urinary retention  Assessment & Plan  Acute urinary retention secondary to severe constipation also known history of prostate cancer.  Zuniga catheter placed as patient bladder scan for 800 cc on presentation to ED and 1800 ml removed in zuniga. Also placed on Flomax   Zuniga removed 12/15 and placed on voiding trial. Some hypotension noted which may be secondary to Flomax and placed on midodrine - will dc flomax and midodrine  Patient with significant amount of hematuria still.   Urology recommending CBI due to hematuria and multiple clots  causing blockages. Ascencio exchanged for 22Fr  Still with hematuria, per urology: If urine starts to clear, CBI rate could be slowed - recommend void trial 24 hours after CBI d/c to allow for continued monitoring of urine output.  Can hand irrigate every 4 hours or more frequently as needed for clots.  If we cannot clear the urine within the next 24 to 48 hours, would consider cystoscopy for clot evacuation    Acute on chronic anemia  Assessment & Plan  Hemoglobin dropped from 10.3-8.7.  low iron and B12 levels. placed on vit b12 and venofer  Resumed home iron supplement  CBC stable  neg stool occult    Lab Results   Component Value Date    HGB 7.4 (L) 12/25/2023    HGB 7.2 (L) 12/24/2023    HGB 7.2 (L) 12/23/2023       Orthostatic hypotension  Assessment & Plan  Patient has been having episodes of orthostatic hypotension outpatient. Orthostatic vitals inpatient positive  Cardiology consult: Suspect this is secondary to poor PO intake and blood loss. Please encourage PO intake. Nursing instructed to apply compression socks while awake. ECG demonstrates sinus bradycardia. HR's controlled. OK to continue digoxin and Multaq.   Will place on gentle IVF rehydration - stopped  Tom stockings, slow position changes. Can add midodrine if becomes symptomatic from hypotension    Permanent atrial fibrillation (HCC)  Assessment & Plan  Continue Multaq, digoxin which are his chronic meds.  Not on anticoagulation  Dig level acceptable  Appreciate cardio eval - continue multaq. Change digoxin to every other day. Was on xarelto in the past - discuss outpatient with his cardiologist about resumption    Prostate cancer metastatic to bone (HCC)  Assessment & Plan  Further outpatient follow-up with urology.  Placed on Flomax for acute urinary retention hold Casodex while inpatient.  stop flomax due to urinary urgency and orthostatic hypotension         VTE Pharmacologic Prophylaxis:   High Risk (Score >/= 5) - Pharmacological DVT  Prophylaxis Contraindicated. Sequential Compression Devices Ordered.    Mobility:   Basic Mobility Inpatient Raw Score: 13  JH-HLM Goal: 4: Move to chair/commode  JH-HLM Achieved: 4: Move to chair/commode  HLM Goal achieved. Continue to encourage appropriate mobility.    Patient Centered Rounds: I performed bedside rounds with nursing staff today.   Discussions with Specialists or Other Care Team Provider: nursing, case management, urology    Education and Discussions with Family / Patient: Updated  (friend) via phone.    Total Time Spent on Date of Encounter in care of patient: 37 mins. This time was spent on one or more of the following: performing physical exam; counseling and coordination of care; obtaining or reviewing history; documenting in the medical record; reviewing/ordering tests, medications or procedures; communicating with other healthcare professionals and discussing with patient's family/caregivers.    Current Length of Stay: 12 day(s)  Current Patient Status: Inpatient   Certification Statement: The patient will continue to require additional inpatient hospital stay due to hematuria, CBI  Discharge Plan: Anticipate discharge in 48-72 hrs to prior assisted or independent living facility.    Code Status: Level 3 - DNAR and DNI    Subjective:   Patient seen and examined at bedside.  He is doing okay today.  States that he did not have a good night.  He had some clots in his bladder which were causing spasms and were very painful to him.  After the clots were irrigated he had significant improvement of the pain.  Currently without any further pain.  Denies any nausea, vomiting, diarrhea, constipation, fever, chills, chest pain, shortness of breath.     Objective:     Vitals:   Temp (24hrs), Av.5 °F (36.9 °C), Min:98.4 °F (36.9 °C), Max:98.6 °F (37 °C)    Temp:  [98.4 °F (36.9 °C)-98.6 °F (37 °C)] 98.6 °F (37 °C)  HR:  [66-76] 66  Resp:  [16-20] 20  BP: (146-167)/(51-63) 146/51  SpO2:   [97 %-100 %] 97 %  Body mass index is 22.73 kg/m².     Input and Output Summary (last 24 hours):     Intake/Output Summary (Last 24 hours) at 12/25/2023 1423  Last data filed at 12/25/2023 1149  Gross per 24 hour   Intake 900 ml   Output 6685 ml   Net -5785 ml       Physical Exam:   Physical Exam  Vitals reviewed.   Constitutional:       General: He is not in acute distress.     Appearance: He is not ill-appearing or toxic-appearing.   HENT:      Head: Normocephalic and atraumatic.      Mouth/Throat:      Mouth: Mucous membranes are moist.   Cardiovascular:      Rate and Rhythm: Normal rate and regular rhythm.      Heart sounds: No murmur heard.  Pulmonary:      Effort: No respiratory distress.      Breath sounds: No stridor. No wheezing.      Comments: Saturating well on room air  Abdominal:      General: Bowel sounds are normal. There is no distension.      Palpations: Abdomen is soft. There is no mass.      Tenderness: There is no abdominal tenderness.   Genitourinary:     Comments: Ascencio in place with cherry colored urine  Musculoskeletal:      Right lower leg: No edema.      Left lower leg: No edema.   Skin:     General: Skin is warm and dry.      Coloration: Skin is pale.   Neurological:      General: No focal deficit present.      Mental Status: He is alert and oriented to person, place, and time.   Psychiatric:         Mood and Affect: Mood normal.         Behavior: Behavior normal.          Additional Data:     Labs:  Results from last 7 days   Lab Units 12/25/23  0445   WBC Thousand/uL 7.13   HEMOGLOBIN g/dL 7.4*   HEMATOCRIT % 22.0*   PLATELETS Thousands/uL 266     Results from last 7 days   Lab Units 12/25/23  0445   SODIUM mmol/L 132*   POTASSIUM mmol/L 4.2   CHLORIDE mmol/L 101   CO2 mmol/L 25   BUN mg/dL 18   CREATININE mg/dL 0.98   ANION GAP mmol/L 6   CALCIUM mg/dL 8.3*   GLUCOSE RANDOM mg/dL 93                       Lines/Drains:  Invasive Devices       Peripheral Intravenous Line  Duration              Peripheral IV 12/25/23 Right;Ventral (anterior) Forearm <1 day              Drain  Duration             Continuous Bladder Irrigation Three-way 4 days                          Imaging: No pertinent imaging reviewed.    Recent Cultures (last 7 days):   Results from last 7 days   Lab Units 12/19/23  1548 12/19/23  0803   BLOOD CULTURE  No Growth After 5 Days.  No Growth After 5 Days.  --    URINE CULTURE   --  No Growth <1000 cfu/mL       Last 24 Hours Medication List:   Current Facility-Administered Medications   Medication Dose Route Frequency Provider Last Rate    acetaminophen  650 mg Oral Q6H PRN Sherita Martinez MD      bisacodyl  10 mg Rectal Daily PRN Remedios Cruz PA-C      cefTRIAXone  2,000 mg Intravenous Q24H Suha Alvarado PA-C 2,000 mg (12/24/23 1637)    cyanocobalamin  1,000 mcg Oral Daily Sherita Martinez MD      digoxin  125 mcg Oral Every Other Day Remedios Cruz PA-C      dronedarone  400 mg Oral BID With Meals Sherita Martinez MD      ferrous sulfate  325 mg Oral Daily With Breakfast Remedios Cruz PA-C      folic acid  1,000 mcg Oral Daily Sherita Martinez MD      glycerin-hypromellose-  1 drop Both Eyes Q4H PRN Sherita Martinez MD      hydrocortisone   Topical 4x Daily PRN Sherita Martinez MD      lubiprostone  8 mcg Oral BID With Meals Remedios Cruz PA-C      melatonin  3 mg Oral HS PRN Suha Alvarado PA-C      ondansetron  4 mg Intravenous Q6H PRN Sherita Martinez MD      pantoprazole  40 mg Oral Daily Sherita Martinez MD      polyethylene glycol  17 g Oral BID Sherita Martinez MD      primidone  50 mg Oral Q12H YAMINI Sherita Martinez MD      senna-docusate sodium  1 tablet Oral BID Remedios Cruz PA-C          Today, Patient Was Seen By: Suha Alvarado PA-C    **Please Note: This note may have been constructed using a voice recognition system.**

## 2023-12-25 NOTE — PLAN OF CARE
Problem: Potential for Falls  Goal: Patient will remain free of falls  Description: INTERVENTIONS:  - Educate patient/family on patient safety including physical limitations  - Instruct patient to call for assistance with activity   - Consult OT/PT to assist with strengthening/mobility   - Keep Call bell within reach  - Keep bed low and locked with side rails adjusted as appropriate  - Keep care items and personal belongings within reach  - Initiate and maintain comfort rounds  - Make Fall Risk Sign visible to staff  - Offer Toileting every 2 Hours, in advance of need  - Initiate/Maintain 2alarm  - Obtain necessary fall risk management equipment: 2  - Apply yellow socks and bracelet for high fall risk patients  - Consider moving patient to room near nurses station  Outcome: Progressing     Problem: PAIN - ADULT  Goal: Verbalizes/displays adequate comfort level or baseline comfort level  Description: Interventions:  - Encourage patient to monitor pain and request assistance  - Assess pain using appropriate pain scale  - Administer analgesics based on type and severity of pain and evaluate response  - Implement non-pharmacological measures as appropriate and evaluate response  - Consider cultural and social influences on pain and pain management  - Notify physician/advanced practitioner if interventions unsuccessful or patient reports new pain  Outcome: Progressing     Problem: INFECTION - ADULT  Goal: Absence or prevention of progression during hospitalization  Description: INTERVENTIONS:  - Assess and monitor for signs and symptoms of infection  - Monitor lab/diagnostic results  - Monitor all insertion sites, i.e. indwelling lines, tubes, and drains  - Monitor endotracheal if appropriate and nasal secretions for changes in amount and color  - [place] appropriate cooling/warming therapies per order  - Administer medications as ordered  - Instruct and encourage patient and family to use good hand hygiene  technique  - Identify and instruct in appropriate isolation precautions for identified infection/condition  Outcome: Progressing     Problem: SAFETY ADULT  Goal: Patient will remain free of falls  Description: INTERVENTIONS:  - Educate patient/family on patient safety including physical limitations  - Instruct patient to call for assistance with activity   - Consult OT/PT to assist with strengthening/mobility   - Keep Call bell within reach  - Keep bed low and locked with side rails adjusted as appropriate  - Keep care items and personal belongings within reach  - Initiate and maintain comfort rounds  - Make Fall Risk Sign visible to staff  - Offer Toileting every 2 Hours, in advance of need  - Initiate/Maintain 2alarm  - Obtain necessary fall risk management equipment: 2  - Apply yellow socks and bracelet for high fall risk patients  - Consider moving patient to room near nurses station  Outcome: Progressing  Goal: Maintain or return to baseline ADL function  Description: INTERVENTIONS:  -  Assess patient's ability to carry out ADLs; assess patient's baseline for ADL function and identify physical deficits which impact ability to perform ADLs (bathing, care of mouth/teeth, toileting, grooming, dressing, etc.)  - Assess/evaluate cause of self-care deficits   - Assess range of motion  - Assess patient's mobility; develop plan if impaired  - Assess patient's need for assistive devices and provide as appropriate  - Encourage maximum independence but intervene and supervise when necessary  - Involve family in performance of ADLs  - Assess for home care needs following discharge   - Consider OT consult to assist with ADL evaluation and planning for discharge  - Provide patient education as appropriate  Outcome: Progressing  Goal: Maintains/Returns to pre admission functional level  Description: INTERVENTIONS:  - Perform AM-PAC 6 Click Basic Mobility/ Daily Activity assessment daily.  - Set and communicate daily mobility  goal to care team and patient/family/caregiver.   - Collaborate with rehabilitation services on mobility goals if consulted  - Perform Range of Motion 2 times a day.  - Reposition patient every 2 hours.  - Dangle patient 2 times a day  - Stand patient 2 times a day  - Ambulate patient 2 times a day  - Out of bed to chair 2 times a day   - Out of bed for meals 2 times a day  - Out of bed for toileting  - Record patient progress and toleration of activity level   Outcome: Progressing     Problem: DISCHARGE PLANNING  Goal: Discharge to home or other facility with appropriate resources  Description: INTERVENTIONS:  - Identify barriers to discharge w/patient and caregiver  - Arrange for needed discharge resources and transportation as appropriate  - Identify discharge learning needs (meds, wound care, etc.)  - Arrange for interpretive services to assist at discharge as needed  - Refer to Case Management Department for coordinating discharge planning if the patient needs post-hospital services based on physician/advanced practitioner order or complex needs related to functional status, cognitive ability, or social support system  Outcome: Progressing     Problem: Knowledge Deficit  Goal: Patient/family/caregiver demonstrates understanding of disease process, treatment plan, medications, and discharge instructions  Description: Complete learning assessment and assess knowledge base.  Interventions:  - Provide teaching at level of understanding  - Provide teaching via preferred learning methods  Outcome: Progressing     Problem: GASTROINTESTINAL - ADULT  Goal: Minimal or absence of nausea and/or vomiting  Description: INTERVENTIONS:  - Administer IV fluids if ordered to ensure adequate hydration  - Maintain NPO status until nausea and vomiting are resolved  - Nasogastric tube if ordered  - Administer ordered antiemetic medications as needed  - Provide nonpharmacologic comfort measures as appropriate  - Advance diet as  tolerated, if ordered  - Consider nutrition services referral to assist patient with adequate nutrition and appropriate food choices  Outcome: Progressing  Goal: Maintains or returns to baseline bowel function  Description: INTERVENTIONS:  - Assess bowel function monitor bowel regimen  - Encourage oral fluids to ensure adequate hydration  - Administer IV fluids if ordered to ensure adequate hydration  - Administer ordered medications as needed  - Encourage mobilization and activity  - Consider nutritional services referral to assist patient with adequate nutrition and appropriate food choices  Outcome: Progressing  Goal: Maintains adequate nutritional intake  Description: INTERVENTIONS:  - Monitor percentage of each meal consumed  - Identify factors contributing to decreased intake, treat as appropriate  - Assist with meals as needed  - Monitor I&O, weight, and lab values if indicated  - Obtain nutrition services referral as needed  Outcome: Progressing  Goal: Oral mucous membranes remain intact  Description: INTERVENTIONS  - Assess oral mucosa and hygiene practices  - Implement preventative oral hygiene regimen  - Implement oral medicated treatments as ordered  - Initiate Nutrition services referral as needed  Outcome: Progressing     Problem: GENITOURINARY - ADULT  Goal: Maintains or returns to baseline urinary function  Description: INTERVENTIONS:  - Assess urinary function  - Encourage oral fluids to ensure adequate hydration if ordered  - Administer IV fluids as ordered to ensure adequate hydration  - Administer ordered medications as needed  - Offer frequent toileting  - Follow urinary retention protocol if ordered  Outcome: Progressing  Goal: Absence of urinary retention  Description: INTERVENTIONS:  - Assess patient’s ability to void and empty bladder  - Monitor I/O  - Bladder scan as needed  - Discuss with physician/AP medications to alleviate retention as needed  - Discuss catheterization for long term  situations as appropriate  Outcome: Progressing  Goal: Urinary catheter remains patent  Description: INTERVENTIONS:  - Assess patency of urinary catheter  - If patient has a chronic zuniga, consider changing catheter if non-functioning  - Follow guidelines for intermittent irrigation of non-functioning urinary catheter  Outcome: Progressing     Problem: Prexisting or High Potential for Compromised Skin Integrity  Goal: Skin integrity is maintained or improved  Description: INTERVENTIONS:  - Identify patients at risk for skin breakdown  - Assess and monitor skin integrity  - Assess and monitor nutrition and hydration status  - Monitor labs   - Assess for incontinence   - Turn and reposition patient  - Assist with mobility/ambulation  - Relieve pressure over bony prominences  - Avoid friction and shearing  - Provide appropriate hygiene as needed including keeping skin clean and dry  - Evaluate need for skin moisturizer/barrier cream  - Collaborate with interdisciplinary team   - Patient/family teaching  - Consider wound care consult   Outcome: Progressing     Problem: Nutrition/Hydration-ADULT  Goal: Nutrient/Hydration intake appropriate for improving, restoring or maintaining nutritional needs  Description: Monitor and assess patient's nutrition/hydration status for malnutrition. Collaborate with interdisciplinary team and initiate plan and interventions as ordered.  Monitor patient's weight and dietary intake as ordered or per policy. Utilize nutrition screening tool and intervene as necessary. Determine patient's food preferences and provide high-protein, high-caloric foods as appropriate.     INTERVENTIONS:  - Monitor oral intake, urinary output, labs, and treatment plans  - Assess nutrition and hydration status and recommend course of action  - Evaluate amount of meals eaten  - Assist patient with eating if necessary   - Allow adequate time for meals  - Recommend/ encourage appropriate diets, oral nutritional  supplements, and vitamin/mineral supplements  - Order, calculate, and assess calorie counts as needed  - Recommend, monitor, and adjust tube feedings and TPN/PPN based on assessed needs  - Assess need for intravenous fluids  - Provide specific nutrition/hydration education as appropriate  - Include patient/family/caregiver in decisions related to nutrition  Outcome: Progressing

## 2023-12-25 NOTE — PROGRESS NOTES
"Progress Note - Urology   Miguel Angel Ortega 89 y.o. male MRN: [id number]  Unit/Bed#: -01 Encounter: [id number]    Assessment:  89 y.o. male with gross hematuria, likely related to radiation cystitis  -admitted 12/13 with severe obstipation, developed urinary retension  - Afebrile, VSS on room air   - WBC 7.13 (7, 6, 8, 7, 7)   - Hgb 7.4 (7.2, 7.2, 7.7, 7.8, 8.3)   - Hyponatremia 132 (133,134, 134)    - On CBI which is running at a moderate rate, draining light pink urine    -Pt had further discomfort in penis with spasms and urine noted to be leaking around catheter overnight. Zuniga hand irrigated for small clots by RN. Irrigated again now for several moderate-sized clots.      Plan:  - CBI to continue at this time, the patient has light pink urine present in zuniga bag   -hand irrigation of zuniga every 4 hrs or more frequently as needed for clots  - Monitor hgb  - If urine does start to clear CBI rate could be slowed to monitor tolerance   - Would recommend void trial 24 hours after CBI d/c to allow for continued monitoring of urine output   - Would continue abx 3-5 days  - Outpatient flex cystoscopy recommended under local anesthetic to confirm radiation cystitis  - With confirmed diagnosis would need referral to wound care/hyperbaric oxygen treatment   - Co-morbid conditions per primary team    Subjective:   Patient states overall he is feeling well today.  He denies any pain, fevers, chills or other concerning symptoms aside from feeling unsteady on his feet.      Objective:   Blood pressure 146/51, pulse 66, temperature 98.6 °F (37 °C), resp. rate 20, height 5' 11\" (1.803 m), weight 73.9 kg (163 lb), SpO2 97%.,Body mass index is 22.73 kg/m².    Intake/Output Summary (Last 24 hours) at 12/25/2023 1102  Last data filed at 12/25/2023 1030  Gross per 24 hour   Intake 1260 ml   Output 6535 ml   Net -5275 ml     Invasive Devices       Peripheral Intravenous Line  Duration             Peripheral IV 12/25/23 " Right;Ventral (anterior) Forearm <1 day              Drain  Duration             Continuous Bladder Irrigation Three-way 3 days                  Physical Exam:   General: no acute distress, pt appears well and comfortable   Skin: warm and dry to touch  Pulmonary: normal effort  Abdominal: soft, non-distended, non-tender abdomen   : patient has CBI currently running  - urine in collection bag is pink without clots. urine noted to be leaking around catheter. Ascencio hand irrigated for several moderate-sized clots  Neuro: alert and oriented     Lab, Imaging and other studies:I have personally reviewed pertinent lab results.  , CBC:   Lab Results   Component Value Date    WBC 7.13 12/25/2023    HGB 7.4 (L) 12/25/2023    HCT 22.0 (L) 12/25/2023    MCV 95 12/25/2023     12/25/2023    RBC 2.31 (L) 12/25/2023    MCH 32.0 12/25/2023    MCHC 33.6 12/25/2023    RDW 14.8 12/25/2023    MPV 8.3 (L) 12/25/2023   , CMP:   Lab Results   Component Value Date    SODIUM 132 (L) 12/25/2023    K 4.2 12/25/2023     12/25/2023    CO2 25 12/25/2023    BUN 18 12/25/2023    CREATININE 0.98 12/25/2023    CALCIUM 8.3 (L) 12/25/2023    EGFR 65 12/25/2023     VTE Pharmacologic Prophylaxis: Reason for no pharmacologic prophylaxis on hold second to hematuria  VTE Mechanical Prophylaxis: sequential compression device    Angelica Sosa PA-C  12/25/2023

## 2023-12-25 NOTE — PLAN OF CARE
Problem: Potential for Falls  Goal: Patient will remain free of falls  Description: INTERVENTIONS:  - Educate patient/family on patient safety including physical limitations  - Instruct patient to call for assistance with activity   - Consult OT/PT to assist with strengthening/mobility   - Keep Call bell within reach  - Keep bed low and locked with side rails adjusted as appropriate  - Keep care items and personal belongings within reach  - Initiate and maintain comfort rounds  - Make Fall Risk Sign visible to staff  - Offer Toileting every 2 Hours, in advance of need  - Initiate/Maintain bed alarm  - Obtain necessary fall risk management equipment:    - Apply yellow socks and bracelet for high fall risk patients  - Consider moving patient to room near nurses station  Outcome: Progressing     Problem: PAIN - ADULT  Goal: Verbalizes/displays adequate comfort level or baseline comfort level  Description: Interventions:  - Encourage patient to monitor pain and request assistance  - Assess pain using appropriate pain scale  - Administer analgesics based on type and severity of pain and evaluate response  - Implement non-pharmacological measures as appropriate and evaluate response  - Consider cultural and social influences on pain and pain management  - Notify physician/advanced practitioner if interventions unsuccessful or patient reports new pain  Outcome: Progressing     Problem: INFECTION - ADULT  Goal: Absence or prevention of progression during hospitalization  Description: INTERVENTIONS:  - Assess and monitor for signs and symptoms of infection  - Monitor lab/diagnostic results  - Monitor all insertion sites, i.e. indwelling lines, tubes, and drains  - Monitor endotracheal if appropriate and nasal secretions for changes in amount and color  - [place] appropriate cooling/warming therapies per order  - Administer medications as ordered  - Instruct and encourage patient and family to use good hand hygiene  technique  - Identify and instruct in appropriate isolation precautions for identified infection/condition  Outcome: Progressing     Problem: SAFETY ADULT  Goal: Patient will remain free of falls  Description: INTERVENTIONS:  - Educate patient/family on patient safety including physical limitations  - Instruct patient to call for assistance with activity   - Consult OT/PT to assist with strengthening/mobility   - Keep Call bell within reach  - Keep bed low and locked with side rails adjusted as appropriate  - Keep care items and personal belongings within reach  - Initiate and maintain comfort rounds  - Make Fall Risk Sign visible to staff  - Offer Toileting every   Hours, in advance of need  - Initiate/Maintain  alarm  - Obtain necessary fall risk management equipment:    - Apply yellow socks and bracelet for high fall risk patients  - Consider moving patient to room near nurses station  Outcome: Progressing  Goal: Maintain or return to baseline ADL function  Description: INTERVENTIONS:  -  Assess patient's ability to carry out ADLs; assess patient's baseline for ADL function and identify physical deficits which impact ability to perform ADLs (bathing, care of mouth/teeth, toileting, grooming, dressing, etc.)  - Assess/evaluate cause of self-care deficits   - Assess range of motion  - Assess patient's mobility; develop plan if impaired  - Assess patient's need for assistive devices and provide as appropriate  - Encourage maximum independence but intervene and supervise when necessary  - Involve family in performance of ADLs  - Assess for home care needs following discharge   - Consider OT consult to assist with ADL evaluation and planning for discharge  - Provide patient education as appropriate  Outcome: Progressing  Goal: Maintains/Returns to pre admission functional level  Description: INTERVENTIONS:  - Perform AM-PAC 6 Click Basic Mobility/ Daily Activity assessment daily.  - Set and communicate daily mobility  goal to care team and patient/family/caregiver.   - Collaborate with rehabilitation services on mobility goals if consulted  - Perform Range of Motion 4 times a day.  - Reposition patient every 2 hours.  - Dangle patient 3 times a day  - Stand patient 3 times a day  - Ambulate patient 3 times a day  - Out of bed to chair 3 times a day   - Out of bed for meals 3 times a day  - Out of bed for toileting  - Record patient progress and toleration of activity level   Outcome: Progressing     Problem: DISCHARGE PLANNING  Goal: Discharge to home or other facility with appropriate resources  Description: INTERVENTIONS:  - Identify barriers to discharge w/patient and caregiver  - Arrange for needed discharge resources and transportation as appropriate  - Identify discharge learning needs (meds, wound care, etc.)  - Arrange for interpretive services to assist at discharge as needed  - Refer to Case Management Department for coordinating discharge planning if the patient needs post-hospital services based on physician/advanced practitioner order or complex needs related to functional status, cognitive ability, or social support system  Outcome: Progressing     Problem: Knowledge Deficit  Goal: Patient/family/caregiver demonstrates understanding of disease process, treatment plan, medications, and discharge instructions  Description: Complete learning assessment and assess knowledge base.  Interventions:  - Provide teaching at level of understanding  - Provide teaching via preferred learning methods  Outcome: Progressing     Problem: GASTROINTESTINAL - ADULT  Goal: Minimal or absence of nausea and/or vomiting  Description: INTERVENTIONS:  - Administer IV fluids if ordered to ensure adequate hydration  - Maintain NPO status until nausea and vomiting are resolved  - Nasogastric tube if ordered  - Administer ordered antiemetic medications as needed  - Provide nonpharmacologic comfort measures as appropriate  - Advance diet as  tolerated, if ordered  - Consider nutrition services referral to assist patient with adequate nutrition and appropriate food choices  Outcome: Progressing  Goal: Maintains or returns to baseline bowel function  Description: INTERVENTIONS:  - Assess bowel function monitor bowel regimen  - Encourage oral fluids to ensure adequate hydration  - Administer IV fluids if ordered to ensure adequate hydration  - Administer ordered medications as needed  - Encourage mobilization and activity  - Consider nutritional services referral to assist patient with adequate nutrition and appropriate food choices  Outcome: Progressing  Goal: Maintains adequate nutritional intake  Description: INTERVENTIONS:  - Monitor percentage of each meal consumed  - Identify factors contributing to decreased intake, treat as appropriate  - Assist with meals as needed  - Monitor I&O, weight, and lab values if indicated  - Obtain nutrition services referral as needed  Outcome: Progressing  Goal: Oral mucous membranes remain intact  Description: INTERVENTIONS  - Assess oral mucosa and hygiene practices  - Implement preventative oral hygiene regimen  - Implement oral medicated treatments as ordered  - Initiate Nutrition services referral as needed  Outcome: Progressing     Problem: GENITOURINARY - ADULT  Goal: Maintains or returns to baseline urinary function  Description: INTERVENTIONS:  - Assess urinary function  - Encourage oral fluids to ensure adequate hydration if ordered  - Administer IV fluids as ordered to ensure adequate hydration  - Administer ordered medications as needed  - Offer frequent toileting  - Follow urinary retention protocol if ordered  Outcome: Progressing  Goal: Absence of urinary retention  Description: INTERVENTIONS:  - Assess patient’s ability to void and empty bladder  - Monitor I/O  - Bladder scan as needed  - Discuss with physician/AP medications to alleviate retention as needed  - Discuss catheterization for long term  situations as appropriate  Outcome: Progressing  Goal: Urinary catheter remains patent  Description: INTERVENTIONS:  - Assess patency of urinary catheter  - If patient has a chronic zuniga, consider changing catheter if non-functioning  - Follow guidelines for intermittent irrigation of non-functioning urinary catheter  Outcome: Progressing     Problem: Prexisting or High Potential for Compromised Skin Integrity  Goal: Skin integrity is maintained or improved  Description: INTERVENTIONS:  - Identify patients at risk for skin breakdown  - Assess and monitor skin integrity  - Assess and monitor nutrition and hydration status  - Monitor labs   - Assess for incontinence   - Turn and reposition patient  - Assist with mobility/ambulation  - Relieve pressure over bony prominences  - Avoid friction and shearing  - Provide appropriate hygiene as needed including keeping skin clean and dry  - Evaluate need for skin moisturizer/barrier cream  - Collaborate with interdisciplinary team   - Patient/family teaching  - Consider wound care consult   Outcome: Progressing     Problem: Nutrition/Hydration-ADULT  Goal: Nutrient/Hydration intake appropriate for improving, restoring or maintaining nutritional needs  Description: Monitor and assess patient's nutrition/hydration status for malnutrition. Collaborate with interdisciplinary team and initiate plan and interventions as ordered.  Monitor patient's weight and dietary intake as ordered or per policy. Utilize nutrition screening tool and intervene as necessary. Determine patient's food preferences and provide high-protein, high-caloric foods as appropriate.     INTERVENTIONS:  - Monitor oral intake, urinary output, labs, and treatment plans  - Assess nutrition and hydration status and recommend course of action  - Evaluate amount of meals eaten  - Assist patient with eating if necessary   - Allow adequate time for meals  - Recommend/ encourage appropriate diets, oral nutritional  supplements, and vitamin/mineral supplements  - Order, calculate, and assess calorie counts as needed  - Recommend, monitor, and adjust tube feedings and TPN/PPN based on assessed needs  - Assess need for intravenous fluids  - Provide specific nutrition/hydration education as appropriate  - Include patient/family/caregiver in decisions related to nutrition  Outcome: Progressing

## 2023-12-25 NOTE — ASSESSMENT & PLAN NOTE
Hemoglobin dropped from 10.3-8.7.  low iron and B12 levels. placed on vit b12 and venofer  Resumed home iron supplement  CBC stable  neg stool occult    Lab Results   Component Value Date    HGB 7.4 (L) 12/25/2023    HGB 7.2 (L) 12/24/2023    HGB 7.2 (L) 12/23/2023

## 2023-12-25 NOTE — ASSESSMENT & PLAN NOTE
Proteus bacteremia from gi source.  ID consulted: increase rocephin to 2g q24h, f/u blood and urine cultures, GI consult for persistent constipation/fecal impaction, continue zuniga, anticipate 10 days abx therapy through 12/26 with cefuroxime 500 bid on dc.   BC no growth after 5 days

## 2023-12-26 ENCOUNTER — APPOINTMENT (INPATIENT)
Dept: CT IMAGING | Facility: HOSPITAL | Age: 88
End: 2023-12-26
Payer: MEDICARE

## 2023-12-26 LAB
ABO GROUP BLD: NORMAL
ABO GROUP BLD: NORMAL
ANION GAP SERPL CALCULATED.3IONS-SCNC: 6 MMOL/L
BLD GP AB SCN SERPL QL: NEGATIVE
BUN SERPL-MCNC: 18 MG/DL (ref 5–25)
CALCIUM SERPL-MCNC: 8.1 MG/DL (ref 8.4–10.2)
CHLORIDE SERPL-SCNC: 102 MMOL/L (ref 96–108)
CO2 SERPL-SCNC: 24 MMOL/L (ref 21–32)
CREAT SERPL-MCNC: 0.96 MG/DL (ref 0.6–1.3)
ERYTHROCYTE [DISTWIDTH] IN BLOOD BY AUTOMATED COUNT: 15.4 % (ref 11.6–15.1)
GFR SERPL CREATININE-BSD FRML MDRD: 66 ML/MIN/1.73SQ M
GLUCOSE SERPL-MCNC: 91 MG/DL (ref 65–140)
HCT VFR BLD AUTO: 23.1 % (ref 36.5–49.3)
HGB BLD-MCNC: 7.2 G/DL (ref 12–17)
MCH RBC QN AUTO: 32 PG (ref 26.8–34.3)
MCHC RBC AUTO-ENTMCNC: 31.2 G/DL (ref 31.4–37.4)
MCV RBC AUTO: 103 FL (ref 82–98)
PLATELET # BLD AUTO: 230 THOUSANDS/UL (ref 149–390)
PMV BLD AUTO: 8.4 FL (ref 8.9–12.7)
POTASSIUM SERPL-SCNC: 4.2 MMOL/L (ref 3.5–5.3)
RBC # BLD AUTO: 2.25 MILLION/UL (ref 3.88–5.62)
RH BLD: POSITIVE
RH BLD: POSITIVE
SODIUM SERPL-SCNC: 132 MMOL/L (ref 135–147)
SPECIMEN EXPIRATION DATE: NORMAL
WBC # BLD AUTO: 5.66 THOUSAND/UL (ref 4.31–10.16)

## 2023-12-26 PROCEDURE — NC001 PR NO CHARGE: Performed by: UROLOGY

## 2023-12-26 PROCEDURE — 86920 COMPATIBILITY TEST SPIN: CPT

## 2023-12-26 PROCEDURE — 86850 RBC ANTIBODY SCREEN: CPT

## 2023-12-26 PROCEDURE — 86901 BLOOD TYPING SEROLOGIC RH(D): CPT

## 2023-12-26 PROCEDURE — 74176 CT ABD & PELVIS W/O CONTRAST: CPT

## 2023-12-26 PROCEDURE — G1004 CDSM NDSC: HCPCS

## 2023-12-26 PROCEDURE — 99232 SBSQ HOSP IP/OBS MODERATE 35: CPT

## 2023-12-26 PROCEDURE — 80048 BASIC METABOLIC PNL TOTAL CA: CPT

## 2023-12-26 PROCEDURE — 30233N1 TRANSFUSION OF NONAUTOLOGOUS RED BLOOD CELLS INTO PERIPHERAL VEIN, PERCUTANEOUS APPROACH: ICD-10-PCS | Performed by: FAMILY MEDICINE

## 2023-12-26 PROCEDURE — P9040 RBC LEUKOREDUCED IRRADIATED: HCPCS

## 2023-12-26 PROCEDURE — 85027 COMPLETE CBC AUTOMATED: CPT

## 2023-12-26 PROCEDURE — 86900 BLOOD TYPING SEROLOGIC ABO: CPT

## 2023-12-26 PROCEDURE — 99232 SBSQ HOSP IP/OBS MODERATE 35: CPT | Performed by: UROLOGY

## 2023-12-26 RX ADMIN — FOLIC ACID 1000 MCG: 1 TABLET ORAL at 08:39

## 2023-12-26 RX ADMIN — FERROUS SULFATE TAB 325 MG (65 MG ELEMENTAL FE) 325 MG: 325 (65 FE) TAB at 08:39

## 2023-12-26 RX ADMIN — LUBIPROSTONE 8 MCG: 8 CAPSULE, GELATIN COATED ORAL at 16:52

## 2023-12-26 RX ADMIN — DRONEDARONE 400 MG: 400 TABLET, FILM COATED ORAL at 08:41

## 2023-12-26 RX ADMIN — CEFTRIAXONE 2000 MG: 2 INJECTION, SOLUTION INTRAVENOUS at 16:53

## 2023-12-26 RX ADMIN — PRIMIDONE 50 MG: 50 TABLET ORAL at 08:39

## 2023-12-26 RX ADMIN — PRIMIDONE 50 MG: 50 TABLET ORAL at 21:29

## 2023-12-26 RX ADMIN — MELATONIN 3 MG: 3 TAB ORAL at 21:29

## 2023-12-26 RX ADMIN — LUBIPROSTONE 8 MCG: 8 CAPSULE, GELATIN COATED ORAL at 08:43

## 2023-12-26 RX ADMIN — SENNOSIDES AND DOCUSATE SODIUM 1 TABLET: 8.6; 5 TABLET ORAL at 08:39

## 2023-12-26 RX ADMIN — SENNOSIDES AND DOCUSATE SODIUM 1 TABLET: 8.6; 5 TABLET ORAL at 16:52

## 2023-12-26 RX ADMIN — POLYETHYLENE GLYCOL 3350 17 G: 17 POWDER, FOR SOLUTION ORAL at 16:54

## 2023-12-26 RX ADMIN — DRONEDARONE 400 MG: 400 TABLET, FILM COATED ORAL at 16:53

## 2023-12-26 RX ADMIN — CYANOCOBALAMIN TAB 500 MCG 1000 MCG: 500 TAB at 08:39

## 2023-12-26 RX ADMIN — POLYETHYLENE GLYCOL 3350 17 G: 17 POWDER, FOR SOLUTION ORAL at 08:40

## 2023-12-26 RX ADMIN — PANTOPRAZOLE SODIUM 40 MG: 40 TABLET, DELAYED RELEASE ORAL at 08:39

## 2023-12-26 RX ADMIN — DIGOXIN 125 MCG: 125 TABLET ORAL at 08:39

## 2023-12-26 NOTE — PROGRESS NOTES
Urology   chart reviewed patient not seen yet today.  Persistent hematuria noted.  Hemoglobin down some.    Will get a CT scan noncontrast to assess for significant clot in bladder.  Will see him once CT scan completed.

## 2023-12-26 NOTE — ASSESSMENT & PLAN NOTE
Acute urinary retention secondary to severe constipation also known history of prostate cancer.  Zuniga catheter placed as patient bladder scan for 800 cc on presentation to ED and 1800 ml removed in zuniga. Also placed on Flomax   Zuniga removed 12/15 and placed on voiding trial. Some hypotension noted which may be secondary to Flomax and placed on midodrine - will dc flomax and midodrine  Patient with significant amount of hematuria still.   Urology recommending CBI due to hematuria and multiple clots causing blockages. Zuniga exchanged for 22Fr  Still with hematuria, per urology: If urine starts to clear, CBI rate could be slowed - recommend void trial 24 hours after CBI d/c to allow for continued monitoring of urine output.  Can hand irrigate every 4 hours or more frequently as needed for clots.  If we cannot clear the urine within the next 24 to 48 hours, would consider cystoscopy for clot evacuation  Urology recommended CT abdomen pelvis noncontrast and will follow up

## 2023-12-26 NOTE — PROGRESS NOTES
"Progress Note - Miguel Angel Ortega 89 y.o. male MRN: [id number]    Unit/Bed#: -01 Encounter: [id number]      Assessment:  Patient has persistent hematuria.  The nurses are and irrigating him for clots daily.  His hemoglobin has drifted to 7.2.    Plan:  I had a long discussion with the patient.  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  He has failed more conservative measures with CBI.  He wants to proceed and informed consent was obtained.  The risks including but not limited to bleeding infection anesthetic risk, MI stroke bladder perforation need for additional procedures failure to control hematuria, small bladder capacity with urgency frequency and bladder pain amongst others discussed in detail.  Informed consent was obtained.  The patient is mentally competent and sharp.  He answers questions appropriately.  He also asks appropriate questions.    I would recommend the patient receive 1 or 2 units of packed red cells or whole blood preprocedure.  We also would request medical clearance for the procedure planned for tomorrow.  I reviewed his CT scan which did not reveal any significant amount of clot within the bladder but this was a noncontrast study.  There is no other gross evidence of urinary tract bleeding.    Subjective:   Patient with continued gross hematuria despite CBI .  Nurses continue to hand irrigate the patient for clots daily.  Patient denies any pain at present.    Objective:     Vitals: Blood pressure (!) 117/46, pulse 66, temperature 97.5 °F (36.4 °C), resp. rate 19, height 5' 11\" (1.803 m), weight 73.9 kg (163 lb), SpO2 97%.,Body mass index is 22.73 kg/m².      Intake/Output Summary (Last 24 hours) at 12/26/2023 1313  Last data filed at 12/26/2023 1253  Gross per 24 hour   Intake 478 ml   Output 2900 ml   Net -2422 ml       Physical Exam:  Const: " Appears healthy and well developed. No signs of acute distress present.  Resp: Respirations are regular and unlabored.   CV: Rate is regular. Rhythm is regular.  Abdomen: Abdomen is soft, nontender, and nondistended. Kidneys are not palpable.  : External genitalia reveal [place]enc[place] catheter draining blood-tinged urine.  No clots presently  Psych: Patient's attitude is cooperative. Mood is normal. Affect is normal.    Invasive Devices       Peripheral Intravenous Line  Duration             Peripheral IV 12/25/23 Right;Ventral (anterior) Forearm 1 day              Drain  Duration             Continuous Bladder Irrigation Three-way 4 days                    Lab, Imaging and other studies: I have personally reviewed pertinent reports.

## 2023-12-26 NOTE — PROGRESS NOTES
[facility]  Progress Note  Name: Miguel Angel Ortega I  MRN: [id number]  Unit/Bed#: -Theo I Date of Admission: 12/13/2023   Date of Service: 12/26/2023 I Hospital Day: 13    Assessment/Plan   * Stercoral colitis  Assessment & Plan  Patient presents with symptoms of constipation for the last few days.  He saw GI outpatient.  He was started on MiraLAX patient states he went 26 times to the bathroom yesterday had very small amounts of stool and felt like he just could not empty out his bowel movement.  Also had a fall yesterday.  Ct abd pelvis shows Abundant stool in the colon and rectal vault compatible with constipation in the appropriate clinical context. No bowel obstruction.  Minimal perirectal fat stranding suggestive of mild or early stercoral proctitis. No perirectal abscess  Placed on MiraLAX daily and placed on Dulcolax suppository x 2 daily and also placed 3 enemas    GI consult placed - miralax BID, colace BID and Amitiza 8mcg BID. Dulcolax suppository daily prn. Advance to regular diet. Outpatient follow up    Gram-negative bacteremia  Assessment & Plan  Proteus bacteremia from gi source.  ID consulted: increase rocephin to 2g q24h, f/u blood and urine cultures, GI consult for persistent constipation/fecal impaction, continue zuniga, anticipate 10 days abx therapy through 12/26 with cefuroxime 500 bid on dc.   BC no growth after 5 days    Acute urinary retention  Assessment & Plan  Acute urinary retention secondary to severe constipation also known history of prostate cancer.  Zuniga catheter placed as patient bladder scan for 800 cc on presentation to ED and 1800 ml removed in zuniga. Also placed on Flomax   Zuniga removed 12/15 and placed on voiding trial. Some hypotension noted which may be secondary to Flomax and placed on midodrine - will dc flomax and midodrine  Patient with significant amount of hematuria still.   Urology recommending CBI due to hematuria and multiple clots  causing blockages. Ascencio exchanged for 22Fr  Still with hematuria, per urology: If urine starts to clear, CBI rate could be slowed - recommend void trial 24 hours after CBI d/c to allow for continued monitoring of urine output.  Can hand irrigate every 4 hours or more frequently as needed for clots.  If we cannot clear the urine within the next 24 to 48 hours, would consider cystoscopy for clot evacuation  Urology recommended CT abdomen pelvis noncontrast and will follow up    Acute on chronic anemia  Assessment & Plan  Hemoglobin dropped from 10.3-8.7.  low iron and B12 levels. placed on vit b12 and venofer  Resumed home iron supplement  CBC stable  neg stool occult    Lab Results   Component Value Date    HGB 7.2 (L) 12/26/2023    HGB 7.4 (L) 12/25/2023    HGB 7.2 (L) 12/24/2023       Orthostatic hypotension  Assessment & Plan  Patient has been having episodes of orthostatic hypotension outpatient. Orthostatic vitals inpatient positive  Cardiology consult: Suspect this is secondary to poor PO intake and blood loss. Please encourage PO intake. Nursing instructed to apply compression socks while awake. ECG demonstrates sinus bradycardia. HR's controlled. OK to continue digoxin and Multaq.   Will place on gentle IVF rehydration - stopped  Tom stockings, slow position changes. Can add midodrine if becomes symptomatic from hypotension    Permanent atrial fibrillation (HCC)  Assessment & Plan  Continue Multaq, digoxin which are his chronic meds.  Not on anticoagulation  Dig level acceptable  Appreciate cardio eval - continue multaq. Change digoxin to every other day. Was on xarelto in the past - discuss outpatient with his cardiologist about resumption    Prostate cancer metastatic to bone (HCC)  Assessment & Plan  Further outpatient follow-up with urology.  Placed on Flomax for acute urinary retention hold Casodex while inpatient.  stop flomax due to urinary urgency and orthostatic hypotension         VTE Pharmacologic  Prophylaxis:   High Risk (Score >/= 5) - Pharmacological DVT Prophylaxis Contraindicated. Sequential Compression Devices Ordered.    Mobility:   Basic Mobility Inpatient Raw Score: 13  JH-HLM Goal: 4: Move to chair/commode  JH-HLM Achieved: 6: Walk 10 steps or more  HLM Goal achieved. Continue to encourage appropriate mobility.    Patient Centered Rounds: I performed bedside rounds with nursing staff today.   Discussions with Specialists or Other Care Team Provider: nursing, case management, urology    Education and Discussions with Family / Patient: Updated  (friend) via phone.    Total Time Spent on Date of Encounter in care of patient: 33 mins. This time was spent on one or more of the following: performing physical exam; counseling and coordination of care; obtaining or reviewing history; documenting in the medical record; reviewing/ordering tests, medications or procedures; communicating with other healthcare professionals and discussing with patient's family/caregivers.    Current Length of Stay: 13 day(s)  Current Patient Status: Inpatient   Certification Statement: The patient will continue to require additional inpatient hospital stay due to CT abdomen pelvis, CBI, hematuria  Discharge Plan: Anticipate discharge in 48-72 hrs to prior assisted or independent living facility.    Code Status: Level 3 - DNAR and DNI    Subjective:   Patient seen and examined this morning. He is doing well. States he slept a little better today than yesterday. Believes his urine looks a little more clear than it did before. Is interested in cystoscopy if this is recommended by urology. He is concerned and wants the bleeding to stop so he can go home.     Objective:     Vitals:   Temp (24hrs), Av.5 °F (36.4 °C), Min:97.5 °F (36.4 °C), Max:97.5 °F (36.4 °C)    Temp:  [97.5 °F (36.4 °C)] 97.5 °F (36.4 °C)  Resp:  [19] 19  BP: (117)/(46) 117/46  Body mass index is 22.73 kg/m².     Input and Output Summary (last 24  hours):     Intake/Output Summary (Last 24 hours) at 12/26/2023 0912  Last data filed at 12/26/2023 0930  Gross per 24 hour   Intake 420 ml   Output 3300 ml   Net -2880 ml       Physical Exam:   Physical Exam  Vitals reviewed.   Constitutional:       General: He is not in acute distress.     Appearance: He is not ill-appearing or toxic-appearing.   HENT:      Head: Normocephalic and atraumatic.      Mouth/Throat:      Mouth: Mucous membranes are moist.   Cardiovascular:      Rate and Rhythm: Normal rate and regular rhythm.      Heart sounds: No murmur heard.  Pulmonary:      Effort: No respiratory distress.      Breath sounds: No stridor. No wheezing.      Comments: Saturating well on room air  Abdominal:      General: Bowel sounds are normal. There is no distension.      Palpations: Abdomen is soft. There is no mass.      Tenderness: There is no abdominal tenderness.   Genitourinary:     Comments: Ascencio in place with pink colored urine  Musculoskeletal:      Right lower leg: No edema.      Left lower leg: No edema.   Skin:     General: Skin is warm and dry.      Coloration: Skin is pale.   Neurological:      General: No focal deficit present.      Mental Status: He is alert and oriented to person, place, and time.   Psychiatric:         Mood and Affect: Mood normal.         Behavior: Behavior normal.          Additional Data:     Labs:  Results from last 7 days   Lab Units 12/26/23  0520   WBC Thousand/uL 5.66   HEMOGLOBIN g/dL 7.2*   HEMATOCRIT % 23.1*   PLATELETS Thousands/uL 230     Results from last 7 days   Lab Units 12/26/23  0520   SODIUM mmol/L 132*   POTASSIUM mmol/L 4.2   CHLORIDE mmol/L 102   CO2 mmol/L 24   BUN mg/dL 18   CREATININE mg/dL 0.96   ANION GAP mmol/L 6   CALCIUM mg/dL 8.1*   GLUCOSE RANDOM mg/dL 91                       Lines/Drains:  Invasive Devices       Peripheral Intravenous Line  Duration             Peripheral IV 12/25/23 Right;Ventral (anterior) Forearm 1 day              Drain   Duration             Continuous Bladder Irrigation Three-way 4 days                          Imaging: No pertinent imaging reviewed. Will review CT abdomen pelvis when completed    Recent Cultures (last 7 days):   Results from last 7 days   Lab Units 12/19/23  1548   BLOOD CULTURE  No Growth After 5 Days.  No Growth After 5 Days.       Last 24 Hours Medication List:   Current Facility-Administered Medications   Medication Dose Route Frequency Provider Last Rate    acetaminophen  650 mg Oral Q6H PRN Sherita Martinez MD      bisacodyl  10 mg Rectal Daily PRN Remedios Cruz PA-C      cefTRIAXone  2,000 mg Intravenous Q24H Suha Alvarado PA-C 2,000 mg (12/25/23 1603)    cyanocobalamin  1,000 mcg Oral Daily Sherita Martinez MD      digoxin  125 mcg Oral Every Other Day Remedios Cruz PA-C      dronedarone  400 mg Oral BID With Meals Sherita Martinez MD      ferrous sulfate  325 mg Oral Daily With Breakfast Remedios Cruz PA-C      folic acid  1,000 mcg Oral Daily Sherita Martinez MD      glycerin-hypromellose-  1 drop Both Eyes Q4H PRN Sherita Martinez MD      hydrocortisone   Topical 4x Daily PRN Sherita Martinez MD      lubiprostone  8 mcg Oral BID With Meals Remedios Cruz PA-C      melatonin  3 mg Oral HS PRN Suha Alvarado PA-C      ondansetron  4 mg Intravenous Q6H PRN Sherita Martinez MD      pantoprazole  40 mg Oral Daily Sherita Martinez MD      polyethylene glycol  17 g Oral BID Sherita Martinez MD      primidone  50 mg Oral Q12H YAMINI Sherita Martinez MD      senna-docusate sodium  1 tablet Oral BID Remedios Cruz PA-C          Today, Patient Was Seen By: Suha Alvarado PA-C    **Please Note: This note may have been constructed using a voice recognition system.**

## 2023-12-26 NOTE — ASSESSMENT & PLAN NOTE
Hemoglobin dropped from 10.3-8.7.  low iron and B12 levels. placed on vit b12 and venofer  Resumed home iron supplement  CBC stable  neg stool occult    Lab Results   Component Value Date    HGB 7.2 (L) 12/26/2023    HGB 7.4 (L) 12/25/2023    HGB 7.2 (L) 12/24/2023

## 2023-12-26 NOTE — CASE MANAGEMENT
Case Management Discharge Planning Note    Patient name Miguel Angel Ortega  Location /-01 MRN [id number]  : 1928 Date 2023       Current Admission Date: 2023  Current Admission Diagnosis:Stercoral colitis   Patient Active Problem List    Diagnosis Date Noted    Gross hematuria 2023    Orthostatic hypotension 2023    Gram-negative bacteremia 2023    Acute on chronic anemia 2023    Stercoral colitis 2023    Acute urinary retention 2023    Permanent atrial fibrillation (HCC) 2023    Hyponatremia 2023    Prostate cancer metastatic to bone (HCC) 2023      LOS (days): 13  Geometric Mean LOS (GMLOS) (days): 3.9  Days to GMLOS:-9.2     OBJECTIVE:  Risk of Unplanned Readmission Score: 25         Current admission status: Inpatient   Preferred Pharmacy:   [id number] [address] [address]  Phone: [phone] Fax: [phone]    Primary Care Provider: Kisha Armstrong MD    Primary Insurance: MEDICARE  Secondary Insurance: [facility]    DISCHARGE DETAILS:        Chart reviewed aware of medical management. Case was discussed in multidisciplinary discharge meeting.  Clinical information supporting hospitalization: stercoral colitis, urinary retention - zuniga placed with CBI running with no improvement - pt chose cystoscopy inpatient     Barriers to discharge:  - medical management    Discharge plan:return to [facility] w/ outpatient PT and OT    CM will continue to follow plan of care.

## 2023-12-26 NOTE — PLAN OF CARE
Problem: Potential for Falls  Goal: Patient will remain free of falls  Description: INTERVENTIONS:  - Educate patient/family on patient safety including physical limitations  - Instruct patient to call for assistance with activity   - Consult OT/PT to assist with strengthening/mobility   - Keep Call bell within reach  - Keep bed low and locked with side rails adjusted as appropriate  - Keep care items and personal belongings within reach  - Initiate and maintain comfort rounds  - Make Fall Risk Sign visible to staff  - Offer Toileting every  Hours, in advance of need  - Initiate/Maintain alarm  - Obtain necessary fall risk management equipment:   - Apply yellow socks and bracelet for high fall risk patients  - Consider moving patient to room near nurses station  Outcome: Progressing     Problem: PAIN - ADULT  Goal: Verbalizes/displays adequate comfort level or baseline comfort level  Description: Interventions:  - Encourage patient to monitor pain and request assistance  - Assess pain using appropriate pain scale  - Administer analgesics based on type and severity of pain and evaluate response  - Implement non-pharmacological measures as appropriate and evaluate response  - Consider cultural and social influences on pain and pain management  - Notify physician/advanced practitioner if interventions unsuccessful or patient reports new pain  Outcome: Progressing     Problem: INFECTION - ADULT  Goal: Absence or prevention of progression during hospitalization  Description: INTERVENTIONS:  - Assess and monitor for signs and symptoms of infection  - Monitor lab/diagnostic results  - Monitor all insertion sites, i.e. indwelling lines, tubes, and drains  - Monitor endotracheal if appropriate and nasal secretions for changes in amount and color  - [place] appropriate cooling/warming therapies per order  - Administer medications as ordered  - Instruct and encourage patient and family to use good hand hygiene technique  -  Identify and instruct in appropriate isolation precautions for identified infection/condition  Outcome: Progressing     Problem: SAFETY ADULT  Goal: Patient will remain free of falls  Description: INTERVENTIONS:  - Educate patient/family on patient safety including physical limitations  - Instruct patient to call for assistance with activity   - Consult OT/PT to assist with strengthening/mobility   - Keep Call bell within reach  - Keep bed low and locked with side rails adjusted as appropriate  - Keep care items and personal belongings within reach  - Initiate and maintain comfort rounds  - Make Fall Risk Sign visible to staff  - Offer Toileting every  Hours, in advance of need  - Initiate/Maintain alarm  - Obtain necessary fall risk management equipment:   - Apply yellow socks and bracelet for high fall risk patients  - Consider moving patient to room near nurses station  Outcome: Progressing  Goal: Maintain or return to baseline ADL function  Description: INTERVENTIONS:  -  Assess patient's ability to carry out ADLs; assess patient's baseline for ADL function and identify physical deficits which impact ability to perform ADLs (bathing, care of mouth/teeth, toileting, grooming, dressing, etc.)  - Assess/evaluate cause of self-care deficits   - Assess range of motion  - Assess patient's mobility; develop plan if impaired  - Assess patient's need for assistive devices and provide as appropriate  - Encourage maximum independence but intervene and supervise when necessary  - Involve family in performance of ADLs  - Assess for home care needs following discharge   - Consider OT consult to assist with ADL evaluation and planning for discharge  - Provide patient education as appropriate  Outcome: Progressing  Goal: Maintains/Returns to pre admission functional level  Description: INTERVENTIONS:  - Perform AM-PAC 6 Click Basic Mobility/ Daily Activity assessment daily.  - Set and communicate daily mobility goal to care  team and patient/family/caregiver.   - Collaborate with rehabilitation services on mobility goals if consulted  - Perform Range of Motion  times a day.  - Reposition patient every  hours.  - Dangle patient  times a day  - Stand patient  times a day  - Ambulate patient  times a day  - Out of bed to chair  times a day   - Out of bed for meals  times a day  - Out of bed for toileting  - Record patient progress and toleration of activity level   Outcome: Progressing     Problem: DISCHARGE PLANNING  Goal: Discharge to home or other facility with appropriate resources  Description: INTERVENTIONS:  - Identify barriers to discharge w/patient and caregiver  - Arrange for needed discharge resources and transportation as appropriate  - Identify discharge learning needs (meds, wound care, etc.)  - Arrange for interpretive services to assist at discharge as needed  - Refer to Case Management Department for coordinating discharge planning if the patient needs post-hospital services based on physician/advanced practitioner order or complex needs related to functional status, cognitive ability, or social support system  Outcome: Progressing     Problem: Knowledge Deficit  Goal: Patient/family/caregiver demonstrates understanding of disease process, treatment plan, medications, and discharge instructions  Description: Complete learning assessment and assess knowledge base.  Interventions:  - Provide teaching at level of understanding  - Provide teaching via preferred learning methods  Outcome: Progressing     Problem: GASTROINTESTINAL - ADULT  Goal: Minimal or absence of nausea and/or vomiting  Description: INTERVENTIONS:  - Administer IV fluids if ordered to ensure adequate hydration  - Maintain NPO status until nausea and vomiting are resolved  - Nasogastric tube if ordered  - Administer ordered antiemetic medications as needed  - Provide nonpharmacologic comfort measures as appropriate  - Advance diet as tolerated, if ordered  -  Consider nutrition services referral to assist patient with adequate nutrition and appropriate food choices  Outcome: Progressing  Goal: Maintains or returns to baseline bowel function  Description: INTERVENTIONS:  - Assess bowel function monitor bowel regimen  - Encourage oral fluids to ensure adequate hydration  - Administer IV fluids if ordered to ensure adequate hydration  - Administer ordered medications as needed  - Encourage mobilization and activity  - Consider nutritional services referral to assist patient with adequate nutrition and appropriate food choices  Outcome: Progressing  Goal: Maintains adequate nutritional intake  Description: INTERVENTIONS:  - Monitor percentage of each meal consumed  - Identify factors contributing to decreased intake, treat as appropriate  - Assist with meals as needed  - Monitor I&O, weight, and lab values if indicated  - Obtain nutrition services referral as needed  Outcome: Progressing  Goal: Oral mucous membranes remain intact  Description: INTERVENTIONS  - Assess oral mucosa and hygiene practices  - Implement preventative oral hygiene regimen  - Implement oral medicated treatments as ordered  - Initiate Nutrition services referral as needed  Outcome: Progressing     Problem: GENITOURINARY - ADULT  Goal: Maintains or returns to baseline urinary function  Description: INTERVENTIONS:  - Assess urinary function  - Encourage oral fluids to ensure adequate hydration if ordered  - Administer IV fluids as ordered to ensure adequate hydration  - Administer ordered medications as needed  - Offer frequent toileting  - Follow urinary retention protocol if ordered  Outcome: Progressing  Goal: Absence of urinary retention  Description: INTERVENTIONS:  - Assess patient’s ability to void and empty bladder  - Monitor I/O  - Bladder scan as needed  - Discuss with physician/AP medications to alleviate retention as needed  - Discuss catheterization for long term situations as  appropriate  Outcome: Progressing  Goal: Urinary catheter remains patent  Description: INTERVENTIONS:  - Assess patency of urinary catheter  - If patient has a chronic zuniga, consider changing catheter if non-functioning  - Follow guidelines for intermittent irrigation of non-functioning urinary catheter  Outcome: Progressing     Problem: Prexisting or High Potential for Compromised Skin Integrity  Goal: Skin integrity is maintained or improved  Description: INTERVENTIONS:  - Identify patients at risk for skin breakdown  - Assess and monitor skin integrity  - Assess and monitor nutrition and hydration status  - Monitor labs   - Assess for incontinence   - Turn and reposition patient  - Assist with mobility/ambulation  - Relieve pressure over bony prominences  - Avoid friction and shearing  - Provide appropriate hygiene as needed including keeping skin clean and dry  - Evaluate need for skin moisturizer/barrier cream  - Collaborate with interdisciplinary team   - Patient/family teaching  - Consider wound care consult   Outcome: Progressing     Problem: Nutrition/Hydration-ADULT  Goal: Nutrient/Hydration intake appropriate for improving, restoring or maintaining nutritional needs  Description: Monitor and assess patient's nutrition/hydration status for malnutrition. Collaborate with interdisciplinary team and initiate plan and interventions as ordered.  Monitor patient's weight and dietary intake as ordered or per policy. Utilize nutrition screening tool and intervene as necessary. Determine patient's food preferences and provide high-protein, high-caloric foods as appropriate.     INTERVENTIONS:  - Monitor oral intake, urinary output, labs, and treatment plans  - Assess nutrition and hydration status and recommend course of action  - Evaluate amount of meals eaten  - Assist patient with eating if necessary   - Allow adequate time for meals  - Recommend/ encourage appropriate diets, oral nutritional supplements, and  vitamin/mineral supplements  - Order, calculate, and assess calorie counts as needed  - Recommend, monitor, and adjust tube feedings and TPN/PPN based on assessed needs  - Assess need for intravenous fluids  - Provide specific nutrition/hydration education as appropriate  - Include patient/family/caregiver in decisions related to nutrition  Outcome: Progressing

## 2023-12-26 NOTE — PLAN OF CARE
Problem: Potential for Falls  Goal: Patient will remain free of falls  Description: INTERVENTIONS:  - Educate patient/family on patient safety including physical limitations  - Instruct patient to call for assistance with activity   - Consult OT/PT to assist with strengthening/mobility   - Keep Call bell within reach  - Keep bed low and locked with side rails adjusted as appropriate  - Keep care items and personal belongings within reach  - Initiate and maintain comfort rounds  - Make Fall Risk Sign visible to staff  - Obtain necessary fall risk management equipment: yellow socks  - Apply yellow socks and bracelet for high fall risk patients  - Consider moving patient to room near nurses station  Outcome: Progressing

## 2023-12-26 NOTE — QUICK NOTE
Due to scheduled cystoscopy tomorrow, 12/27/23, will transfuse 1 unit of blood tonight. Blood consent obtained and placed in patient's chart. Will also have cardiology do cardiac clearance in the AM tomorrow prior to procedure. Patient is agreeable to the plan and has no current questions.     Suha Alvarado PA-C

## 2023-12-27 ENCOUNTER — ANESTHESIA (INPATIENT)
Dept: PERIOP | Facility: HOSPITAL | Age: 88
End: 2023-12-27
Payer: MEDICARE

## 2023-12-27 ENCOUNTER — ANESTHESIA EVENT (INPATIENT)
Dept: PERIOP | Facility: HOSPITAL | Age: 88
End: 2023-12-27
Payer: MEDICARE

## 2023-12-27 LAB
ABO GROUP BLD BPU: NORMAL
ANION GAP SERPL CALCULATED.3IONS-SCNC: 5 MMOL/L
BPU ID: NORMAL
BUN SERPL-MCNC: 19 MG/DL (ref 5–25)
CALCIUM SERPL-MCNC: 8.1 MG/DL (ref 8.4–10.2)
CHLORIDE SERPL-SCNC: 102 MMOL/L (ref 96–108)
CO2 SERPL-SCNC: 26 MMOL/L (ref 21–32)
CREAT SERPL-MCNC: 1.13 MG/DL (ref 0.6–1.3)
CROSSMATCH: NORMAL
ERYTHROCYTE [DISTWIDTH] IN BLOOD BY AUTOMATED COUNT: 16.1 % (ref 11.6–15.1)
GFR SERPL CREATININE-BSD FRML MDRD: 54 ML/MIN/1.73SQ M
GLUCOSE SERPL-MCNC: 93 MG/DL (ref 65–140)
HCT VFR BLD AUTO: 24.2 % (ref 36.5–49.3)
HCT VFR BLD AUTO: 27 % (ref 36.5–49.3)
HGB BLD-MCNC: 8 G/DL (ref 12–17)
HGB BLD-MCNC: 8.8 G/DL (ref 12–17)
MCH RBC QN AUTO: 31.6 PG (ref 26.8–34.3)
MCHC RBC AUTO-ENTMCNC: 33.1 G/DL (ref 31.4–37.4)
MCV RBC AUTO: 96 FL (ref 82–98)
PLATELET # BLD AUTO: 225 THOUSANDS/UL (ref 149–390)
PMV BLD AUTO: 8 FL (ref 8.9–12.7)
POTASSIUM SERPL-SCNC: 4.5 MMOL/L (ref 3.5–5.3)
RBC # BLD AUTO: 2.53 MILLION/UL (ref 3.88–5.62)
SODIUM SERPL-SCNC: 133 MMOL/L (ref 135–147)
UNIT DISPENSE STATUS: NORMAL
UNIT PRODUCT CODE: NORMAL
UNIT PRODUCT VOLUME: 350 ML
UNIT RH: NORMAL
WBC # BLD AUTO: 6.38 THOUSAND/UL (ref 4.31–10.16)

## 2023-12-27 PROCEDURE — 85027 COMPLETE CBC AUTOMATED: CPT

## 2023-12-27 PROCEDURE — 99232 SBSQ HOSP IP/OBS MODERATE 35: CPT

## 2023-12-27 PROCEDURE — 85014 HEMATOCRIT: CPT

## 2023-12-27 PROCEDURE — 0T5C8ZZ DESTRUCTION OF BLADDER NECK, VIA NATURAL OR ARTIFICIAL OPENING ENDOSCOPIC: ICD-10-PCS | Performed by: UROLOGY

## 2023-12-27 PROCEDURE — 80048 BASIC METABOLIC PNL TOTAL CA: CPT

## 2023-12-27 PROCEDURE — 52001 CYSTO W/IRRG&EVAC MLT CLOTS: CPT | Performed by: UROLOGY

## 2023-12-27 PROCEDURE — 85018 HEMOGLOBIN: CPT

## 2023-12-27 PROCEDURE — 0TCB8ZZ EXTIRPATION OF MATTER FROM BLADDER, VIA NATURAL OR ARTIFICIAL OPENING ENDOSCOPIC: ICD-10-PCS | Performed by: UROLOGY

## 2023-12-27 PROCEDURE — 52214 CYSTOSCOPY AND TREATMENT: CPT | Performed by: UROLOGY

## 2023-12-27 PROCEDURE — 0T5B8ZZ DESTRUCTION OF BLADDER, VIA NATURAL OR ARTIFICIAL OPENING ENDOSCOPIC: ICD-10-PCS | Performed by: UROLOGY

## 2023-12-27 RX ORDER — EPHEDRINE SULFATE 50 MG/ML
INJECTION INTRAVENOUS AS NEEDED
Status: DISCONTINUED | OUTPATIENT
Start: 2023-12-27 | End: 2023-12-27

## 2023-12-27 RX ORDER — SODIUM CHLORIDE, SODIUM LACTATE, POTASSIUM CHLORIDE, CALCIUM CHLORIDE 600; 310; 30; 20 MG/100ML; MG/100ML; MG/100ML; MG/100ML
50 INJECTION, SOLUTION INTRAVENOUS CONTINUOUS
Status: DISCONTINUED | OUTPATIENT
Start: 2023-12-27 | End: 2023-12-27

## 2023-12-27 RX ORDER — LIDOCAINE HYDROCHLORIDE 10 MG/ML
INJECTION, SOLUTION EPIDURAL; INFILTRATION; INTRACAUDAL; PERINEURAL AS NEEDED
Status: DISCONTINUED | OUTPATIENT
Start: 2023-12-27 | End: 2023-12-27

## 2023-12-27 RX ORDER — PROPOFOL 10 MG/ML
INJECTION, EMULSION INTRAVENOUS AS NEEDED
Status: DISCONTINUED | OUTPATIENT
Start: 2023-12-27 | End: 2023-12-27

## 2023-12-27 RX ORDER — FENTANYL CITRATE 50 UG/ML
INJECTION, SOLUTION INTRAMUSCULAR; INTRAVENOUS AS NEEDED
Status: DISCONTINUED | OUTPATIENT
Start: 2023-12-27 | End: 2023-12-27

## 2023-12-27 RX ORDER — ONDANSETRON 2 MG/ML
4 INJECTION INTRAMUSCULAR; INTRAVENOUS ONCE AS NEEDED
Status: DISCONTINUED | OUTPATIENT
Start: 2023-12-27 | End: 2023-12-27 | Stop reason: HOSPADM

## 2023-12-27 RX ORDER — FENTANYL CITRATE/PF 50 MCG/ML
25 SYRINGE (ML) INJECTION
Status: DISCONTINUED | OUTPATIENT
Start: 2023-12-27 | End: 2023-12-27 | Stop reason: HOSPADM

## 2023-12-27 RX ORDER — MAGNESIUM HYDROXIDE 1200 MG/15ML
LIQUID ORAL AS NEEDED
Status: DISCONTINUED | OUTPATIENT
Start: 2023-12-27 | End: 2023-12-27 | Stop reason: HOSPADM

## 2023-12-27 RX ORDER — SODIUM CHLORIDE, SODIUM LACTATE, POTASSIUM CHLORIDE, CALCIUM CHLORIDE 600; 310; 30; 20 MG/100ML; MG/100ML; MG/100ML; MG/100ML
INJECTION, SOLUTION INTRAVENOUS CONTINUOUS PRN
Status: DISCONTINUED | OUTPATIENT
Start: 2023-12-27 | End: 2023-12-27

## 2023-12-27 RX ADMIN — FERROUS SULFATE TAB 325 MG (65 MG ELEMENTAL FE) 325 MG: 325 (65 FE) TAB at 09:11

## 2023-12-27 RX ADMIN — SENNOSIDES AND DOCUSATE SODIUM 1 TABLET: 8.6; 5 TABLET ORAL at 09:05

## 2023-12-27 RX ADMIN — PRIMIDONE 50 MG: 50 TABLET ORAL at 09:06

## 2023-12-27 RX ADMIN — PROPOFOL 50 MG: 10 INJECTION, EMULSION INTRAVENOUS at 14:10

## 2023-12-27 RX ADMIN — PROPOFOL 50 MG: 10 INJECTION, EMULSION INTRAVENOUS at 14:08

## 2023-12-27 RX ADMIN — PROPOFOL 50 MG: 10 INJECTION, EMULSION INTRAVENOUS at 14:12

## 2023-12-27 RX ADMIN — LUBIPROSTONE 8 MCG: 8 CAPSULE, GELATIN COATED ORAL at 09:11

## 2023-12-27 RX ADMIN — LUBIPROSTONE 8 MCG: 8 CAPSULE, GELATIN COATED ORAL at 16:42

## 2023-12-27 RX ADMIN — ONDANSETRON 4 MG: 2 INJECTION INTRAMUSCULAR; INTRAVENOUS at 14:16

## 2023-12-27 RX ADMIN — MELATONIN 3 MG: 3 TAB ORAL at 22:12

## 2023-12-27 RX ADMIN — PRIMIDONE 50 MG: 50 TABLET ORAL at 22:07

## 2023-12-27 RX ADMIN — SENNOSIDES AND DOCUSATE SODIUM 1 TABLET: 8.6; 5 TABLET ORAL at 16:42

## 2023-12-27 RX ADMIN — LUBIPROSTONE 8 MCG: 8 CAPSULE, GELATIN COATED ORAL at 09:09

## 2023-12-27 RX ADMIN — POLYETHYLENE GLYCOL 3350 17 G: 17 POWDER, FOR SOLUTION ORAL at 09:06

## 2023-12-27 RX ADMIN — SODIUM CHLORIDE, SODIUM LACTATE, POTASSIUM CHLORIDE, AND CALCIUM CHLORIDE: .6; .31; .03; .02 INJECTION, SOLUTION INTRAVENOUS at 14:03

## 2023-12-27 RX ADMIN — POLYETHYLENE GLYCOL 3350 17 G: 17 POWDER, FOR SOLUTION ORAL at 16:42

## 2023-12-27 RX ADMIN — LIDOCAINE HYDROCHLORIDE 50 MG: 10 INJECTION, SOLUTION EPIDURAL; INFILTRATION; INTRACAUDAL at 14:08

## 2023-12-27 RX ADMIN — FENTANYL CITRATE 25 MCG: 50 INJECTION INTRAMUSCULAR; INTRAVENOUS at 14:16

## 2023-12-27 RX ADMIN — EPHEDRINE SULFATE 10 MG: 50 INJECTION, SOLUTION INTRAVENOUS at 14:24

## 2023-12-27 RX ADMIN — PANTOPRAZOLE SODIUM 40 MG: 40 TABLET, DELAYED RELEASE ORAL at 09:05

## 2023-12-27 RX ADMIN — CYANOCOBALAMIN TAB 500 MCG 1000 MCG: 500 TAB at 09:05

## 2023-12-27 RX ADMIN — DRONEDARONE 400 MG: 400 TABLET, FILM COATED ORAL at 16:42

## 2023-12-27 RX ADMIN — FOLIC ACID 1000 MCG: 1 TABLET ORAL at 09:05

## 2023-12-27 RX ADMIN — DRONEDARONE 400 MG: 400 TABLET, FILM COATED ORAL at 09:11

## 2023-12-27 NOTE — OP NOTE
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 fulguration of bleeding. insertion 24FR 3 Way zuniga CBI    Specimen(s):  * No specimens in log *    Estimated Blood Loss:   Minimal    Drains:  Urethral Catheter Latex 24 Fr. 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  Active bleeding noted from areas of neovascularization on trigone right greater than left.  Areas fulgurated and hemostatic.  1 area at 12:00 within prostatic urethra near bladder neck fulgurated as well.  Multiple areas of neovascularization secondary to radiation cystitis noted within bladder.  24 Yi three-way Zuniga replaced with CBI normal saline.  Effluent crystal-clear at completion of procedure.  15 cc placed in balloon.    Complications:   None    Procedure and Technique:   In the preop holding area I spoke again with the patient and his family regarding findings and recommendations.  We discussed the pros and cons of formalin use including the potential adverse side effects and their impact on quality of life particularly decreased bladder capacity and worsening urinary frequency and incontinence.  Our plan is to cauterize any actively bleeding areas, reinstitute CBI overnight and remove the Zuniga catheter tomorrow.  If formalin would become  necessary due to recurrent bleeding, the patient will need to be transferred to [place].  In speaking with the pharmacist it is not available here at [facility] and has not been used here previously.  Informed consent was obtained.    Patient was brought to the operating room identified and a timeout was satisfactory.  Patient was placed in the supine position and underwent his anesthetic.  Patient was then placed in lithotomy position.  The penis was prepped and draped in usual sterile fashion using Hibiclens.  This was done after removal of the existing Ascencio catheter.    Cystoscopy was then performed using a 22 [place]ian cystoscope with a 30 and 70 degree lens.  The urethra is normal.  There are radiation changes within the prostatic urethra but no significant obstruction.  There is also neovascularization noted at the bladder outlet at the bladder neck.  There is oozing noted from the bladder neck from neovascularization at the 12 o'clock position.  Upon entering the bladder there is blood clot noted.  There is also active oozing from the bladder trigone right side greater than left.    We then used the 22 [place]ian cystoscope sheath along with gentle irrigation to evacuate all blood clot from the bladder.    Careful inspection of the bladder revealed that the persistent bleeding that has been noted is coming from the trigone there are 3 primary areas on the trigone which are actively oozing and these are areas of neovascularization from radiation cystitis.  These areas are relatively close to the right and left ureteral orifice but do not involve it.  We then used the 8 [place] Bugbee electrode under direct vision to carefully fulgurate these locations discretely.  The orifice ease were avoided.  Hemostasis was excellent after cauterization of these areas.  In addition the area of neovascularization which was oozing at the 12 o'clock position on the bladder neck was fulgurated using the Bugbee electrode.  This was  hemostatic thereafter as well.    A careful inspection of the bladder was then made using the 70 degree lens there were no additional bleeding areas noted.  There were areas of neovascularization but no bleeding.  Photographs were taken.  There were no bladder tumors no stones and no distinct diverticula.  The bladder was drained refilled and reinspected.  There was no bleeding or oozing.  There was no evidence of bladder injury or perforation.  I then removed the cystoscope under direct vision and there was no bleeding noted from the bladder neck prostatic urethra or urethra.    I then placed a 24 [place]an three-way Ascencio catheter and inflated the balloon with 15 cc of saline.  The effluent was crystal-clear.  CBI was started using normal saline.  The effluent remained clear.  The catheter function well and irrigated appropriately.    The patient's anesthetic was reversed and he was returned to recovery room in satisfactory condition.  Will plan to discontinue the CBI early tomorrow morning and hopefully remove the catheter.    Patient will remain on antibiotics.  We will send him for referral to wound care for hyperbaric oxygen to see if he is a candidate.  Please see my note above regarding formalin if it would become necessary in the future.           I was present for the entire procedure.    Patient Disposition:  PACU         SIGNATURE: Leila Singer MD  DATE: December 27, 2023  TIME: 2:51 PM

## 2023-12-27 NOTE — ASSESSMENT & PLAN NOTE
Proteus bacteremia from gi source.  ID consulted: increase rocephin to 2g q24h, f/u blood and urine cultures, GI consult for persistent constipation/fecal impaction, continue zuniga, anticipate 10 days abx therapy through 12/26 with cefuroxime 500 bid on dc.   BC no growth after 5 days  Completed abx on 12/26

## 2023-12-27 NOTE — ASSESSMENT & PLAN NOTE
Acute urinary retention secondary to severe constipation also known history of prostate cancer.  Zuniga catheter placed as patient bladder scan for 800 cc on presentation to ED and 1800 ml removed in zuniga. Also placed on Flomax   Zuniga removed 12/15 and placed on voiding trial. Some hypotension noted which may be secondary to Flomax and placed on midodrine - will dc flomax and midodrine  Patient with significant amount of hematuria still.   Urology recommending CBI due to hematuria and multiple clots causing blockages. Zuniga exchanged for 22Fr  CT abdomen pelvis: Bladder completely decompressed by Zuniga catheter and cannot be further evaluated. If clinical concern remains, consider clamping of Zuniga for several hours and ultrasound imaging. Zuniga in expected position.   Urology taking patient for cystoscopy with clot evacuation possible fulguration and instillation of formalin to bladder for probable radiation cystitis   Will monitor

## 2023-12-27 NOTE — ASSESSMENT & PLAN NOTE
Hemoglobin dropped from 10.3-8.7.  low iron and B12 levels. placed on vit b12 and venofer  Resumed home iron supplement  CBC stable  neg stool occult  Obtained blood consent and transfused 1 unit on 12/26 for preop transfusion. Will repeat H&H after procedure    Lab Results   Component Value Date    HGB 8.0 (L) 12/27/2023    HGB 7.2 (L) 12/26/2023    HGB 7.4 (L) 12/25/2023

## 2023-12-27 NOTE — PLAN OF CARE
Problem: Potential for Falls  Goal: Patient will remain free of falls  Description: INTERVENTIONS:  - Educate patient/family on patient safety including physical limitations  - Instruct patient to call for assistance with activity   - Consult OT/PT to assist with strengthening/mobility   - Keep Call bell within reach  - Keep bed low and locked with side rails adjusted as appropriate  - Keep care items and personal belongings within reach  - Initiate and maintain comfort rounds  - Make Fall Risk Sign visible to staff  - Offer Toileting every    Hours, in advance of need  - Initiate/Maintain   alarm  - Obtain necessary fall risk management equipment:     - Apply yellow socks and bracelet for high fall risk patients  - Consider moving patient to room near nurses station  Outcome: Progressing     Problem: PAIN - ADULT  Goal: Verbalizes/displays adequate comfort level or baseline comfort level  Description: Interventions:  - Encourage patient to monitor pain and request assistance  - Assess pain using appropriate pain scale  - Administer analgesics based on type and severity of pain and evaluate response  - Implement non-pharmacological measures as appropriate and evaluate response  - Consider cultural and social influences on pain and pain management  - Notify physician/advanced practitioner if interventions unsuccessful or patient reports new pain  Outcome: Progressing     Problem: INFECTION - ADULT  Goal: Absence or prevention of progression during hospitalization  Description: INTERVENTIONS:  - Assess and monitor for signs and symptoms of infection  - Monitor lab/diagnostic results  - Monitor all insertion sites, i.e. indwelling lines, tubes, and drains  - Monitor endotracheal if appropriate and nasal secretions for changes in amount and color  - [place] appropriate cooling/warming therapies per order  - Administer medications as ordered  - Instruct and encourage patient and family to use good hand hygiene  technique  - Identify and instruct in appropriate isolation precautions for identified infection/condition  Outcome: Progressing     Problem: SAFETY ADULT  Goal: Patient will remain free of falls  Description: INTERVENTIONS:  - Educate patient/family on patient safety including physical limitations  - Instruct patient to call for assistance with activity   - Consult OT/PT to assist with strengthening/mobility   - Keep Call bell within reach  - Keep bed low and locked with side rails adjusted as appropriate  - Keep care items and personal belongings within reach  - Initiate and maintain comfort rounds  - Make Fall Risk Sign visible to staff  - Offer Toileting every    Hours, in advance of need  - Initiate/Maintain   alarm  - Obtain necessary fall risk management equipment:     - Apply yellow socks and bracelet for high fall risk patients  - Consider moving patient to room near nurses station  Outcome: Progressing  Goal: Maintain or return to baseline ADL function  Description: INTERVENTIONS:  -  Assess patient's ability to carry out ADLs; assess patient's baseline for ADL function and identify physical deficits which impact ability to perform ADLs (bathing, care of mouth/teeth, toileting, grooming, dressing, etc.)  - Assess/evaluate cause of self-care deficits   - Assess range of motion  - Assess patient's mobility; develop plan if impaired  - Assess patient's need for assistive devices and provide as appropriate  - Encourage maximum independence but intervene and supervise when necessary  - Involve family in performance of ADLs  - Assess for home care needs following discharge   - Consider OT consult to assist with ADL evaluation and planning for discharge  - Provide patient education as appropriate  Outcome: Progressing  Goal: Maintains/Returns to pre admission functional level  Description: INTERVENTIONS:  - Perform AM-PAC 6 Click Basic Mobility/ Daily Activity assessment daily.  - Set and communicate daily  mobility goal to care team and patient/family/caregiver.   - Collaborate with rehabilitation services on mobility goals if consulted  - Perform Range of Motion    times a day.  - Reposition patient every    hours.  - Dangle patient    times a day  - Stand patient    times a day  - Ambulate patient    times a day  - Out of bed to chair    times a day   - Out of bed for meals    times a day  - Out of bed for toileting  - Record patient progress and toleration of activity level   Outcome: Progressing     Problem: DISCHARGE PLANNING  Goal: Discharge to home or other facility with appropriate resources  Description: INTERVENTIONS:  - Identify barriers to discharge w/patient and caregiver  - Arrange for needed discharge resources and transportation as appropriate  - Identify discharge learning needs (meds, wound care, etc.)  - Arrange for interpretive services to assist at discharge as needed  - Refer to Case Management Department for coordinating discharge planning if the patient needs post-hospital services based on physician/advanced practitioner order or complex needs related to functional status, cognitive ability, or social support system  Outcome: Progressing     Problem: Knowledge Deficit  Goal: Patient/family/caregiver demonstrates understanding of disease process, treatment plan, medications, and discharge instructions  Description: Complete learning assessment and assess knowledge base.  Interventions:  - Provide teaching at level of understanding  - Provide teaching via preferred learning methods  Outcome: Progressing     Problem: GASTROINTESTINAL - ADULT  Goal: Minimal or absence of nausea and/or vomiting  Description: INTERVENTIONS:  - Administer IV fluids if ordered to ensure adequate hydration  - Maintain NPO status until nausea and vomiting are resolved  - Nasogastric tube if ordered  - Administer ordered antiemetic medications as needed  - Provide nonpharmacologic comfort measures as appropriate  -  Advance diet as tolerated, if ordered  - Consider nutrition services referral to assist patient with adequate nutrition and appropriate food choices  Outcome: Progressing  Goal: Maintains or returns to baseline bowel function  Description: INTERVENTIONS:  - Assess bowel function monitor bowel regimen  - Encourage oral fluids to ensure adequate hydration  - Administer IV fluids if ordered to ensure adequate hydration  - Administer ordered medications as needed  - Encourage mobilization and activity  - Consider nutritional services referral to assist patient with adequate nutrition and appropriate food choices  Outcome: Progressing  Goal: Maintains adequate nutritional intake  Description: INTERVENTIONS:  - Monitor percentage of each meal consumed  - Identify factors contributing to decreased intake, treat as appropriate  - Assist with meals as needed  - Monitor I&O, weight, and lab values if indicated  - Obtain nutrition services referral as needed  Outcome: Progressing  Goal: Oral mucous membranes remain intact  Description: INTERVENTIONS  - Assess oral mucosa and hygiene practices  - Implement preventative oral hygiene regimen  - Implement oral medicated treatments as ordered  - Initiate Nutrition services referral as needed  Outcome: Progressing     Problem: GENITOURINARY - ADULT  Goal: Maintains or returns to baseline urinary function  Description: INTERVENTIONS:  - Assess urinary function  - Encourage oral fluids to ensure adequate hydration if ordered  - Administer IV fluids as ordered to ensure adequate hydration  - Administer ordered medications as needed  - Offer frequent toileting  - Follow urinary retention protocol if ordered  Outcome: Progressing  Goal: Absence of urinary retention  Description: INTERVENTIONS:  - Assess patient’s ability to void and empty bladder  - Monitor I/O  - Bladder scan as needed  - Discuss with physician/AP medications to alleviate retention as needed  - Discuss catheterization  for long term situations as appropriate  Outcome: Progressing  Goal: Urinary catheter remains patent  Description: INTERVENTIONS:  - Assess patency of urinary catheter  - If patient has a chronic zuniga, consider changing catheter if non-functioning  - Follow guidelines for intermittent irrigation of non-functioning urinary catheter  Outcome: Progressing     Problem: Prexisting or High Potential for Compromised Skin Integrity  Goal: Skin integrity is maintained or improved  Description: INTERVENTIONS:  - Identify patients at risk for skin breakdown  - Assess and monitor skin integrity  - Assess and monitor nutrition and hydration status  - Monitor labs   - Assess for incontinence   - Turn and reposition patient  - Assist with mobility/ambulation  - Relieve pressure over bony prominences  - Avoid friction and shearing  - Provide appropriate hygiene as needed including keeping skin clean and dry  - Evaluate need for skin moisturizer/barrier cream  - Collaborate with interdisciplinary team   - Patient/family teaching  - Consider wound care consult   Outcome: Progressing     Problem: Nutrition/Hydration-ADULT  Goal: Nutrient/Hydration intake appropriate for improving, restoring or maintaining nutritional needs  Description: Monitor and assess patient's nutrition/hydration status for malnutrition. Collaborate with interdisciplinary team and initiate plan and interventions as ordered.  Monitor patient's weight and dietary intake as ordered or per policy. Utilize nutrition screening tool and intervene as necessary. Determine patient's food preferences and provide high-protein, high-caloric foods as appropriate.     INTERVENTIONS:  - Monitor oral intake, urinary output, labs, and treatment plans  - Assess nutrition and hydration status and recommend course of action  - Evaluate amount of meals eaten  - Assist patient with eating if necessary   - Allow adequate time for meals  - Recommend/ encourage appropriate diets, oral  nutritional supplements, and vitamin/mineral supplements  - Order, calculate, and assess calorie counts as needed  - Recommend, monitor, and adjust tube feedings and TPN/PPN based on assessed needs  - Assess need for intravenous fluids  - Provide specific nutrition/hydration education as appropriate  - Include patient/family/caregiver in decisions related to nutrition  Outcome: Progressing

## 2023-12-27 NOTE — ASSESSMENT & PLAN NOTE
Continue Multaq, digoxin which are his chronic meds.  Not on anticoagulation  Dig level acceptable  Appreciate cardio eval - continue multaq. Change digoxin to every other day. Was on xarelto in the past - discuss outpatient with his cardiologist about resumption  Asked for cardiac clearance prior to procedure: Patient is considered low risk for adverse cardiovascular outcomes in regards to non cardiac procedure

## 2023-12-27 NOTE — PROGRESS NOTES
[facility]  Progress Note  Name: Miguel Angel Ortega I  MRN: [id number]  Unit/Bed#: -Theo I Date of Admission: 12/13/2023   Date of Service: 12/27/2023 I Hospital Day: 14    Assessment/Plan   * Stercoral colitis  Assessment & Plan  Patient presents with symptoms of constipation for the last few days.  He saw GI outpatient.  He was started on MiraLAX patient states he went 26 times to the bathroom yesterday had very small amounts of stool and felt like he just could not empty out his bowel movement.  Also had a fall yesterday.  Ct abd pelvis shows Abundant stool in the colon and rectal vault compatible with constipation in the appropriate clinical context. No bowel obstruction.  Minimal perirectal fat stranding suggestive of mild or early stercoral proctitis. No perirectal abscess  Placed on MiraLAX daily and placed on Dulcolax suppository x 2 daily and also placed 3 enemas    GI consult placed - miralax BID, colace BID and Amitiza 8mcg BID. Dulcolax suppository daily prn. Advance to regular diet. Outpatient follow up    Gram-negative bacteremia  Assessment & Plan  Proteus bacteremia from gi source.  ID consulted: increase rocephin to 2g q24h, f/u blood and urine cultures, GI consult for persistent constipation/fecal impaction, continue zuniga, anticipate 10 days abx therapy through 12/26 with cefuroxime 500 bid on dc.   BC no growth after 5 days  Completed abx on 12/26    Acute urinary retention  Assessment & Plan  Acute urinary retention secondary to severe constipation also known history of prostate cancer.  Zuniga catheter placed as patient bladder scan for 800 cc on presentation to ED and 1800 ml removed in zuniga. Also placed on Flomax   Zungia removed 12/15 and placed on voiding trial. Some hypotension noted which may be secondary to Flomax and placed on midodrine - will dc flomax and midodrine  Patient with significant amount of hematuria still.   Urology recommending CBI due to  hematuria and multiple clots causing blockages. Ascencio exchanged for 22Fr  CT abdomen pelvis: Bladder completely decompressed by Ascencio catheter and cannot be further evaluated. If clinical concern remains, consider clamping of Ascencio for several hours and ultrasound imaging. Ascencio in expected position.   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  low iron and B12 levels. placed on vit b12 and venofer  Resumed home iron supplement  CBC stable  neg stool occult  Obtained blood consent and transfused 1 unit on 12/26 for preop transfusion. Will repeat H&H after procedure    Lab Results   Component Value Date    HGB 8.0 (L) 12/27/2023    HGB 7.2 (L) 12/26/2023    HGB 7.4 (L) 12/25/2023       Orthostatic hypotension  Assessment & Plan  Patient has been having episodes of orthostatic hypotension outpatient. Orthostatic vitals inpatient positive  Cardiology consult: Suspect this is secondary to poor PO intake and blood loss. Please encourage PO intake. Nursing instructed to apply compression socks while awake. ECG demonstrates sinus bradycardia. HR's controlled. OK to continue digoxin and Multaq.   Will place on gentle IVF rehydration - stopped  Tom stockings, slow position changes. Can add midodrine if becomes symptomatic from hypotension    Permanent atrial fibrillation (HCC)  Assessment & Plan  Continue Multaq, digoxin which are his chronic meds.  Not on anticoagulation  Dig level acceptable  Appreciate cardio eval - continue multaq. Change digoxin to every other day. Was on xarelto in the past - discuss outpatient with his cardiologist about resumption  Asked for cardiac clearance prior to procedure: Patient is considered low risk for adverse cardiovascular outcomes in regards to non cardiac procedure     Prostate  cancer metastatic to bone (HCC)  Assessment & Plan  Further outpatient follow-up with urology.  Placed on Flomax for acute urinary retention hold Casodex while inpatient.  stop flomax due to urinary urgency and orthostatic hypotension           VTE Pharmacologic Prophylaxis:   High Risk (Score >/= 5) - Pharmacological DVT Prophylaxis Contraindicated. Sequential Compression Devices Ordered.    Mobility:   Basic Mobility Inpatient Raw Score: 13  -HLM Goal: 4: Move to chair/commode  JH-HLM Achieved: 3: Sit at edge of bed  HLM Goal NOT achieved. Continue with multidisciplinary rounding and encourage appropriate mobility to improve upon HLM goals.    Patient Centered Rounds: I performed bedside rounds with nursing staff today.   Discussions with Specialists or Other Care Team Provider: nursing, case management, cardiology    Education and Discussions with Family / Patient: Updated  (friend) at bedside.    Total Time Spent on Date of Encounter in care of patient: 38 mins. This time was spent on one or more of the following: performing physical exam; counseling and coordination of care; obtaining or reviewing history; documenting in the medical record; reviewing/ordering tests, medications or procedures; communicating with other healthcare professionals and discussing with patient's family/caregivers.    Current Length of Stay: 14 day(s)  Current Patient Status: Inpatient   Certification Statement: The patient will continue to require additional inpatient hospital stay due to cystoscopy with clot evacuation  Discharge Plan: Anticipate discharge in 48-72 hrs to prior assisted or independent living facility.    Code Status: Level 3 - DNAR and DNI    Subjective:   Patient seen and examined. He is doing well today. Offers no complaints. He is hopeful that the procedure goes well and that he will have no further bloody urine. Denies nausea, vomiting, diarrhea, constipation, abdominal pain, CP, SOB, fever,  chills.     Objective:     Vitals:   Temp (24hrs), Av.1 °F (36.7 °C), Min:97.9 °F (36.6 °C), Max:98.2 °F (36.8 °C)    Temp:  [97.9 °F (36.6 °C)-98.2 °F (36.8 °C)] 98.1 °F (36.7 °C)  HR:  [62-66] 66  Resp:  [16] 16  BP: (116-143)/() 130/47  SpO2:  [95 %-99 %] 97 %  Body mass index is 22.73 kg/m².     Input and Output Summary (last 24 hours):     Intake/Output Summary (Last 24 hours) at 2023 1259  Last data filed at 2023 1248  Gross per 24 hour   Intake 525 ml   Output 3750 ml   Net -3225 ml       Physical Exam:   Physical Exam  Vitals reviewed.   Constitutional:       General: He is not in acute distress.     Appearance: He is not ill-appearing or toxic-appearing.   HENT:      Head: Normocephalic and atraumatic.      Mouth/Throat:      Mouth: Mucous membranes are moist.   Cardiovascular:      Rate and Rhythm: Normal rate and regular rhythm.      Heart sounds: No murmur heard.  Pulmonary:      Effort: No respiratory distress.      Breath sounds: No stridor. No wheezing.      Comments: Saturating well on room air  Abdominal:      General: There is no distension.      Palpations: Abdomen is soft. There is no mass.      Tenderness: There is no abdominal tenderness.   Genitourinary:     Comments: Ascencio catheter with red urine output  Musculoskeletal:      Right lower leg: No edema.      Left lower leg: No edema.   Skin:     General: Skin is warm and dry.      Coloration: Skin is pale.   Neurological:      General: No focal deficit present.      Mental Status: He is alert and oriented to person, place, and time.   Psychiatric:         Mood and Affect: Mood normal.         Behavior: Behavior normal.          Additional Data:     Labs:  Results from last 7 days   Lab Units 23  0454   WBC Thousand/uL 6.38   HEMOGLOBIN g/dL 8.0*   HEMATOCRIT % 24.2*   PLATELETS Thousands/uL 225     Results from last 7 days   Lab Units 23  0454   SODIUM mmol/L 133*   POTASSIUM mmol/L 4.5   CHLORIDE mmol/L 102    CO2 mmol/L 26   BUN mg/dL 19   CREATININE mg/dL 1.13   ANION GAP mmol/L 5   CALCIUM mg/dL 8.1*   GLUCOSE RANDOM mg/dL 93                       Lines/Drains:  Invasive Devices       Peripheral Intravenous Line  Duration             Peripheral IV 12/25/23 Right;Ventral (anterior) Forearm 2 days              Drain  Duration             Continuous Bladder Irrigation Three-way 5 days                          Imaging: Reviewed radiology reports from this admission including: abdominal/pelvic CT    Recent Cultures (last 7 days):         Last 24 Hours Medication List:   Current Facility-Administered Medications   Medication Dose Route Frequency Provider Last Rate    acetaminophen  650 mg Oral Q6H PRN Sherita Martinez MD      bisacodyl  10 mg Rectal Daily PRN Remedios Cruz PA-C      cyanocobalamin  1,000 mcg Oral Daily Sherita Martinez MD      digoxin  125 mcg Oral Every Other Day Remedios Cruz PA-C      dronedarone  400 mg Oral BID With Meals Sherita Martinez MD      ferrous sulfate  325 mg Oral Daily With Breakfast Remedios Cruz PA-C      folic acid  1,000 mcg Oral Daily Sherita Martinez MD      glycerin-hypromellose-  1 drop Both Eyes Q4H PRN Sherita Martinez MD      hydrocortisone   Topical 4x Daily PRN Sherita Martinez MD      lubiprostone  8 mcg Oral BID With Meals Remedios Cruz PA-C      melatonin  3 mg Oral HS PRN Suha Alvarado PA-C      ondansetron  4 mg Intravenous Q6H PRN Sherita Martinez MD      pantoprazole  40 mg Oral Daily Sherita Martinez MD      polyethylene glycol  17 g Oral BID Sherita Martinez MD      primidone  50 mg Oral Q12H YAMINI Sherita Martinez MD      senna-docusate sodium  1 tablet Oral BID Remedios Cruz PA-C          Today, Patient Was Seen By: Suha Alvarado PA-C    **Please Note: This note may have been constructed using a voice recognition system.**

## 2023-12-27 NOTE — PHYSICAL THERAPY NOTE
PHYSICAL THERAPY TREATMENT NOTE  NAME:  Miguel Angel Ortega  DATE: 12/27/23 12/27/23 1310   Note Type   Note Type Cancelled Session   Cancel Reasons Patient to operating room       PT order received, chart review completed. Pt admitted to [facility] on 12/13/2023 w/ dx of Stercoral colitis. Attempted to see pt for PT session this date, however pt ANTONINO for urology procedure. Will continue to follow pt and provide care as pt is appropriate and available.    Prisca Cheatham, PT,DPT

## 2023-12-27 NOTE — ANESTHESIA PREPROCEDURE EVALUATION
Procedure:  CYSTOSCOPY EVACUATION OF CLOTS, 2% formalin instillation, cystogram, possible fulguration of bleeding. (Bladder)    Relevant Problems   CARDIO   (+) Permanent atrial fibrillation (HCC)      HEMATOLOGY   (+) Acute on chronic anemia      Cardiovascular and Mediastinum   (+) Orthostatic hypotension      Genitourinary   (+) Acute urinary retention   (+) Gross hematuria      Other   (+) Gram-negative bacteremia        Physical Exam    Airway    Mallampati score: I  TM Distance: >3 FB  Neck ROM: full     Dental   No notable dental hx     Cardiovascular  Cardiovascular exam normal    Pulmonary  Pulmonary exam normal     Other Findings        Anesthesia Plan  ASA Score- 3     Anesthesia Type- general with ASA Monitors.         Additional Monitors:     Airway Plan: ETT.    Comment: Discussed temporary suspension of DNR/DNI for procedure. Patient and family agreeable..       Plan Factors-Exercise tolerance (METS): >4 METS.    Chart reviewed. EKG reviewed.  Existing labs reviewed.     Patient is not a current smoker.              Induction- intravenous.    Postoperative Plan- Plan for postoperative opioid use. Planned trial extubation    Informed Consent- Anesthetic plan and risks discussed with patient.  I personally reviewed this patient with the CRNA. Discussed and agreed on the Anesthesia Plan with the CRNA..

## 2023-12-27 NOTE — CONSULTS
Consultation - Cardiology   Miguel Angel Ortega 89 y.o. male MRN: [id number]  Unit/Bed#: -01 Encounter: [id number]    Assessment/Plan     Assessment:  Preoperative cardiac risk stratification   Likely radiation cystitis planning for urologic procedure  Prostate cancer with known mets   Stercoral colitis   Orthostatic hypotension  paroxysmal atrial fibrillation  CAD sp CABG (remote, 20 years ago)  HLD  Mild to mod AI  Aortic root 4.6 cm   Normal LV function  Wide pulse pressure     Plan:  Patient is considered low risk for adverse cardiovascular outcomes in regards to non cardiac procedure. He has no easily modifiable risk factors at this time and requires no further testing prior to planned procedure. He should be monitored on telemetry during procedure. RCRI gives him a 6% 30 day risk of death, MI or cardiac event.     History of Present Illness   Physician Requesting Consult: Sonya Srinivasan MD  Reason for Consult / Principal Problem: pre op cardiac risk stratification   HPI: Miguel Angel Ortega is a 89 y.o. year old male with history of prostate cancer with metastases to the bone, paroxysmal atrial fibrillation, CAD sp CABG x4 (20 years ago) is currently being treated for probable radiation cystitis planning for urologic procedure. We were asked to comment on preopative cardiac risk.     Currently resides in a nursing home. He has been walking with a walker. He has no chest pain or sob. He has no palpitations. He had an echocardiogram in Sept 2023 showing normal LVEF and no wall motion abnormalities.     Inpatient consult to Cardiology  Consult performed by: Sujey Bravo PA-C  Consult ordered by: Suha lAvarado PA-C        Review of Systems   Constitutional:  Positive for fatigue. Negative for chills and unexpected weight change.   Respiratory:  Negative for chest tightness and shortness of breath.    Cardiovascular:  Negative for chest pain, palpitations and leg swelling.   Skin:  Negative for color  "change.   Neurological:  Positive for weakness. Negative for dizziness.   Psychiatric/Behavioral:  Negative for agitation, behavioral problems and confusion.        Historical Information   Past Medical History:   Diagnosis Date    A-fib (HCC)     History of angina     Prostate cancer (HCC)      Past Surgical History:   Procedure Laterality Date    CORONARY ARTERY BYPASS GRAFT  1995    TONSILLECTOMY       Social History     Substance and Sexual Activity   Alcohol Use Not Currently     Social History     Substance and Sexual Activity   Drug Use Not Currently     E-Cigarette/Vaping    E-Cigarette Use Never User      E-Cigarette/Vaping Substances     Social History     Tobacco Use   Smoking Status Never   Smokeless Tobacco Never     Family History: non-contributory    Meds/Allergies   all current active meds have been reviewed  No Known Allergies    Objective   Vitals: Blood pressure (!) 130/47, pulse 66, temperature 98.1 °F (36.7 °C), resp. rate 16, height 5' 11\" (1.803 m), weight 73.9 kg (163 lb), SpO2 97%.  Orthostatic Blood Pressures      Flowsheet Row Most Recent Value   Blood Pressure 130/47 filed at 12/27/2023 0452   Patient Position - Orthostatic VS Lying filed at 12/23/2023 1405              Intake/Output Summary (Last 24 hours) at 12/27/2023 0826  Last data filed at 12/27/2023 0701  Gross per 24 hour   Intake 823 ml   Output 4000 ml   Net -3177 ml       Invasive Devices       Peripheral Intravenous Line  Duration             Peripheral IV 12/25/23 Right;Ventral (anterior) Forearm 2 days              Drain  Duration             Continuous Bladder Irrigation Three-way 5 days                    Physical Exam  Vitals and nursing note reviewed.   Constitutional:       Appearance: Normal appearance.   HENT:      Head: Normocephalic and atraumatic.   Cardiovascular:      Rate and Rhythm: Normal rate and regular rhythm.      Heart sounds: No murmur heard.  Pulmonary:      Effort: Pulmonary effort is normal. "   Musculoskeletal:         General: No swelling.      Cervical back: Neck supple.   Skin:     General: Skin is warm.      Capillary Refill: Capillary refill takes less than 2 seconds.   Neurological:      General: No focal deficit present.      Mental Status: He is alert and oriented to person, place, and time.   Psychiatric:         Mood and Affect: Mood normal.         Thought Content: Thought content normal.         Lab Results: I have personally reviewed pertinent lab results.    CBC with diff:   Results from last 7 days   Lab Units 12/27/23  0454   WBC Thousand/uL 6.38   RBC Million/uL 2.53*   HEMOGLOBIN g/dL 8.0*   HEMATOCRIT % 24.2*   MCV fL 96   MCH pg 31.6   MCHC g/dL 33.1   RDW % 16.1*   MPV fL 8.0*   PLATELETS Thousands/uL 225     CMP:   Results from last 7 days   Lab Units 12/27/23  0454   SODIUM mmol/L 133*   CHLORIDE mmol/L 102   CO2 mmol/L 26   BUN mg/dL 19   CREATININE mg/dL 1.13   CALCIUM mg/dL 8.1*   EGFR ml/min/1.73sq m 54     HS Troponin:   0   Lab Value Date/Time    HSTNI0 7 07/16/2023 1009    HSTNI2 6 07/16/2023 1204    HSTNI4 6 07/16/2023 1419     BNP:   Results from last 7 days   Lab Units 12/27/23  0454   POTASSIUM mmol/L 4.5   CHLORIDE mmol/L 102   CO2 mmol/L 26   BUN mg/dL 19   CREATININE mg/dL 1.13   CALCIUM mg/dL 8.1*   EGFR ml/min/1.73sq m 54     Coags:     TSH:     Magnesium:   Results from last 7 days   Lab Units 12/21/23  0510   MAGNESIUM mg/dL 2.1     Lipid Profile:     Imaging: I have personally reviewed pertinent reports.    EKG: Sinus bradycardia  Nonspecific ST abnormality  Abnormal ECG  When compared with ECG of 16-JUL-2023 09:32,  Nonspecific T wave abnormality no longer evident in Lateral leads  Confirmed by Kp Holman ([id number]) on 12/18/2023 7:38:52 PM    Echo 9/27/2023:    Ascending Aorta: Aortic root is dilated, measures 4.6cm. Ascending   aorta not well visualized. Recommend dedicated thoracic imaging.     Left Ventricle: Left ventricle was not well visualized. Left  ventricle   is normal in size. Definity contrast utilized. No evidence of LV thrombus.   There is mild hypertrophy. Systolic function is normal with an ejection   fraction of 55-60%. Wall motion is within normal limits. There is grade I   (mild) diastolic dysfunction.     Left Atrium: Left atrium cavity size is normal.     Right Ventricle: Right ventricle cavity is moderately dilated. Systolic   function is normal. Normal tricuspid annular plane systolic excursion   (TAPSE) >1.7 cm.     Aortic Valve: The aortic valve is trileaflet. There is mild to moderate   regurgitation.

## 2023-12-28 LAB
ANION GAP SERPL CALCULATED.3IONS-SCNC: 6 MMOL/L
BUN SERPL-MCNC: 20 MG/DL (ref 5–25)
CALCIUM SERPL-MCNC: 8 MG/DL (ref 8.4–10.2)
CHLORIDE SERPL-SCNC: 101 MMOL/L (ref 96–108)
CO2 SERPL-SCNC: 25 MMOL/L (ref 21–32)
CREAT SERPL-MCNC: 1.03 MG/DL (ref 0.6–1.3)
ERYTHROCYTE [DISTWIDTH] IN BLOOD BY AUTOMATED COUNT: 16.3 % (ref 11.6–15.1)
GFR SERPL CREATININE-BSD FRML MDRD: 61 ML/MIN/1.73SQ M
GLUCOSE SERPL-MCNC: 101 MG/DL (ref 65–140)
HCT VFR BLD AUTO: 24.7 % (ref 36.5–49.3)
HGB BLD-MCNC: 8.1 G/DL (ref 12–17)
MCH RBC QN AUTO: 31.8 PG (ref 26.8–34.3)
MCHC RBC AUTO-ENTMCNC: 32.8 G/DL (ref 31.4–37.4)
MCV RBC AUTO: 97 FL (ref 82–98)
PLATELET # BLD AUTO: 226 THOUSANDS/UL (ref 149–390)
PMV BLD AUTO: 8.1 FL (ref 8.9–12.7)
POTASSIUM SERPL-SCNC: 4.5 MMOL/L (ref 3.5–5.3)
RBC # BLD AUTO: 2.55 MILLION/UL (ref 3.88–5.62)
SODIUM SERPL-SCNC: 132 MMOL/L (ref 135–147)
WBC # BLD AUTO: 9.82 THOUSAND/UL (ref 4.31–10.16)

## 2023-12-28 PROCEDURE — 80048 BASIC METABOLIC PNL TOTAL CA: CPT | Performed by: UROLOGY

## 2023-12-28 PROCEDURE — 99231 SBSQ HOSP IP/OBS SF/LOW 25: CPT | Performed by: UROLOGY

## 2023-12-28 PROCEDURE — 97530 THERAPEUTIC ACTIVITIES: CPT

## 2023-12-28 PROCEDURE — 97535 SELF CARE MNGMENT TRAINING: CPT

## 2023-12-28 PROCEDURE — 97164 PT RE-EVAL EST PLAN CARE: CPT

## 2023-12-28 PROCEDURE — 97110 THERAPEUTIC EXERCISES: CPT

## 2023-12-28 PROCEDURE — 99232 SBSQ HOSP IP/OBS MODERATE 35: CPT

## 2023-12-28 PROCEDURE — 85027 COMPLETE CBC AUTOMATED: CPT | Performed by: UROLOGY

## 2023-12-28 RX ADMIN — FERROUS SULFATE TAB 325 MG (65 MG ELEMENTAL FE) 325 MG: 325 (65 FE) TAB at 09:47

## 2023-12-28 RX ADMIN — POLYETHYLENE GLYCOL 3350 17 G: 17 POWDER, FOR SOLUTION ORAL at 16:34

## 2023-12-28 RX ADMIN — PANTOPRAZOLE SODIUM 40 MG: 40 TABLET, DELAYED RELEASE ORAL at 09:47

## 2023-12-28 RX ADMIN — PRIMIDONE 50 MG: 50 TABLET ORAL at 21:18

## 2023-12-28 RX ADMIN — FOLIC ACID 1000 MCG: 1 TABLET ORAL at 09:47

## 2023-12-28 RX ADMIN — SENNOSIDES AND DOCUSATE SODIUM 1 TABLET: 8.6; 5 TABLET ORAL at 16:34

## 2023-12-28 RX ADMIN — PRIMIDONE 50 MG: 50 TABLET ORAL at 09:47

## 2023-12-28 RX ADMIN — CYANOCOBALAMIN TAB 500 MCG 1000 MCG: 500 TAB at 09:47

## 2023-12-28 RX ADMIN — DRONEDARONE 400 MG: 400 TABLET, FILM COATED ORAL at 09:50

## 2023-12-28 RX ADMIN — BISACODYL 10 MG: 10 SUPPOSITORY RECTAL at 16:34

## 2023-12-28 RX ADMIN — GLYCERIN 1 DROP: .002; .002; .01 SOLUTION/ DROPS OPHTHALMIC at 10:05

## 2023-12-28 RX ADMIN — LUBIPROSTONE 8 MCG: 8 CAPSULE, GELATIN COATED ORAL at 10:00

## 2023-12-28 RX ADMIN — MELATONIN 3 MG: 3 TAB ORAL at 21:18

## 2023-12-28 RX ADMIN — SENNOSIDES AND DOCUSATE SODIUM 1 TABLET: 8.6; 5 TABLET ORAL at 09:47

## 2023-12-28 RX ADMIN — POLYETHYLENE GLYCOL 3350 17 G: 17 POWDER, FOR SOLUTION ORAL at 09:47

## 2023-12-28 RX ADMIN — GLYCERIN 1 DROP: .002; .002; .01 SOLUTION/ DROPS OPHTHALMIC at 16:36

## 2023-12-28 RX ADMIN — DIGOXIN 125 MCG: 125 TABLET ORAL at 09:47

## 2023-12-28 RX ADMIN — DRONEDARONE 400 MG: 400 TABLET, FILM COATED ORAL at 16:34

## 2023-12-28 RX ADMIN — LUBIPROSTONE 8 MCG: 8 CAPSULE, GELATIN COATED ORAL at 16:34

## 2023-12-28 NOTE — OCCUPATIONAL THERAPY NOTE
Occupational Therapy Progress Note     Patient Name: Miguel Angel Ortega  Today's Date: 12/28/2023  Problem List  Principal Problem:    Stercoral colitis  Active Problems:    Prostate cancer metastatic to bone (HCC)    Acute urinary retention    Permanent atrial fibrillation (HCC)    Acute on chronic anemia    Gram-negative bacteremia    Orthostatic hypotension    Gross hematuria        12/28/23 0819   Note Type   Note Type Treatment   Pain Assessment   Pain Assessment Tool 0-10   Pain Score No Pain   Restrictions/Precautions   Other Precautions Chair Alarm;Bed Alarm;Fall Risk;Multiple lines  (Ascencio)   ADL   Grooming Assistance 5  Supervision/Setup   Grooming Deficit Setup;Verbal cueing;Increased time to complete;Wash/dry face;Teeth care   Grooming Comments Pt completed oral care and face hygiene while sitting EOB with SUP. Pt unable to complete grooming standing at sink due to low BP.   Bed Mobility   Supine to Sit 6  Modified independent   Sit to Supine 6  Modified independent   Additional Comments Pt completing all bed mobility with HOB flat. BP obtained while sitting with no complaint of dizziness. Please see below vitals.   Transfers   Sit to Stand   (SBA)   Additional items Increased time required;Verbal cues   Stand to Sit   (SBA)   Additional items Increased time required;Verbal cues   Stand pivot   (Steadying assist)   Additional items Assist x 1;Increased time required;Verbal cues   Additional Comments Pt completed all functional transfers using RW. Pt completed STS transfers with SBA from EOB and recliner. EOB<>recliner SPT completed with steadying assist and cuing for safety. Pt's BP obtained throughout. Pt reporting minimal dizziness following standing for 2-3 min. Please see below vitals.   ROM- Right Upper Extremities   R Shoulder AROM;Flexion;Extension   R Elbow AROM;Elbow flexion;Elbow extension   R Position Seated   R Weight/Reps/Sets 2x10   RUE ROM Comment Pt completed 2x10 RUE shoulder flexion,  chest press and tricep chair push ups with no added resistance. Rest breaks required.   ROM - Left Upper Extremities    L Shoulder AROM;Flexion;Extension   L Elbow AROM;Elbow flexion;Elbow extension   L Weight/Reps/Sets 2x10   LUE ROM Comment Pt completed 2x10 LUE shoulder flexion, chest press and tricep chair push ups with no added resistance. Rest breaks required.   Cognition   Overall Cognitive Status WFL   Arousal/Participation Alert;Responsive;Cooperative   Attention Within functional limits   Orientation Level Oriented X4   Memory Within functional limits   Following Commands Follows one step commands without difficulty   Activity Tolerance   Activity Tolerance Patient limited by fatigue   Medical Staff Made Aware Prisca BEAR   Assessment   Assessment Pt seen for OT treatment day 3. Pt willing and able to participate in treatment focused on bed mobility, ADLs, functional transfers and BUE strengthening. Orthostatic BP's obtained throughout session. Please see below vitals. Pt completed bed mobility with MI. Grooming completed sitting EOB with SUP. Unable to complete grooming standing at sink for safety concerns regarding low BP. Based on functional abilities assessed, pt demo ability to complete UB ADLs with SUP/SBA and LB ADLs with SBA/steadying assist. Pt completing STS transfers with SBA and SPT with steadying assist. Pt tolerating BUE strengthening well with rest breaks taken PRN. Goals reassessed and remain appropriate at this time. Pt requires additional OT services to increase strength, endurance and balance for improved IND during ADLs and functional transfers. Recommendation remains Level III (Minimum resource intensity) therapy following dc. Pt sitting in recliner with chair alarm activated, call bell placed within reach and all needs met following session.   Plan   Treatment Interventions ADL retraining;Functional transfer training;UE strengthening/ROM;Endurance training;Patient/family  training;Energy conservation;Activityengagement   Goal Expiration Date 01/11/24   OT Treatment Day 3   OT Frequency 2-3x/wk   Discharge Recommendation   Rehab Resource Intensity Level, OT III (Minimum Resource Intensity)   AM-PAC Daily Activity Inpatient   Lower Body Dressing 3   Bathing 3   Toileting 3   Upper Body Dressing 3   Grooming 3   Eating 4   Daily Activity Raw Score 19   Daily Activity Standardized Score (Calc for Raw Score >=11) 40.22   AM-PAC Applied Cognition Inpatient   Following a Speech/Presentation 3   Understanding Ordinary Conversation 3   Taking Medications 2   Remembering Where Things Are Placed or Put Away 3   Remembering List of 4-5 Errands 3   Taking Care of Complicated Tasks 2   Applied Cognition Raw Score 16   Applied Cognition Standardized Score 35.03        12/28/23 0824   Vitals   Pulse 75   Blood Pressure (!) 90/38   MAP (mmHg) (!) 55   Patient Position - Orthostatic VS Sitting - Orthostatic VS   Oxygen Therapy   SpO2 99 %        12/28/23 0831   Vitals   Pulse 77   Blood Pressure (!) 91/36   MAP (mmHg) (!) 54   Patient Position - Orthostatic VS Sitting - Orthostatic VS  (After seated exercise)   Oxygen Therapy   SpO2 98 %        12/28/23 0838   Vitals   Pulse 73   Blood Pressure (!) 90/37   MAP (mmHg) (!) 55   Patient Position - Orthostatic VS   (Following SPT from EOB<>recliner)   Oxygen Therapy   SpO2 99 %        12/28/23 0842   Vitals   Pulse 81   Blood Pressure (!) 79/31   MAP (mmHg) (!) 47   Patient Position - Orthostatic VS Standing for 3 minutes - Orthostatic VS  (Reporting mild dizziness)   Oxygen Therapy   SpO2 99 %     Pt goals to be met by 1/11/2024:     Pt will demonstrate ability to complete grooming/hygiene tasks @ mod I after set-up.  Pt will demonstrate ability to complete supine<>sit @ mod I in order to increase safety and independence during ADL tasks.  Pt will demonstrate ability to complete UB ADLs including washing/dressing @ mod I in order to increase performance  and participation during meaningful tasks  Pt will demonstrate ability to complete LB dressing @ mod I in order to increase safety and independence during meaningful tasks.   Pt will demonstrate ability to bernardo/doff socks/shoes while sitting EOB/chair @ mod I in order to increase safety and independence during meaningful tasks.   Pt will demonstrate ability to complete toileting tasks including CM and pericare @ mod I in order to increase safety and independence during meaningful tasks.  Pt will demonstrate ability to complete EOB, chair, toilet/commode transfers @ mod I in order to increase performance and participation during functional tasks.  Pt will demonstrate ability to stand for 10 minutes while maintaining F+ balance with use of RW for UB support PRN.  Pt will demonstrate ability to tolerate 30 minute OT session with no vc'ing for deep breathing or use of energy conservation techniques in order to increase activity tolerance during functional tasks.   Pt will demonstrate Good carryover of use of energy conservation/compensatory strategies during ADLs and functional tasks in order to increase safety and reduce risk for falls.   Pt will demonstrate Good attention and participation in continued evaluation of functional ambulation house hold distances in order to assist with safe d/c planning.  Pt will attend to continued cognitive assessments 100% of the time in order to provide most appropriate d/c recommendations.   Pt will follow 100% simple 2-step commands and be A&O x4 consistently with environmental cues to increase participation in functional activities.   Pt will identify 3 areas of interest/hobbies and 1 intervention on how to incorporate into daily life in order to increase interaction with environment and peers as well as increase participation in meaningful tasks.   Pt will demonstrate 100% carryover of BUE HEP in order to increase BUE MS and increase performance during functional tasks upon d/c  home.    Laurie Hudson, OTR/L

## 2023-12-28 NOTE — CASE MANAGEMENT
Case Management Progress Note    Patient name Miguel Angel Ortega  Location /-01 MRN [id number]  : 1928 Date 2023       LOS (days): 15  Geometric Mean LOS (GMLOS) (days): 8.2  Days to GMLOS:-6.9        OBJECTIVE:        Current admission status: Inpatient  Preferred Pharmacy:   [id number] - [address] [address]  Phone: [phone] Fax: [phone]    Primary Care Provider: Kisha Armstrong MD    Primary Insurance: MEDICARE  Secondary Insurance: [employer]    PROGRESS NOTE:    CM CONTINUES TO MONITOR VIA CHART REVIEW AND TEAM DISCUSSION IN INTERDISCIPLINARY ROUNDS.    [phone] back for a detailed clinical update.   Sonu to be d/c today and check for post void residuals.   CM also requested a confirmation of  the plan for return to the [facility] facility over the Holiday weekend ,if  patient is stable for discharge and the SOC for PT/OT services          PLAN- CM will continue to follow throughout course of hospitalization and coordinate retrun to [facility] with OP PT/OT.

## 2023-12-28 NOTE — PROGRESS NOTES
[facility]  Progress Note  Name: Miguel Angel Ortega I  MRN: [id number]  Unit/Bed#: -Theo I Date of Admission: 12/13/2023   Date of Service: 12/28/2023 I Hospital Day: 15    Assessment/Plan   * Stercoral colitis  Assessment & Plan  Patient presents with symptoms of constipation for the last few days.  He saw GI outpatient.  He was started on MiraLAX patient states he went 26 times to the bathroom yesterday had very small amounts of stool and felt like he just could not empty out his bowel movement.  Also had a fall yesterday.  Ct abd pelvis shows Abundant stool in the colon and rectal vault compatible with constipation in the appropriate clinical context. No bowel obstruction.  Minimal perirectal fat stranding suggestive of mild or early stercoral proctitis. No perirectal abscess  Placed on MiraLAX daily and placed on Dulcolax suppository x 2 daily and also placed 3 enemas    GI consult placed - miralax BID, colace BID and Amitiza 8mcg BID. Dulcolax suppository daily prn. Advance to regular diet. Outpatient follow up    Gram-negative bacteremia  Assessment & Plan  Proteus bacteremia from gi source.  ID consulted: increase rocephin to 2g q24h, f/u blood and urine cultures, GI consult for persistent constipation/fecal impaction, continue zuniga, anticipate 10 days abx therapy through 12/26 with cefuroxime 500 bid on dc.   BC no growth after 5 days  Completed abx on 12/26    Orthostatic hypotension  Assessment & Plan  Patient has been having episodes of orthostatic hypotension outpatient. Orthostatic vitals inpatient positive  Cardiology consult: Suspect this is secondary to poor PO intake and blood loss. Please encourage PO intake. Nursing instructed to apply compression socks while awake. ECG demonstrates sinus bradycardia. HR's controlled. OK to continue digoxin and Multaq.   Will place on gentle IVF rehydration - stopped  Tom stockings, slow position changes. Can add midodrine if becomes  symptomatic from hypotension    Acute on chronic anemia  Assessment & Plan  Hemoglobin dropped from 10.3-8.7.  low iron and B12 levels. placed on vit b12 and venofer  Resumed home iron supplement  CBC stable  neg stool occult  Obtained blood consent and transfused 1 unit on 12/26 for preop transfusion.     Lab Results   Component Value Date    HGB 8.1 (L) 12/28/2023    HGB 8.8 (L) 12/27/2023    HGB 8.0 (L) 12/27/2023       Permanent atrial fibrillation (HCC)  Assessment & Plan  Continue Multaq, digoxin which are his chronic meds.  Not on anticoagulation  Dig level acceptable  Appreciate cardio eval - continue multaq. Change digoxin to every other day. Was on xarelto in the past - discuss outpatient with his cardiologist about resumption  Asked for cardiac clearance prior to procedure: Patient is considered low risk for adverse cardiovascular outcomes in regards to non cardiac procedure     Acute urinary retention  Assessment & Plan  Acute urinary retention secondary to severe constipation also known history of prostate cancer.  Zuniga catheter placed on admission. Also placed on Flomax   Zuniga removed 12/15 and placed on voiding trial. Some hypotension noted which may be secondary to Flomax and placed on midodrine - dc flomax and midodrine  Urology recommending CBI due to hematuria and multiple clots causing blockages. Zuniga exchanged for 22Fr  CT abdomen pelvis: Bladder completely decompressed by Zuniga catheter and cannot be further evaluated. If clinical concern remains, consider clamping of Zuniga for several hours and ultrasound imaging. Zuniga in expected position.   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 Remove zuniga and initiate voiding trial. Restart flomax    Prostate cancer metastatic to bone (HCC)  Assessment & Plan  Further outpatient follow-up with urology.  Placed on Flomax for acute urinary retention hold  Casodex while inpatient.  Urology restarted patient on flomax - monitor bp             VTE Pharmacologic Prophylaxis:   High Risk (Score >/= 5) - Pharmacological DVT Prophylaxis Contraindicated. Sequential Compression Devices Ordered.    Mobility:   Basic Mobility Inpatient Raw Score: 19  JH-HLM Goal: 6: Walk 10 steps or more  JH-HLM Achieved: 5: Stand (1 or more minutes)  HLM Goal NOT achieved. Continue with multidisciplinary rounding and encourage appropriate mobility to improve upon HLM goals.    Patient Centered Rounds: I performed bedside rounds with nursing staff today.   Discussions with Specialists or Other Care Team Provider: nursing and cm    Education and Discussions with Family / Patient: Updated  (friend) at bedside.    Total Time Spent on Date of Encounter in care of patient:  This time was spent on one or more of the following: performing physical exam; counseling and coordination of care; obtaining or reviewing history; documenting in the medical record; reviewing/ordering tests, medications or procedures; communicating with other healthcare professionals and discussing with patient's family/caregivers.    Current Length of Stay: 15 day(s)  Current Patient Status: Inpatient   Certification Statement: The patient will continue to require additional inpatient hospital stay due to urinary retention requiring zuniga removal and voiding trial  Discharge Plan: Anticipate discharge in 48 hrs to prior assisted or independent living facility.    Code Status: Level 3 - DNAR and DNI    Subjective:   Patient states that he is feeling fine today but has not voiding since zuniga catheter was removed. Also states that he has not had a bowel movement in 3 days. Agreeable to rectal suppository. Will continue voiding trial with bladder scans    Objective:     Vitals:   Temp (24hrs), Av.9 °F (36.6 °C), Min:97.7 °F (36.5 °C), Max:98.1 °F (36.7 °C)    Temp:  [97.7 °F (36.5 °C)-98.1 °F (36.7 °C)] 97.7 °F  (36.5 °C)  HR:  [72-81] 72  Resp:  [15-17] 17  BP: ()/(31-45) 105/40  SpO2:  [95 %-99 %] 98 %  Body mass index is 22.73 kg/m².     Input and Output Summary (last 24 hours):     Intake/Output Summary (Last 24 hours) at 12/28/2023 1701  Last data filed at 12/28/2023 1247  Gross per 24 hour   Intake 240 ml   Output 200 ml   Net 40 ml       Physical Exam:   Physical Exam  Vitals reviewed.   Constitutional:       General: He is not in acute distress.     Appearance: Normal appearance. He is not ill-appearing.   HENT:      Head: Normocephalic and atraumatic.      Nose: Nose normal.      Mouth/Throat:      Mouth: Mucous membranes are moist.      Pharynx: Oropharynx is clear.   Eyes:      Extraocular Movements: Extraocular movements intact.      Conjunctiva/sclera: Conjunctivae normal.   Cardiovascular:      Rate and Rhythm: Normal rate and regular rhythm.      Pulses: Normal pulses.      Heart sounds: Normal heart sounds. No murmur heard.  Pulmonary:      Effort: Pulmonary effort is normal. No respiratory distress.      Breath sounds: Normal breath sounds. No wheezing.   Abdominal:      General: Abdomen is flat. Bowel sounds are normal. There is no distension.      Palpations: Abdomen is soft.      Tenderness: There is no abdominal tenderness. There is no guarding.   Musculoskeletal:         General: Normal range of motion.      Cervical back: Normal range of motion.      Right lower leg: No edema.      Left lower leg: No edema.   Skin:     General: Skin is warm.   Neurological:      General: No focal deficit present.      Mental Status: He is alert and oriented to person, place, and time. Mental status is at baseline.      Motor: No weakness.   Psychiatric:         Mood and Affect: Mood normal.         Behavior: Behavior normal.         Thought Content: Thought content normal.         Judgment: Judgment normal.          Additional Data:     Labs:  Results from last 7 days   Lab Units 12/28/23  0436   WBC Thousand/uL  9.82   HEMOGLOBIN g/dL 8.1*   HEMATOCRIT % 24.7*   PLATELETS Thousands/uL 226     Results from last 7 days   Lab Units 12/28/23  0436   SODIUM mmol/L 132*   POTASSIUM mmol/L 4.5   CHLORIDE mmol/L 101   CO2 mmol/L 25   BUN mg/dL 20   CREATININE mg/dL 1.03   ANION GAP mmol/L 6   CALCIUM mg/dL 8.0*   GLUCOSE RANDOM mg/dL 101                       Lines/Drains:  Invasive Devices       Peripheral Intravenous Line  Duration             Peripheral IV 12/25/23 Right;Ventral (anterior) Forearm 3 days                          Imaging: Reviewed radiology reports from this admission including: abdominal/pelvic CT    Recent Cultures (last 7 days):         Last 24 Hours Medication List:   Current Facility-Administered Medications   Medication Dose Route Frequency Provider Last Rate    acetaminophen  650 mg Oral Q6H PRN Carson Singer MD      bisacodyl  10 mg Rectal Daily PRN Carson Singer MD      cyanocobalamin  1,000 mcg Oral Daily Carson Singer MD      digoxin  125 mcg Oral Every Other Day Carson Singer MD      dronedarone  400 mg Oral BID With Meals Carson Singer MD      ferrous sulfate  325 mg Oral Daily With Breakfast Carson Singer MD      folic acid  1,000 mcg Oral Daily Carson Singer MD      glycerin-hypromellose-  1 drop Both Eyes Q4H PRN Carson Singer MD      hydrocortisone   Topical 4x Daily PRN Carson Singer MD      lubiprostone  8 mcg Oral BID With Meals Carson Singer MD      melatonin  3 mg Oral HS PRN Carson Singer MD      ondansetron  4 mg Intravenous Q6H PRN Carson Singer MD      pantoprazole  40 mg Oral Daily Carson Singer MD      polyethylene glycol  17 g Oral BID Carson Singer MD      primidone  50 mg Oral Q12H YAMINI Carson Singer MD      senna-docusate sodium  1 tablet Oral BID Carson Singer MD          Today, Patient Was Seen By: Remedios Cruz PA-C    **Please Note: This note may have been constructed using a voice recognition system.**

## 2023-12-28 NOTE — ASSESSMENT & PLAN NOTE
Hemoglobin dropped from 10.3-8.7.  low iron and B12 levels. placed on vit b12 and venofer  Resumed home iron supplement  CBC stable  neg stool occult  Obtained blood consent and transfused 1 unit on 12/26 for preop transfusion.     Lab Results   Component Value Date    HGB 8.1 (L) 12/28/2023    HGB 8.8 (L) 12/27/2023    HGB 8.0 (L) 12/27/2023

## 2023-12-28 NOTE — ASSESSMENT & PLAN NOTE
Further outpatient follow-up with urology.  Placed on Flomax for acute urinary retention hold Casodex while inpatient.  Urology restarted patient on flomax - monitor bp

## 2023-12-28 NOTE — PROGRESS NOTES
Postop day #1 status post cystoscopy clot evacuation fulguration bladder neck and prostatic fossa.  Urine clear.  Patient feels well physical exam abdomen soft bladder nontender.    Discussed with Dr. Singer after the case yesterday and will DC Ascencio and check postvoid residuals.  This was discussed with the nursing staff.    Urology will check on patient later to check on his voiding pattern and his residuals.    Will add Flomax.  Thanks

## 2023-12-28 NOTE — ASSESSMENT & PLAN NOTE
Acute urinary retention secondary to severe constipation also known history of prostate cancer.  Zuniga catheter placed on admission. Also placed on Flomax   Zuniga removed 12/15 and placed on voiding trial. Some hypotension noted which may be secondary to Flomax and placed on midodrine - dc flomax and midodrine  Urology recommending CBI due to hematuria and multiple clots causing blockages. Zuniga exchanged for 22Fr  CT abdomen pelvis: Bladder completely decompressed by Zuniga catheter and cannot be further evaluated. If clinical concern remains, consider clamping of Zuniga for several hours and ultrasound imaging. Zuniga in expected position.   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 Remove zuniga and initiate voiding trial. Restart flomax

## 2023-12-28 NOTE — PLAN OF CARE
Problem: OCCUPATIONAL THERAPY ADULT  Goal: Performs self-care activities at highest level of function for planned discharge setting.  See evaluation for individualized goals.  Description: Treatment Interventions: ADL retraining, Functional transfer training, UE strengthening/ROM, Endurance training, Patient/family training, Equipment evaluation/education, Compensatory technique education, Continued evaluation, Energy conservation, Activityengagement          See flowsheet documentation for full assessment, interventions and recommendations.   Outcome: Progressing  Note: Limitation: Decreased ADL status, Decreased UE strength, Decreased Safe judgement during ADL, Decreased endurance, Decreased self-care trans, Decreased high-level ADLs     Assessment: Pt seen for OT treatment day 3. Pt willing and able to participate in treatment focused on bed mobility, ADLs, functional transfers and BUE strengthening. Orthostatic BP's obtained throughout session. Please see below vitals. Pt completed bed mobility with MI. Grooming completed sitting EOB with SUP. Unable to complete grooming standing at sink for safety concerns regarding low BP. Based on functional abilities assessed, pt demo ability to complete UB ADLs with SUP/SBA and LB ADLs with SBA/steadying assist. Pt completing STS transfers with SBA and SPT with steadying assist. Pt tolerating BUE strengthening well with rest breaks taken PRN. Goals reassessed and remain appropriate at this time. Pt requires additional OT services to increase strength, endurance and balance for improved IND during ADLs and functional transfers. Recommendation remains Level III (Minimum resource intensity) therapy following dc. Pt sitting in recliner with chair alarm activated, call bell placed within reach and all needs met following session.     Rehab Resource Intensity Level, OT: III (Minimum Resource Intensity)

## 2023-12-28 NOTE — PLAN OF CARE
Problem: Potential for Falls  Goal: Patient will remain free of falls  Description: INTERVENTIONS:  - Educate patient/family on patient safety including physical limitations  - Instruct patient to call for assistance with activity   - Consult OT/PT to assist with strengthening/mobility   - Keep Call bell within reach  - Keep bed low and locked with side rails adjusted as appropriate  - Keep care items and personal belongings within reach  - Initiate and maintain comfort rounds  - Make Fall Risk Sign visible to staff  - Offer Toileting every 2 Hours, in advance of need  - Initiate/Maintain bed/chair alarm  - Obtain necessary fall risk management equipment:   - Apply yellow socks and bracelet for high fall risk patients  - Consider moving patient to room near nurses station  Outcome: Progressing     Problem: PAIN - ADULT  Goal: Verbalizes/displays adequate comfort level or baseline comfort level  Description: Interventions:  - Encourage patient to monitor pain and request assistance  - Assess pain using appropriate pain scale  - Administer analgesics based on type and severity of pain and evaluate response  - Implement non-pharmacological measures as appropriate and evaluate response  - Consider cultural and social influences on pain and pain management  - Notify physician/advanced practitioner if interventions unsuccessful or patient reports new pain  Outcome: Progressing     Problem: INFECTION - ADULT  Goal: Absence or prevention of progression during hospitalization  Description: INTERVENTIONS:  - Assess and monitor for signs and symptoms of infection  - Monitor lab/diagnostic results  - Monitor all insertion sites, i.e. indwelling lines, tubes, and drains  - Monitor endotracheal if appropriate and nasal secretions for changes in amount and color  - [place] appropriate cooling/warming therapies per order  - Administer medications as ordered  - Instruct and encourage patient and family to use good hand hygiene  technique  - Identify and instruct in appropriate isolation precautions for identified infection/condition  Outcome: Progressing     Problem: SAFETY ADULT  Goal: Patient will remain free of falls  Description: INTERVENTIONS:  - Educate patient/family on patient safety including physical limitations  - Instruct patient to call for assistance with activity   - Consult OT/PT to assist with strengthening/mobility   - Keep Call bell within reach  - Keep bed low and locked with side rails adjusted as appropriate  - Keep care items and personal belongings within reach  - Initiate and maintain comfort rounds  - Make Fall Risk Sign visible to staff  - Offer Toileting every 2 Hours, in advance of need  - Initiate/Maintain bed/chair alarm  - Obtain necessary fall risk management equipment:   - Apply yellow socks and bracelet for high fall risk patients  - Consider moving patient to room near nurses station  Outcome: Progressing  Goal: Maintain or return to baseline ADL function  Description: INTERVENTIONS:  - Educate patient/family on patient safety including physical limitations  - Instruct patient to call for assistance with activity   - Consult OT/PT to assist with strengthening/mobility   - Keep Call bell within reach  - Keep bed low and locked with side rails adjusted as appropriate  - Keep care items and personal belongings within reach  - Initiate and maintain comfort rounds  - Make Fall Risk Sign visible to staff  - Offer Toileting every 2 Hours, in advance of need  - Initiate/Maintain bed/chair alarm  - Obtain necessary fall risk management equipment:   - Apply yellow socks and bracelet for high fall risk patients  - Consider moving patient to room near nurses station  Outcome: Progressing  Goal: Maintains/Returns to pre admission functional level  Description: INTERVENTIONS:  - Educate patient/family on patient safety including physical limitations  - Instruct patient to call for assistance with activity   - Consult  OT/PT to assist with strengthening/mobility   - Keep Call bell within reach  - Keep bed low and locked with side rails adjusted as appropriate  - Keep care items and personal belongings within reach  - Initiate and maintain comfort rounds  - Make Fall Risk Sign visible to staff  - Offer Toileting every 2 Hours, in advance of need  - Initiate/Maintain bed/chair alarm  - Obtain necessary fall risk management equipment:  - Apply yellow socks and bracelet for high fall risk patients  - Consider moving patient to room near nurses station  Outcome: Progressing     Problem: DISCHARGE PLANNING  Goal: Discharge to home or other facility with appropriate resources  Description: INTERVENTIONS:  - Identify barriers to discharge w/patient and caregiver  - Arrange for needed discharge resources and transportation as appropriate  - Identify discharge learning needs (meds, wound care, etc.)  - Arrange for interpretive services to assist at discharge as needed  - Refer to Case Management Department for coordinating discharge planning if the patient needs post-hospital services based on physician/advanced practitioner order or complex needs related to functional status, cognitive ability, or social support system  Outcome: Progressing     Problem: Knowledge Deficit  Goal: Patient/family/caregiver demonstrates understanding of disease process, treatment plan, medications, and discharge instructions  Description: Complete learning assessment and assess knowledge base.  Interventions:  - Provide teaching at level of understanding  - Provide teaching via preferred learning methods  Outcome: Progressing     Problem: GASTROINTESTINAL - ADULT  Goal: Minimal or absence of nausea and/or vomiting  Description: INTERVENTIONS:  - Administer IV fluids if ordered to ensure adequate hydration  - Maintain NPO status until nausea and vomiting are resolved  - Nasogastric tube if ordered  - Administer ordered antiemetic medications as needed  - Provide  nonpharmacologic comfort measures as appropriate  - Advance diet as tolerated, if ordered  - Consider nutrition services referral to assist patient with adequate nutrition and appropriate food choices  Outcome: Progressing  Goal: Maintains or returns to baseline bowel function  Description: INTERVENTIONS:  - Assess bowel function monitor bowel regimen  - Encourage oral fluids to ensure adequate hydration  - Administer IV fluids if ordered to ensure adequate hydration  - Administer ordered medications as needed  - Encourage mobilization and activity  - Consider nutritional services referral to assist patient with adequate nutrition and appropriate food choices  Outcome: Progressing  Goal: Maintains adequate nutritional intake  Description: INTERVENTIONS:  - Monitor percentage of each meal consumed  - Identify factors contributing to decreased intake, treat as appropriate  - Assist with meals as needed  - Monitor I&O, weight, and lab values if indicated  - Obtain nutrition services referral as needed  Outcome: Progressing  Goal: Oral mucous membranes remain intact  Description: INTERVENTIONS  - Assess oral mucosa and hygiene practices  - Implement preventative oral hygiene regimen  - Implement oral medicated treatments as ordered  - Initiate Nutrition services referral as needed  Outcome: Progressing     Problem: GENITOURINARY - ADULT  Goal: Maintains or returns to baseline urinary function  Description: INTERVENTIONS:  - Assess urinary function  - Encourage oral fluids to ensure adequate hydration if ordered  - Administer IV fluids as ordered to ensure adequate hydration  - Administer ordered medications as needed  - Offer frequent toileting  - Follow urinary retention protocol if ordered  Outcome: Progressing  Goal: Absence of urinary retention  Description: INTERVENTIONS:  - Assess patient's ability to void and empty bladder  - Monitor I/O  - Bladder scan as needed  - Discuss with physician/AP medications to  alleviate retention as needed  - Discuss catheterization for long term situations as appropriate  Outcome: Progressing  Goal: Urinary catheter remains patent  Description: INTERVENTIONS:  - Assess patency of urinary catheter  - If patient has a chronic zuniga, consider changing catheter if non-functioning  - Follow guidelines for intermittent irrigation of non-functioning urinary catheter  Outcome: Progressing     Problem: Prexisting or High Potential for Compromised Skin Integrity  Goal: Skin integrity is maintained or improved  Description: INTERVENTIONS:  - Identify patients at risk for skin breakdown  - Assess and monitor skin integrity  - Assess and monitor nutrition and hydration status  - Monitor labs   - Assess for incontinence   - Turn and reposition patient  - Assist with mobility/ambulation  - Relieve pressure over bony prominences  - Avoid friction and shearing  - Provide appropriate hygiene as needed including keeping skin clean and dry  - Evaluate need for skin moisturizer/barrier cream  - Collaborate with interdisciplinary team   - Patient/family teaching  - Consider wound care consult   Outcome: Progressing     Problem: Nutrition/Hydration-ADULT  Goal: Nutrient/Hydration intake appropriate for improving, restoring or maintaining nutritional needs  Description: Monitor and assess patient's nutrition/hydration status for malnutrition. Collaborate with interdisciplinary team and initiate plan and interventions as ordered.  Monitor patient's weight and dietary intake as ordered or per policy. Utilize nutrition screening tool and intervene as necessary. Determine patient's food preferences and provide high-protein, high-caloric foods as appropriate.     INTERVENTIONS:  - Monitor oral intake, urinary output, labs, and treatment plans  - Assess nutrition and hydration status and recommend course of action  - Evaluate amount of meals eaten  - Assist patient with eating if necessary   - Allow adequate time for  meals  - Recommend/ encourage appropriate diets, oral nutritional supplements, and vitamin/mineral supplements  - Order, calculate, and assess calorie counts as needed  - Recommend, monitor, and adjust tube feedings and TPN/PPN based on assessed needs  - Assess need for intravenous fluids  - Provide specific nutrition/hydration education as appropriate  - Include patient/family/caregiver in decisions related to nutrition  Outcome: Progressing

## 2023-12-28 NOTE — PLAN OF CARE
Problem: Potential for Falls  Goal: Patient will remain free of falls  Description: INTERVENTIONS:  - Educate patient/family on patient safety including physical limitations  - Instruct patient to call for assistance with activity   - Consult OT/PT to assist with strengthening/mobility   - Keep Call bell within reach  - Keep bed low and locked with side rails adjusted as appropriate  - Keep care items and personal belongings within reach  - Initiate and maintain comfort rounds  - Make Fall Risk Sign visible to staff  - Offer Toileting every 2 Hours, in advance of need  - Initiate/Maintain alarm  - Obtain necessary fall risk management equipment  - Apply yellow socks and bracelet for high fall risk patients  - Consider moving patient to room near nurses station  Outcome: Progressing     Problem: PAIN - ADULT  Goal: Verbalizes/displays adequate comfort level or baseline comfort level  Description: Interventions:  - Encourage patient to monitor pain and request assistance  - Assess pain using appropriate pain scale  - Administer analgesics based on type and severity of pain and evaluate response  - Implement non-pharmacological measures as appropriate and evaluate response  - Consider cultural and social influences on pain and pain management  - Notify physician/advanced practitioner if interventions unsuccessful or patient reports new pain  Outcome: Progressing     Problem: INFECTION - ADULT  Goal: Absence or prevention of progression during hospitalization  Description: INTERVENTIONS:  - Assess and monitor for signs and symptoms of infection  - Monitor lab/diagnostic results  - Monitor all insertion sites, i.e. indwelling lines, tubes, and drains  - Monitor endotracheal if appropriate and nasal secretions for changes in amount and color  - [place] appropriate cooling/warming therapies per order  - Administer medications as ordered  - Instruct and encourage patient and family to use good hand hygiene technique  -  Identify and instruct in appropriate isolation precautions for identified infection/condition  Outcome: Progressing     Problem: SAFETY ADULT  Goal: Patient will remain free of falls  Description: INTERVENTIONS:  - Educate patient/family on patient safety including physical limitations  - Instruct patient to call for assistance with activity   - Consult OT/PT to assist with strengthening/mobility   - Keep Call bell within reach  - Keep bed low and locked with side rails adjusted as appropriate  - Keep care items and personal belongings within reach  - Initiate and maintain comfort rounds  - Make Fall Risk Sign visible to staff  - Offer Toileting every 2 Hours, in advance of need  - Initiate/Maintain alarm  - Obtain necessary fall risk management equipment  - Apply yellow socks and bracelet for high fall risk patients  - Consider moving patient to room near nurses station  Outcome: Progressing  Goal: Maintain or return to baseline ADL function  Description: INTERVENTIONS:  -  Assess patient's ability to carry out ADLs; assess patient's baseline for ADL function and identify physical deficits which impact ability to perform ADLs (bathing, care of mouth/teeth, toileting, grooming, dressing, etc.)  - Assess/evaluate cause of self-care deficits   - Assess range of motion  - Assess patient's mobility; develop plan if impaired  - Assess patient's need for assistive devices and provide as appropriate  - Encourage maximum independence but intervene and supervise when necessary  - Involve family in performance of ADLs  - Assess for home care needs following discharge   - Consider OT consult to assist with ADL evaluation and planning for discharge  - Provide patient education as appropriate  Outcome: Progressing  Goal: Maintains/Returns to pre admission functional level  Description: INTERVENTIONS:  - Perform AM-PAC 6 Click Basic Mobility/ Daily Activity assessment daily.  - Set and communicate daily mobility goal to care team  and patient/family/caregiver.   - Collaborate with rehabilitation services on mobility goals if consulted  - Perform Range of Motion - times a day.  - Reposition patient every - hours.  - Dangle patient - times a day  - Stand patient - times a day  - Ambulate patient - times a day  - Out of bed to chair - times a day   - Out of bed for meals - times a day  - Out of bed for toileting  - Record patient progress and toleration of activity level   Outcome: Progressing     Problem: DISCHARGE PLANNING  Goal: Discharge to home or other facility with appropriate resources  Description: INTERVENTIONS:  - Identify barriers to discharge w/patient and caregiver  - Arrange for needed discharge resources and transportation as appropriate  - Identify discharge learning needs (meds, wound care, etc.)  - Arrange for interpretive services to assist at discharge as needed  - Refer to Case Management Department for coordinating discharge planning if the patient needs post-hospital services based on physician/advanced practitioner order or complex needs related to functional status, cognitive ability, or social support system  Outcome: Progressing     Problem: Knowledge Deficit  Goal: Patient/family/caregiver demonstrates understanding of disease process, treatment plan, medications, and discharge instructions  Description: Complete learning assessment and assess knowledge base.  Interventions:  - Provide teaching at level of understanding  - Provide teaching via preferred learning methods  Outcome: Progressing     Problem: GASTROINTESTINAL - ADULT  Goal: Minimal or absence of nausea and/or vomiting  Description: INTERVENTIONS:  - Administer IV fluids if ordered to ensure adequate hydration  - Maintain NPO status until nausea and vomiting are resolved  - Nasogastric tube if ordered  - Administer ordered antiemetic medications as needed  - Provide nonpharmacologic comfort measures as appropriate  - Advance diet as tolerated, if  ordered  - Consider nutrition services referral to assist patient with adequate nutrition and appropriate food choices  Outcome: Progressing  Goal: Maintains or returns to baseline bowel function  Description: INTERVENTIONS:  - Assess bowel function monitor bowel regimen  - Encourage oral fluids to ensure adequate hydration  - Administer IV fluids if ordered to ensure adequate hydration  - Administer ordered medications as needed  - Encourage mobilization and activity  - Consider nutritional services referral to assist patient with adequate nutrition and appropriate food choices  Outcome: Progressing  Goal: Maintains adequate nutritional intake  Description: INTERVENTIONS:  - Monitor percentage of each meal consumed  - Identify factors contributing to decreased intake, treat as appropriate  - Assist with meals as needed  - Monitor I&O, weight, and lab values if indicated  - Obtain nutrition services referral as needed  Outcome: Progressing  Goal: Oral mucous membranes remain intact  Description: INTERVENTIONS  - Assess oral mucosa and hygiene practices  - Implement preventative oral hygiene regimen  - Implement oral medicated treatments as ordered  - Initiate Nutrition services referral as needed  Outcome: Progressing     Problem: GENITOURINARY - ADULT  Goal: Maintains or returns to baseline urinary function  Description: INTERVENTIONS:  - Assess urinary function  - Encourage oral fluids to ensure adequate hydration if ordered  - Administer IV fluids as ordered to ensure adequate hydration  - Administer ordered medications as needed  - Offer frequent toileting  - Follow urinary retention protocol if ordered  Outcome: Progressing  Goal: Absence of urinary retention  Description: INTERVENTIONS:  - Assess patient’s ability to void and empty bladder  - Monitor I/O  - Bladder scan as needed  - Discuss with physician/AP medications to alleviate retention as needed  - Discuss catheterization for long term situations as  appropriate  Outcome: Progressing  Goal: Urinary catheter remains patent  Description: INTERVENTIONS:  - Assess patency of urinary catheter  - If patient has a chronic zuniga, consider changing catheter if non-functioning  - Follow guidelines for intermittent irrigation of non-functioning urinary catheter  Outcome: Progressing     Problem: Prexisting or High Potential for Compromised Skin Integrity  Goal: Skin integrity is maintained or improved  Description: INTERVENTIONS:  - Identify patients at risk for skin breakdown  - Assess and monitor skin integrity  - Assess and monitor nutrition and hydration status  - Monitor labs   - Assess for incontinence   - Turn and reposition patient  - Assist with mobility/ambulation  - Relieve pressure over bony prominences  - Avoid friction and shearing  - Provide appropriate hygiene as needed including keeping skin clean and dry  - Evaluate need for skin moisturizer/barrier cream  - Collaborate with interdisciplinary team   - Patient/family teaching  - Consider wound care consult   Outcome: Progressing     Problem: Nutrition/Hydration-ADULT  Goal: Nutrient/Hydration intake appropriate for improving, restoring or maintaining nutritional needs  Description: Monitor and assess patient's nutrition/hydration status for malnutrition. Collaborate with interdisciplinary team and initiate plan and interventions as ordered.  Monitor patient's weight and dietary intake as ordered or per policy. Utilize nutrition screening tool and intervene as necessary. Determine patient's food preferences and provide high-protein, high-caloric foods as appropriate.     INTERVENTIONS:  - Monitor oral intake, urinary output, labs, and treatment plans  - Assess nutrition and hydration status and recommend course of action  - Evaluate amount of meals eaten  - Assist patient with eating if necessary   - Allow adequate time for meals  - Recommend/ encourage appropriate diets, oral nutritional supplements, and  vitamin/mineral supplements  - Order, calculate, and assess calorie counts as needed  - Recommend, monitor, and adjust tube feedings and TPN/PPN based on assessed needs  - Assess need for intravenous fluids  - Provide specific nutrition/hydration education as appropriate  - Include patient/family/caregiver in decisions related to nutrition  Outcome: Progressing

## 2023-12-28 NOTE — PHYSICAL THERAPY NOTE
"   PHYSICAL THERAPY TREATMENT NOTE  NAME:  Miguel Angel Ortega  DATE: 12/28/23    Length Of Stay: 15  Performed at least 2 patient identifiers during session: Name and Birthday  Treatment time: 818-856  Treatment length: 38 min       12/28/23 0818   Note Type   Note Type Treatment   Pain Assessment   Pain Assessment Tool 0-10   Pain Score No Pain   Restrictions/Precautions   Other Precautions Chair Alarm;Bed Alarm;Multiple lines;Fall Risk   General   Chart Reviewed Yes   Response to Previous Treatment Patient with no complaints from previous session.   Cognition   Overall Cognitive Status WFL   Orientation Level Oriented X4   Following Commands Follows one step commands without difficulty   Bed Mobility   Supine to Sit 6  Modified independent   Additional items HOB elevated;Bedrails   Sit to Supine 6  Modified independent   Additional items HOB elevated;Bedrails   Additional Comments HOB slightly elevated. Pt reporting light headedness seated EOB, BP 90/38. Pt performed seated ther ex prior to mobility with BP 91/36   Transfers   Sit to Stand   (SBA)   Additional items Increased time required;Verbal cues   Stand to Sit   (SBA)   Additional items Increased time required;Verbal cues   Stand pivot   (Steadying assist)   Additional items Increased time required;Verbal cues   Additional Comments RW for transfers, VC for safety. SBA for STS and steadying assist for SPT due to pt with hx of orthostatic hypotension and low BP initially. Post transfer BP 90/37. Attempted standing ther ex, pt reporting \"a little\" light headedness however able to continue, standing BP 79/31. Pt returned to seated, deferred gait this session due to orthostatic BP, RN Es aware   Balance   Static Sitting Good   Dynamic Sitting Fair +   Static Standing Fair   Dynamic Standing Fair -   Endurance Deficit   Endurance Deficit Yes   Endurance Deficit Description fatigue, orthostatic BP   Activity Tolerance   Activity Tolerance Patient limited by fatigue "   Medical Staff Made Aware Laurie DUFF   Nurse Made Aware Es LUCIO   Exercises   Glute Sets Sitting;20 reps;AROM;Bilateral   Hip Flexion Sitting;20 reps;AROM;Bilateral   Hip Abduction Sitting;20 reps;AROM;Bilateral   Knee AROM Long Arc Quad Sitting;20 reps;AROM;Bilateral   Ankle Pumps Standing;20 reps;AROM;Bilateral  (HR)   Assessment   Prognosis Good   Problem List Decreased strength;Decreased endurance;Impaired balance;Decreased mobility   Barriers to Discharge None   Barriers to Discharge Comments Pt lives in assisted, has assistance PRN   Goals   STG Expiration Date 01/11/24   PT Treatment Day 5   Plan   Treatment/Interventions Functional transfer training;LE strengthening/ROM;Therapeutic exercise;Endurance training;Patient/family training;Equipment eval/education;Bed mobility;Gait training;Compensatory technique education;Spoke to nursing;OT   Progress Slow progress, medical status limitations   PT Frequency 3-5x/wk   Discharge Recommendation   Rehab Resource Intensity Level, PT III (Minimum Resource Intensity)   AM-PAC Basic Mobility Inpatient   Turning in Flat Bed Without Bedrails 4   Lying on Back to Sitting on Edge of Flat Bed Without Bedrails 4   Moving Bed to Chair 3   Standing Up From Chair Using Arms 3   Walk in Room 3   Climb 3-5 Stairs With Railing 2   Basic Mobility Inpatient Raw Score 19   Basic Mobility Standardized Score 42.48   Highest Level Of Mobility   JH-HLM Goal 6: Walk 10 steps or more   JH-HLM Achieved 5: Stand (1 or more minutes)   Education   Education Provided Mobility training;Home exercise program;Assistive device   Patient Demonstrates acceptance/verbal understanding   End of Consult   Patient Position at End of Consult Bedside chair;Bed/Chair alarm activated;All needs within reach     The patient's AM-PAC Basic Mobility Inpatient Short Form Raw Score is 19. A Raw score of greater than 16 suggests the patient may benefit from discharge to home. Please also refer to the recommendation of  the Physical Therapist for safe discharge planning.      Assessment: Pt seen this date for reassessment. He has received skilled PT services consisting of gait training, therapeutic activity, and therapeutic exercise. He was making good functional progress however this session his mobility was limited by low/orthostatic BP. He was initially requring min - steadying assist with ' and this session was unsafe to ambulate due to low BP. She has ambulated at most 165' with RW and supervision. He continues to be limited by decreased strength, balance, endurance, and safety awareness. He will continue to benefit from PT services 3-5x/week to maximize LOF.     STG exp 1/11/24: Pt will: Perform bed mobility tasks with modified I to reposition in bed and prepare for transfers. Pt will perform transfers with modified I to increase Indep in prior living environment and prepare for ambulation. Pt will ambulate with RW for >/= 150 ft with  Supervision  to decrease risk for falls and improve gait quality and to access home environment.  Pt will participate in objective balance assessment to determine baseline fall risk.       Prisca Cheatham, PT,DPT

## 2023-12-28 NOTE — PLAN OF CARE
Problem: PHYSICAL THERAPY ADULT  Goal: Performs mobility at highest level of function for planned discharge setting.  See evaluation for individualized goals.  Description: Treatment/Interventions: ADL retraining, Functional transfer training, LE strengthening/ROM, Elevations, Therapeutic exercise, Endurance training, Patient/family training, Equipment eval/education, Bed mobility, Gait training, Compensatory technique education, Spoke to nursing, OT          See flowsheet documentation for full assessment, interventions and recommendations.  12/28/2023 1243 by Prisca Cheatham PT  Outcome: Not Progressing  Note: Prognosis: Good  Problem List: Decreased strength, Decreased endurance, Impaired balance, Decreased mobility  Assessment: Pt seen this date for reassessment. He has received skilled PT services consisting of gait training, therapeutic activity, and therapeutic exercise. He was making good functional progress however this session his mobility was limited by low/orthostatic BP. He was initially requring min - steadying assist with ' and this session was unsafe to ambulate due to low BP. She has ambulated at most 165' with RW and supervision. He continues to be limited by decreased strength, balance, endurance, and safety awareness. He will continue to benefit from PT services 3-5x/week to maximize LOF.  Barriers to Discharge: None  Barriers to Discharge Comments: Pt lives in senior living, has assistance PRN  Rehab Resource Intensity Level, PT: III (Minimum Resource Intensity)    See flowsheet documentation for full assessment.

## 2023-12-29 ENCOUNTER — TELEPHONE (OUTPATIENT)
Dept: UROLOGY | Facility: CLINIC | Age: 88
End: 2023-12-29

## 2023-12-29 PROBLEM — R78.81 GRAM-NEGATIVE BACTEREMIA: Status: RESOLVED | Noted: 2023-12-18 | Resolved: 2023-12-29

## 2023-12-29 LAB
ANION GAP SERPL CALCULATED.3IONS-SCNC: 5 MMOL/L
BUN SERPL-MCNC: 21 MG/DL (ref 5–25)
CALCIUM SERPL-MCNC: 8.1 MG/DL (ref 8.4–10.2)
CHLORIDE SERPL-SCNC: 100 MMOL/L (ref 96–108)
CO2 SERPL-SCNC: 25 MMOL/L (ref 21–32)
CREAT SERPL-MCNC: 0.96 MG/DL (ref 0.6–1.3)
ERYTHROCYTE [DISTWIDTH] IN BLOOD BY AUTOMATED COUNT: 16.2 % (ref 11.6–15.1)
GFR SERPL CREATININE-BSD FRML MDRD: 66 ML/MIN/1.73SQ M
GLUCOSE SERPL-MCNC: 102 MG/DL (ref 65–140)
HCT VFR BLD AUTO: 24 % (ref 36.5–49.3)
HGB BLD-MCNC: 8 G/DL (ref 12–17)
MCH RBC QN AUTO: 31.6 PG (ref 26.8–34.3)
MCHC RBC AUTO-ENTMCNC: 33.3 G/DL (ref 31.4–37.4)
MCV RBC AUTO: 95 FL (ref 82–98)
OSMOLALITY UR/SERPL-RTO: 281 MMOL/KG (ref 282–298)
OSMOLALITY UR: 380 MMOL/KG
PLATELET # BLD AUTO: 242 THOUSANDS/UL (ref 149–390)
PMV BLD AUTO: 8.2 FL (ref 8.9–12.7)
POTASSIUM SERPL-SCNC: 4.3 MMOL/L (ref 3.5–5.3)
RBC # BLD AUTO: 2.53 MILLION/UL (ref 3.88–5.62)
SODIUM 24H UR-SCNC: 53 MOL/L
SODIUM SERPL-SCNC: 130 MMOL/L (ref 135–147)
WBC # BLD AUTO: 6.11 THOUSAND/UL (ref 4.31–10.16)

## 2023-12-29 PROCEDURE — 97110 THERAPEUTIC EXERCISES: CPT

## 2023-12-29 PROCEDURE — 85027 COMPLETE CBC AUTOMATED: CPT

## 2023-12-29 PROCEDURE — 99231 SBSQ HOSP IP/OBS SF/LOW 25: CPT | Performed by: UROLOGY

## 2023-12-29 PROCEDURE — 80048 BASIC METABOLIC PNL TOTAL CA: CPT

## 2023-12-29 PROCEDURE — 83930 ASSAY OF BLOOD OSMOLALITY: CPT

## 2023-12-29 PROCEDURE — 97530 THERAPEUTIC ACTIVITIES: CPT

## 2023-12-29 PROCEDURE — 99232 SBSQ HOSP IP/OBS MODERATE 35: CPT

## 2023-12-29 PROCEDURE — 84300 ASSAY OF URINE SODIUM: CPT

## 2023-12-29 PROCEDURE — 83935 ASSAY OF URINE OSMOLALITY: CPT

## 2023-12-29 RX ORDER — LUBIPROSTONE 8 UG/1
8 CAPSULE ORAL 2 TIMES DAILY WITH MEALS
Start: 2023-12-29 | End: 2023-12-29

## 2023-12-29 RX ORDER — LUBIPROSTONE 8 UG/1
8 CAPSULE ORAL 2 TIMES DAILY WITH MEALS
Qty: 60 CAPSULE | Refills: 0 | Status: SHIPPED | OUTPATIENT
Start: 2023-12-29 | End: 2024-01-02

## 2023-12-29 RX ORDER — TAMSULOSIN HYDROCHLORIDE 0.4 MG/1
0.4 CAPSULE ORAL
Status: DISCONTINUED | OUTPATIENT
Start: 2023-12-29 | End: 2024-01-04 | Stop reason: HOSPADM

## 2023-12-29 RX ORDER — FLUDROCORTISONE ACETATE 0.1 MG/1
0.05 TABLET ORAL DAILY
Status: DISCONTINUED | OUTPATIENT
Start: 2023-12-29 | End: 2024-01-02

## 2023-12-29 RX ADMIN — SENNOSIDES AND DOCUSATE SODIUM 1 TABLET: 8.6; 5 TABLET ORAL at 15:32

## 2023-12-29 RX ADMIN — PRIMIDONE 50 MG: 50 TABLET ORAL at 21:22

## 2023-12-29 RX ADMIN — LUBIPROSTONE 8 MCG: 8 CAPSULE, GELATIN COATED ORAL at 08:01

## 2023-12-29 RX ADMIN — DRONEDARONE 400 MG: 400 TABLET, FILM COATED ORAL at 15:33

## 2023-12-29 RX ADMIN — MELATONIN 3 MG: 3 TAB ORAL at 21:24

## 2023-12-29 RX ADMIN — POLYETHYLENE GLYCOL 3350 17 G: 17 POWDER, FOR SOLUTION ORAL at 15:32

## 2023-12-29 RX ADMIN — PANTOPRAZOLE SODIUM 40 MG: 40 TABLET, DELAYED RELEASE ORAL at 08:01

## 2023-12-29 RX ADMIN — DRONEDARONE 400 MG: 400 TABLET, FILM COATED ORAL at 08:02

## 2023-12-29 RX ADMIN — POLYETHYLENE GLYCOL 3350 17 G: 17 POWDER, FOR SOLUTION ORAL at 08:02

## 2023-12-29 RX ADMIN — FLUDROCORTISONE ACETATE 0.05 MG: 0.1 TABLET ORAL at 15:33

## 2023-12-29 RX ADMIN — SENNOSIDES AND DOCUSATE SODIUM 1 TABLET: 8.6; 5 TABLET ORAL at 08:01

## 2023-12-29 RX ADMIN — TAMSULOSIN HYDROCHLORIDE 0.4 MG: 0.4 CAPSULE ORAL at 15:32

## 2023-12-29 RX ADMIN — LUBIPROSTONE 8 MCG: 8 CAPSULE, GELATIN COATED ORAL at 15:32

## 2023-12-29 RX ADMIN — FERROUS SULFATE TAB 325 MG (65 MG ELEMENTAL FE) 325 MG: 325 (65 FE) TAB at 08:01

## 2023-12-29 RX ADMIN — PRIMIDONE 50 MG: 50 TABLET ORAL at 08:01

## 2023-12-29 RX ADMIN — FOLIC ACID 1000 MCG: 1 TABLET ORAL at 08:01

## 2023-12-29 RX ADMIN — CYANOCOBALAMIN TAB 500 MCG 1000 MCG: 500 TAB at 08:01

## 2023-12-29 NOTE — ASSESSMENT & PLAN NOTE
Acute urinary retention secondary to severe constipation also known history of prostate cancer.  Zuniga catheter placed on admission. Also placed on Flomax   Zuniga removed 12/15 and placed on voiding trial. Some hypotension noted which may be secondary to Flomax and placed on midodrine - dc flomax and midodrine  Urology recommending CBI due to hematuria and multiple clots causing blockages. Zuniga exchanged for 22Fr  CT abdomen pelvis: Bladder completely decompressed by Zuniga catheter and cannot be further evaluated. If clinical concern remains, consider clamping of Zuniga for several hours and ultrasound imaging. Zuniga in expected position.   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 Remove zuniga and initiate voiding trial. Restart flomax. Send back to facility on condom catheter. OP follow up

## 2023-12-29 NOTE — PROGRESS NOTES
No fever vital signs stable.  Hemoglobin stable at 8.0 creatinine normal as well.    Patient with [place] catheter on currently and not uncomfortable.  On physical exam he is alert and awake and abdomen soft.  Urine clear in the external catheter bag.    Postop day #2 status post cystoscopy clot evacuation fulguration of bladder neck and prostatic urethra are urologically stable.    I told the patient he should expect to have incontinence with his neurogenic bladder and history of radiation for prostate cancer as well as because of his age.  He understands and is okay with the [place] catheter.    I will arrange outpatient follow-up with me to check on his voiding pattern and reevaluate.  Offered patient to go back to his urologist Dr. Gorman but he would like to see LOUISE RODRIGUEZ

## 2023-12-29 NOTE — PLAN OF CARE
Problem: PHYSICAL THERAPY ADULT  Goal: Performs mobility at highest level of function for planned discharge setting.  See evaluation for individualized goals.  Description: Treatment/Interventions: ADL retraining, Functional transfer training, LE strengthening/ROM, Elevations, Therapeutic exercise, Endurance training, Patient/family training, Equipment eval/education, Bed mobility, Gait training, Compensatory technique education, Spoke to nursing, OT          See flowsheet documentation for full assessment, interventions and recommendations.  Outcome: Not Progressing  Note: Prognosis: Good  Problem List: Decreased strength, Decreased endurance, Impaired balance, Decreased mobility, Decreased safety awareness  Assessment: Pt seen for PT treatment session this date with interventions consisting of Therapeutic exercise consisting of: AROM 10 reps and 20 reps B LE in sitting and standing with B/L UE support position and therapeutic activity consisting of training: sit<>stand transfers and static standing tolerance for 3 minutes w/ B/L UE support. Pt agreeable to PT treatment session upon arrival, pt found seated OOB in recliner, in no apparent distress. In comparison to previous session, pt with no improvements as evidenced by pt unsafe to ambulate this session due to continued orthostatic Bps, pt symptomatic, able to complete assigned therex . Post session: pt returned back to recliner, chair alarm engaged, all needs in reach, and RN notified of session findings/recommendations Continue to recommend  Level III Resource Intensity  at time of d/c in order to maximize pt's functional independence and safety w/ mobility. Pt continues to be functioning below baseline level, and remains limited 2* factors listed above and including decreased strength, balance, mobility, and activity tolerance. PT will continue to see pt while here in order to address the deficits listed above and provide interventions consistent w/ POC in  effort to achieve STGs.  Barriers to Discharge: None  Barriers to Discharge Comments: Pt resides in long-term, has assistance PRN  Rehab Resource Intensity Level, PT: III (Minimum Resource Intensity)    See flowsheet documentation for full assessment.

## 2023-12-29 NOTE — ASSESSMENT & PLAN NOTE
Hemoglobin dropped from 10.3-8.7.  low iron and B12 levels. placed on vit b12 and venofer  Resumed home iron supplement  CBC stable  neg stool occult  Obtained blood consent and transfused 1 unit on 12/26 for preop transfusion.     Lab Results   Component Value Date    HGB 8.0 (L) 12/29/2023    HGB 8.1 (L) 12/28/2023    HGB 8.8 (L) 12/27/2023

## 2023-12-29 NOTE — CASE MANAGEMENT
Case Management Discharge Planning Note    Patient name Miguel Angel Ortega  Location /-01 MRN [id number]  : 1928 Date 2023       Current Admission Date: 2023  Current Admission Diagnosis:Stercoral colitis   Patient Active Problem List    Diagnosis Date Noted    Gross hematuria 2023    Orthostatic hypotension 2023    Gram-negative bacteremia 2023    Acute on chronic anemia 2023    Stercoral colitis 2023    Acute urinary retention 2023    Permanent atrial fibrillation (HCC) 2023    Hyponatremia 2023    Prostate cancer metastatic to bone (HCC) 2023      LOS (days): 16  Geometric Mean LOS (GMLOS) (days): 8.2  Days to GMLOS:-8     OBJECTIVE:  Risk of Unplanned Readmission Score: 25.57         Current admission status: Inpatient   Preferred Pharmacy:   [id number] [address] [address]  Phone: [phone] Fax: [phone]    [facility]  [place], [place] - [address]  [address]  [facility] [place] [address]  Phone: [phone] Fax: [phone]    Primary Care Provider: Kisha Armstrong MD    Primary Insurance: MEDICARE  Secondary Insurance: [facility]    DISCHARGE DETAILS:        1330 CM spoke with Gadiel Sun, regarding patient returning to facility over weekend with [facility].  As long as [facility] can provide a few day supplies [facility] can accept until they can order Tuesday for patient.  CM discussed with Corinne concern for patients unstable blood pressure when mobile and suggested mobility via WC rather than RW upon return.  Corinne confirmed this is feasible for patient.    CM met with patient to let him know [facility] An[facility] for patients return to [facility]. [facility] prescription is $225 copay for patient.  Prescription requires prior auth through patients [employer] Prescription plan - CM  contacted [facility] for prior auth to determine cost.    CM discussed with patient potential for discharge over weekend and provided patient with IMM.  IMM placed in bin for filing.                                                                                     IMM Given (Date):: 12/29/23  IMM Given to:: Patient  Family notified:: appeal rights provided to patient

## 2023-12-29 NOTE — PROGRESS NOTES
[facility]  Progress Note  Name: Miguel Angel Ortega I  MRN: [id number]  Unit/Bed#: -01 I Date of Admission: 12/13/2023   Date of Service: 12/29/2023 I Hospital Day: 16    Assessment/Plan   * Stercoral colitis  Assessment & Plan  Patient presents with symptoms of constipation for the last few days.  He saw GI outpatient.  He was started on MiraLAX patient states he went 26 times to the bathroom yesterday had very small amounts of stool and felt like he just could not empty out his bowel movement.  Also had a fall yesterday.  Ct abd pelvis shows Abundant stool in the colon and rectal vault compatible with constipation in the appropriate clinical context. No bowel obstruction.  Minimal perirectal fat stranding suggestive of mild or early stercoral proctitis. No perirectal abscess  Placed on MiraLAX daily and placed on Dulcolax suppository x 2 daily and also placed 3 enemas    GI consult placed - miralax BID, colace BID and Amitiza 8mcg BID. Dulcolax suppository daily prn. Advance to regular diet. Outpatient follow up    Orthostatic hypotension  Assessment & Plan  Patient has been having episodes of orthostatic hypotension outpatient. Orthostatic vitals inpatient positive  Cardiology consult: Suspect this is secondary to poor PO intake and blood loss. Please encourage PO intake. Nursing instructed to apply compression socks while awake. ECG demonstrates sinus bradycardia. HR's controlled. OK to continue digoxin and Multaq.   Will place on gentle IVF rehydration - stopped  Tom stockings, slow position changes. Can add midodrine if becomes symptomatic from hypotension  Started florinef .05mg daily    Acute on chronic anemia  Assessment & Plan  Hemoglobin dropped from 10.3-8.7.  low iron and B12 levels. placed on vit b12 and venofer  Resumed home iron supplement  CBC stable  neg stool occult  Obtained blood consent and transfused 1 unit on 12/26 for preop transfusion.     Lab Results    Component Value Date    HGB 8.0 (L) 12/29/2023    HGB 8.1 (L) 12/28/2023    HGB 8.8 (L) 12/27/2023       Permanent atrial fibrillation (HCC)  Assessment & Plan  Continue Multaq, digoxin which are his chronic meds.  Not on anticoagulation  Dig level acceptable  Appreciate cardio eval - continue multaq. Change digoxin to every other day. Was on xarelto in the past - discuss outpatient with his cardiologist about resumption  Asked for cardiac clearance prior to procedure: Patient is considered low risk for adverse cardiovascular outcomes in regards to non cardiac procedure     Acute urinary retention  Assessment & Plan  Acute urinary retention secondary to severe constipation also known history of prostate cancer.  Zuniga catheter placed on admission. Also placed on Flomax   Zuniga removed 12/15 and placed on voiding trial. Some hypotension noted which may be secondary to Flomax and placed on midodrine - dc flomax and midodrine  Urology recommending CBI due to hematuria and multiple clots causing blockages. Zuniga exchanged for 22Fr  CT abdomen pelvis: Bladder completely decompressed by Zuniga catheter and cannot be further evaluated. If clinical concern remains, consider clamping of Zuniga for several hours and ultrasound imaging. Zuniga in expected position.   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 Remove zuniga and initiate voiding trial. Restart flomax. Send back to facility on condom catheter. OP follow up     Prostate cancer metastatic to bone (HCC)  Assessment & Plan  Further outpatient follow-up with urology.  Placed on Flomax for acute urinary retention hold Casodex while inpatient.  Urology restarted patient on flomax - monitor bp    Gram-negative bacteremia-resolved as of 12/29/2023  Assessment & Plan  Proteus bacteremia from gi source.  ID consulted: increase rocephin to 2g q24h, f/u blood and urine cultures, GI  consult for persistent constipation/fecal impaction, continue zuniga, anticipate 10 days abx therapy through  with cefuroxime 500 bid on dc.   BC no growth after 5 days  Completed abx on              VTE Pharmacologic Prophylaxis:   High Risk (Score >/= 5) - Pharmacological DVT Prophylaxis Contraindicated. Sequential Compression Devices Ordered.    Mobility:   Basic Mobility Inpatient Raw Score: 19  JH-HLM Goal: 6: Walk 10 steps or more  JH-HLM Achieved: 5: Stand (1 or more minutes)  HLM Goal NOT achieved. Continue with multidisciplinary rounding and encourage appropriate mobility to improve upon HLM goals.    Patient Centered Rounds: I performed bedside rounds with nursing staff today.   Discussions with Specialists or Other Care Team Provider: nursing, cm and urology    Education and Discussions with Family / Patient: Updated  (friend) via phone.    Total Time Spent on Date of Encounter in care of patient:  This time was spent on one or more of the following: performing physical exam; counseling and coordination of care; obtaining or reviewing history; documenting in the medical record; reviewing/ordering tests, medications or procedures; communicating with other healthcare professionals and discussing with patient's family/caregivers.    Current Length of Stay: 16 day(s)  Current Patient Status: Inpatient   Certification Statement: The patient will continue to require additional inpatient hospital stay due to monitoring blood pressure with addition of medications  Discharge Plan: Anticipate discharge in 24-48 hrs to prior assisted or independent living facility.    Code Status: Level 3 - DNAR and DNI    Subjective:   Patient states that he is feeling well today but was dizzy this morning when he stood up. Does not offer any further complaints at this time. States that he had a bowel movement yesterday after receiving suppository.    Objective:     Vitals:   Temp (24hrs), Av.5 °F (36.4  °C), Min:97.3 °F (36.3 °C), Max:97.7 °F (36.5 °C)    Temp:  [97.3 °F (36.3 °C)-97.7 °F (36.5 °C)] 97.5 °F (36.4 °C)  HR:  [61-71] 70  Resp:  [16-18] 18  BP: ()/(36-61) 150/58  SpO2:  [72 %-99 %] 98 %  Body mass index is 22.73 kg/m².     Input and Output Summary (last 24 hours):     Intake/Output Summary (Last 24 hours) at 12/29/2023 1733  Last data filed at 12/29/2023 1438  Gross per 24 hour   Intake 236 ml   Output 1805 ml   Net -1569 ml       Physical Exam:   Physical Exam  Vitals reviewed.   Constitutional:       General: He is not in acute distress.     Appearance: Normal appearance. He is not ill-appearing.   HENT:      Head: Normocephalic and atraumatic.      Nose: Nose normal.      Mouth/Throat:      Mouth: Mucous membranes are moist.      Pharynx: Oropharynx is clear.   Eyes:      Extraocular Movements: Extraocular movements intact.      Conjunctiva/sclera: Conjunctivae normal.   Cardiovascular:      Rate and Rhythm: Normal rate and regular rhythm.      Pulses: Normal pulses.      Heart sounds: Normal heart sounds. No murmur heard.  Pulmonary:      Effort: Pulmonary effort is normal. No respiratory distress.      Breath sounds: Normal breath sounds. No wheezing.   Abdominal:      General: Abdomen is flat. Bowel sounds are normal. There is no distension.      Palpations: Abdomen is soft.      Tenderness: There is no abdominal tenderness. There is no guarding.   Musculoskeletal:         General: Normal range of motion.      Cervical back: Normal range of motion.      Right lower leg: No edema.      Left lower leg: No edema.   Skin:     General: Skin is warm.   Neurological:      General: No focal deficit present.      Mental Status: He is alert and oriented to person, place, and time. Mental status is at baseline.      Motor: No weakness.   Psychiatric:         Mood and Affect: Mood normal.         Behavior: Behavior normal.         Thought Content: Thought content normal.         Judgment: Judgment  normal.          Additional Data:     Labs:  Results from last 7 days   Lab Units 12/29/23  0528   WBC Thousand/uL 6.11   HEMOGLOBIN g/dL 8.0*   HEMATOCRIT % 24.0*   PLATELETS Thousands/uL 242     Results from last 7 days   Lab Units 12/29/23  0528   SODIUM mmol/L 130*   POTASSIUM mmol/L 4.3   CHLORIDE mmol/L 100   CO2 mmol/L 25   BUN mg/dL 21   CREATININE mg/dL 0.96   ANION GAP mmol/L 5   CALCIUM mg/dL 8.1*   GLUCOSE RANDOM mg/dL 102                       Lines/Drains:  Invasive Devices       Peripheral Intravenous Line  Duration             Peripheral IV 12/29/23 Distal;Left;Ventral (anterior) Forearm <1 day              Drain  Duration             External Urinary Catheter Medium <1 day                          Imaging: Reviewed radiology reports from this admission including: abdominal/pelvic CT    Recent Cultures (last 7 days):         Last 24 Hours Medication List:   Current Facility-Administered Medications   Medication Dose Route Frequency Provider Last Rate    acetaminophen  650 mg Oral Q6H PRN Carson Singer MD      bisacodyl  10 mg Rectal Daily PRN Carson Singer MD      cyanocobalamin  1,000 mcg Oral Daily Carson Singer MD      digoxin  125 mcg Oral Every Other Day Carson Singer MD      dronedarone  400 mg Oral BID With Meals Carson Singer MD      ferrous sulfate  325 mg Oral Daily With Breakfast Carson Singer MD      fludrocortisone  0.05 mg Oral Daily Remedios Cruz PA-C      folic acid  1,000 mcg Oral Daily Carson Singer MD      glycerin-hypromellose-  1 drop Both Eyes Q4H PRN Carson Singer MD      hydrocortisone   Topical 4x Daily PRN Carson Singer MD      lubiprostone  8 mcg Oral BID With Meals Carson Singer MD      melatonin  3 mg Oral HS PRN Carson Singer MD      ondansetron  4 mg Intravenous Q6H PRN Carson Singer MD      pantoprazole  40 mg Oral Daily Carson Singer MD      polyethylene glycol  17 g Oral BID Carson Singer MD      primidone  50 mg Oral Q12H [facility] Carson Singer MD       senna-docusate sodium  1 tablet Oral BID Carson Singer MD      tamsulosin  0.4 mg Oral Daily With Dinner Remedios Cruz PA-C          Today, Patient Was Seen By: Remedios Cruz PA-C    **Please Note: This note may have been constructed using a voice recognition system.**

## 2023-12-29 NOTE — CASE MANAGEMENT
Case Management Discharge Planning Note    Patient name Miguel Angel Ortega  Location /-01 MRN [id number]  : 1928 Date 2023       Current Admission Date: 2023  Current Admission Diagnosis:Stercoral colitis   Patient Active Problem List    Diagnosis Date Noted    Gross hematuria 2023    Orthostatic hypotension 2023    Gram-negative bacteremia 2023    Acute on chronic anemia 2023    Stercoral colitis 2023    Acute urinary retention 2023    Permanent atrial fibrillation (HCC) 2023    Hyponatremia 2023    Prostate cancer metastatic to bone (HCC) 2023      LOS (days): 16  Geometric Mean LOS (GMLOS) (days): 8.2  Days to GMLOS:-7.7     OBJECTIVE:  Risk of Unplanned Readmission Score: 25.04         Current admission status: Inpatient   Preferred Pharmacy:   [id number] [address] [address]  Phone: [phone] Fax: [phone]    Primary Care Provider: Kisha Armstrong MD    Primary Insurance: MEDICARE  Secondary Insurance: [facility]    DISCHARGE DETAILS:        CM left voice message for DON at [facility] to inquire if patient is able to return with [facility] and if referral for visiting nurse is needed.

## 2023-12-29 NOTE — ASSESSMENT & PLAN NOTE
Patient has been having episodes of orthostatic hypotension outpatient. Orthostatic vitals inpatient positive  Cardiology consult: Suspect this is secondary to poor PO intake and blood loss. Please encourage PO intake. Nursing instructed to apply compression socks while awake. ECG demonstrates sinus bradycardia. HR's controlled. OK to continue digoxin and Multaq.   Will place on gentle IVF rehydration - stopped  Tom stockings, slow position changes. Can add midodrine if becomes symptomatic from hypotension  Started florinef .05mg daily

## 2023-12-29 NOTE — PLAN OF CARE
Problem: Potential for Falls  Goal: Patient will remain free of falls  Description: INTERVENTIONS:  - Educate patient/family on patient safety including physical limitations  - Instruct patient to call for assistance with activity   - Consult OT/PT to assist with strengthening/mobility   - Keep Call bell within reach  - Keep bed low and locked with side rails adjusted as appropriate  - Keep care items and personal belongings within reach  - Initiate and maintain comfort rounds  - Make Fall Risk Sign visible to staff  - Offer Toileting every 2 Hours, in advance of need  - Initiate/Maintain bed/chair alarm  - Obtain necessary fall risk management equipment:   - Apply yellow socks and bracelet for high fall risk patients  - Consider moving patient to room near nurses station  Outcome: Progressing     Problem: PAIN - ADULT  Goal: Verbalizes/displays adequate comfort level or baseline comfort level  Description: Interventions:  - Encourage patient to monitor pain and request assistance  - Assess pain using appropriate pain scale  - Administer analgesics based on type and severity of pain and evaluate response  - Implement non-pharmacological measures as appropriate and evaluate response  - Consider cultural and social influences on pain and pain management  - Notify physician/advanced practitioner if interventions unsuccessful or patient reports new pain  Outcome: Progressing     Problem: INFECTION - ADULT  Goal: Absence or prevention of progression during hospitalization  Description: INTERVENTIONS:  - Assess and monitor for signs and symptoms of infection  - Monitor lab/diagnostic results  - Monitor all insertion sites, i.e. indwelling lines, tubes, and drains  - Monitor endotracheal if appropriate and nasal secretions for changes in amount and color  - [place] appropriate cooling/warming therapies per order  - Administer medications as ordered  - Instruct and encourage patient and family to use good hand hygiene  technique  - Identify and instruct in appropriate isolation precautions for identified infection/condition  Outcome: Progressing     Problem: SAFETY ADULT  Goal: Patient will remain free of falls  Description: INTERVENTIONS:  - Educate patient/family on patient safety including physical limitations  - Instruct patient to call for assistance with activity   - Consult OT/PT to assist with strengthening/mobility   - Keep Call bell within reach  - Keep bed low and locked with side rails adjusted as appropriate  - Keep care items and personal belongings within reach  - Initiate and maintain comfort rounds  - Make Fall Risk Sign visible to staff  - Offer Toileting every 2 Hours, in advance of need  - Initiate/Maintain bed/chair alarm  - Obtain necessary fall risk management equipment:   - Apply yellow socks and bracelet for high fall risk patients  - Consider moving patient to room near nurses station  Outcome: Progressing  Goal: Maintain or return to baseline ADL function  Description: INTERVENTIONS:  - Educate patient/family on patient safety including physical limitations  - Instruct patient to call for assistance with activity   - Consult OT/PT to assist with strengthening/mobility   - Keep Call bell within reach  - Keep bed low and locked with side rails adjusted as appropriate  - Keep care items and personal belongings within reach  - Initiate and maintain comfort rounds  - Make Fall Risk Sign visible to staff  - Offer Toileting every 2 Hours, in advance of need  - Initiate/Maintain bed/chair alarm  - Obtain necessary fall risk management equipment:  - Apply yellow socks and bracelet for high fall risk patients  - Consider moving patient to room near nurses station  Outcome: Progressing  Goal: Maintains/Returns to pre admission functional level  Description: INTERVENTIONS:  - Perform AM-PAC 6 Click Basic Mobility/ Daily Activity assessment daily.  - Set and communicate daily mobility goal to care team and  patient/family/caregiver.   - Collaborate with rehabilitation services on mobility goals if consulted  - Perform Range of Motion - times a day.  - Reposition patient every - hours.  - Dangle patient - times a day  - Stand patient - times a day  - Ambulate patient - times a day  - Out of bed to chair - times a day   - Out of bed for meals - times a day  - Out of bed for toileting  - Record patient progress and toleration of activity level   Outcome: Progressing     Problem: DISCHARGE PLANNING  Goal: Discharge to home or other facility with appropriate resources  Description: INTERVENTIONS:  - Identify barriers to discharge w/patient and caregiver  - Arrange for needed discharge resources and transportation as appropriate  - Identify discharge learning needs (meds, wound care, etc.)  - Arrange for interpretive services to assist at discharge as needed  - Refer to Case Management Department for coordinating discharge planning if the patient needs post-hospital services based on physician/advanced practitioner order or complex needs related to functional status, cognitive ability, or social support system  Outcome: Progressing     Problem: Knowledge Deficit  Goal: Patient/family/caregiver demonstrates understanding of disease process, treatment plan, medications, and discharge instructions  Description: Complete learning assessment and assess knowledge base.  Interventions:  - Provide teaching at level of understanding  - Provide teaching via preferred learning methods  Outcome: Progressing     Problem: GASTROINTESTINAL - ADULT  Goal: Minimal or absence of nausea and/or vomiting  Description: INTERVENTIONS:  - Administer IV fluids if ordered to ensure adequate hydration  - Maintain NPO status until nausea and vomiting are resolved  - Nasogastric tube if ordered  - Administer ordered antiemetic medications as needed  - Provide nonpharmacologic comfort measures as appropriate  - Advance diet as tolerated, if ordered  -  Consider nutrition services referral to assist patient with adequate nutrition and appropriate food choices  Outcome: Progressing  Goal: Maintains or returns to baseline bowel function  Description: INTERVENTIONS:  - Assess bowel function monitor bowel regimen  - Encourage oral fluids to ensure adequate hydration  - Administer IV fluids if ordered to ensure adequate hydration  - Administer ordered medications as needed  - Encourage mobilization and activity  - Consider nutritional services referral to assist patient with adequate nutrition and appropriate food choices  Outcome: Progressing  Goal: Maintains adequate nutritional intake  Description: INTERVENTIONS:  - Monitor percentage of each meal consumed  - Identify factors contributing to decreased intake, treat as appropriate  - Assist with meals as needed  - Monitor I&O, weight, and lab values if indicated  - Obtain nutrition services referral as needed  Outcome: Progressing  Goal: Oral mucous membranes remain intact  Description: INTERVENTIONS  - Assess oral mucosa and hygiene practices  - Implement preventative oral hygiene regimen  - Implement oral medicated treatments as ordered  - Initiate Nutrition services referral as needed  Outcome: Progressing     Problem: GENITOURINARY - ADULT  Goal: Maintains or returns to baseline urinary function  Description: INTERVENTIONS:  - Assess urinary function  - Encourage oral fluids to ensure adequate hydration if ordered  - Administer IV fluids as ordered to ensure adequate hydration  - Administer ordered medications as needed  - Offer frequent toileting  - Follow urinary retention protocol if ordered  Outcome: Progressing  Goal: Absence of urinary retention  Description: INTERVENTIONS:  - Assess patient's ability to void and empty bladder  - Monitor I/O  - Bladder scan as needed  - Discuss with physician/AP medications to alleviate retention as needed  - Discuss catheterization for long term situations as  appropriate  Outcome: Progressing  Goal: Urinary catheter remains patent  Description: INTERVENTIONS:  - Assess patency of urinary catheter  - If patient has a chronic zuniga, consider changing catheter if non-functioning  - Follow guidelines for intermittent irrigation of non-functioning urinary catheter  Outcome: Progressing     Problem: Prexisting or High Potential for Compromised Skin Integrity  Goal: Skin integrity is maintained or improved  Description: INTERVENTIONS:  - Identify patients at risk for skin breakdown  - Assess and monitor skin integrity  - Assess and monitor nutrition and hydration status  - Monitor labs   - Assess for incontinence   - Turn and reposition patient  - Assist with mobility/ambulation  - Relieve pressure over bony prominences  - Avoid friction and shearing  - Provide appropriate hygiene as needed including keeping skin clean and dry  - Evaluate need for skin moisturizer/barrier cream  - Collaborate with interdisciplinary team   - Patient/family teaching  - Consider wound care consult   Outcome: Progressing     Problem: Nutrition/Hydration-ADULT  Goal: Nutrient/Hydration intake appropriate for improving, restoring or maintaining nutritional needs  Description: Monitor and assess patient's nutrition/hydration status for malnutrition. Collaborate with interdisciplinary team and initiate plan and interventions as ordered.  Monitor patient's weight and dietary intake as ordered or per policy. Utilize nutrition screening tool and intervene as necessary. Determine patient's food preferences and provide high-protein, high-caloric foods as appropriate.     INTERVENTIONS:  - Monitor oral intake, urinary output, labs, and treatment plans  - Assess nutrition and hydration status and recommend course of action  - Evaluate amount of meals eaten  - Assist patient with eating if necessary   - Allow adequate time for meals  - Recommend/ encourage appropriate diets, oral nutritional supplements, and  vitamin/mineral supplements  - Order, calculate, and assess calorie counts as needed  - Recommend, monitor, and adjust tube feedings and TPN/PPN based on assessed needs  - Assess need for intravenous fluids  - Provide specific nutrition/hydration education as appropriate  - Include patient/family/caregiver in decisions related to nutrition  Outcome: Progressing

## 2023-12-29 NOTE — PLAN OF CARE
Problem: Potential for Falls  Goal: Patient will remain free of falls  Description: INTERVENTIONS:  - Educate patient/family on patient safety including physical limitations  - Instruct patient to call for assistance with activity   - Consult OT/PT to assist with strengthening/mobility   - Keep Call bell within reach  - Keep bed low and locked with side rails adjusted as appropriate  - Keep care items and personal belongings within reach  - Initiate and maintain comfort rounds  - Make Fall Risk Sign visible to staff  - Offer Toileting every 2 Hours, in advance of need  - Initiate/Maintain bed/chair alarm  - Obtain necessary fall risk management equipment:   - Apply yellow socks and bracelet for high fall risk patients  - Consider moving patient to room near nurses station  Outcome: Progressing     Problem: PAIN - ADULT  Goal: Verbalizes/displays adequate comfort level or baseline comfort level  Description: Interventions:  - Encourage patient to monitor pain and request assistance  - Assess pain using appropriate pain scale  - Administer analgesics based on type and severity of pain and evaluate response  - Implement non-pharmacological measures as appropriate and evaluate response  - Consider cultural and social influences on pain and pain management  - Notify physician/advanced practitioner if interventions unsuccessful or patient reports new pain  Outcome: Progressing     Problem: INFECTION - ADULT  Goal: Absence or prevention of progression during hospitalization  Description: INTERVENTIONS:  - Assess and monitor for signs and symptoms of infection  - Monitor lab/diagnostic results  - Monitor all insertion sites, i.e. indwelling lines, tubes, and drains  - Monitor endotracheal if appropriate and nasal secretions for changes in amount and color  - [place] appropriate cooling/warming therapies per order  - Administer medications as ordered  - Instruct and encourage patient and family to use good hand hygiene  technique  - Identify and instruct in appropriate isolation precautions for identified infection/condition  Outcome: Progressing     Problem: SAFETY ADULT  Goal: Patient will remain free of falls  Description: INTERVENTIONS:  - Educate patient/family on patient safety including physical limitations  - Instruct patient to call for assistance with activity   - Consult OT/PT to assist with strengthening/mobility   - Keep Call bell within reach  - Keep bed low and locked with side rails adjusted as appropriate  - Keep care items and personal belongings within reach  - Initiate and maintain comfort rounds  - Make Fall Risk Sign visible to staff  - Offer Toileting every 2 Hours, in advance of need  - Initiate/Maintain bed/chair alarm  - Obtain necessary fall risk management equipment:   - Apply yellow socks and bracelet for high fall risk patients  - Consider moving patient to room near nurses station  Outcome: Progressing  Goal: Maintain or return to baseline ADL function  Description: INTERVENTIONS:  - Educate patient/family on patient safety including physical limitations  - Instruct patient to call for assistance with activity   - Consult OT/PT to assist with strengthening/mobility   - Keep Call bell within reach  - Keep bed low and locked with side rails adjusted as appropriate  - Keep care items and personal belongings within reach  - Initiate and maintain comfort rounds  - Make Fall Risk Sign visible to staff  - Offer Toileting every 2 Hours, in advance of need  - Initiate/Maintain bed/chair alarm  - Obtain necessary fall risk management equipment:  - Apply yellow socks and bracelet for high fall risk patients  - Consider moving patient to room near nurses station  Outcome: Progressing  Goal: Maintains/Returns to pre admission functional level  Description: INTERVENTIONS:  - Perform AM-PAC 6 Click Basic Mobility/ Daily Activity assessment daily.  - Set and communicate daily mobility goal to care team and  patient/family/caregiver.   - Collaborate with rehabilitation services on mobility goals if consulted  - Perform Range of Motion . times a day.  - Reposition patient every . hours.  - Dangle patient . times a day  - Stand patient . times a day  - Ambulate patient ... times a day  - Out of bed to chair . times a day   - Out of bed for meals . times a day  - Out of bed for toileting  - Record patient progress and toleration of activity level   Outcome: Progressing     Problem: DISCHARGE PLANNING  Goal: Discharge to home or other facility with appropriate resources  Description: INTERVENTIONS:  - Identify barriers to discharge w/patient and caregiver  - Arrange for needed discharge resources and transportation as appropriate  - Identify discharge learning needs (meds, wound care, etc.)  - Arrange for interpretive services to assist at discharge as needed  - Refer to Case Management Department for coordinating discharge planning if the patient needs post-hospital services based on physician/advanced practitioner order or complex needs related to functional status, cognitive ability, or social support system  Outcome: Progressing     Problem: Knowledge Deficit  Goal: Patient/family/caregiver demonstrates understanding of disease process, treatment plan, medications, and discharge instructions  Description: Complete learning assessment and assess knowledge base.  Interventions:  - Provide teaching at level of understanding  - Provide teaching via preferred learning methods  Outcome: Progressing     Problem: GASTROINTESTINAL - ADULT  Goal: Minimal or absence of nausea and/or vomiting  Description: INTERVENTIONS:  - Administer IV fluids if ordered to ensure adequate hydration  - Maintain NPO status until nausea and vomiting are resolved  - Nasogastric tube if ordered  - Administer ordered antiemetic medications as needed  - Provide nonpharmacologic comfort measures as appropriate  - Advance diet as tolerated, if ordered  -  Consider nutrition services referral to assist patient with adequate nutrition and appropriate food choices  Outcome: Progressing  Goal: Maintains or returns to baseline bowel function  Description: INTERVENTIONS:  - Assess bowel function monitor bowel regimen  - Encourage oral fluids to ensure adequate hydration  - Administer IV fluids if ordered to ensure adequate hydration  - Administer ordered medications as needed  - Encourage mobilization and activity  - Consider nutritional services referral to assist patient with adequate nutrition and appropriate food choices  Outcome: Progressing  Goal: Maintains adequate nutritional intake  Description: INTERVENTIONS:  - Monitor percentage of each meal consumed  - Identify factors contributing to decreased intake, treat as appropriate  - Assist with meals as needed  - Monitor I&O, weight, and lab values if indicated  - Obtain nutrition services referral as needed  Outcome: Progressing  Goal: Oral mucous membranes remain intact  Description: INTERVENTIONS  - Assess oral mucosa and hygiene practices  - Implement preventative oral hygiene regimen  - Implement oral medicated treatments as ordered  - Initiate Nutrition services referral as needed  Outcome: Progressing     Problem: GENITOURINARY - ADULT  Goal: Maintains or returns to baseline urinary function  Description: INTERVENTIONS:  - Assess urinary function  - Encourage oral fluids to ensure adequate hydration if ordered  - Administer IV fluids as ordered to ensure adequate hydration  - Administer ordered medications as needed  - Offer frequent toileting  - Follow urinary retention protocol if ordered  Outcome: Progressing  Goal: Absence of urinary retention  Description: INTERVENTIONS:  - Assess patient's ability to void and empty bladder  - Monitor I/O  - Bladder scan as needed  - Discuss with physician/AP medications to alleviate retention as needed  - Discuss catheterization for long term situations as  appropriate  Outcome: Progressing  Goal: Urinary catheter remains patent  Description: INTERVENTIONS:  - Assess patency of urinary catheter  - If patient has a chronic zuniga, consider changing catheter if non-functioning  - Follow guidelines for intermittent irrigation of non-functioning urinary catheter  Outcome: Progressing     Problem: Prexisting or High Potential for Compromised Skin Integrity  Goal: Skin integrity is maintained or improved  Description: INTERVENTIONS:  - Identify patients at risk for skin breakdown  - Assess and monitor skin integrity  - Assess and monitor nutrition and hydration status  - Monitor labs   - Assess for incontinence   - Turn and reposition patient  - Assist with mobility/ambulation  - Relieve pressure over bony prominences  - Avoid friction and shearing  - Provide appropriate hygiene as needed including keeping skin clean and dry  - Evaluate need for skin moisturizer/barrier cream  - Collaborate with interdisciplinary team   - Patient/family teaching  - Consider wound care consult   Outcome: Progressing     Problem: Nutrition/Hydration-ADULT  Goal: Nutrient/Hydration intake appropriate for improving, restoring or maintaining nutritional needs  Description: Monitor and assess patient's nutrition/hydration status for malnutrition. Collaborate with interdisciplinary team and initiate plan and interventions as ordered.  Monitor patient's weight and dietary intake as ordered or per policy. Utilize nutrition screening tool and intervene as necessary. Determine patient's food preferences and provide high-protein, high-caloric foods as appropriate.     INTERVENTIONS:  - Monitor oral intake, urinary output, labs, and treatment plans  - Assess nutrition and hydration status and recommend course of action  - Evaluate amount of meals eaten  - Assist patient with eating if necessary   - Allow adequate time for meals  - Recommend/ encourage appropriate diets, oral nutritional supplements, and  vitamin/mineral supplements  - Order, calculate, and assess calorie counts as needed  - Recommend, monitor, and adjust tube feedings and TPN/PPN based on assessed needs  - Assess need for intravenous fluids  - Provide specific nutrition/hydration education as appropriate  - Include patient/family/caregiver in decisions related to nutrition  Outcome: Progressing

## 2023-12-29 NOTE — PHYSICAL THERAPY NOTE
PHYSICAL THERAPY TREATMENT NOTE  NAME:  Miguel Angel Ortega  DATE: 12/29/23    Length Of Stay: 16  Performed at least 2 patient identifiers during session: Name and Birthday  Treatment time: 0372-3538  Treatment length: 28 min       12/29/23 1047   Note Type   Note Type Treatment   Pain Assessment   Pain Assessment Tool 0-10   Pain Score No Pain   Restrictions/Precautions   Other Precautions Chair Alarm;Bed Alarm;Multiple lines  (zuniga cath)   General   Chart Reviewed Yes   Response to Previous Treatment Patient with no complaints from previous session.   Cognition   Overall Cognitive Status WFL   Orientation Level Oriented X4   Following Commands Follows one step commands without difficulty   Bed Mobility   Additional Comments Pt seated OOB in recliner at start and end of session, bed mobility not assessed this session   Transfers   Sit to Stand   (SBA)   Additional items Increased time required;Verbal cues   Stand to Sit 4  Minimal assistance   Additional items Assist x 1;Increased time required;Verbal cues   Additional Comments RW for transfers with occ VC for hand placement, SBA for sit > stand. Attempted to obtain orthostatic BP, see below for results. During 3 min standing BP, pt with increased unsteadiness, decreased verbal responses, assisted back to seated. Deferred further standing exercises this session, RN Gayle and SEBASTIEN Whittaker made aware   Balance   Static Sitting Good   Dynamic Sitting Fair +   Static Standing Fair   Dynamic Standing Fair -   Endurance Deficit   Endurance Deficit Yes   Endurance Deficit Description fatigue, orthostatic BP   Activity Tolerance   Activity Tolerance Patient limited by fatigue   Medical Staff Made Aware SEBASTIEN Whittaker   Nurse Made Aware RNGayle   Exercises   Glute Sets Sitting;20 reps;AROM;Bilateral   Hip Flexion Sitting;20 reps;AROM;Bilateral   Hip Abduction Sitting;20 reps;AROM;Bilateral   Hip Adduction Sitting;20 reps;AROM;Bilateral   Knee AROM Long Arc Quad Sitting;20  reps;AROM;Bilateral   Ankle Pumps Standing;10 reps;Bilateral  (HR)   Assessment   Prognosis Good   Problem List Decreased strength;Decreased endurance;Impaired balance;Decreased mobility;Decreased safety awareness   Barriers to Discharge None   Barriers to Discharge Comments [facility] in jail, has assistance PRN   Goals   PT Treatment Day 6   Plan   Treatment/Interventions Functional transfer training;LE strengthening/ROM;Therapeutic exercise;Endurance training;Patient/family training;Equipment eval/education;Bed mobility;Gait training;Compensatory technique education;Spoke to nursing   Progress Slow progress, medical status limitations   PT Frequency 3-5x/wk   Discharge Recommendation   Rehab Resource Intensity Level, PT III (Minimum Resource Intensity)   AM-PAC Basic Mobility Inpatient   Turning in Flat Bed Without Bedrails 4   Lying on Back to Sitting on Edge of Flat Bed Without Bedrails 4   Moving Bed to Chair 3   Standing Up From Chair Using Arms 3   Walk in Room 3   Climb 3-5 Stairs With Railing 2   Basic Mobility Inpatient Raw Score 19   Basic Mobility Standardized Score 42.48   Highest Level Of Mobility   JH-HLM Goal 6: Walk 10 steps or more   JH-HLM Achieved 5: Stand (1 or more minutes)   Education   Education Provided Mobility training;Assistive device   Patient Demonstrates acceptance/verbal understanding   End of Consult   Patient Position at End of Consult Bedside chair;Bed/Chair alarm activated;All needs within reach     The patient's AM-PAC Basic Mobility Inpatient Short Form Raw Score is 19. A Raw score of greater than 16 suggests the patient may benefit from discharge to home. Please also refer to the recommendation of the Physical Therapist for safe discharge planning.      Assessment: Pt seen for PT treatment session this date with interventions consisting of Therapeutic exercise consisting of: AROM 10 reps and 20 reps B LE in sitting and standing with B/L UE support position and therapeutic  activity consisting of training: sit<>stand transfers and static standing tolerance for 3 minutes w/ B/L UE support. Pt agreeable to PT treatment session upon arrival, pt found seated OOB in recliner, in no apparent distress. In comparison to previous session, pt with no improvements as evidenced by pt unsafe to ambulate this session due to continued orthostatic Bps, pt symptomatic, able to complete assigned therex . Post session: pt returned back to recliner, chair alarm engaged, all needs in reach, and RN notified of session findings/recommendations Continue to recommend  Level III Resource Intensity  at time of d/c in order to maximize pt's functional independence and safety w/ mobility. Pt continues to be functioning below baseline level, and remains limited 2* factors listed above and including decreased strength, balance, mobility, and activity tolerance. PT will continue to see pt while here in order to address the deficits listed above and provide interventions consistent w/ POC in effort to achieve STGs.     Prisca Cheatham, PT,DPT

## 2023-12-29 NOTE — TELEPHONE ENCOUNTER
----- Message from Cb Ji MD sent at 12/29/2023  6:35 AM EST -----  Needs OV with me in 4 to 6 weeks to check on his voiding.  Thanks currently patient still in [facility]

## 2023-12-30 LAB
ANION GAP SERPL CALCULATED.3IONS-SCNC: 5 MMOL/L
BUN SERPL-MCNC: 21 MG/DL (ref 5–25)
CALCIUM SERPL-MCNC: 8.1 MG/DL (ref 8.4–10.2)
CHLORIDE SERPL-SCNC: 101 MMOL/L (ref 96–108)
CO2 SERPL-SCNC: 25 MMOL/L (ref 21–32)
CREAT SERPL-MCNC: 0.97 MG/DL (ref 0.6–1.3)
ERYTHROCYTE [DISTWIDTH] IN BLOOD BY AUTOMATED COUNT: 16.4 % (ref 11.6–15.1)
GFR SERPL CREATININE-BSD FRML MDRD: 66 ML/MIN/1.73SQ M
GLUCOSE SERPL-MCNC: 98 MG/DL (ref 65–140)
HCT VFR BLD AUTO: 24.9 % (ref 36.5–49.3)
HGB BLD-MCNC: 8 G/DL (ref 12–17)
MCH RBC QN AUTO: 31.3 PG (ref 26.8–34.3)
MCHC RBC AUTO-ENTMCNC: 32.1 G/DL (ref 31.4–37.4)
MCV RBC AUTO: 97 FL (ref 82–98)
PLATELET # BLD AUTO: 245 THOUSANDS/UL (ref 149–390)
PMV BLD AUTO: 8.3 FL (ref 8.9–12.7)
POTASSIUM SERPL-SCNC: 4.1 MMOL/L (ref 3.5–5.3)
RBC # BLD AUTO: 2.56 MILLION/UL (ref 3.88–5.62)
SODIUM SERPL-SCNC: 131 MMOL/L (ref 135–147)
WBC # BLD AUTO: 5.85 THOUSAND/UL (ref 4.31–10.16)

## 2023-12-30 PROCEDURE — 85027 COMPLETE CBC AUTOMATED: CPT

## 2023-12-30 PROCEDURE — 80048 BASIC METABOLIC PNL TOTAL CA: CPT

## 2023-12-30 PROCEDURE — 99232 SBSQ HOSP IP/OBS MODERATE 35: CPT

## 2023-12-30 RX ADMIN — SENNOSIDES AND DOCUSATE SODIUM 1 TABLET: 8.6; 5 TABLET ORAL at 17:12

## 2023-12-30 RX ADMIN — LUBIPROSTONE 8 MCG: 8 CAPSULE, GELATIN COATED ORAL at 09:19

## 2023-12-30 RX ADMIN — DRONEDARONE 400 MG: 400 TABLET, FILM COATED ORAL at 09:19

## 2023-12-30 RX ADMIN — PRIMIDONE 50 MG: 50 TABLET ORAL at 20:26

## 2023-12-30 RX ADMIN — DRONEDARONE 400 MG: 400 TABLET, FILM COATED ORAL at 17:13

## 2023-12-30 RX ADMIN — FLUDROCORTISONE ACETATE 0.05 MG: 0.1 TABLET ORAL at 09:19

## 2023-12-30 RX ADMIN — POLYETHYLENE GLYCOL 3350 17 G: 17 POWDER, FOR SOLUTION ORAL at 17:12

## 2023-12-30 RX ADMIN — FERROUS SULFATE TAB 325 MG (65 MG ELEMENTAL FE) 325 MG: 325 (65 FE) TAB at 09:19

## 2023-12-30 RX ADMIN — PANTOPRAZOLE SODIUM 40 MG: 40 TABLET, DELAYED RELEASE ORAL at 09:19

## 2023-12-30 RX ADMIN — LUBIPROSTONE 8 MCG: 8 CAPSULE, GELATIN COATED ORAL at 17:12

## 2023-12-30 RX ADMIN — SENNOSIDES AND DOCUSATE SODIUM 1 TABLET: 8.6; 5 TABLET ORAL at 09:19

## 2023-12-30 RX ADMIN — POLYETHYLENE GLYCOL 3350 17 G: 17 POWDER, FOR SOLUTION ORAL at 09:19

## 2023-12-30 RX ADMIN — CYANOCOBALAMIN TAB 500 MCG 1000 MCG: 500 TAB at 09:19

## 2023-12-30 RX ADMIN — DIGOXIN 125 MCG: 125 TABLET ORAL at 09:19

## 2023-12-30 RX ADMIN — MELATONIN 3 MG: 3 TAB ORAL at 21:48

## 2023-12-30 RX ADMIN — FOLIC ACID 1000 MCG: 1 TABLET ORAL at 09:19

## 2023-12-30 RX ADMIN — GLYCERIN 1 DROP: .002; .002; .01 SOLUTION/ DROPS OPHTHALMIC at 17:18

## 2023-12-30 RX ADMIN — TAMSULOSIN HYDROCHLORIDE 0.4 MG: 0.4 CAPSULE ORAL at 17:12

## 2023-12-30 RX ADMIN — PRIMIDONE 50 MG: 50 TABLET ORAL at 09:19

## 2023-12-30 NOTE — PROGRESS NOTES
[facility]  Progress Note  Name: Miguel Angel Ortega I  MRN: [id number]  Unit/Bed#: -Theo I Date of Admission: 12/13/2023   Date of Service: 12/30/2023 I Hospital Day: 17    Assessment/Plan   * Stercoral colitis  Assessment & Plan  Patient presents with symptoms of constipation for the last few days.  He saw GI outpatient.  He was started on MiraLAX patient states he went 26 times to the bathroom yesterday had very small amounts of stool and felt like he just could not empty out his bowel movement.  Also had a fall yesterday.  Ct abd pelvis shows Abundant stool in the colon and rectal vault compatible with constipation in the appropriate clinical context. No bowel obstruction.  Minimal perirectal fat stranding suggestive of mild or early stercoral proctitis. No perirectal abscess  Placed on MiraLAX daily and placed on Dulcolax suppository x 2 daily and also placed 3 enemas    GI consult placed - miralax BID, colace BID and Amitiza 8mcg BID. Dulcolax suppository daily prn. Advance to regular diet. Outpatient follow up    Orthostatic hypotension  Assessment & Plan  Patient has been having episodes of orthostatic hypotension outpatient. Orthostatic vitals inpatient positive  Cardiology consult: Suspect this is secondary to poor PO intake and blood loss. Please encourage PO intake. Nursing instructed to apply compression socks while awake. ECG demonstrates sinus bradycardia. HR's controlled. OK to continue digoxin and Multaq.   Will place on gentle IVF rehydration - stopped  Tom stockings, slow position changes. Can add midodrine if becomes symptomatic from hypotension  Started florinef .05mg daily  Orthostatics now negative     Acute on chronic anemia  Assessment & Plan  Hemoglobin dropped from 10.3-8.7.  low iron and B12 levels. placed on vit b12 and venofer  Resumed home iron supplement  CBC stable  neg stool occult  Obtained blood consent and transfused 1 unit on 12/26 for preop  transfusion.     Lab Results   Component Value Date    HGB 8.0 (L) 12/30/2023    HGB 8.0 (L) 12/29/2023    HGB 8.1 (L) 12/28/2023       Permanent atrial fibrillation (HCC)  Assessment & Plan  Continue Multaq, digoxin which are his chronic meds.  Not on anticoagulation  Dig level acceptable  Appreciate cardio eval - continue multaq. Change digoxin to every other day. Was on xarelto in the past - discuss outpatient with his cardiologist about resumption  Asked for cardiac clearance prior to procedure: Patient is considered low risk for adverse cardiovascular outcomes in regards to non cardiac procedure     Acute urinary retention  Assessment & Plan  Acute urinary retention secondary to severe constipation also known history of prostate cancer.  Zuniga catheter placed on admission. Also placed on Flomax   Zuniga removed 12/15 and placed on voiding trial. Some hypotension noted which may be secondary to Flomax and placed on midodrine - dc flomax and midodrine  CT abdomen pelvis: Bladder completely decompressed by Zuniga catheter and cannot be further evaluated. If clinical concern remains, consider clamping of Zuniga for several hours and ultrasound imaging. Zuniga in expected position.   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 Remove zuniga and initiate voiding trial. Restart flomax. Send back to facility on condom catheter. OP follow up     Prostate cancer metastatic to bone (HCC)  Assessment & Plan  Further outpatient follow-up with urology.  Placed on Flomax for acute urinary retention hold Casodex while inpatient.  Urology restarted patient on flomax - monitor bp             VTE Pharmacologic Prophylaxis:   High Risk (Score >/= 5) - Pharmacological DVT Prophylaxis Contraindicated. Sequential Compression Devices Ordered.    Mobility:   Basic Mobility Inpatient Raw Score: 19  JH-HLM Goal: 6: Walk 10 steps or more  JH-HLM  Achieved: 7: Walk 25 feet or more  HLM Goal achieved. Continue to encourage appropriate mobility.    Patient Centered Rounds: I performed bedside rounds with nursing staff today.   Discussions with Specialists or Other Care Team Provider: nursing and cm    Education and Discussions with Family / Patient: Updated  (friend) at bedside.    Total Time Spent on Date of Encounter in care of patient: This time was spent on one or more of the following: performing physical exam; counseling and coordination of care; obtaining or reviewing history; documenting in the medical record; reviewing/ordering tests, medications or procedures; communicating with other healthcare professionals and discussing with patient's family/caregivers.    Current Length of Stay: 17 day(s)  Current Patient Status: Inpatient   Certification Statement: The patient will continue to require additional inpatient hospital stay due to orthostatic hypotension monitoring with blood pressure medications  Discharge Plan: Anticipate discharge in 48 hrs to prior assisted or independent living facility.    Code Status: Level 3 - DNAR and DNI    Subjective:   Patient states that he is feeling fine today and is no longer dizzy when he stands. Denies chest pain or shortness of breath. Does not offer any further complaints at this time.     Objective:     Vitals:   Temp (24hrs), Av.1 °F (36.7 °C), Min:97.3 °F (36.3 °C), Max:98.8 °F (37.1 °C)    Temp:  [97.3 °F (36.3 °C)-98.8 °F (37.1 °C)] 97.3 °F (36.3 °C)  HR:  [64-81] 81  Resp:  [18] 18  BP: (100-115)/(31-93) 108/48  SpO2:  [95 %-100 %] 100 %  Body mass index is 22.73 kg/m².     Input and Output Summary (last 24 hours):     Intake/Output Summary (Last 24 hours) at 2023 1444  Last data filed at 2023 1329  Gross per 24 hour   Intake 1140 ml   Output --   Net 1140 ml       Physical Exam:   Physical Exam  Vitals reviewed.   Constitutional:       General: He is not in acute distress.      Appearance: Normal appearance. He is not ill-appearing.      Comments: frail   HENT:      Head: Normocephalic and atraumatic.      Nose: Nose normal.      Mouth/Throat:      Mouth: Mucous membranes are moist.      Pharynx: Oropharynx is clear.   Eyes:      Extraocular Movements: Extraocular movements intact.      Conjunctiva/sclera: Conjunctivae normal.   Cardiovascular:      Rate and Rhythm: Normal rate and regular rhythm.      Pulses: Normal pulses.      Heart sounds: Normal heart sounds. No murmur heard.  Pulmonary:      Effort: Pulmonary effort is normal. No respiratory distress.      Breath sounds: Normal breath sounds. No wheezing.   Abdominal:      General: Abdomen is flat. Bowel sounds are normal. There is no distension.      Palpations: Abdomen is soft.      Tenderness: There is no abdominal tenderness. There is no guarding.   Musculoskeletal:         General: Normal range of motion.      Cervical back: Normal range of motion.      Right lower leg: No edema.      Left lower leg: No edema.   Skin:     General: Skin is warm.   Neurological:      General: No focal deficit present.      Mental Status: He is alert and oriented to person, place, and time. Mental status is at baseline.      Motor: No weakness.   Psychiatric:         Mood and Affect: Mood normal.         Behavior: Behavior normal.         Thought Content: Thought content normal.         Judgment: Judgment normal.          Additional Data:     Labs:  Results from last 7 days   Lab Units 12/30/23  0500   WBC Thousand/uL 5.85   HEMOGLOBIN g/dL 8.0*   HEMATOCRIT % 24.9*   PLATELETS Thousands/uL 245     Results from last 7 days   Lab Units 12/30/23  0500   SODIUM mmol/L 131*   POTASSIUM mmol/L 4.1   CHLORIDE mmol/L 101   CO2 mmol/L 25   BUN mg/dL 21   CREATININE mg/dL 0.97   ANION GAP mmol/L 5   CALCIUM mg/dL 8.1*   GLUCOSE RANDOM mg/dL 98                       Lines/Drains:  Invasive Devices       Peripheral Intravenous Line  Duration              Peripheral IV 12/29/23 Distal;Left;Ventral (anterior) Forearm 1 day              Drain  Duration             External Urinary Catheter Medium 1 day                          Imaging: Reviewed radiology reports from this admission including: abdominal/pelvic CT    Recent Cultures (last 7 days):         Last 24 Hours Medication List:   Current Facility-Administered Medications   Medication Dose Route Frequency Provider Last Rate    acetaminophen  650 mg Oral Q6H PRN Carson Singer MD      bisacodyl  10 mg Rectal Daily PRN Carson Singer MD      cyanocobalamin  1,000 mcg Oral Daily Carson Singer MD      digoxin  125 mcg Oral Every Other Day Carson Singer MD      dronedarone  400 mg Oral BID With Meals Carson Singer MD      ferrous sulfate  325 mg Oral Daily With Breakfast Carson Sinegr MD      fludrocortisone  0.05 mg Oral Daily Remedios Cruz PA-C      folic acid  1,000 mcg Oral Daily Carson Singer MD      glycerin-hypromellose-  1 drop Both Eyes Q4H PRN Carson Singer MD      hydrocortisone   Topical 4x Daily PRN Carson Singer MD      lubiprostone  8 mcg Oral BID With Meals Carson Singer MD      melatonin  3 mg Oral HS PRN Carson Singer MD      ondansetron  4 mg Intravenous Q6H PRN Carson Singer MD      pantoprazole  40 mg Oral Daily Carson Singer MD      polyethylene glycol  17 g Oral BID Carson Singer MD      primidone  50 mg Oral Q12H YAMINI Carson Singer MD      senna-docusate sodium  1 tablet Oral BID Carson Singer MD      tamsulosin  0.4 mg Oral Daily With Dinner Remedios Cruz PA-C          Today, Patient Was Seen By: Remedios Cruz PA-C    **Please Note: This note may have been constructed using a voice recognition system.**

## 2023-12-30 NOTE — ASSESSMENT & PLAN NOTE
Hemoglobin dropped from 10.3-8.7.  low iron and B12 levels. placed on vit b12 and venofer  Resumed home iron supplement  CBC stable  neg stool occult  Obtained blood consent and transfused 1 unit on 12/26 for preop transfusion.     Lab Results   Component Value Date    HGB 8.0 (L) 12/30/2023    HGB 8.0 (L) 12/29/2023    HGB 8.1 (L) 12/28/2023

## 2023-12-30 NOTE — ASSESSMENT & PLAN NOTE
Acute urinary retention secondary to severe constipation also known history of prostate cancer.  Zuniga catheter placed on admission. Also placed on Flomax   Zuniga removed 12/15 and placed on voiding trial. Some hypotension noted which may be secondary to Flomax and placed on midodrine - dc flomax and midodrine  CT abdomen pelvis: Bladder completely decompressed by Zuniga catheter and cannot be further evaluated. If clinical concern remains, consider clamping of Zuniga for several hours and ultrasound imaging. Zuniga in expected position.   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 Remove zuniga and initiate voiding trial. Restart flomax. Send back to facility on condom catheter. OP follow up

## 2023-12-30 NOTE — ASSESSMENT & PLAN NOTE
Patient has been having episodes of orthostatic hypotension outpatient. Orthostatic vitals inpatient positive  Cardiology consult: Suspect this is secondary to poor PO intake and blood loss. Please encourage PO intake. Nursing instructed to apply compression socks while awake. ECG demonstrates sinus bradycardia. HR's controlled. OK to continue digoxin and Multaq.   Will place on gentle IVF rehydration - stopped  Tom stockings, slow position changes. Can add midodrine if becomes symptomatic from hypotension  Started florinef .05mg daily  Orthostatics now negative

## 2023-12-31 LAB
ANION GAP SERPL CALCULATED.3IONS-SCNC: 7 MMOL/L
BUN SERPL-MCNC: 21 MG/DL (ref 5–25)
CALCIUM SERPL-MCNC: 8 MG/DL (ref 8.4–10.2)
CHLORIDE SERPL-SCNC: 101 MMOL/L (ref 96–108)
CO2 SERPL-SCNC: 24 MMOL/L (ref 21–32)
CREAT SERPL-MCNC: 1 MG/DL (ref 0.6–1.3)
ERYTHROCYTE [DISTWIDTH] IN BLOOD BY AUTOMATED COUNT: 16.3 % (ref 11.6–15.1)
GFR SERPL CREATININE-BSD FRML MDRD: 63 ML/MIN/1.73SQ M
GLUCOSE SERPL-MCNC: 94 MG/DL (ref 65–140)
HCT VFR BLD AUTO: 25.2 % (ref 36.5–49.3)
HGB BLD-MCNC: 8.3 G/DL (ref 12–17)
MCH RBC QN AUTO: 31.3 PG (ref 26.8–34.3)
MCHC RBC AUTO-ENTMCNC: 32.9 G/DL (ref 31.4–37.4)
MCV RBC AUTO: 95 FL (ref 82–98)
PLATELET # BLD AUTO: 242 THOUSANDS/UL (ref 149–390)
PMV BLD AUTO: 8.1 FL (ref 8.9–12.7)
POTASSIUM SERPL-SCNC: 4.2 MMOL/L (ref 3.5–5.3)
RBC # BLD AUTO: 2.65 MILLION/UL (ref 3.88–5.62)
SODIUM SERPL-SCNC: 132 MMOL/L (ref 135–147)
WBC # BLD AUTO: 6.36 THOUSAND/UL (ref 4.31–10.16)

## 2023-12-31 PROCEDURE — 99232 SBSQ HOSP IP/OBS MODERATE 35: CPT

## 2023-12-31 PROCEDURE — 85027 COMPLETE CBC AUTOMATED: CPT

## 2023-12-31 PROCEDURE — 80048 BASIC METABOLIC PNL TOTAL CA: CPT

## 2023-12-31 RX ADMIN — LUBIPROSTONE 8 MCG: 8 CAPSULE, GELATIN COATED ORAL at 16:58

## 2023-12-31 RX ADMIN — PRIMIDONE 50 MG: 50 TABLET ORAL at 08:06

## 2023-12-31 RX ADMIN — SENNOSIDES AND DOCUSATE SODIUM 1 TABLET: 8.6; 5 TABLET ORAL at 08:06

## 2023-12-31 RX ADMIN — POLYETHYLENE GLYCOL 3350 17 G: 17 POWDER, FOR SOLUTION ORAL at 08:07

## 2023-12-31 RX ADMIN — PRIMIDONE 50 MG: 50 TABLET ORAL at 21:41

## 2023-12-31 RX ADMIN — FLUDROCORTISONE ACETATE 0.05 MG: 0.1 TABLET ORAL at 08:06

## 2023-12-31 RX ADMIN — FOLIC ACID 1000 MCG: 1 TABLET ORAL at 08:06

## 2023-12-31 RX ADMIN — POLYETHYLENE GLYCOL 3350 17 G: 17 POWDER, FOR SOLUTION ORAL at 17:00

## 2023-12-31 RX ADMIN — PANTOPRAZOLE SODIUM 40 MG: 40 TABLET, DELAYED RELEASE ORAL at 08:07

## 2023-12-31 RX ADMIN — MELATONIN 3 MG: 3 TAB ORAL at 21:41

## 2023-12-31 RX ADMIN — LUBIPROSTONE 8 MCG: 8 CAPSULE, GELATIN COATED ORAL at 08:06

## 2023-12-31 RX ADMIN — FERROUS SULFATE TAB 325 MG (65 MG ELEMENTAL FE) 325 MG: 325 (65 FE) TAB at 08:06

## 2023-12-31 RX ADMIN — CYANOCOBALAMIN TAB 500 MCG 1000 MCG: 500 TAB at 08:06

## 2023-12-31 RX ADMIN — DRONEDARONE 400 MG: 400 TABLET, FILM COATED ORAL at 16:57

## 2023-12-31 RX ADMIN — TAMSULOSIN HYDROCHLORIDE 0.4 MG: 0.4 CAPSULE ORAL at 16:58

## 2023-12-31 RX ADMIN — DRONEDARONE 400 MG: 400 TABLET, FILM COATED ORAL at 08:07

## 2023-12-31 RX ADMIN — GLYCERIN 1 DROP: .002; .002; .01 SOLUTION/ DROPS OPHTHALMIC at 16:58

## 2023-12-31 RX ADMIN — SENNOSIDES AND DOCUSATE SODIUM 1 TABLET: 8.6; 5 TABLET ORAL at 17:00

## 2023-12-31 NOTE — ASSESSMENT & PLAN NOTE
Hemoglobin dropped from 10.3-8.7.  low iron and B12 levels. placed on vit b12 and venofer  Resumed home iron supplement  CBC stable  neg stool occult  Obtained blood consent and transfused 1 unit on 12/26 for preop transfusion.     Lab Results   Component Value Date    HGB 8.3 (L) 12/31/2023    HGB 8.0 (L) 12/30/2023    HGB 8.0 (L) 12/29/2023

## 2023-12-31 NOTE — CASE MANAGEMENT
"   Case Management Discharge Planning Note    Patient name Miguel Angel Ortega  Location /-01 MRN [id number]  : 1928 Date 2023       Current Admission Date: 2023  Current Admission Diagnosis:Stercoral colitis   Patient Active Problem List    Diagnosis Date Noted    Gross hematuria 2023    Orthostatic hypotension 2023    Acute on chronic anemia 2023    Stercoral colitis 2023    Acute urinary retention 2023    Permanent atrial fibrillation (HCC) 2023    Hyponatremia 2023    Prostate cancer metastatic to bone (HCC) 2023      LOS (days): 18  Geometric Mean LOS (GMLOS) (days): 8.2  Days to GMLOS:-9.8     OBJECTIVE:  Risk of Unplanned Readmission Score: 28.06         Current admission status: Inpatient   Preferred Pharmacy:   [id number] - [facility] [address] [address]  Phone: [phone] Fax: [phone]    [facility] [place] - [place], [place] - [address]  [address]  [place] [address]  Phone: [phone] Fax: [phone]    Primary Care Provider: Kisha Armstrong MD    Primary Insurance: MEDICARE  Secondary Insurance: [facility]    DISCHARGE DETAILS:        Checked on prior auth for [place] with [facility]. \" It is still coming up, non formulary\" thus believe the aurh was not process yet. Called [employer] pre-auth line closed.  Discounted cost is $272 for a month, friends would like to wait on the preauth.  CM will need to ck back on Monday for authorization.                                                                                                 "

## 2023-12-31 NOTE — PLAN OF CARE
Problem: Potential for Falls  Goal: Patient will remain free of falls  Description: INTERVENTIONS:  - Educate patient/family on patient safety including physical limitations  - Instruct patient to call for assistance with activity   - Consult OT/PT to assist with strengthening/mobility   - Keep Call bell within reach  - Keep bed low and locked with side rails adjusted as appropriate  - Keep care items and personal belongings within reach  - Initiate and maintain comfort rounds  - Make Fall Risk Sign visible to staff  - Offer Toileting every 2 Hours, in advance of need  - Initiate/Maintain bed/chair alarm  - Obtain necessary fall risk management equipment:   - Apply yellow socks and bracelet for high fall risk patients  - Consider moving patient to room near nurses station  Outcome: Progressing     Problem: PAIN - ADULT  Goal: Verbalizes/displays adequate comfort level or baseline comfort level  Description: Interventions:  - Encourage patient to monitor pain and request assistance  - Assess pain using appropriate pain scale  - Administer analgesics based on type and severity of pain and evaluate response  - Implement non-pharmacological measures as appropriate and evaluate response  - Consider cultural and social influences on pain and pain management  - Notify physician/advanced practitioner if interventions unsuccessful or patient reports new pain  Outcome: Progressing     Problem: INFECTION - ADULT  Goal: Absence or prevention of progression during hospitalization  Description: INTERVENTIONS:  - Assess and monitor for signs and symptoms of infection  - Monitor lab/diagnostic results  - Monitor all insertion sites, i.e. indwelling lines, tubes, and drains  - Monitor endotracheal if appropriate and nasal secretions for changes in amount and color  - Scott appropriate cooling/warming therapies per order  - Administer medications as ordered  - Instruct and encourage patient and family to use good hand hygiene  technique  - Identify and instruct in appropriate isolation precautions for identified infection/condition  Outcome: Progressing     Problem: SAFETY ADULT  Goal: Patient will remain free of falls  Description: INTERVENTIONS:  - Educate patient/family on patient safety including physical limitations  - Instruct patient to call for assistance with activity   - Consult OT/PT to assist with strengthening/mobility   - Keep Call bell within reach  - Keep bed low and locked with side rails adjusted as appropriate  - Keep care items and personal belongings within reach  - Initiate and maintain comfort rounds  - Make Fall Risk Sign visible to staff  - Offer Toileting every 2 Hours, in advance of need  - Initiate/Maintain bed/chair alarm  - Obtain necessary fall risk management equipment:   - Apply yellow socks and bracelet for high fall risk patients  - Consider moving patient to room near nurses station  Outcome: Progressing  Goal: Maintain or return to baseline ADL function  Description: INTERVENTIONS:  - Educate patient/family on patient safety including physical limitations  - Instruct patient to call for assistance with activity   - Consult OT/PT to assist with strengthening/mobility   - Keep Call bell within reach  - Keep bed low and locked with side rails adjusted as appropriate  - Keep care items and personal belongings within reach  - Initiate and maintain comfort rounds  - Make Fall Risk Sign visible to staff  - Offer Toileting every 2 Hours, in advance of need  - Initiate/Maintain bed/chair alarm  - Obtain necessary fall risk management equipment:  - Apply yellow socks and bracelet for high fall risk patients  - Consider moving patient to room near nurses station  Outcome: Progressing  Goal: Maintains/Returns to pre admission functional level  Description: INTERVENTIONS:  - Perform AM-PAC 6 Click Basic Mobility/ Daily Activity assessment daily.  - Set and communicate daily mobility goal to care team and  patient/family/caregiver.   - Collaborate with rehabilitation services on mobility goals if consulted  - Perform Range of Motion 4 times a day.  - Reposition patient every 2 hours.  - Ambulate patient 4 times a day  - Out of bed to chair 3 times a day   - Out of bed for toileting  - Record patient progress and toleration of activity level   Outcome: Progressing    Problem: DISCHARGE PLANNING  Goal: Discharge to home or other facility with appropriate resources  Description: INTERVENTIONS:  - Identify barriers to discharge w/patient and caregiver  - Arrange for needed discharge resources and transportation as appropriate  - Identify discharge learning needs (meds, wound care, etc.)  - Arrange for interpretive services to assist at discharge as needed  - Refer to Case Management Department for coordinating discharge planning if the patient needs post-hospital services based on physician/advanced practitioner order or complex needs related to functional status, cognitive ability, or social support system  Outcome: Progressing     Problem: Knowledge Deficit  Goal: Patient/family/caregiver demonstrates understanding of disease process, treatment plan, medications, and discharge instructions  Description: Complete learning assessment and assess knowledge base.  Interventions:  - Provide teaching at level of understanding  - Provide teaching via preferred learning methods  Outcome: Progressing     Problem: GASTROINTESTINAL - ADULT  Goal: Minimal or absence of nausea and/or vomiting  Description: INTERVENTIONS:  - Administer IV fluids if ordered to ensure adequate hydration  - Maintain NPO status until nausea and vomiting are resolved  - Nasogastric tube if ordered  - Administer ordered antiemetic medications as needed  - Provide nonpharmacologic comfort measures as appropriate  - Advance diet as tolerated, if ordered  - Consider nutrition services referral to assist patient with adequate nutrition and appropriate food  choices  Outcome: Progressing  Goal: Maintains or returns to baseline bowel function  Description: INTERVENTIONS:  - Assess bowel function monitor bowel regimen  - Encourage oral fluids to ensure adequate hydration  - Administer IV fluids if ordered to ensure adequate hydration  - Administer ordered medications as needed  - Encourage mobilization and activity  - Consider nutritional services referral to assist patient with adequate nutrition and appropriate food choices  Outcome: Progressing  Goal: Maintains adequate nutritional intake  Description: INTERVENTIONS:  - Monitor percentage of each meal consumed  - Identify factors contributing to decreased intake, treat as appropriate  - Assist with meals as needed  - Monitor I&O, weight, and lab values if indicated  - Obtain nutrition services referral as needed  Outcome: Progressing  Goal: Oral mucous membranes remain intact  Description: INTERVENTIONS  - Assess oral mucosa and hygiene practices  - Implement preventative oral hygiene regimen  - Implement oral medicated treatments as ordered  - Initiate Nutrition services referral as needed  Outcome: Progressing     Problem: GENITOURINARY - ADULT  Goal: Maintains or returns to baseline urinary function  Description: INTERVENTIONS:  - Assess urinary function  - Encourage oral fluids to ensure adequate hydration if ordered  - Administer IV fluids as ordered to ensure adequate hydration  - Administer ordered medications as needed  - Offer frequent toileting  - Follow urinary retention protocol if ordered  Outcome: Progressing     Problem: Prexisting or High Potential for Compromised Skin Integrity  Goal: Skin integrity is maintained or improved  Description: INTERVENTIONS:  - Identify patients at risk for skin breakdown  - Assess and monitor skin integrity  - Assess and monitor nutrition and hydration status  - Monitor labs   - Assess for incontinence   - Turn and reposition patient  - Assist with mobility/ambulation  -  Relieve pressure over bony prominences  - Avoid friction and shearing  - Provide appropriate hygiene as needed including keeping skin clean and dry  - Evaluate need for skin moisturizer/barrier cream  - Collaborate with interdisciplinary team   - Patient/family teaching  - Consider wound care consult   Outcome: Progressing     Problem: Nutrition/Hydration-ADULT  Goal: Nutrient/Hydration intake appropriate for improving, restoring or maintaining nutritional needs  Description: Monitor and assess patient's nutrition/hydration status for malnutrition. Collaborate with interdisciplinary team and initiate plan and interventions as ordered.  Monitor patient's weight and dietary intake as ordered or per policy. Utilize nutrition screening tool and intervene as necessary. Determine patient's food preferences and provide high-protein, high-caloric foods as appropriate.     INTERVENTIONS:  - Monitor oral intake, urinary output, labs, and treatment plans  - Assess nutrition and hydration status and recommend course of action  - Evaluate amount of meals eaten  - Assist patient with eating if necessary   - Allow adequate time for meals  - Recommend/ encourage appropriate diets, oral nutritional supplements, and vitamin/mineral supplements  - Order, calculate, and assess calorie counts as needed  - Recommend, monitor, and adjust tube feedings and TPN/PPN based on assessed needs  - Assess need for intravenous fluids  - Provide specific nutrition/hydration education as appropriate  - Include patient/family/caregiver in decisions related to nutrition  Outcome: Progressing

## 2023-12-31 NOTE — PROGRESS NOTES
[facility]  Progress Note  Name: Miguel Angel Ortega I  MRN: [id number]  Unit/Bed#: -[facility]o I Date of Admission: 12/13/2023   Date of Service: 12/31/2023 I Hospital Day: 18    Assessment/Plan   * Stercoral colitis  Assessment & Plan  Patient presents with symptoms of constipation for the last few days.  He saw GI outpatient.  He was started on MiraLAX patient states he went 26 times to the bathroom yesterday had very small amounts of stool and felt like he just could not empty out his bowel movement.  Also had a fall yesterday.  Ct abd pelvis shows Abundant stool in the colon and rectal vault compatible with constipation in the appropriate clinical context. No bowel obstruction.  Minimal perirectal fat stranding suggestive of mild or early stercoral proctitis. No perirectal abscess  Placed on MiraLAX daily and placed on Dulcolax suppository x 2 daily and also placed 3 enemas    GI consult placed - miralax BID, colace BID and Amitiza 8mcg BID. Dulcolax suppository daily prn. Advance to regular diet. Outpatient follow up    Orthostatic hypotension  Assessment & Plan  Patient has been having episodes of orthostatic hypotension outpatient. Orthostatic vitals inpatient positive  Cardiology consult: Suspect this is secondary to poor PO intake and blood loss. Please encourage PO intake. Nursing instructed to apply compression socks while awake. ECG demonstrates sinus bradycardia. HR's controlled. OK to continue digoxin and Multaq.   Will place on gentle IVF rehydration - stopped  Tom stockings, slow position changes. Can add midodrine if becomes symptomatic from hypotension  Started florinef .05mg daily  Orthostatics now negative     Acute on chronic anemia  Assessment & Plan  Hemoglobin dropped from 10.3-8.7.  low iron and B12 levels. placed on vit b12 and venofer  Resumed home iron supplement  CBC stable  neg stool occult  Obtained blood consent and transfused 1 unit on 12/26 for preop  transfusion.     Lab Results   Component Value Date    HGB 8.3 (L) 12/31/2023    HGB 8.0 (L) 12/30/2023    HGB 8.0 (L) 12/29/2023       Permanent atrial fibrillation (HCC)  Assessment & Plan  Continue Multaq, digoxin which are his chronic meds.  Not on anticoagulation  Dig level acceptable  Appreciate cardio eval - continue multaq. Change digoxin to every other day. Was on xarelto in the past - discuss outpatient with his cardiologist about resumption  Asked for cardiac clearance prior to procedure: Patient is considered low risk for adverse cardiovascular outcomes in regards to non cardiac procedure     Acute urinary retention  Assessment & Plan  Acute urinary retention secondary to severe constipation also known history of prostate cancer.  Zuniga catheter placed on admission. Also placed on Flomax   Zuniga removed 12/15 and placed on voiding trial. Some hypotension noted which may be secondary to Flomax and placed on midodrine - dc flomax and midodrine  CT abdomen pelvis: Bladder completely decompressed by Zuniga catheter and cannot be further evaluated. If clinical concern remains, consider clamping of Zuniga for several hours and ultrasound imaging. Zuniga in expected position.   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 Remove zuniga and initiate voiding trial. Restart flomax. Send back to facility on condom catheter. OP follow up     Prostate cancer metastatic to bone (HCC)  Assessment & Plan  Further outpatient follow-up with urology.  Placed on Flomax for acute urinary retention hold Casodex while inpatient.  Urology restarted patient on flomax - monitor bp               VTE Pharmacologic Prophylaxis:   High Risk (Score >/= 5) - Pharmacological DVT Prophylaxis Contraindicated. Sequential Compression Devices Ordered.    Mobility:   Basic Mobility Inpatient Raw Score: 19  JH-HLM Goal: 6: Walk 10 steps or more  JH-HLM  Achieved: 6: Walk 10 steps or more  HLM Goal achieved. Continue to encourage appropriate mobility.    Patient Centered Rounds: I performed bedside rounds with nursing staff today.   Discussions with Specialists or Other Care Team Provider: nursing and cm    Education and Discussions with Family / Patient: Updated  (friend) at bedside.    Total Time Spent on Date of Encounter in care of patient:  This time was spent on one or more of the following: performing physical exam; counseling and coordination of care; obtaining or reviewing history; documenting in the medical record; reviewing/ordering tests, medications or procedures; communicating with other healthcare professionals and discussing with patient's family/caregivers.    Current Length of Stay: 18 day(s)  Current Patient Status: Inpatient   Certification Statement: The patient will continue to require additional inpatient hospital stay due to monitoring blood pressure with orthostatic hypotension  Discharge Plan: Anticipate discharge in 24-48 hrs to prior assisted or independent living facility.    Code Status: Level 3 - DNAR and DNI    Subjective:   Patient states that he is feeling well today. Denies chest pain or shortness of breath. Does not offer any complaints at this time.    Objective:     Vitals:   Temp (24hrs), Av.8 °F (36.6 °C), Min:97.5 °F (36.4 °C), Max:98.1 °F (36.7 °C)    Temp:  [97.5 °F (36.4 °C)-98.1 °F (36.7 °C)] 97.5 °F (36.4 °C)  HR:  [69-81] 76  Resp:  [14-18] 18  BP: (100-125)/(43-54) 114/45  SpO2:  [96 %-100 %] 97 %  Body mass index is 22.73 kg/m².     Input and Output Summary (last 24 hours):     Intake/Output Summary (Last 24 hours) at 2023 1205  Last data filed at 2023 0807  Gross per 24 hour   Intake 1620 ml   Output 1100 ml   Net 520 ml       Physical Exam:   Physical Exam  Vitals reviewed.   Constitutional:       General: He is not in acute distress.     Appearance: Normal appearance. He is not  ill-appearing.      Comments: frail   HENT:      Head: Normocephalic and atraumatic.      Nose: Nose normal.      Mouth/Throat:      Mouth: Mucous membranes are moist.      Pharynx: Oropharynx is clear.   Eyes:      Extraocular Movements: Extraocular movements intact.      Conjunctiva/sclera: Conjunctivae normal.   Cardiovascular:      Rate and Rhythm: Normal rate and regular rhythm.      Pulses: Normal pulses.      Heart sounds: Normal heart sounds. No murmur heard.  Pulmonary:      Effort: Pulmonary effort is normal. No respiratory distress.      Breath sounds: Normal breath sounds. No wheezing.   Abdominal:      General: Abdomen is flat. Bowel sounds are normal. There is no distension.      Palpations: Abdomen is soft.      Tenderness: There is no abdominal tenderness. There is no guarding.   Musculoskeletal:         General: Normal range of motion.      Cervical back: Normal range of motion.      Right lower leg: No edema.      Left lower leg: No edema.   Skin:     General: Skin is warm.   Neurological:      General: No focal deficit present.      Mental Status: He is alert and oriented to person, place, and time. Mental status is at baseline.      Motor: No weakness.   Psychiatric:         Mood and Affect: Mood normal.         Behavior: Behavior normal.         Thought Content: Thought content normal.         Judgment: Judgment normal.          Additional Data:     Labs:  Results from last 7 days   Lab Units 12/31/23  0442   WBC Thousand/uL 6.36   HEMOGLOBIN g/dL 8.3*   HEMATOCRIT % 25.2*   PLATELETS Thousands/uL 242     Results from last 7 days   Lab Units 12/31/23  0442   SODIUM mmol/L 132*   POTASSIUM mmol/L 4.2   CHLORIDE mmol/L 101   CO2 mmol/L 24   BUN mg/dL 21   CREATININE mg/dL 1.00   ANION GAP mmol/L 7   CALCIUM mg/dL 8.0*   GLUCOSE RANDOM mg/dL 94                       Lines/Drains:  Invasive Devices       Peripheral Intravenous Line  Duration             Peripheral IV 12/29/23 Distal;Left;Ventral  (anterior) Forearm 2 days              Drain  Duration             External Urinary Catheter Medium 2 days                          Imaging: Reviewed radiology reports from this admission including: abdominal/pelvic CT    Recent Cultures (last 7 days):         Last 24 Hours Medication List:   Current Facility-Administered Medications   Medication Dose Route Frequency Provider Last Rate    acetaminophen  650 mg Oral Q6H PRN Carson Singer MD      bisacodyl  10 mg Rectal Daily PRN Carson Singer MD      cyanocobalamin  1,000 mcg Oral Daily Carson Singer MD      digoxin  125 mcg Oral Every Other Day Carson Singer MD      dronedarone  400 mg Oral BID With Meals Carson Singer MD      ferrous sulfate  325 mg Oral Daily With Breakfast Carson Singer MD      fludrocortisone  0.05 mg Oral Daily Remedios Cruz PA-C      folic acid  1,000 mcg Oral Daily Carson Singer MD      glycerin-hypromellose-  1 drop Both Eyes Q4H PRN Carson Singer MD      hydrocortisone   Topical 4x Daily PRN Carson Singer MD      lubiprostone  8 mcg Oral BID With Meals Carson Singer MD      melatonin  3 mg Oral HS PRN Carson Singer MD      ondansetron  4 mg Intravenous Q6H PRN Carson Singer MD      pantoprazole  40 mg Oral Daily Carson Singer MD      polyethylene glycol  17 g Oral BID Carson Singer MD      primidone  50 mg Oral Q12H YAMINI Carson Singer MD      senna-docusate sodium  1 tablet Oral BID Carson Singer MD      tamsulosin  0.4 mg Oral Daily With Dinner Remedios Cruz PA-C          Today, Patient Was Seen By: Remedios Cruz PA-C    **Please Note: This note may have been constructed using a voice recognition system.**

## 2023-12-31 NOTE — PLAN OF CARE
Problem: Potential for Falls  Goal: Patient will remain free of falls  Description: INTERVENTIONS:  - Educate patient/family on patient safety including physical limitations  - Instruct patient to call for assistance with activity   - Consult OT/PT to assist with strengthening/mobility   - Keep Call bell within reach  - Keep bed low and locked with side rails adjusted as appropriate  - Keep care items and personal belongings within reach  - Initiate and maintain comfort rounds  - Make Fall Risk Sign visible to staff  - Offer Toileting every 2 Hours, in advance of need  - Initiate/Maintain bed/chair alarm  - Obtain necessary fall risk management equipment:   - Apply yellow socks and bracelet for high fall risk patients  - Consider moving patient to room near nurses station  Outcome: Progressing     Problem: PAIN - ADULT  Goal: Verbalizes/displays adequate comfort level or baseline comfort level  Description: Interventions:  - Encourage patient to monitor pain and request assistance  - Assess pain using appropriate pain scale  - Administer analgesics based on type and severity of pain and evaluate response  - Implement non-pharmacological measures as appropriate and evaluate response  - Consider cultural and social influences on pain and pain management  - Notify physician/advanced practitioner if interventions unsuccessful or patient reports new pain  Outcome: Progressing

## 2024-01-01 LAB
ANION GAP SERPL CALCULATED.3IONS-SCNC: 2 MMOL/L
BUN SERPL-MCNC: 23 MG/DL (ref 5–25)
CALCIUM SERPL-MCNC: 8 MG/DL (ref 8.4–10.2)
CHLORIDE SERPL-SCNC: 104 MMOL/L (ref 96–108)
CO2 SERPL-SCNC: 25 MMOL/L (ref 21–32)
CREAT SERPL-MCNC: 1 MG/DL (ref 0.6–1.3)
GFR SERPL CREATININE-BSD FRML MDRD: 63 ML/MIN/1.73SQ M
GLUCOSE SERPL-MCNC: 91 MG/DL (ref 65–140)
POTASSIUM SERPL-SCNC: 4.1 MMOL/L (ref 3.5–5.3)
SODIUM SERPL-SCNC: 131 MMOL/L (ref 135–147)

## 2024-01-01 PROCEDURE — 80048 BASIC METABOLIC PNL TOTAL CA: CPT

## 2024-01-01 PROCEDURE — 99232 SBSQ HOSP IP/OBS MODERATE 35: CPT

## 2024-01-01 RX ADMIN — MELATONIN 3 MG: 3 TAB ORAL at 20:49

## 2024-01-01 RX ADMIN — FOLIC ACID 1000 MCG: 1 TABLET ORAL at 07:50

## 2024-01-01 RX ADMIN — SENNOSIDES AND DOCUSATE SODIUM 1 TABLET: 8.6; 5 TABLET ORAL at 16:24

## 2024-01-01 RX ADMIN — TAMSULOSIN HYDROCHLORIDE 0.4 MG: 0.4 CAPSULE ORAL at 16:22

## 2024-01-01 RX ADMIN — LUBIPROSTONE 8 MCG: 8 CAPSULE, GELATIN COATED ORAL at 07:50

## 2024-01-01 RX ADMIN — FERROUS SULFATE TAB 325 MG (65 MG ELEMENTAL FE) 325 MG: 325 (65 FE) TAB at 07:51

## 2024-01-01 RX ADMIN — DIGOXIN 125 MCG: 125 TABLET ORAL at 07:50

## 2024-01-01 RX ADMIN — CYANOCOBALAMIN TAB 500 MCG 1000 MCG: 500 TAB at 07:51

## 2024-01-01 RX ADMIN — FLUDROCORTISONE ACETATE 0.05 MG: 0.1 TABLET ORAL at 07:51

## 2024-01-01 RX ADMIN — POLYETHYLENE GLYCOL 3350 17 G: 17 POWDER, FOR SOLUTION ORAL at 16:24

## 2024-01-01 RX ADMIN — SENNOSIDES AND DOCUSATE SODIUM 1 TABLET: 8.6; 5 TABLET ORAL at 07:50

## 2024-01-01 RX ADMIN — POLYETHYLENE GLYCOL 3350 17 G: 17 POWDER, FOR SOLUTION ORAL at 07:51

## 2024-01-01 RX ADMIN — DRONEDARONE 400 MG: 400 TABLET, FILM COATED ORAL at 07:52

## 2024-01-01 RX ADMIN — PRIMIDONE 50 MG: 50 TABLET ORAL at 20:49

## 2024-01-01 RX ADMIN — PANTOPRAZOLE SODIUM 40 MG: 40 TABLET, DELAYED RELEASE ORAL at 07:51

## 2024-01-01 RX ADMIN — PRIMIDONE 50 MG: 50 TABLET ORAL at 07:50

## 2024-01-01 RX ADMIN — DRONEDARONE 400 MG: 400 TABLET, FILM COATED ORAL at 16:23

## 2024-01-01 RX ADMIN — LUBIPROSTONE 8 MCG: 8 CAPSULE, GELATIN COATED ORAL at 16:22

## 2024-01-01 NOTE — PLAN OF CARE
Problem: Potential for Falls  Goal: Patient will remain free of falls  Description: INTERVENTIONS:  - Educate patient/family on patient safety including physical limitations  - Instruct patient to call for assistance with activity   - Consult OT/PT to assist with strengthening/mobility   - Keep Call bell within reach  - Keep bed low and locked with side rails adjusted as appropriate  - Keep care items and personal belongings within reach  - Initiate and maintain comfort rounds  - Make Fall Risk Sign visible to staff  - Offer Toileting every 2 Hours, in advance of need  - Initiate/Maintain bed/chair alarm  - Obtain necessary fall risk management equipment:   - Apply yellow socks and bracelet for high fall risk patients  - Consider moving patient to room near nurses station  Outcome: Progressing     Problem: PAIN - ADULT  Goal: Verbalizes/displays adequate comfort level or baseline comfort level  Description: Interventions:  - Encourage patient to monitor pain and request assistance  - Assess pain using appropriate pain scale  - Administer analgesics based on type and severity of pain and evaluate response  - Implement non-pharmacological measures as appropriate and evaluate response  - Consider cultural and social influences on pain and pain management  - Notify physician/advanced practitioner if interventions unsuccessful or patient reports new pain  Outcome: Progressing     Problem: INFECTION - ADULT  Goal: Absence or prevention of progression during hospitalization  Description: INTERVENTIONS:  - Assess and monitor for signs and symptoms of infection  - Monitor lab/diagnostic results  - Monitor all insertion sites, i.e. indwelling lines, tubes, and drains  - Monitor endotracheal if appropriate and nasal secretions for changes in amount and color  - [place] appropriate cooling/warming therapies per order  - Administer medications as ordered  - Instruct and encourage patient and family to use good hand hygiene  technique  - Identify and instruct in appropriate isolation precautions for identified infection/condition  Outcome: Progressing     Problem: SAFETY ADULT  Goal: Patient will remain free of falls  Description: INTERVENTIONS:  - Educate patient/family on patient safety including physical limitations  - Instruct patient to call for assistance with activity   - Consult OT/PT to assist with strengthening/mobility   - Keep Call bell within reach  - Keep bed low and locked with side rails adjusted as appropriate  - Keep care items and personal belongings within reach  - Initiate and maintain comfort rounds  - Make Fall Risk Sign visible to staff  - Offer Toileting every 2 Hours, in advance of need  - Initiate/Maintain bed/chair alarm  - Obtain necessary fall risk management equipment:   - Apply yellow socks and bracelet for high fall risk patients  - Consider moving patient to room near nurses station  Outcome: Progressing  Goal: Maintain or return to baseline ADL function  Description: INTERVENTIONS:  - Educate patient/family on patient safety including physical limitations  - Instruct patient to call for assistance with activity   - Consult OT/PT to assist with strengthening/mobility   - Keep Call bell within reach  - Keep bed low and locked with side rails adjusted as appropriate  - Keep care items and personal belongings within reach  - Initiate and maintain comfort rounds  - Make Fall Risk Sign visible to staff  - Offer Toileting every 2 Hours, in advance of need  - Initiate/Maintain bed/chair alarm  - Obtain necessary fall risk management equipment:  - Apply yellow socks and bracelet for high fall risk patients  - Consider moving patient to room near nurses station  Outcome: Progressing  Goal: Maintains/Returns to pre admission functional level  Description: INTERVENTIONS:  - Perform AM-PAC 6 Click Basic Mobility/ Daily Activity assessment daily.  - Set and communicate daily mobility goal to care team and  patient/family/caregiver.   - Collaborate with rehabilitation services on mobility goals if consulted  - Perform Range of Motion TID times a day.  - Reposition patient every 2 hours.  - Dangle patient TID times a day  - Stand patient TID times a day  - Ambulate patient TID times a day  - Out of bed to chair TID times a day   - Out of bed for meals TID times a day  - Out of bed for toileting  - Record patient progress and toleration of activity level   Outcome: Progressing     Problem: DISCHARGE PLANNING  Goal: Discharge to home or other facility with appropriate resources  Description: INTERVENTIONS:  - Identify barriers to discharge w/patient and caregiver  - Arrange for needed discharge resources and transportation as appropriate  - Identify discharge learning needs (meds, wound care, etc.)  - Arrange for interpretive services to assist at discharge as needed  - Refer to Case Management Department for coordinating discharge planning if the patient needs post-hospital services based on physician/advanced practitioner order or complex needs related to functional status, cognitive ability, or social support system  Outcome: Progressing     Problem: Knowledge Deficit  Goal: Patient/family/caregiver demonstrates understanding of disease process, treatment plan, medications, and discharge instructions  Description: Complete learning assessment and assess knowledge base.  Interventions:  - Provide teaching at level of understanding  - Provide teaching via preferred learning methods  Outcome: Progressing     Problem: GASTROINTESTINAL - ADULT  Goal: Minimal or absence of nausea and/or vomiting  Description: INTERVENTIONS:  - Administer IV fluids if ordered to ensure adequate hydration  - Maintain NPO status until nausea and vomiting are resolved  - Nasogastric tube if ordered  - Administer ordered antiemetic medications as needed  - Provide nonpharmacologic comfort measures as appropriate  - Advance diet as tolerated, if  ordered  - Consider nutrition services referral to assist patient with adequate nutrition and appropriate food choices  Outcome: Progressing  Goal: Maintains or returns to baseline bowel function  Description: INTERVENTIONS:  - Assess bowel function monitor bowel regimen  - Encourage oral fluids to ensure adequate hydration  - Administer IV fluids if ordered to ensure adequate hydration  - Administer ordered medications as needed  - Encourage mobilization and activity  - Consider nutritional services referral to assist patient with adequate nutrition and appropriate food choices  Outcome: Progressing  Goal: Maintains adequate nutritional intake  Description: INTERVENTIONS:  - Monitor percentage of each meal consumed  - Identify factors contributing to decreased intake, treat as appropriate  - Assist with meals as needed  - Monitor I&O, weight, and lab values if indicated  - Obtain nutrition services referral as needed  Outcome: Progressing  Goal: Oral mucous membranes remain intact  Description: INTERVENTIONS  - Assess oral mucosa and hygiene practices  - Implement preventative oral hygiene regimen  - Implement oral medicated treatments as ordered  - Initiate Nutrition services referral as needed  Outcome: Progressing     Problem: GENITOURINARY - ADULT  Goal: Maintains or returns to baseline urinary function  Description: INTERVENTIONS:  - Assess urinary function  - Encourage oral fluids to ensure adequate hydration if ordered  - Administer IV fluids as ordered to ensure adequate hydration  - Administer ordered medications as needed  - Offer frequent toileting  - Follow urinary retention protocol if ordered  Outcome: Progressing  Goal: Absence of urinary retention  Description: INTERVENTIONS:  - Assess patient's ability to void and empty bladder  - Monitor I/O  - Bladder scan as needed  - Discuss with physician/AP medications to alleviate retention as needed  - Discuss catheterization for long term situations as  appropriate  Outcome: Progressing     Problem: Prexisting or High Potential for Compromised Skin Integrity  Goal: Skin integrity is maintained or improved  Description: INTERVENTIONS:  - Identify patients at risk for skin breakdown  - Assess and monitor skin integrity  - Assess and monitor nutrition and hydration status  - Monitor labs   - Assess for incontinence   - Turn and reposition patient  - Assist with mobility/ambulation  - Relieve pressure over bony prominences  - Avoid friction and shearing  - Provide appropriate hygiene as needed including keeping skin clean and dry  - Evaluate need for skin moisturizer/barrier cream  - Collaborate with interdisciplinary team   - Patient/family teaching  - Consider wound care consult   Outcome: Progressing     Problem: Nutrition/Hydration-ADULT  Goal: Nutrient/Hydration intake appropriate for improving, restoring or maintaining nutritional needs  Description: Monitor and assess patient's nutrition/hydration status for malnutrition. Collaborate with interdisciplinary team and initiate plan and interventions as ordered.  Monitor patient's weight and dietary intake as ordered or per policy. Utilize nutrition screening tool and intervene as necessary. Determine patient's food preferences and provide high-protein, high-caloric foods as appropriate.     INTERVENTIONS:  - Monitor oral intake, urinary output, labs, and treatment plans  - Assess nutrition and hydration status and recommend course of action  - Evaluate amount of meals eaten  - Assist patient with eating if necessary   - Allow adequate time for meals  - Recommend/ encourage appropriate diets, oral nutritional supplements, and vitamin/mineral supplements  - Order, calculate, and assess calorie counts as needed  - Recommend, monitor, and adjust tube feedings and TPN/PPN based on assessed needs  - Assess need for intravenous fluids  - Provide specific nutrition/hydration education as appropriate  - Include  patient/family/caregiver in decisions related to nutrition  Outcome: Progressing

## 2024-01-01 NOTE — ASSESSMENT & PLAN NOTE
[facility]    DC order noted, all docs needed have been faxed to [facility] for home care services.     Home care to see patient by 7/5/22    Michael Schumacher RN, BSN CTN  [facility] [phone] Patient presents with symptoms of constipation for the last few days.  He saw GI outpatient.  He was started on MiraLAX patient states he went 26 times to the bathroom yesterday had very small amounts of stool and felt like he just could not empty out his bowel movement.  Also had a fall yesterday.  Ct abd pelvis shows Abundant stool in the colon and rectal vault compatible with constipation in the appropriate clinical context. No bowel obstruction.  Minimal perirectal fat stranding suggestive of mild or early stercoral proctitis. No perirectal abscess  Placed on MiraLAX daily and placed on Dulcolax suppository x 2 daily and also placed 3 enemas    GI consult placed - miralax BID, colace BID and Amitiza 8mcg BID. Dulcolax suppository daily prn. Advance to regular diet. Outpatient follow up  Appreciate CM assistance with amitiza

## 2024-01-01 NOTE — PROGRESS NOTES
[facility]  Progress Note  Name: Miguel Angel Ortega I  MRN: [id number]  Unit/Bed#: -01 I Date of Admission: 12/13/2023   Date of Service: 1/1/2024 I Hospital Day: 19    Assessment/Plan   * Stercoral colitis  Assessment & Plan  Patient presents with symptoms of constipation for the last few days.  He saw GI outpatient.  He was started on MiraLAX patient states he went 26 times to the bathroom yesterday had very small amounts of stool and felt like he just could not empty out his bowel movement.  Also had a fall yesterday.  Ct abd pelvis shows Abundant stool in the colon and rectal vault compatible with constipation in the appropriate clinical context. No bowel obstruction.  Minimal perirectal fat stranding suggestive of mild or early stercoral proctitis. No perirectal abscess  Placed on MiraLAX daily and placed on Dulcolax suppository x 2 daily and also placed 3 enemas    GI consult placed - miralax BID, colace BID and Amitiza 8mcg BID. Dulcolax suppository daily prn. Advance to regular diet. Outpatient follow up  Appreciate CM assistance with amitiza     Acute urinary retention  Assessment & Plan  Acute urinary retention secondary to severe constipation also known history of prostate cancer.  Zuniga catheter placed on admission. Also placed on Flomax   Zuniga removed 12/15 and placed on voiding trial. Some hypotension noted which may be secondary to Flomax and placed on midodrine - dc flomax and midodrine  CT abdomen pelvis: Bladder completely decompressed by Zuniga catheter and cannot be further evaluated. If clinical concern remains, consider clamping of Zuniga for several hours and ultrasound imaging. Zuniga in expected position.   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 Remove zuniga and initiate voiding trial. Restart flomax. Send back to facility on condom catheter. OP follow up     Acute  on chronic anemia  Assessment & Plan  Hemoglobin dropped from 10.3-8.7.  low iron and B12 levels. placed on vit b12 and venofer  Resumed home iron supplement  CBC stable  neg stool occult  Obtained blood consent and transfused 1 unit on 12/26 for preop transfusion.     Lab Results   Component Value Date    HGB 8.3 (L) 12/31/2023    HGB 8.0 (L) 12/30/2023    HGB 8.0 (L) 12/29/2023       Orthostatic hypotension  Assessment & Plan  Patient has been having episodes of orthostatic hypotension outpatient. Orthostatic vitals inpatient positive  Cardiology consult: Suspect this is secondary to poor PO intake and blood loss. Please encourage PO intake. Nursing instructed to apply compression socks while awake. ECG demonstrates sinus bradycardia. HR's controlled. OK to continue digoxin and Multaq.   Will place on gentle IVF rehydration - stopped  Tom stockings, slow position changes. Can add midodrine if becomes symptomatic from hypotension  Started florinef .05mg daily  Orthostatics now negative     Permanent atrial fibrillation (HCC)  Assessment & Plan  Continue Multaq, digoxin which are his chronic meds.  Not on anticoagulation  Dig level acceptable  Appreciate cardio eval - continue multaq. Change digoxin to every other day. Was on xarelto in the past - discuss outpatient with his cardiologist about resumption  Asked for cardiac clearance prior to procedure: Patient is considered low risk for adverse cardiovascular outcomes in regards to non cardiac procedure     Prostate cancer metastatic to bone (HCC)  Assessment & Plan  Further outpatient follow-up with urology.  Placed on Flomax for acute urinary retention hold Casodex while inpatient.  Urology restarted patient on flomax - monitor bp         VTE Pharmacologic Prophylaxis:   High Risk (Score >/= 5) - Pharmacological DVT Prophylaxis Contraindicated. Sequential Compression Devices Ordered.    Mobility:   Basic Mobility Inpatient Raw Score: 19  -[facility] Goal: 6: Walk 10  steps or more  JH-HLM Achieved: 4: Move to chair/commode  HLM Goal achieved. Continue to encourage appropriate mobility.    Patient Centered Rounds: I performed bedside rounds with nursing staff today.   Discussions with Specialists or Other Care Team Provider: nursing, case management    Education and Discussions with Family / Patient: Updated  (friend) via phone.    Total Time Spent on Date of Encounter in care of patient: 34 mins. This time was spent on one or more of the following: performing physical exam; counseling and coordination of care; obtaining or reviewing history; documenting in the medical record; reviewing/ordering tests, medications or procedures; communicating with other healthcare professionals and discussing with patient's family/caregivers.    Current Length of Stay: 19 day(s)  Current Patient Status: Inpatient   Certification Statement: The patient will continue to require additional inpatient hospital stay due to pending authorization of medications, monitoring orthostatic BPs  Discharge Plan: Anticipate discharge in 24-48 hrs to prior assisted or independent living facility.    Code Status: Level 3 - DNAR and DNI    Subjective:   Patient seen and examined at bedside this morning. He is doing well. Offers no acute complaints at this time. States that he is overall feeling well and hopeful to get up and move some to see how he feels. Denies nausea, vomiting, diarrhea, constipation, abdominal pain, CP, SOB, fever, chills. Has clear urine output and no difficulty with urination or bowel movements at this time.     Objective:     Vitals:   Temp (24hrs), Av.7 °F (36.5 °C), Min:97.3 °F (36.3 °C), Max:98.2 °F (36.8 °C)    Temp:  [97.3 °F (36.3 °C)-98.2 °F (36.8 °C)] 97.3 °F (36.3 °C)  HR:  [63-73] 70  Resp:  [18-20] 18  BP: (101-138)/(40-48) 101/40  SpO2:  [94 %-97 %] 97 %  Body mass index is 22.73 kg/m².     Input and Output Summary (last 24 hours):     Intake/Output Summary (Last  24 hours) at 1/1/2024 1314  Last data filed at 1/1/2024 0830  Gross per 24 hour   Intake 660 ml   Output 1250 ml   Net -590 ml       Physical Exam:   Physical Exam  Vitals reviewed.   Constitutional:       General: He is not in acute distress.     Appearance: He is not ill-appearing or toxic-appearing.   HENT:      Head: Normocephalic and atraumatic.      Mouth/Throat:      Mouth: Mucous membranes are moist.   Cardiovascular:      Rate and Rhythm: Normal rate and regular rhythm.      Heart sounds: No murmur heard.  Pulmonary:      Effort: No respiratory distress.      Breath sounds: No stridor. No wheezing.      Comments: Saturating well on room air  Abdominal:      General: Bowel sounds are normal. There is no distension.      Palpations: Abdomen is soft. There is no mass.      Tenderness: There is no abdominal tenderness.   Genitourinary:     Comments: Condom cath in place with clear yellow urine output  Musculoskeletal:      Right lower leg: No edema.      Left lower leg: No edema.   Skin:     General: Skin is warm and dry.   Neurological:      General: No focal deficit present.      Mental Status: He is alert and oriented to person, place, and time.   Psychiatric:         Mood and Affect: Mood normal.         Behavior: Behavior normal.          Additional Data:     Labs:  Results from last 7 days   Lab Units 12/31/23  0442   WBC Thousand/uL 6.36   HEMOGLOBIN g/dL 8.3*   HEMATOCRIT % 25.2*   PLATELETS Thousands/uL 242     Results from last 7 days   Lab Units 01/01/24  0541   SODIUM mmol/L 131*   POTASSIUM mmol/L 4.1   CHLORIDE mmol/L 104   CO2 mmol/L 25   BUN mg/dL 23   CREATININE mg/dL 1.00   ANION GAP mmol/L 2   CALCIUM mg/dL 8.0*   GLUCOSE RANDOM mg/dL 91                       Lines/Drains:  Invasive Devices       Peripheral Intravenous Line  Duration             Peripheral IV 12/29/23 Distal;Left;Ventral (anterior) Forearm 3 days              Drain  Duration             External Urinary Catheter Medium 3 days                           Imaging: No pertinent imaging reviewed.    Recent Cultures (last 7 days):         Last 24 Hours Medication List:   Current Facility-Administered Medications   Medication Dose Route Frequency Provider Last Rate    acetaminophen  650 mg Oral Q6H PRN Carson Singer MD      bisacodyl  10 mg Rectal Daily PRN Carson Singer MD      cyanocobalamin  1,000 mcg Oral Daily Carson Singer MD      digoxin  125 mcg Oral Every Other Day Carson Singer MD      dronedarone  400 mg Oral BID With Meals Carson Singer MD      ferrous sulfate  325 mg Oral Daily With Breakfast Carson Singer MD      fludrocortisone  0.05 mg Oral Daily Remedios Cruz PA-C      folic acid  1,000 mcg Oral Daily Carson Singer MD      glycerin-hypromellose-  1 drop Both Eyes Q4H PRN Carson Singer MD      hydrocortisone   Topical 4x Daily PRN Carson Singer MD      lubiprostone  8 mcg Oral BID With Meals Carson Singer MD      melatonin  3 mg Oral HS PRN Carson Singer MD      ondansetron  4 mg Intravenous Q6H PRN Carson Singer MD      pantoprazole  40 mg Oral Daily Carson Singer MD      polyethylene glycol  17 g Oral BID Carson Singer MD      primidone  50 mg Oral Q12H YAMINI Carson Singer MD      senna-docusate sodium  1 tablet Oral BID Carson Singer MD      tamsulosin  0.4 mg Oral Daily With Dinner Remedios Cruz PA-C          Today, Patient Was Seen By: Suha Alvarado PA-C    **Please Note: This note may have been constructed using a voice recognition system.**

## 2024-01-01 NOTE — PLAN OF CARE
Problem: SAFETY ADULT  Goal: Patient will remain free of falls  Description: INTERVENTIONS:  - Educate patient/family on patient safety including physical limitations  - Instruct patient to call for assistance with activity   - Consult OT/PT to assist with strengthening/mobility   - Keep Call bell within reach  - Keep bed low and locked with side rails adjusted as appropriate  - Keep care items and personal belongings within reach  - Initiate and maintain comfort rounds  - Make Fall Risk Sign visible to staff  - Offer Toileting every 2 Hours, in advance of need  - Initiate/Maintain bed/chair alarm  - Obtain necessary fall risk management equipment:   - Apply yellow socks and bracelet for high fall risk patients  - Consider moving patient to room near nurses station  Outcome: Progressing     Problem: PAIN - ADULT  Goal: Verbalizes/displays adequate comfort level or baseline comfort level  Description: Interventions:  - Encourage patient to monitor pain and request assistance  - Assess pain using appropriate pain scale  - Administer analgesics based on type and severity of pain and evaluate response  - Implement non-pharmacological measures as appropriate and evaluate response  - Consider cultural and social influences on pain and pain management  - Notify physician/advanced practitioner if interventions unsuccessful or patient reports new pain  Outcome: Progressing

## 2024-01-02 LAB
ANION GAP SERPL CALCULATED.3IONS-SCNC: 3 MMOL/L
BUN SERPL-MCNC: 21 MG/DL (ref 5–25)
CALCIUM SERPL-MCNC: 8 MG/DL (ref 8.4–10.2)
CHLORIDE SERPL-SCNC: 102 MMOL/L (ref 96–108)
CO2 SERPL-SCNC: 26 MMOL/L (ref 21–32)
CREAT SERPL-MCNC: 1.06 MG/DL (ref 0.6–1.3)
ERYTHROCYTE [DISTWIDTH] IN BLOOD BY AUTOMATED COUNT: 16.2 % (ref 11.6–15.1)
GFR SERPL CREATININE-BSD FRML MDRD: 59 ML/MIN/1.73SQ M
GLUCOSE SERPL-MCNC: 92 MG/DL (ref 65–140)
HCT VFR BLD AUTO: 23.5 % (ref 36.5–49.3)
HGB BLD-MCNC: 7.8 G/DL (ref 12–17)
MCH RBC QN AUTO: 32.1 PG (ref 26.8–34.3)
MCHC RBC AUTO-ENTMCNC: 33.2 G/DL (ref 31.4–37.4)
MCV RBC AUTO: 97 FL (ref 82–98)
PLATELET # BLD AUTO: 224 THOUSANDS/UL (ref 149–390)
PMV BLD AUTO: 8.1 FL (ref 8.9–12.7)
POTASSIUM SERPL-SCNC: 4.2 MMOL/L (ref 3.5–5.3)
RBC # BLD AUTO: 2.43 MILLION/UL (ref 3.88–5.62)
SODIUM SERPL-SCNC: 131 MMOL/L (ref 135–147)
WBC # BLD AUTO: 4.37 THOUSAND/UL (ref 4.31–10.16)

## 2024-01-02 PROCEDURE — 97110 THERAPEUTIC EXERCISES: CPT

## 2024-01-02 PROCEDURE — 85027 COMPLETE CBC AUTOMATED: CPT

## 2024-01-02 PROCEDURE — 99232 SBSQ HOSP IP/OBS MODERATE 35: CPT

## 2024-01-02 PROCEDURE — 97530 THERAPEUTIC ACTIVITIES: CPT

## 2024-01-02 PROCEDURE — 80048 BASIC METABOLIC PNL TOTAL CA: CPT

## 2024-01-02 RX ORDER — FLUDROCORTISONE ACETATE 0.1 MG/1
0.05 TABLET ORAL ONCE
Status: COMPLETED | OUTPATIENT
Start: 2024-01-02 | End: 2024-01-02

## 2024-01-02 RX ORDER — LINACLOTIDE 290 UG/1
290 CAPSULE, GELATIN COATED ORAL EVERY MORNING
Qty: 30 CAPSULE | Refills: 0 | Status: SHIPPED | OUTPATIENT
Start: 2024-01-02 | End: 2024-01-04

## 2024-01-02 RX ORDER — FLUDROCORTISONE ACETATE 0.1 MG/1
0.1 TABLET ORAL DAILY
Status: DISCONTINUED | OUTPATIENT
Start: 2024-01-03 | End: 2024-01-04 | Stop reason: HOSPADM

## 2024-01-02 RX ORDER — HYDROXYZINE HYDROCHLORIDE 25 MG/1
25 TABLET, FILM COATED ORAL EVERY 6 HOURS PRN
Status: DISCONTINUED | OUTPATIENT
Start: 2024-01-02 | End: 2024-01-04 | Stop reason: HOSPADM

## 2024-01-02 RX ADMIN — PRIMIDONE 50 MG: 50 TABLET ORAL at 08:32

## 2024-01-02 RX ADMIN — PANTOPRAZOLE SODIUM 40 MG: 40 TABLET, DELAYED RELEASE ORAL at 08:31

## 2024-01-02 RX ADMIN — FOLIC ACID 1000 MCG: 1 TABLET ORAL at 08:32

## 2024-01-02 RX ADMIN — LUBIPROSTONE 8 MCG: 8 CAPSULE, GELATIN COATED ORAL at 08:31

## 2024-01-02 RX ADMIN — FERROUS SULFATE TAB 325 MG (65 MG ELEMENTAL FE) 325 MG: 325 (65 FE) TAB at 08:32

## 2024-01-02 RX ADMIN — DRONEDARONE 400 MG: 400 TABLET, FILM COATED ORAL at 08:33

## 2024-01-02 RX ADMIN — LUBIPROSTONE 8 MCG: 8 CAPSULE, GELATIN COATED ORAL at 16:24

## 2024-01-02 RX ADMIN — POLYETHYLENE GLYCOL 3350 17 G: 17 POWDER, FOR SOLUTION ORAL at 17:28

## 2024-01-02 RX ADMIN — GLYCERIN 1 DROP: .002; .002; .01 SOLUTION/ DROPS OPHTHALMIC at 16:31

## 2024-01-02 RX ADMIN — PRIMIDONE 50 MG: 50 TABLET ORAL at 20:33

## 2024-01-02 RX ADMIN — SENNOSIDES AND DOCUSATE SODIUM 1 TABLET: 8.6; 5 TABLET ORAL at 08:31

## 2024-01-02 RX ADMIN — TAMSULOSIN HYDROCHLORIDE 0.4 MG: 0.4 CAPSULE ORAL at 16:24

## 2024-01-02 RX ADMIN — POLYETHYLENE GLYCOL 3350 17 G: 17 POWDER, FOR SOLUTION ORAL at 08:31

## 2024-01-02 RX ADMIN — GLYCERIN 1 DROP: .002; .002; .01 SOLUTION/ DROPS OPHTHALMIC at 08:39

## 2024-01-02 RX ADMIN — SENNOSIDES AND DOCUSATE SODIUM 1 TABLET: 8.6; 5 TABLET ORAL at 16:24

## 2024-01-02 RX ADMIN — DRONEDARONE 400 MG: 400 TABLET, FILM COATED ORAL at 16:24

## 2024-01-02 RX ADMIN — FLUDROCORTISONE ACETATE 0.05 MG: 0.1 TABLET ORAL at 12:12

## 2024-01-02 RX ADMIN — FLUDROCORTISONE ACETATE 0.05 MG: 0.1 TABLET ORAL at 08:31

## 2024-01-02 RX ADMIN — CYANOCOBALAMIN TAB 500 MCG 1000 MCG: 500 TAB at 08:37

## 2024-01-02 NOTE — PLAN OF CARE
Problem: Potential for Falls  Goal: Patient will remain free of falls  Description: INTERVENTIONS:  - Educate patient/family on patient safety including physical limitations  - Instruct patient to call for assistance with activity   - Consult OT/PT to assist with strengthening/mobility   - Keep Call bell within reach  - Keep bed low and locked with side rails adjusted as appropriate  - Keep care items and personal belongings within reach  - Initiate and maintain comfort rounds  - Make Fall Risk Sign visible to staff  - Offer Toileting every 2 Hours, in advance of need  - Initiate/Maintain bed/chair alarm  - Obtain necessary fall risk management equipment:   - Apply yellow socks and bracelet for high fall risk patients  - Consider moving patient to room near nurses station  Outcome: Progressing     Problem: PAIN - ADULT  Goal: Verbalizes/displays adequate comfort level or baseline comfort level  Description: Interventions:  - Encourage patient to monitor pain and request assistance  - Assess pain using appropriate pain scale  - Administer analgesics based on type and severity of pain and evaluate response  - Implement non-pharmacological measures as appropriate and evaluate response  - Consider cultural and social influences on pain and pain management  - Notify physician/advanced practitioner if interventions unsuccessful or patient reports new pain  Outcome: Progressing     Problem: INFECTION - ADULT  Goal: Absence or prevention of progression during hospitalization  Description: INTERVENTIONS:  - Assess and monitor for signs and symptoms of infection  - Monitor lab/diagnostic results  - Monitor all insertion sites, i.e. indwelling lines, tubes, and drains  - Monitor endotracheal if appropriate and nasal secretions for changes in amount and color  - [place] appropriate cooling/warming therapies per order  - Administer medications as ordered  - Instruct and encourage patient and family to use good hand hygiene  technique  - Identify and instruct in appropriate isolation precautions for identified infection/condition  Outcome: Progressing     Problem: SAFETY ADULT  Goal: Patient will remain free of falls  Description: INTERVENTIONS:  - Educate patient/family on patient safety including physical limitations  - Instruct patient to call for assistance with activity   - Consult OT/PT to assist with strengthening/mobility   - Keep Call bell within reach  - Keep bed low and locked with side rails adjusted as appropriate  - Keep care items and personal belongings within reach  - Initiate and maintain comfort rounds  - Make Fall Risk Sign visible to staff  - Offer Toileting every 2 Hours, in advance of need  - Initiate/Maintain bed/chair alarm  - Obtain necessary fall risk management equipment:   - Apply yellow socks and bracelet for high fall risk patients  - Consider moving patient to room near nurses station  Outcome: Progressing  Goal: Maintain or return to baseline ADL function  Description: INTERVENTIONS:  - Educate patient/family on patient safety including physical limitations  - Instruct patient to call for assistance with activity   - Consult OT/PT to assist with strengthening/mobility   - Keep Call bell within reach  - Keep bed low and locked with side rails adjusted as appropriate  - Keep care items and personal belongings within reach  - Initiate and maintain comfort rounds  - Make Fall Risk Sign visible to staff  - Offer Toileting every 2 Hours, in advance of need  - Initiate/Maintain bed/chair alarm  - Obtain necessary fall risk management equipment:  - Apply yellow socks and bracelet for high fall risk patients  - Consider moving patient to room near nurses station  Outcome: Progressing  Goal: Maintains/Returns to pre admission functional level  Description: INTERVENTIONS:  - Perform AM-PAC 6 Click Basic Mobility/ Daily Activity assessment daily.  - Set and communicate daily mobility goal to care team and  patient/family/caregiver.   - Collaborate with rehabilitation services on mobility goals if consulted  - Perform Range of Motion 3 times a day.  - Reposition patient every 2 hours.  - Dangle patient 3 times a day  - Stand patient 3 times a day  - Ambulate patient 3 times a day  - Out of bed to chair 3 times a day   - Out of bed for meals 3 times a day  - Out of bed for toileting  - Record patient progress and toleration of activity level   Outcome: Progressing     Problem: DISCHARGE PLANNING  Goal: Discharge to home or other facility with appropriate resources  Description: INTERVENTIONS:  - Identify barriers to discharge w/patient and caregiver  - Arrange for needed discharge resources and transportation as appropriate  - Identify discharge learning needs (meds, wound care, etc.)  - Arrange for interpretive services to assist at discharge as needed  - Refer to Case Management Department for coordinating discharge planning if the patient needs post-hospital services based on physician/advanced practitioner order or complex needs related to functional status, cognitive ability, or social support system  Outcome: Progressing

## 2024-01-02 NOTE — PHYSICAL THERAPY NOTE
PHYSICAL THERAPY TREATMENT NOTE  NAME:  Miguel Angel Ortega  DATE: 01/02/24    Length Of Stay: 20  Performed at least 2 patient identifiers during session: Name and Birthday  Treatment time: 4509-6111  Treatment length: 42 min       01/02/24 1058   Note Type   Note Type Treatment   Pain Assessment   Pain Assessment Tool 0-10   Pain Score No Pain   Restrictions/Precautions   Other Precautions Chair Alarm;Bed Alarm;Multiple lines  (condom cath)   General   Chart Reviewed Yes   Response to Previous Treatment Patient with no complaints from previous session.   Cognition   Overall Cognitive Status WFL   Orientation Level Oriented X4   Following Commands Follows one step commands without difficulty   Bed Mobility   Additional Comments Pt seated OOB in recliner at start and end of session; bed mobility not assessed this session   Transfers   Sit to Stand   (SBA)   Additional items Increased time required;Verbal cues   Stand to Sit   (SBA - steadying assist)   Additional items Increased time required;Verbal cues   Stand pivot   (SBA)   Additional items Increased time required;Verbal cues   Additional Comments RW for transfers with SBA primarily for STS and SPT with occ VC for safety. Attempted orthostatic BP, see below for details, pt able to stand x 3-4 min each attempt prior to symptom onset, requiring seated rest break. Winston PA-C and NAVEED Waldrop aware of BPs   Balance   Static Sitting Good   Dynamic Sitting Fair +   Static Standing Fair   Dynamic Standing Fair -   Endurance Deficit   Endurance Deficit Yes   Endurance Deficit Description fatigue, orthostatic BP   Activity Tolerance   Activity Tolerance Treatment limited secondary to medical complications (Comment)  (orthostatic BP)   Medical Staff Made Aware AP Winston   Nurse Made Aware Palma LUCIO   Exercises   Glute Sets Sitting;20 reps;AROM;Bilateral   Hip Flexion Standing;20 reps;AROM;Bilateral   Hip Abduction Sitting;20 reps;AROM;Bilateral   Knee AROM Long Arc Quad  Sitting;20 reps;AROM;Bilateral   Assessment   Prognosis Good   Problem List Decreased strength;Decreased endurance;Impaired balance;Decreased mobility;Impaired hearing   Goals   PT Treatment Day 7   Plan   Treatment/Interventions Functional transfer training;LE strengthening/ROM;Therapeutic exercise;Endurance training;Patient/family training;Equipment eval/education;Bed mobility;Gait training;Compensatory technique education;Spoke to nursing;Spoke to advanced practitioner   Progress Slow progress, medical status limitations   PT Frequency 3-5x/wk   Discharge Recommendation   Rehab Resource Intensity Level, PT III (Minimum Resource Intensity)   AM-PAC Basic Mobility Inpatient   Turning in Flat Bed Without Bedrails 4   Lying on Back to Sitting on Edge of Flat Bed Without Bedrails 4   Moving Bed to Chair 3   Standing Up From Chair Using Arms 3   Walk in Room 3   Climb 3-5 Stairs With Railing 2   Basic Mobility Inpatient Raw Score 19   Basic Mobility Standardized Score 42.48   Highest Level Of Mobility   JH-HLM Goal 6: Walk 10 steps or more   JH-HLM Achieved 5: Stand (1 or more minutes)   Education   Education Provided Mobility training;Home exercise program;Assistive device   Patient Demonstrates acceptance/verbal understanding   End of Consult   Patient Position at End of Consult Bedside chair;Bed/Chair alarm activated;All needs within reach     The patient's AM-PAC Basic Mobility Inpatient Short Form Raw Score is 19. A Raw score of greater than 16 suggests the patient may benefit from discharge to home. Please also refer to the recommendation of the Physical Therapist for safe discharge planning.      Assessment: Pt seen for PT treatment session this date with interventions consisting of Therapeutic exercise consisting of: AROM 20 reps B LE in sitting and standing with B/L UE support position and therapeutic activity consisting of training: sit<>stand transfers, static standing tolerance for 3-4 minutes w/ B/L UE  support, and stand pivot transfers towards B/L direction. Pt agreeable to PT treatment session upon arrival, pt found seated OOB in recliner, in no apparent distress. In comparison to previous session, pt with no improvements as evidenced by pt continues to be limited by orthostatic BP, continues to transfer at SBA and complete assigned therex . Post session: pt returned back to recliner, chair alarm engaged, all needs in reach, and RN notified of session findings/recommendations Continue to recommend  Level III Resource Intensity  at time of d/c in order to maximize pt's functional independence and safety w/ mobility. Pt continues to be functioning below baseline level, and remains limited 2* factors listed above and including decreased strength, balance, mobility, and activity tolerance. PT will continue to see pt while here in order to address the deficits listed above and provide interventions consistent w/ POC in effort to achieve STGs.     Prisca Cheatham, PT,DPT

## 2024-01-02 NOTE — ASSESSMENT & PLAN NOTE
Hemoglobin dropped from 10.3-8.7.  low iron and B12 levels. placed on vit b12 and venofer  Resumed home iron supplement  CBC stable  neg stool occult  Obtained blood consent and transfused 1 unit on 12/26 for preop transfusion.     Lab Results   Component Value Date    HGB 7.8 (L) 01/02/2024    HGB 8.3 (L) 12/31/2023    HGB 8.0 (L) 12/30/2023

## 2024-01-02 NOTE — PLAN OF CARE
Problem: PHYSICAL THERAPY ADULT  Goal: Performs mobility at highest level of function for planned discharge setting.  See evaluation for individualized goals.  Description: Treatment/Interventions: ADL retraining, Functional transfer training, LE strengthening/ROM, Elevations, Therapeutic exercise, Endurance training, Patient/family training, Equipment eval/education, Bed mobility, Gait training, Compensatory technique education, Spoke to nursing, OT          See flowsheet documentation for full assessment, interventions and recommendations.  Outcome: Not Progressing  Note: Prognosis: Good  Problem List: Decreased strength, Decreased endurance, Impaired balance, Decreased mobility, Impaired hearing  Assessment: Pt seen for PT treatment session this date with interventions consisting of Therapeutic exercise consisting of: AROM 20 reps B LE in sitting and standing with B/L UE support position and therapeutic activity consisting of training: sit<>stand transfers, static standing tolerance for 3-4 minutes w/ B/L UE support, and stand pivot transfers towards B/L direction. Pt agreeable to PT treatment session upon arrival, pt found seated OOB in recliner, in no apparent distress. In comparison to previous session, pt with no improvements as evidenced by pt continues to be limited by orthostatic BP, continues to transfer at SBA and complete assigned therex . Post session: pt returned back to recliner, chair alarm engaged, all needs in reach, and RN notified of session findings/recommendations Continue to recommend  Level III Resource Intensity  at time of d/c in order to maximize pt's functional independence and safety w/ mobility. Pt continues to be functioning below baseline level, and remains limited 2* factors listed above and including decreased strength, balance, mobility, and activity tolerance. PT will continue to see pt while here in order to address the deficits listed above and provide interventions  consistent w/ POC in effort to achieve STGs.  Barriers to Discharge: None  Barriers to Discharge Comments: Pt resides in detention, has assistance PRN  Rehab Resource Intensity Level, PT: III (Minimum Resource Intensity)    See flowsheet documentation for full assessment.

## 2024-01-02 NOTE — ASSESSMENT & PLAN NOTE
Patient presents with symptoms of constipation for the last few days.  He saw GI outpatient.  He was started on MiraLAX patient states he went 26 times to the bathroom yesterday had very small amounts of stool and felt like he just could not empty out his bowel movement.  Also had a fall yesterday.  Ct abd pelvis shows Abundant stool in the colon and rectal vault compatible with constipation in the appropriate clinical context. No bowel obstruction.  Minimal perirectal fat stranding suggestive of mild or early stercoral proctitis. No perirectal abscess  Placed on MiraLAX daily and placed on Dulcolax suppository x 2 daily and also placed 3 enemas    GI consult placed - miralax BID, colace BID and Amitiza 8mcg BID. Dulcolax suppository daily prn. Advance to regular diet. Outpatient follow up  Amitiza not approved with prior auth, discussed with GI- can submit linzess [id number] in the AM, if still not approved, may need to do miralax bid.

## 2024-01-02 NOTE — ASSESSMENT & PLAN NOTE
Patient has been having episodes of orthostatic hypotension outpatient. Orthostatic vitals inpatient positive  Cardiology consult: Suspect this is secondary to poor PO intake and blood loss. Please encourage PO intake. Nursing instructed to apply compression socks while awake. ECG demonstrates sinus bradycardia. HR's controlled. OK to continue digoxin and Multaq.   Will place on gentle IVF rehydration - stopped  Tom stockings, slow position changes. Can add midodrine if becomes symptomatic from hypotension  Started florinef .05mg daily - had more orthostatic BP and symptoms on 1/2 when ambulating, increase florinef to 0.1mg daily and thigh high stockings.

## 2024-01-02 NOTE — PLAN OF CARE
Problem: Potential for Falls  Goal: Patient will remain free of falls  Description: INTERVENTIONS:  - Educate patient/family on patient safety including physical limitations  - Instruct patient to call for assistance with activity   - Consult OT/PT to assist with strengthening/mobility   - Keep Call bell within reach  - Keep bed low and locked with side rails adjusted as appropriate  - Keep care items and personal belongings within reach  - Initiate and maintain comfort rounds  - Make Fall Risk Sign visible to staff  - Offer Toileting every 2 Hours, in advance of need  - Initiate/Maintain bed/chair alarm  - Obtain necessary fall risk management equipment:   - Apply yellow socks and bracelet for high fall risk patients  - Consider moving patient to room near nurses station  Outcome: Progressing     Problem: PAIN - ADULT  Goal: Verbalizes/displays adequate comfort level or baseline comfort level  Description: Interventions:  - Encourage patient to monitor pain and request assistance  - Assess pain using appropriate pain scale  - Administer analgesics based on type and severity of pain and evaluate response  - Implement non-pharmacological measures as appropriate and evaluate response  - Consider cultural and social influences on pain and pain management  - Notify physician/advanced practitioner if interventions unsuccessful or patient reports new pain  Outcome: Progressing     Problem: INFECTION - ADULT  Goal: Absence or prevention of progression during hospitalization  Description: INTERVENTIONS:  - Assess and monitor for signs and symptoms of infection  - Monitor lab/diagnostic results  - Monitor all insertion sites, i.e. indwelling lines, tubes, and drains  - Monitor endotracheal if appropriate and nasal secretions for changes in amount and color  - Spearman appropriate cooling/warming therapies per order  - Administer medications as ordered  - Instruct and encourage patient and family to use good hand hygiene  technique  - Identify and instruct in appropriate isolation precautions for identified infection/condition  Outcome: Progressing     Problem: SAFETY ADULT  Goal: Patient will remain free of falls  Description: INTERVENTIONS:  - Educate patient/family on patient safety including physical limitations  - Instruct patient to call for assistance with activity   - Consult OT/PT to assist with strengthening/mobility   - Keep Call bell within reach  - Keep bed low and locked with side rails adjusted as appropriate  - Keep care items and personal belongings within reach  - Initiate and maintain comfort rounds  - Make Fall Risk Sign visible to staff  - Offer Toileting every 2 Hours, in advance of need  - Initiate/Maintain bed/chair alarm  - Obtain necessary fall risk management equipment:   - Apply yellow socks and bracelet for high fall risk patients  - Consider moving patient to room near nurses station  Outcome: Progressing  Goal: Maintain or return to baseline ADL function  Description: INTERVENTIONS:  - Educate patient/family on patient safety including physical limitations  - Instruct patient to call for assistance with activity   - Consult OT/PT to assist with strengthening/mobility   - Keep Call bell within reach  - Keep bed low and locked with side rails adjusted as appropriate  - Keep care items and personal belongings within reach  - Initiate and maintain comfort rounds  - Make Fall Risk Sign visible to staff  - Offer Toileting every 2 Hours, in advance of need  - Initiate/Maintain bed/chair alarm  - Obtain necessary fall risk management equipment:  - Apply yellow socks and bracelet for high fall risk patients  - Consider moving patient to room near nurses station  Outcome: Progressing  Goal: Maintains/Returns to pre admission functional level  Description: INTERVENTIONS:  - Perform AM-PAC 6 Click Basic Mobility/ Daily Activity assessment daily.  - Set and communicate daily mobility goal to care team and  patient/family/caregiver.   - Collaborate with rehabilitation services on mobility goals if consulted  - Perform Range of Motion 3 times a day.  - Reposition patient every 2 hours.  - Dangle patient 3 times a day  - Stand patient 3 times a day  - Ambulate patient 3 times a day  - Out of bed to chair 3 times a day   - Out of bed for meals 3 times a day  - Out of bed for toileting  - Record patient progress and toleration of activity level   Outcome: Progressing     Problem: DISCHARGE PLANNING  Goal: Discharge to home or other facility with appropriate resources  Description: INTERVENTIONS:  - Identify barriers to discharge w/patient and caregiver  - Arrange for needed discharge resources and transportation as appropriate  - Identify discharge learning needs (meds, wound care, etc.)  - Arrange for interpretive services to assist at discharge as needed  - Refer to Case Management Department for coordinating discharge planning if the patient needs post-hospital services based on physician/advanced practitioner order or complex needs related to functional status, cognitive ability, or social support system  Outcome: Progressing     Problem: Knowledge Deficit  Goal: Patient/family/caregiver demonstrates understanding of disease process, treatment plan, medications, and discharge instructions  Description: Complete learning assessment and assess knowledge base.  Interventions:  - Provide teaching at level of understanding  - Provide teaching via preferred learning methods  Outcome: Progressing     Problem: GASTROINTESTINAL - ADULT  Goal: Minimal or absence of nausea and/or vomiting  Description: INTERVENTIONS:  - Administer IV fluids if ordered to ensure adequate hydration  - Maintain NPO status until nausea and vomiting are resolved  - Nasogastric tube if ordered  - Administer ordered antiemetic medications as needed  - Provide nonpharmacologic comfort measures as appropriate  - Advance diet as tolerated, if ordered  -  Consider nutrition services referral to assist patient with adequate nutrition and appropriate food choices  Outcome: Progressing  Goal: Maintains or returns to baseline bowel function  Description: INTERVENTIONS:  - Assess bowel function monitor bowel regimen  - Encourage oral fluids to ensure adequate hydration  - Administer IV fluids if ordered to ensure adequate hydration  - Administer ordered medications as needed  - Encourage mobilization and activity  - Consider nutritional services referral to assist patient with adequate nutrition and appropriate food choices  Outcome: Progressing  Goal: Maintains adequate nutritional intake  Description: INTERVENTIONS:  - Monitor percentage of each meal consumed  - Identify factors contributing to decreased intake, treat as appropriate  - Assist with meals as needed  - Monitor I&O, weight, and lab values if indicated  - Obtain nutrition services referral as needed  Outcome: Progressing  Goal: Oral mucous membranes remain intact  Description: INTERVENTIONS  - Assess oral mucosa and hygiene practices  - Implement preventative oral hygiene regimen  - Implement oral medicated treatments as ordered  - Initiate Nutrition services referral as needed  Outcome: Progressing     Problem: GENITOURINARY - ADULT  Goal: Maintains or returns to baseline urinary function  Description: INTERVENTIONS:  - Assess urinary function  - Encourage oral fluids to ensure adequate hydration if ordered  - Administer IV fluids as ordered to ensure adequate hydration  - Administer ordered medications as needed  - Offer frequent toileting  - Follow urinary retention protocol if ordered  Outcome: Progressing  Goal: Absence of urinary retention  Description: INTERVENTIONS:  - Assess patient's ability to void and empty bladder  - Monitor I/O  - Bladder scan as needed  - Discuss with physician/AP medications to alleviate retention as needed  - Discuss catheterization for long term situations as  appropriate  Outcome: Progressing     Problem: Prexisting or High Potential for Compromised Skin Integrity  Goal: Skin integrity is maintained or improved  Description: INTERVENTIONS:  - Identify patients at risk for skin breakdown  - Assess and monitor skin integrity  - Assess and monitor nutrition and hydration status  - Monitor labs   - Assess for incontinence   - Turn and reposition patient  - Assist with mobility/ambulation  - Relieve pressure over bony prominences  - Avoid friction and shearing  - Provide appropriate hygiene as needed including keeping skin clean and dry  - Evaluate need for skin moisturizer/barrier cream  - Collaborate with interdisciplinary team   - Patient/family teaching  - Consider wound care consult   Outcome: Progressing     Problem: Nutrition/Hydration-ADULT  Goal: Nutrient/Hydration intake appropriate for improving, restoring or maintaining nutritional needs  Description: Monitor and assess patient's nutrition/hydration status for malnutrition. Collaborate with interdisciplinary team and initiate plan and interventions as ordered.  Monitor patient's weight and dietary intake as ordered or per policy. Utilize nutrition screening tool and intervene as necessary. Determine patient's food preferences and provide high-protein, high-caloric foods as appropriate.     INTERVENTIONS:  - Monitor oral intake, urinary output, labs, and treatment plans  - Assess nutrition and hydration status and recommend course of action  - Evaluate amount of meals eaten  - Assist patient with eating if necessary   - Allow adequate time for meals  - Recommend/ encourage appropriate diets, oral nutritional supplements, and vitamin/mineral supplements  - Order, calculate, and assess calorie counts as needed  - Recommend, monitor, and adjust tube feedings and TPN/PPN based on assessed needs  - Assess need for intravenous fluids  - Provide specific nutrition/hydration education as appropriate  - Include  patient/family/caregiver in decisions related to nutrition  Outcome: Progressing

## 2024-01-02 NOTE — PROGRESS NOTES
[facility]  Progress Note  Name: Miguel Angel Ortega I  MRN: [id number]  Unit/Bed#: MS 328Melisa I Date of Admission: 12/13/2023   Date of Service: 1/2/2024 I Hospital Day: 20    Assessment/Plan   * Stercoral colitis  Assessment & Plan  Patient presents with symptoms of constipation for the last few days.  He saw GI outpatient.  He was started on MiraLAX patient states he went 26 times to the bathroom yesterday had very small amounts of stool and felt like he just could not empty out his bowel movement.  Also had a fall yesterday.  Ct abd pelvis shows Abundant stool in the colon and rectal vault compatible with constipation in the appropriate clinical context. No bowel obstruction.  Minimal perirectal fat stranding suggestive of mild or early stercoral proctitis. No perirectal abscess  Placed on MiraLAX daily and placed on Dulcolax suppository x 2 daily and also placed 3 enemas    GI consult placed - miralax BID, colace BID and Amitiza 8mcg BID. Dulcolax suppository daily prn. Advance to regular diet. Outpatient follow up  Amitiza not approved with prior auth, discussed with GI- can submit linzess 290 in the AM, if still not approved, may need to do miralax bid.     Acute urinary retention  Assessment & Plan  Acute urinary retention secondary to severe constipation also known history of prostate cancer.  Zuniga catheter placed on admission. Also placed on Flomax   Zuniga removed 12/15 and placed on voiding trial. Some hypotension noted which may be secondary to Flomax and placed on midodrine - dc flomax and midodrine  CT abdomen pelvis: Bladder completely decompressed by Zuniga catheter and cannot be further evaluated. If clinical concern remains, consider clamping of Zuniga for several hours and ultrasound imaging. Zuniga in expected position.   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 Remove zuniag and  initiate voiding trial. Restart flomax. Send back to facility on condom catheter. OP follow up     Acute on chronic anemia  Assessment & Plan  Hemoglobin dropped from 10.3-8.7.  low iron and B12 levels. placed on vit b12 and venofer  Resumed home iron supplement  CBC stable  neg stool occult  Obtained blood consent and transfused 1 unit on 12/26 for preop transfusion.     Lab Results   Component Value Date    HGB 7.8 (L) 01/02/2024    HGB 8.3 (L) 12/31/2023    HGB 8.0 (L) 12/30/2023       Orthostatic hypotension  Assessment & Plan  Patient has been having episodes of orthostatic hypotension outpatient. Orthostatic vitals inpatient positive  Cardiology consult: Suspect this is secondary to poor PO intake and blood loss. Please encourage PO intake. Nursing instructed to apply compression socks while awake. ECG demonstrates sinus bradycardia. HR's controlled. OK to continue digoxin and Multaq.   Will place on gentle IVF rehydration - stopped  Tom stockings, slow position changes. Can add midodrine if becomes symptomatic from hypotension  Started florinef .05mg daily - had more orthostatic BP and symptoms on 1/2 when ambulating, increase florinef to 0.1mg daily and thigh high stockings.     Permanent atrial fibrillation (HCC)  Assessment & Plan  Continue Multaq, digoxin which are his chronic meds.  Not on anticoagulation  Dig level acceptable  Appreciate cardio eval - continue multaq. Change digoxin to every other day. Was on xarelto in the past - discuss outpatient with his cardiologist about resumption  Asked for cardiac clearance prior to procedure: Patient is considered low risk for adverse cardiovascular outcomes in regards to non cardiac procedure     Prostate cancer metastatic to bone (HCC)  Assessment & Plan  Further outpatient follow-up with urology.  Placed on Flomax for acute urinary retention hold Casodex while inpatient.  Urology restarted patient on flomax - monitor bp           VTE Pharmacologic  Prophylaxis:   High Risk (Score >/= 5) - Pharmacological DVT Prophylaxis Contraindicated. Sequential Compression Devices Ordered.    Mobility:   Basic Mobility Inpatient Raw Score: 19  JH-HLM Goal: 6: Walk 10 steps or more  JH-HLM Achieved: 5: Stand (1 or more minutes)  HLM Goal NOT achieved. Continue with multidisciplinary rounding and encourage appropriate mobility to improve upon HLM goals.    Patient Centered Rounds: I performed bedside rounds with nursing staff today.   Discussions with Specialists or Other Care Team Provider: nursing, case management    Education and Discussions with Family / Patient: Updated  (friend) at bedside.    Total Time Spent on Date of Encounter in care of patient: 43 mins. This time was spent on one or more of the following: performing physical exam; counseling and coordination of care; obtaining or reviewing history; documenting in the medical record; reviewing/ordering tests, medications or procedures; communicating with other healthcare professionals and discussing with patient's family/caregivers.    Current Length of Stay: 20 day(s)  Current Patient Status: Inpatient   Certification Statement: The patient will continue to require additional inpatient hospital stay due to monitoring BP, adjusting medications  Discharge Plan: Anticipate discharge in 24-48 hrs to prior assisted or independent living facility.    Code Status: Level 3 - DNAR and DNI    Subjective:   Patient seen and examined. He is doing well today. He states that he did have some dizziness/blacking of his vision when he was getting up and moving today and noticed his blood pressure dropping. Also stating that he is urinating excessively at night as well. Would like to try to move up the timing of his flomax. Denies any further difficulty with urinating and clots in the urine. Denies fever, chills, CP, SOB, nausea, vomiting, constipation, abdominal pain.     Objective:     Vitals:   Temp (24hrs),  Av.4 °F (36.3 °C), Min:97.3 °F (36.3 °C), Max:97.5 °F (36.4 °C)    Temp:  [97.3 °F (36.3 °C)-97.5 °F (36.4 °C)] 97.5 °F (36.4 °C)  HR:  [63-78] 77  Resp:  [18] 18  BP: ()/(34-50) 90/38  SpO2:  [95 %-99 %] 98 %  Body mass index is 22.73 kg/m².     Input and Output Summary (last 24 hours):     Intake/Output Summary (Last 24 hours) at 2024 1511  Last data filed at 2024 0801  Gross per 24 hour   Intake 180 ml   Output 1525 ml   Net -1345 ml       Physical Exam:   Physical Exam  Vitals reviewed.   Constitutional:       General: He is not in acute distress.     Appearance: He is not ill-appearing or toxic-appearing.   HENT:      Head: Normocephalic and atraumatic.      Mouth/Throat:      Mouth: Mucous membranes are moist.   Cardiovascular:      Rate and Rhythm: Normal rate and regular rhythm.      Heart sounds: No murmur heard.  Pulmonary:      Effort: No respiratory distress.      Breath sounds: No stridor. No wheezing.      Comments: Saturating well on room air  Abdominal:      General: Bowel sounds are normal. There is no distension.      Palpations: Abdomen is soft. There is no mass.      Tenderness: There is no abdominal tenderness.   Genitourinary:     Comments: External catheter with clear yellow urine output.   Musculoskeletal:      Right lower leg: No edema.      Left lower leg: No edema.   Skin:     General: Skin is warm and dry.   Neurological:      General: No focal deficit present.      Mental Status: He is alert and oriented to person, place, and time.   Psychiatric:         Mood and Affect: Mood normal.         Behavior: Behavior normal.          Additional Data:     Labs:  Results from last 7 days   Lab Units 24  0502   WBC Thousand/uL 4.37   HEMOGLOBIN g/dL 7.8*   HEMATOCRIT % 23.5*   PLATELETS Thousands/uL 224     Results from last 7 days   Lab Units 24  0502   SODIUM mmol/L 131*   POTASSIUM mmol/L 4.2   CHLORIDE mmol/L 102   CO2 mmol/L 26   BUN mg/dL 21   CREATININE mg/dL  1.06   ANION GAP mmol/L 3   CALCIUM mg/dL 8.0*   GLUCOSE RANDOM mg/dL 92                       Lines/Drains:  Invasive Devices       Peripheral Intravenous Line  Duration             Peripheral IV 01/02/24 Dorsal (posterior);Right Forearm <1 day              Drain  Duration             External Urinary Catheter Medium 4 days                          Imaging: No pertinent imaging reviewed.    Recent Cultures (last 7 days):         Last 24 Hours Medication List:   Current Facility-Administered Medications   Medication Dose Route Frequency Provider Last Rate    acetaminophen  650 mg Oral Q6H PRN Carson Singer MD      bisacodyl  10 mg Rectal Daily PRN Carson Singer MD      cyanocobalamin  1,000 mcg Oral Daily Carson Singer MD      digoxin  125 mcg Oral Every Other Day Carson Singer MD      dronedarone  400 mg Oral BID With Meals Carson Singer MD      ferrous sulfate  325 mg Oral Daily With Breakfast Carson Singer MD      [START ON 1/3/2024] fludrocortisone  0.1 mg Oral Daily Suha Alvarado PA-C      folic acid  1,000 mcg Oral Daily Carson Singer MD      glycerin-hypromellose-  1 drop Both Eyes Q4H PRN Carson Singer MD      hydrocortisone   Topical 4x Daily PRN Carson Singer MD      hydrOXYzine HCL  25 mg Oral Q6H PRN Suha Alvarado PA-C      lubiprostone  8 mcg Oral BID With Meals Carson Singer MD      melatonin  3 mg Oral HS PRN Carson Singer MD      ondansetron  4 mg Intravenous Q6H PRN Carson Singer MD      pantoprazole  40 mg Oral Daily Carson Singer MD      polyethylene glycol  17 g Oral BID Carson Singer MD      primidone  50 mg Oral Q12H YAMINI Carson Singer MD      senna-docusate sodium  1 tablet Oral BID Carson Singer MD      tamsulosin  0.4 mg Oral Daily With Dinner Remedios Cruz PA-C          Today, Patient Was Seen By: Suha Alvarado PA-C    **Please Note: This note may have been constructed using a voice recognition system.**

## 2024-01-02 NOTE — CASE MANAGEMENT
Case Management Discharge Planning Note    Patient name Miguel Angel Ortega  Location /-01 MRN [id number]  : 1928 Date 2024       Current Admission Date: 2023  Current Admission Diagnosis:Stercoral colitis   Patient Active Problem List    Diagnosis Date Noted    Gross hematuria 2023    Orthostatic hypotension 2023    Acute on chronic anemia 2023    Stercoral colitis 2023    Acute urinary retention 2023    Permanent atrial fibrillation (HCC) 2023    Hyponatremia 2023    Prostate cancer metastatic to bone (HCC) 2023      LOS (days): 20  Geometric Mean LOS (GMLOS) (days): 8.2  Days to GMLOS:-11.9     OBJECTIVE:  Risk of Unplanned Readmission Score: 29.03         Current admission status: Inpatient   Preferred Pharmacy:   [id number] [address] [address]  Phone: [phone] Fax: [phone]    [facility] [place] [place] - [address]  [address]  [place] [address]  Phone: [phone] Fax: [phone]    Primary Care Provider: Kisha Armstrong MD    Primary Insurance: MEDICARE  Secondary Insurance: [facility] BLUE [facility]    DISCHARGE DETAILS:     CM contacted [facility] Insurance Customer Care Line to learn [facility] denied the authorization for payment of Lubiprostone on 23 as they deemed the medication is not medically accepted as usage for patients diagnosis.      CM previously made aware patient can obtain medication under his [facility] Blue [facility] for $225 copay.    CM provided information to PA to discuss with patient.

## 2024-01-02 NOTE — PROGRESS NOTES
Pastoral Care Progress Note    2024  Patient: Miguel Angel Ortega : 1928  Admission Date & Time: 2023 0906  MRN: [id number] [facility]: [id number]      CH initiated visit to pt in setting of long admission. Pt received CH in bed, no family present.  Pt shared that he had been a  in the 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 Pt shared that he hopes that he can gain back strength so that he might resume living independently, but he realizes this may not be his outcome.  Pt shared he has no close relatives, no children.   Ch provided empathetic listening and support.             Chaplaincy Interventions Utilized:     Exploration: Explored relational needs & resources      Relationship Building: Cultivated a relationship of care and support      Chaplaincy Outcomes Achieved:  Expressed intermediate hope      Spiritual Coping Strategies Utilized:   Connectedness       24 1200   Clinical Encounter Type   Visited With Patient   Routine Visit Introduction

## 2024-01-03 LAB
ANION GAP SERPL CALCULATED.3IONS-SCNC: 5 MMOL/L
BUN SERPL-MCNC: 21 MG/DL (ref 5–25)
CALCIUM SERPL-MCNC: 8 MG/DL (ref 8.4–10.2)
CHLORIDE SERPL-SCNC: 102 MMOL/L (ref 96–108)
CO2 SERPL-SCNC: 26 MMOL/L (ref 21–32)
CREAT SERPL-MCNC: 0.98 MG/DL (ref 0.6–1.3)
ERYTHROCYTE [DISTWIDTH] IN BLOOD BY AUTOMATED COUNT: 16.3 % (ref 11.6–15.1)
GFR SERPL CREATININE-BSD FRML MDRD: 65 ML/MIN/1.73SQ M
GLUCOSE SERPL-MCNC: 90 MG/DL (ref 65–140)
HCT VFR BLD AUTO: 25.4 % (ref 36.5–49.3)
HGB BLD-MCNC: 8.4 G/DL (ref 12–17)
MCH RBC QN AUTO: 32.2 PG (ref 26.8–34.3)
MCHC RBC AUTO-ENTMCNC: 33.1 G/DL (ref 31.4–37.4)
MCV RBC AUTO: 97 FL (ref 82–98)
PLATELET # BLD AUTO: 210 THOUSANDS/UL (ref 149–390)
PMV BLD AUTO: 7.8 FL (ref 8.9–12.7)
POTASSIUM SERPL-SCNC: 4.5 MMOL/L (ref 3.5–5.3)
RBC # BLD AUTO: 2.61 MILLION/UL (ref 3.88–5.62)
SODIUM SERPL-SCNC: 133 MMOL/L (ref 135–147)
WBC # BLD AUTO: 4.49 THOUSAND/UL (ref 4.31–10.16)

## 2024-01-03 PROCEDURE — 85027 COMPLETE CBC AUTOMATED: CPT

## 2024-01-03 PROCEDURE — 80048 BASIC METABOLIC PNL TOTAL CA: CPT

## 2024-01-03 PROCEDURE — 99232 SBSQ HOSP IP/OBS MODERATE 35: CPT

## 2024-01-03 PROCEDURE — 97530 THERAPEUTIC ACTIVITIES: CPT

## 2024-01-03 RX ADMIN — PANTOPRAZOLE SODIUM 40 MG: 40 TABLET, DELAYED RELEASE ORAL at 08:07

## 2024-01-03 RX ADMIN — DRONEDARONE 400 MG: 400 TABLET, FILM COATED ORAL at 08:10

## 2024-01-03 RX ADMIN — MELATONIN 3 MG: 3 TAB ORAL at 21:16

## 2024-01-03 RX ADMIN — FOLIC ACID 1000 MCG: 1 TABLET ORAL at 08:07

## 2024-01-03 RX ADMIN — LUBIPROSTONE 8 MCG: 8 CAPSULE, GELATIN COATED ORAL at 17:28

## 2024-01-03 RX ADMIN — POLYETHYLENE GLYCOL 3350 17 G: 17 POWDER, FOR SOLUTION ORAL at 17:29

## 2024-01-03 RX ADMIN — SENNOSIDES AND DOCUSATE SODIUM 1 TABLET: 8.6; 5 TABLET ORAL at 08:07

## 2024-01-03 RX ADMIN — SENNOSIDES AND DOCUSATE SODIUM 1 TABLET: 8.6; 5 TABLET ORAL at 17:28

## 2024-01-03 RX ADMIN — PRIMIDONE 50 MG: 50 TABLET ORAL at 08:07

## 2024-01-03 RX ADMIN — PRIMIDONE 50 MG: 50 TABLET ORAL at 20:40

## 2024-01-03 RX ADMIN — TAMSULOSIN HYDROCHLORIDE 0.4 MG: 0.4 CAPSULE ORAL at 17:28

## 2024-01-03 RX ADMIN — DIGOXIN 125 MCG: 125 TABLET ORAL at 08:07

## 2024-01-03 RX ADMIN — FERROUS SULFATE TAB 325 MG (65 MG ELEMENTAL FE) 325 MG: 325 (65 FE) TAB at 08:07

## 2024-01-03 RX ADMIN — GLYCERIN 1 DROP: .002; .002; .01 SOLUTION/ DROPS OPHTHALMIC at 08:12

## 2024-01-03 RX ADMIN — CYANOCOBALAMIN TAB 500 MCG 1000 MCG: 500 TAB at 08:07

## 2024-01-03 RX ADMIN — POLYETHYLENE GLYCOL 3350 17 G: 17 POWDER, FOR SOLUTION ORAL at 08:06

## 2024-01-03 RX ADMIN — DRONEDARONE 400 MG: 400 TABLET, FILM COATED ORAL at 17:29

## 2024-01-03 RX ADMIN — FLUDROCORTISONE ACETATE 0.1 MG: 0.1 TABLET ORAL at 08:07

## 2024-01-03 RX ADMIN — LUBIPROSTONE 8 MCG: 8 CAPSULE, GELATIN COATED ORAL at 08:07

## 2024-01-03 NOTE — PHYSICIAN ADVISOR
Current patient class: Inpatient  The patient is currently on Hospital Day: 22      The patient was admitted to the hospital at 12:03 PM on 12/13/23 for the following diagnosis:  Fecal impaction (HCC) [K56.41]  Hyponatremia [E87.1]  Prostate cancer (HCC) [C61]  Abdominal pain, vomiting, and diarrhea [R10.9, R11.10, R19.7]     CMS OUTLIER STAY REVIEW    After review of the relevant documentation, labs, vital signs and test results, the patient is appropriate for CONTINUED INPATIENT ADMISSION.     The patient continues to remain hospitalized receiving acute medical care.  The patient has surpassed the expected duration of stay, however given the clinical condition, need for further acute care management, the patient is appropriate to remain in an inpatient status.  The patient still being actively managed, and does have unresolved medical issues requiring further hospitalization.      This review is conducted at 20 days, to help satisfy the requirements for significant outlier stay review as per CMS.  Given the current condition of this patient, the patient satisfies this review was determination for continued inpatient stay.    Rationale is as follows:    Primary diagnoses include stercoral colitis and acute urinary retention.  Attempts are being made to receive authorization for medication.  The authorization process has been slow despite the hospital's best efforts.  Along the way he has also had orthostatic hypotension and Florinef had been started.  He had additional orthostasis yesterday requiring increase in Florinef dose.  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    Patient is making slow progress with therapy but in comparison to previous session he was able to stand longer.  He continues to be functioning below baseline.  His assisted living facility will allow patient to return this week but requires some modifications given his recent unstable blood  pressure.    Today his blood pressure was as low as 74/33.  The patient was symptomatic while standing.  The patient is likely nearing appropriateness for discharge but will likely need further blood pressure management given his advanced age and symptoms related to this.    The patient’s vitals on arrival were   ED Triage Vitals   Temperature Pulse Respirations Blood Pressure SpO2   12/13/23 0909 12/13/23 0909 12/13/23 0909 12/13/23 0909 12/13/23 0909   (!) 97.4 °F (36.3 °C) 85 16 149/69 97 %      Temp Source Heart Rate Source Patient Position - Orthostatic VS BP Location FiO2 (%)   12/13/23 0909 12/13/23 0909 12/13/23 0909 12/13/23 0909 --   Temporal Monitor Lying Right arm       Pain Score       12/13/23 1453       No Pain           Past Medical History:   Diagnosis Date    A-fib (HCC)     Coronary artery disease     History of angina     Prostate cancer (HCC)      Past Surgical History:   Procedure Laterality Date    CORONARY ARTERY BYPASS GRAFT  1995    [place] CYSTO W/IRRIG & EVAC MULTPLE OBSTRUCTING CLOTS N/A 12/27/2023    Procedure: CYSTOSCOPY EVACUATION OF CLOTS, fulguration of bleeding. insertion 24FR 3 Way zuniga CBI;  Surgeon: Carson Singer MD;  Location: [facility];  Service: Urology    TONSILLECTOMY             Consults have been placed to:   IP CONSULT TO UROLOGY  IP CONSULT TO CARDIOLOGY  IP CONSULT TO INFECTIOUS DISEASES  IP CONSULT TO GASTROENTEROLOGY  IP CONSULT TO CARDIOLOGY    Vitals:    01/03/24 1100 01/03/24 1102 01/03/24 1102 01/03/24 1105   BP: (!) 91/39 (!) 74/33 (!) 74/33 118/50   Pulse: 82 84 82 74   Resp:       Temp:       TempSrc:       SpO2: 96% 98% 98% 97%   Weight:       Height:           Most recent labs:    Recent Labs     01/03/24  0501   WBC 4.49   HGB 8.4*   HCT 25.4*      K 4.5   CALCIUM 8.0*   BUN 21   CREATININE 0.98       Scheduled Meds:  Current Facility-Administered Medications   Medication Dose Route Frequency Provider Last Rate    acetaminophen  650 mg Oral Q6H PRN  Carson Singer MD      bisacodyl  10 mg Rectal Daily PRN Carson Singer MD      cyanocobalamin  1,000 mcg Oral Daily Carson Singer MD      digoxin  125 mcg Oral Every Other Day Carson Singer MD      dronedarone  400 mg Oral BID With Meals Carson Singer MD      ferrous sulfate  325 mg Oral Daily With Breakfast Carson Singer MD      fludrocortisone  0.1 mg Oral Daily Suha Alvarado PA-C      folic acid  1,000 mcg Oral Daily Carson Singer MD      glycerin-hypromellose-  1 drop Both Eyes Q4H PRN Carson Singer MD      hydrocortisone   Topical 4x Daily PRN Carson Singer MD      hydrOXYzine HCL  25 mg Oral Q6H PRN Suha Alvarado PA-C      lubiprostone  8 mcg Oral BID With Meals Carson Sniger MD      melatonin  3 mg Oral HS PRN Carson Singer MD      ondansetron  4 mg Intravenous Q6H PRN Carson Singer MD      pantoprazole  40 mg Oral Daily Carson Singer MD      polyethylene glycol  17 g Oral BID Carson Singer MD      primidone  50 mg Oral Q12H YAMINI Carson Singer MD      senna-docusate sodium  1 tablet Oral BID Carson Singer MD      tamsulosin  0.4 mg Oral Daily With Dinner Remedios Cruz PA-C       Continuous Infusions:   PRN Meds:.  acetaminophen    bisacodyl    glycerin-hypromellose-    hydrocortisone    hydrOXYzine HCL    melatonin    ondansetron    Surgical procedures (if appropriate):  Procedure(s):  CYSTOSCOPY EVACUATION OF CLOTS, fulguration of bleeding. insertion 24FR 3 Way zuniga CBI

## 2024-01-03 NOTE — ASSESSMENT & PLAN NOTE
Patient presents with symptoms of constipation for the last few days.  He saw GI outpatient.  He was started on MiraLAX patient states he went 26 times to the bathroom yesterday had very small amounts of stool and felt like he just could not empty out his bowel movement.  Also had a fall yesterday.  Ct abd pelvis shows Abundant stool in the colon and rectal vault compatible with constipation in the appropriate clinical context. No bowel obstruction.  Minimal perirectal fat stranding suggestive of mild or early stercoral proctitis. No perirectal abscess  Placed on MiraLAX daily and placed on Dulcolax suppository x 2 daily and also placed 3 enemas    GI consult placed - miralax BID, colace BID and Amitiza 8mcg BID. Dulcolax suppository daily prn. Advance to regular diet. Outpatient follow up  Amitiza and linzess not approved by insurance  GI recommending bid miralax and sennakot. PRN dulcolax suppository

## 2024-01-03 NOTE — PLAN OF CARE
Problem: PHYSICAL THERAPY ADULT  Goal: Performs mobility at highest level of function for planned discharge setting.  See evaluation for individualized goals.  Description: Treatment/Interventions: ADL retraining, Functional transfer training, LE strengthening/ROM, Elevations, Therapeutic exercise, Endurance training, Patient/family training, Equipment eval/education, Bed mobility, Gait training, Compensatory technique education, Spoke to nursing, OT          See flowsheet documentation for full assessment, interventions and recommendations.  Outcome: Progressing  Note: Prognosis: Good  Problem List: Decreased strength, Decreased endurance, Impaired balance, Decreased mobility, Impaired hearing  Assessment: Pt seen for PT treatment session this date with interventions consisting of therapeutic activity consisting of training: supine<>sit transfers, sit<>stand transfers, static sitting tolerance at EOB for 5 minutes w/ B/L UE support, and stand pivot transfers towards R direction. Pt agreeable to PT treatment session upon arrival, pt found supine in bed w/ HOB elevated, in no apparent distress. In comparison to previous session, pt with improvements in pt able to maintain standing 2 min longer than previous session prior to becoming symptomatic from orthostatic BP, BP additionally not dropping right after standing upright . Post session: pt returned back to recliner, chair alarm engaged, all needs in reach, and RN notified of session findings/recommendations Continue to recommend  Level III Resource Intensity  at time of d/c in order to maximize pt's functional independence and safety w/ mobility. Pt continues to be functioning below baseline level, and remains limited 2* factors listed above and including decreased strength, balance, mobility, and activity tolerance. PT will continue to see pt while here in order to address the deficits listed above and provide interventions consistent w/ POC in effort to achieve  STGs.  Barriers to Discharge: None  Barriers to Discharge Comments: Pt resides in group home, has assistance PRN  Rehab Resource Intensity Level, PT: III (Minimum Resource Intensity)    See flowsheet documentation for full assessment.

## 2024-01-03 NOTE — ASSESSMENT & PLAN NOTE
Acute urinary retention secondary to severe constipation also known history of prostate cancer.  Zuniga catheter placed on admission. Also placed on Flomax   Zuniga removed 12/15 and placed on voiding trial. Some hypotension noted which may be secondary to Flomax and placed on midodrine - dc flomax and midodrine  CT abdomen pelvis: Bladder completely decompressed by Zuniga catheter and cannot be further evaluated. If clinical concern remains, consider clamping of Zuniga for several hours and ultrasound imaging. Zuniga in expected position.   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 Remove zuniga and initiate voiding trial. Restart flomax. Send back to facility on condom catheter. OP follow up    No

## 2024-01-03 NOTE — PHYSICAL THERAPY NOTE
PHYSICAL THERAPY TREATMENT NOTE  NAME:  Miguel Angel Ortega  DATE: 01/03/24    Length Of Stay: 21  Performed at least 2 patient identifiers during session: Name and Birthday  Treatment time: 4185-4072  Treatment length: 25 min       01/03/24 1042   Note Type   Note Type Treatment   Pain Assessment   Pain Assessment Tool 0-10   Pain Score No Pain   Restrictions/Precautions   Other Precautions Chair Alarm;Bed Alarm;Fall Risk;Multiple lines  (condom cath)   General   Chart Reviewed Yes   Response to Previous Treatment Patient with no complaints from previous session.   Cognition   Overall Cognitive Status WFL   Orientation Level Oriented X4   Following Commands Follows one step commands without difficulty   Bed Mobility   Supine to Sit 6  Modified independent   Additional items Increased time required;Verbal cues   Transfers   Sit to Stand   (SBA)   Additional items Increased time required;Verbal cues   Stand to Sit   (SBA)   Additional items Increased time required;Verbal cues   Stand pivot   (SBA)   Additional items Increased time required;Verbal cues   Additional Comments RW for transfers with VC for safety. Pt denies symptoms during transfer. Completed orthostatic BP, see below for details   Balance   Static Sitting Good   Dynamic Sitting Fair +   Static Standing Fair   Dynamic Standing Fair -   Endurance Deficit   Endurance Deficit Yes   Endurance Deficit Description fatigue, orthostatic BP   Activity Tolerance   Activity Tolerance Treatment limited secondary to medical complications (Comment)   Medical Staff Made Aware SEBASTIEN Whittaker   Nurse Made Aware NAVEED Waldrop   Assessment   Prognosis Good   Problem List Decreased strength;Decreased endurance;Impaired balance;Decreased mobility;Impaired hearing   Barriers to Discharge None   Barriers to Discharge Comments Pt resides in [facility], has assistance PRN   Goals   PT Treatment Day 8   Plan   Treatment/Interventions Functional transfer training;LE strengthening/ROM;Therapeutic  exercise;Endurance training;Patient/family training;Equipment eval/education;Bed mobility;Gait training;Compensatory technique education;Spoke to nursing;Spoke to advanced practitioner   Progress Slow progress, medical status limitations   PT Frequency 3-5x/wk   Discharge Recommendation   Rehab Resource Intensity Level, PT III (Minimum Resource Intensity)   AM-PAC Basic Mobility Inpatient   Turning in Flat Bed Without Bedrails 4   Lying on Back to Sitting on Edge of Flat Bed Without Bedrails 4   Moving Bed to Chair 3   Standing Up From Chair Using Arms 3   Walk in Room 3   Climb 3-5 Stairs With Railing 2   Basic Mobility Inpatient Raw Score 19   Basic Mobility Standardized Score 42.48   Highest Level Of Mobility   JH-HLM Goal 6: Walk 10 steps or more   JH-HLM Achieved 5: Stand (1 or more minutes)   Education   Education Provided Mobility training;Assistive device   Patient Demonstrates acceptance/verbal understanding   End of Consult   Patient Position at End of Consult Bedside chair;Bed/Chair alarm activated;All needs within reach     The patient's AM-PAC Basic Mobility Inpatient Short Form Raw Score is 19. A Raw score of greater than 16 suggests the patient may benefit from discharge to home. Please also refer to the recommendation of the Physical Therapist for safe discharge planning.    Assessment: Pt seen for PT treatment session this date with interventions consisting of therapeutic activity consisting of training: supine<>sit transfers, sit<>stand transfers, static sitting tolerance at EOB for 5 minutes w/ B/L UE support, and stand pivot transfers towards R direction. Pt agreeable to PT treatment session upon arrival, pt found supine in bed w/ HOB elevated, in no apparent distress. In comparison to previous session, pt with improvements in pt able to maintain standing 2 min longer than previous session prior to becoming symptomatic from orthostatic BP, BP additionally not dropping right after standing upright  . Post session: pt returned back to recliner, chair alarm engaged, all needs in reach, and RN notified of session findings/recommendations Continue to recommend  Level III Resource Intensity  at time of d/c in order to maximize pt's functional independence and safety w/ mobility. Pt continues to be functioning below baseline level, and remains limited 2* factors listed above and including decreased strength, balance, mobility, and activity tolerance. PT will continue to see pt while here in order to address the deficits listed above and provide interventions consistent w/ POC in effort to achieve STGs.     Prisca Cheatham, PT,DPT

## 2024-01-03 NOTE — PLAN OF CARE
Problem: Potential for Falls  Goal: Patient will remain free of falls  Description: INTERVENTIONS:  - Educate patient/family on patient safety including physical limitations  - Instruct patient to call for assistance with activity   - Consult OT/PT to assist with strengthening/mobility   - Keep Call bell within reach  - Keep bed low and locked with side rails adjusted as appropriate  - Keep care items and personal belongings within reach  - Initiate and maintain comfort rounds  - Make Fall Risk Sign visible to staff  - Offer Toileting every 2 Hours, in advance of need  - Initiate/Maintain bed/chair alarm  - Obtain necessary fall risk management equipment:   - Apply yellow socks and bracelet for high fall risk patients  - Consider moving patient to room near nurses station  Outcome: Progressing     Problem: PAIN - ADULT  Goal: Verbalizes/displays adequate comfort level or baseline comfort level  Description: Interventions:  - Encourage patient to monitor pain and request assistance  - Assess pain using appropriate pain scale  - Administer analgesics based on type and severity of pain and evaluate response  - Implement non-pharmacological measures as appropriate and evaluate response  - Consider cultural and social influences on pain and pain management  - Notify physician/advanced practitioner if interventions unsuccessful or patient reports new pain  Outcome: Progressing     Problem: INFECTION - ADULT  Goal: Absence or prevention of progression during hospitalization  Description: INTERVENTIONS:  - Assess and monitor for signs and symptoms of infection  - Monitor lab/diagnostic results  - Monitor all insertion sites, i.e. indwelling lines, tubes, and drains  - Monitor endotracheal if appropriate and nasal secretions for changes in amount and color  - [place] appropriate cooling/warming therapies per order  - Administer medications as ordered  - Instruct and encourage patient and family to use good hand hygiene  technique  - Identify and instruct in appropriate isolation precautions for identified infection/condition  Outcome: Progressing     Problem: SAFETY ADULT  Goal: Patient will remain free of falls  Description: INTERVENTIONS:  - Educate patient/family on patient safety including physical limitations  - Instruct patient to call for assistance with activity   - Consult OT/PT to assist with strengthening/mobility   - Keep Call bell within reach  - Keep bed low and locked with side rails adjusted as appropriate  - Keep care items and personal belongings within reach  - Initiate and maintain comfort rounds  - Make Fall Risk Sign visible to staff  - Offer Toileting every 2 Hours, in advance of need  - Initiate/Maintain bed/chair alarm  - Obtain necessary fall risk management equipment:   - Apply yellow socks and bracelet for high fall risk patients  - Consider moving patient to room near nurses station  Outcome: Progressing  Goal: Maintain or return to baseline ADL function  Description: INTERVENTIONS:  - Educate patient/family on patient safety including physical limitations  - Instruct patient to call for assistance with activity   - Consult OT/PT to assist with strengthening/mobility   - Keep Call bell within reach  - Keep bed low and locked with side rails adjusted as appropriate  - Keep care items and personal belongings within reach  - Initiate and maintain comfort rounds  - Make Fall Risk Sign visible to staff  - Offer Toileting every 2 Hours, in advance of need  - Initiate/Maintain bed/chair alarm  - Obtain necessary fall risk management equipment:   - Apply yellow socks and bracelet for high fall risk patients  - Consider moving patient to room near nurses station  Outcome: Progressing  Goal: Maintains/Returns to pre admission functional level  Description: INTERVENTIONS:  - Educate patient/family on patient safety including physical limitations  - Instruct patient to call for assistance with activity   - Consult  OT/PT to assist with strengthening/mobility   - Keep Call bell within reach  - Keep bed low and locked with side rails adjusted as appropriate  - Keep care items and personal belongings within reach  - Initiate and maintain comfort rounds  - Make Fall Risk Sign visible to staff  - Offer Toileting every 2 Hours, in advance of need  - Initiate/Maintain bed/chair alarm  - Obtain necessary fall risk management equipment:  - Apply yellow socks and bracelet for high fall risk patients  - Consider moving patient to room near nurses station  Outcome: Progressing     Problem: DISCHARGE PLANNING  Goal: Discharge to home or other facility with appropriate resources  Description: INTERVENTIONS:  - Identify barriers to discharge w/patient and caregiver  - Arrange for needed discharge resources and transportation as appropriate  - Identify discharge learning needs (meds, wound care, etc.)  - Arrange for interpretive services to assist at discharge as needed  - Refer to Case Management Department for coordinating discharge planning if the patient needs post-hospital services based on physician/advanced practitioner order or complex needs related to functional status, cognitive ability, or social support system  Outcome: Progressing     Problem: Knowledge Deficit  Goal: Patient/family/caregiver demonstrates understanding of disease process, treatment plan, medications, and discharge instructions  Description: Complete learning assessment and assess knowledge base.  Interventions:  - Provide teaching at level of understanding  - Provide teaching via preferred learning methods  Outcome: Progressing     Problem: GASTROINTESTINAL - ADULT  Goal: Minimal or absence of nausea and/or vomiting  Description: INTERVENTIONS:  - Administer IV fluids if ordered to ensure adequate hydration  - Maintain NPO status until nausea and vomiting are resolved  - Nasogastric tube if ordered  - Administer ordered antiemetic medications as needed  - Provide  nonpharmacologic comfort measures as appropriate  - Advance diet as tolerated, if ordered  - Consider nutrition services referral to assist patient with adequate nutrition and appropriate food choices  Outcome: Progressing  Goal: Maintains or returns to baseline bowel function  Description: INTERVENTIONS:  - Assess bowel function monitor bowel regimen  - Encourage oral fluids to ensure adequate hydration  - Administer IV fluids if ordered to ensure adequate hydration  - Administer ordered medications as needed  - Encourage mobilization and activity  - Consider nutritional services referral to assist patient with adequate nutrition and appropriate food choices  Outcome: Progressing  Goal: Maintains adequate nutritional intake  Description: INTERVENTIONS:  - Monitor percentage of each meal consumed  - Identify factors contributing to decreased intake, treat as appropriate  - Assist with meals as needed  - Monitor I&O, weight, and lab values if indicated  - Obtain nutrition services referral as needed  Outcome: Progressing  Goal: Oral mucous membranes remain intact  Description: INTERVENTIONS  - Assess oral mucosa and hygiene practices  - Implement preventative oral hygiene regimen  - Implement oral medicated treatments as ordered  - Initiate Nutrition services referral as needed  Outcome: Progressing

## 2024-01-03 NOTE — ASSESSMENT & PLAN NOTE
Hemoglobin dropped from 10.3-8.7.  low iron and B12 levels. placed on vit b12 and venofer  Resumed home iron supplement  CBC stable  neg stool occult  Obtained blood consent and transfused 1 unit on 12/26 for preop transfusion.     Lab Results   Component Value Date    HGB 8.4 (L) 01/03/2024    HGB 7.8 (L) 01/02/2024    HGB 8.3 (L) 12/31/2023

## 2024-01-03 NOTE — PROGRESS NOTES
[facility]  Progress Note  Name: Miguel Angel Ortega I  MRN: [id number]  Unit/Bed#: -Theo I Date of Admission: 12/13/2023   Date of Service: 1/3/2024 I Hospital Day: 21    Assessment/Plan   * Stercoral colitis  Assessment & Plan  Patient presents with symptoms of constipation for the last few days.  He saw GI outpatient.  He was started on MiraLAX patient states he went 26 times to the bathroom yesterday had very small amounts of stool and felt like he just could not empty out his bowel movement.  Also had a fall yesterday.  Ct abd pelvis shows Abundant stool in the colon and rectal vault compatible with constipation in the appropriate clinical context. No bowel obstruction.  Minimal perirectal fat stranding suggestive of mild or early stercoral proctitis. No perirectal abscess  Placed on MiraLAX daily and placed on Dulcolax suppository x 2 daily and also placed 3 enemas    GI consult placed - miralax BID, colace BID and Amitiza 8mcg BID. Dulcolax suppository daily prn. Advance to regular diet. Outpatient follow up  Amitiza and linzess not approved by insurance  GI recommending bid miralax and sennakot. PRN dulcolax suppository    Orthostatic hypotension  Assessment & Plan  Patient has been having episodes of orthostatic hypotension outpatient. Orthostatic vitals inpatient positive  Cardiology consult: Suspect this is secondary to poor PO intake and blood loss. Please encourage PO intake. Nursing instructed to apply compression socks while awake. ECG demonstrates sinus bradycardia. HR's controlled. OK to continue digoxin and Multaq.   Will place on gentle IVF rehydration - stopped  Tom stockings, slow position changes. Can add midodrine if becomes symptomatic from hypotension  increase florinef to 0.1mg daily and thigh high stockings.   Curbsided cardiology and recommending to continue current regimen. Orthostatic hypotension likely chronic.  Patient would benefit from ambulating with  wheelchair to avoid falls    Acute on chronic anemia  Assessment & Plan  Hemoglobin dropped from 10.3-8.7.  low iron and B12 levels. placed on vit b12 and venofer  Resumed home iron supplement  CBC stable  neg stool occult  Obtained blood consent and transfused 1 unit on 12/26 for preop transfusion.     Lab Results   Component Value Date    HGB 8.4 (L) 01/03/2024    HGB 7.8 (L) 01/02/2024    HGB 8.3 (L) 12/31/2023       Permanent atrial fibrillation (HCC)  Assessment & Plan  Continue Multaq, digoxin which are his chronic meds.  Not on anticoagulation  Dig level acceptable  Appreciate cardio eval - continue multaq. Change digoxin to every other day. Was on xarelto in the past - discuss outpatient with his cardiologist about resumption  Asked for cardiac clearance prior to procedure: Patient is considered low risk for adverse cardiovascular outcomes in regards to non cardiac procedure     Acute urinary retention  Assessment & Plan  Acute urinary retention secondary to severe constipation also known history of prostate cancer.  Zuniga catheter placed on admission. Also placed on Flomax   Zuniga removed 12/15 and placed on voiding trial. Some hypotension noted which may be secondary to Flomax and placed on midodrine - dc flomax and midodrine  CT abdomen pelvis: Bladder completely decompressed by Zuniga catheter and cannot be further evaluated. If clinical concern remains, consider clamping of Zuniga for several hours and ultrasound imaging. Zuniga in expected position.   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 Remove zuniga and initiate voiding trial. Restart flomax. Send back to facility on condom catheter. OP follow up     Prostate cancer metastatic to bone (HCC)  Assessment & Plan  Further outpatient follow-up with urology.  Placed on Flomax for acute urinary retention hold Casodex while inpatient.  Urology restarted patient  on flomax - monitor bp    Gram-negative bacteremia-resolved as of 12/29/2023  Assessment & Plan  Proteus bacteremia from gi source.  ID consulted: increase rocephin to 2g q24h, f/u blood and urine cultures, GI consult for persistent constipation/fecal impaction, continue zuniga, anticipate 10 days abx therapy through 12/26 with cefuroxime 500 bid on dc.   BC no growth after 5 days  Completed abx on 12/26             VTE Pharmacologic Prophylaxis:   High Risk (Score >/= 5) - Pharmacological DVT Prophylaxis Contraindicated. Sequential Compression Devices Ordered.    Mobility:   Basic Mobility Inpatient Raw Score: 19  JH-HLM Goal: 6: Walk 10 steps or more  JH-HLM Achieved: 5: Stand (1 or more minutes)  HLM Goal NOT achieved. Continue with multidisciplinary rounding and encourage appropriate mobility to improve upon HLM goals.    Patient Centered Rounds: I performed bedside rounds with nursing staff today.   Discussions with Specialists or Other Care Team Provider: nursing, cardiology and cm    Education and Discussions with Family / Patient: Updated  (friend) via phone.    Total Time Spent on Date of Encounter in care of patient: This time was spent on one or more of the following: performing physical exam; counseling and coordination of care; obtaining or reviewing history; documenting in the medical record; reviewing/ordering tests, medications or procedures; communicating with other healthcare professionals and discussing with patient's family/caregivers.    Current Length of Stay: 21 day(s)  Current Patient Status: Inpatient   Certification Statement: The patient will continue to require additional inpatient hospital stay due to orthostatic hypotenstion  Discharge Plan: Anticipate discharge tomorrow to prior assisted or independent living facility.    Code Status: Level 3 - DNAR and DNI    Subjective:   Patient states that he is feeling fine today. Denies chest pain or shortness of breath. States that  when he stands up, his vision does get a little blurred. Does not offer any complaints at this time.    Objective:     Vitals:   Temp (24hrs), Av.8 °F (36.6 °C), Min:97.7 °F (36.5 °C), Max:97.9 °F (36.6 °C)    Temp:  [97.7 °F (36.5 °C)-97.9 °F (36.6 °C)] 97.9 °F (36.6 °C)  HR:  [63-84] 74  Resp:  [18-20] 20  BP: ()/(33-57) 118/50  SpO2:  [96 %-99 %] 97 %  Body mass index is 22.73 kg/m².     Input and Output Summary (last 24 hours):     Intake/Output Summary (Last 24 hours) at 1/3/2024 1614  Last data filed at 1/3/2024 0504  Gross per 24 hour   Intake --   Output 1500 ml   Net -1500 ml       Physical Exam:   Physical Exam  Vitals reviewed.   Constitutional:       General: He is not in acute distress.     Appearance: Normal appearance. He is not ill-appearing.   HENT:      Head: Normocephalic and atraumatic.      Nose: Nose normal.      Mouth/Throat:      Mouth: Mucous membranes are moist.      Pharynx: Oropharynx is clear.   Eyes:      Extraocular Movements: Extraocular movements intact.      Conjunctiva/sclera: Conjunctivae normal.   Cardiovascular:      Rate and Rhythm: Normal rate and regular rhythm.      Pulses: Normal pulses.      Heart sounds: Normal heart sounds. No murmur heard.  Pulmonary:      Effort: Pulmonary effort is normal. No respiratory distress.      Breath sounds: Normal breath sounds. No wheezing.   Abdominal:      General: Abdomen is flat. Bowel sounds are normal. There is no distension.      Palpations: Abdomen is soft.      Tenderness: There is no abdominal tenderness. There is no guarding.   Musculoskeletal:         General: Normal range of motion.      Cervical back: Normal range of motion.      Right lower leg: No edema.      Left lower leg: No edema.   Skin:     General: Skin is warm.   Neurological:      General: No focal deficit present.      Mental Status: He is alert and oriented to person, place, and time. Mental status is at baseline.      Motor: No weakness.   Psychiatric:          Mood and Affect: Mood normal.         Behavior: Behavior normal.         Thought Content: Thought content normal.         Judgment: Judgment normal.          Additional Data:     Labs:  Results from last 7 days   Lab Units 01/03/24  0501   WBC Thousand/uL 4.49   HEMOGLOBIN g/dL 8.4*   HEMATOCRIT % 25.4*   PLATELETS Thousands/uL 210     Results from last 7 days   Lab Units 01/03/24  0501   SODIUM mmol/L 133*   POTASSIUM mmol/L 4.5   CHLORIDE mmol/L 102   CO2 mmol/L 26   BUN mg/dL 21   CREATININE mg/dL 0.98   ANION GAP mmol/L 5   CALCIUM mg/dL 8.0*   GLUCOSE RANDOM mg/dL 90                       Lines/Drains:  Invasive Devices       Peripheral Intravenous Line  Duration             Peripheral IV 01/02/24 Dorsal (posterior);Right Forearm 1 day              Drain  Duration             External Urinary Catheter Medium 5 days                          Imaging: Reviewed radiology reports from this admission including: abdominal/pelvic CT    Recent Cultures (last 7 days):         Last 24 Hours Medication List:   Current Facility-Administered Medications   Medication Dose Route Frequency Provider Last Rate    acetaminophen  650 mg Oral Q6H PRN Carson Singer MD      bisacodyl  10 mg Rectal Daily PRN Carson Singer MD      cyanocobalamin  1,000 mcg Oral Daily Carson Singer MD      digoxin  125 mcg Oral Every Other Day Carson Singer MD      dronedarone  400 mg Oral BID With Meals Carson Singer MD      ferrous sulfate  325 mg Oral Daily With Breakfast Carson Singer MD      fludrocortisone  0.1 mg Oral Daily Suha Alvarado PA-C      folic acid  1,000 mcg Oral Daily Carson Singer MD      glycerin-hypromellose-  1 drop Both Eyes Q4H PRN Carson Singer MD      hydrocortisone   Topical 4x Daily PRN Carson Singer MD      hydrOXYzine HCL  25 mg Oral Q6H PRN Suha Alvarado PA-C      lubiprostone  8 mcg Oral BID With Meals Carson Singer MD      melatonin  3 mg Oral HS PRN Carson Singer MD      ondansetron   4 mg Intravenous Q6H PRN Carson Singer MD      pantoprazole  40 mg Oral Daily Carson Singer MD      polyethylene glycol  17 g Oral BID Carson Singer MD      primidone  50 mg Oral Q12H YAMINI Carson Singer MD      senna-docusate sodium  1 tablet Oral BID Carson Singer MD      tamsulosin  0.4 mg Oral Daily With Dinner Remedios Cruz PA-C          Today, Patient Was Seen By: Remedios Cruz PA-C    **Please Note: This note may have been constructed using a voice recognition system.**

## 2024-01-03 NOTE — PLAN OF CARE
Problem: Potential for Falls  Goal: Patient will remain free of falls  Description: INTERVENTIONS:  - Educate patient/family on patient safety including physical limitations  - Instruct patient to call for assistance with activity   - Consult OT/PT to assist with strengthening/mobility   - Keep Call bell within reach  - Keep bed low and locked with side rails adjusted as appropriate  - Keep care items and personal belongings within reach  - Initiate and maintain comfort rounds  - Make Fall Risk Sign visible to staff  - Offer Toileting every 2 Hours, in advance of need  - Initiate/Maintain bed/chair alarm  - Obtain necessary fall risk management equipment:   - Apply yellow socks and bracelet for high fall risk patients  - Consider moving patient to room near nurses station  Outcome: Progressing     Problem: PAIN - ADULT  Goal: Verbalizes/displays adequate comfort level or baseline comfort level  Description: Interventions:  - Encourage patient to monitor pain and request assistance  - Assess pain using appropriate pain scale  - Administer analgesics based on type and severity of pain and evaluate response  - Implement non-pharmacological measures as appropriate and evaluate response  - Consider cultural and social influences on pain and pain management  - Notify physician/advanced practitioner if interventions unsuccessful or patient reports new pain  Outcome: Progressing     Problem: INFECTION - ADULT  Goal: Absence or prevention of progression during hospitalization  Description: INTERVENTIONS:  - Assess and monitor for signs and symptoms of infection  - Monitor lab/diagnostic results  - Monitor all insertion sites, i.e. indwelling lines, tubes, and drains  - Monitor endotracheal if appropriate and nasal secretions for changes in amount and color  - [place] appropriate cooling/warming therapies per order  - Administer medications as ordered  - Instruct and encourage patient and family to use good hand hygiene  technique  - Identify and instruct in appropriate isolation precautions for identified infection/condition  Outcome: Progressing     Problem: SAFETY ADULT  Goal: Patient will remain free of falls  Description: INTERVENTIONS:  - Educate patient/family on patient safety including physical limitations  - Instruct patient to call for assistance with activity   - Consult OT/PT to assist with strengthening/mobility   - Keep Call bell within reach  - Keep bed low and locked with side rails adjusted as appropriate  - Keep care items and personal belongings within reach  - Initiate and maintain comfort rounds  - Make Fall Risk Sign visible to staff  - Offer Toileting every 2 Hours, in advance of need  - Initiate/Maintain bed/chair alarm  - Obtain necessary fall risk management equipment:   - Apply yellow socks and bracelet for high fall risk patients  - Consider moving patient to room near nurses station  Outcome: Progressing  Goal: Maintain or return to baseline ADL function  Description: INTERVENTIONS:  - Educate patient/family on patient safety including physical limitations  - Instruct patient to call for assistance with activity   - Consult OT/PT to assist with strengthening/mobility   - Keep Call bell within reach  - Keep bed low and locked with side rails adjusted as appropriate  - Keep care items and personal belongings within reach  - Initiate and maintain comfort rounds  - Make Fall Risk Sign visible to staff  - Offer Toileting every 2 Hours, in advance of need  - Initiate/Maintain bed/chair alarm  - Obtain necessary fall risk management equipment:  - Apply yellow socks and bracelet for high fall risk patients  - Consider moving patient to room near nurses station  Outcome: Progressing  Goal: Maintains/Returns to pre admission functional level  Description: INTERVENTIONS:  - Perform AM-PAC 6 Click Basic Mobility/ Daily Activity assessment daily.  - Set and communicate daily mobility goal to care team and  patient/family/caregiver.   - Collaborate with rehabilitation services on mobility goals if consulted  - Perform Range of Motion 3 times a day.  - Reposition patient every 2 hours.  - Dangle patient 3 times a day  - Stand patient 3 times a day  - Ambulate patient 3 times a day  - Out of bed to chair 3 times a day   - Out of bed for meals 3 times a day  - Out of bed for toileting  - Record patient progress and toleration of activity level   Outcome: Progressing     Problem: DISCHARGE PLANNING  Goal: Discharge to home or other facility with appropriate resources  Description: INTERVENTIONS:  - Identify barriers to discharge w/patient and caregiver  - Arrange for needed discharge resources and transportation as appropriate  - Identify discharge learning needs (meds, wound care, etc.)  - Arrange for interpretive services to assist at discharge as needed  - Refer to Case Management Department for coordinating discharge planning if the patient needs post-hospital services based on physician/advanced practitioner order or complex needs related to functional status, cognitive ability, or social support system  Outcome: Progressing     Problem: Knowledge Deficit  Goal: Patient/family/caregiver demonstrates understanding of disease process, treatment plan, medications, and discharge instructions  Description: Complete learning assessment and assess knowledge base.  Interventions:  - Provide teaching at level of understanding  - Provide teaching via preferred learning methods  Outcome: Progressing     Problem: GASTROINTESTINAL - ADULT  Goal: Minimal or absence of nausea and/or vomiting  Description: INTERVENTIONS:  - Administer IV fluids if ordered to ensure adequate hydration  - Maintain NPO status until nausea and vomiting are resolved  - Nasogastric tube if ordered  - Administer ordered antiemetic medications as needed  - Provide nonpharmacologic comfort measures as appropriate  - Advance diet as tolerated, if ordered  -  Consider nutrition services referral to assist patient with adequate nutrition and appropriate food choices  Outcome: Progressing  Goal: Maintains or returns to baseline bowel function  Description: INTERVENTIONS:  - Assess bowel function monitor bowel regimen  - Encourage oral fluids to ensure adequate hydration  - Administer IV fluids if ordered to ensure adequate hydration  - Administer ordered medications as needed  - Encourage mobilization and activity  - Consider nutritional services referral to assist patient with adequate nutrition and appropriate food choices  Outcome: Progressing  Goal: Maintains adequate nutritional intake  Description: INTERVENTIONS:  - Monitor percentage of each meal consumed  - Identify factors contributing to decreased intake, treat as appropriate  - Assist with meals as needed  - Monitor I&O, weight, and lab values if indicated  - Obtain nutrition services referral as needed  Outcome: Progressing  Goal: Oral mucous membranes remain intact  Description: INTERVENTIONS  - Assess oral mucosa and hygiene practices  - Implement preventative oral hygiene regimen  - Implement oral medicated treatments as ordered  - Initiate Nutrition services referral as needed  Outcome: Progressing     Problem: GENITOURINARY - ADULT  Goal: Maintains or returns to baseline urinary function  Description: INTERVENTIONS:  - Assess urinary function  - Encourage oral fluids to ensure adequate hydration if ordered  - Administer IV fluids as ordered to ensure adequate hydration  - Administer ordered medications as needed  - Offer frequent toileting  - Follow urinary retention protocol if ordered  Outcome: Progressing  Goal: Absence of urinary retention  Description: INTERVENTIONS:  - Assess patient's ability to void and empty bladder  - Monitor I/O  - Bladder scan as needed  - Discuss with physician/AP medications to alleviate retention as needed  - Discuss catheterization for long term situations as  appropriate  Outcome: Progressing     Problem: Prexisting or High Potential for Compromised Skin Integrity  Goal: Skin integrity is maintained or improved  Description: INTERVENTIONS:  - Identify patients at risk for skin breakdown  - Assess and monitor skin integrity  - Assess and monitor nutrition and hydration status  - Monitor labs   - Assess for incontinence   - Turn and reposition patient  - Assist with mobility/ambulation  - Relieve pressure over bony prominences  - Avoid friction and shearing  - Provide appropriate hygiene as needed including keeping skin clean and dry  - Evaluate need for skin moisturizer/barrier cream  - Collaborate with interdisciplinary team   - Patient/family teaching  - Consider wound care consult   Outcome: Progressing     Problem: Nutrition/Hydration-ADULT  Goal: Nutrient/Hydration intake appropriate for improving, restoring or maintaining nutritional needs  Description: Monitor and assess patient's nutrition/hydration status for malnutrition. Collaborate with interdisciplinary team and initiate plan and interventions as ordered.  Monitor patient's weight and dietary intake as ordered or per policy. Utilize nutrition screening tool and intervene as necessary. Determine patient's food preferences and provide high-protein, high-caloric foods as appropriate.     INTERVENTIONS:  - Monitor oral intake, urinary output, labs, and treatment plans  - Assess nutrition and hydration status and recommend course of action  - Evaluate amount of meals eaten  - Assist patient with eating if necessary   - Allow adequate time for meals  - Recommend/ encourage appropriate diets, oral nutritional supplements, and vitamin/mineral supplements  - Order, calculate, and assess calorie counts as needed  - Recommend, monitor, and adjust tube feedings and TPN/PPN based on assessed needs  - Assess need for intravenous fluids  - Provide specific nutrition/hydration education as appropriate  - Include  patient/family/caregiver in decisions related to nutrition  Outcome: Progressing

## 2024-01-03 NOTE — ASSESSMENT & PLAN NOTE
Patient has been having episodes of orthostatic hypotension outpatient. Orthostatic vitals inpatient positive  Cardiology consult: Suspect this is secondary to poor PO intake and blood loss. Please encourage PO intake. Nursing instructed to apply compression socks while awake. ECG demonstrates sinus bradycardia. HR's controlled. OK to continue digoxin and Multaq.   Will place on gentle IVF rehydration - stopped  Tom stockings, slow position changes. Can add midodrine if becomes symptomatic from hypotension  increase florinef to 0.1mg daily and thigh high stockings.   Curbsided cardiology and recommending to continue current regimen. Orthostatic hypotension likely chronic.  Patient would benefit from ambulating with wheelchair to avoid falls

## 2024-01-04 VITALS
WEIGHT: 163 LBS | DIASTOLIC BLOOD PRESSURE: 82 MMHG | BODY MASS INDEX: 22.82 KG/M2 | HEART RATE: 73 BPM | RESPIRATION RATE: 18 BRPM | HEIGHT: 71 IN | SYSTOLIC BLOOD PRESSURE: 110 MMHG | OXYGEN SATURATION: 99 % | TEMPERATURE: 97.7 F

## 2024-01-04 PROCEDURE — 99239 HOSP IP/OBS DSCHRG MGMT >30: CPT

## 2024-01-04 PROCEDURE — 97535 SELF CARE MNGMENT TRAINING: CPT

## 2024-01-04 RX ORDER — FLUDROCORTISONE ACETATE 0.1 MG/1
0.1 TABLET ORAL DAILY
Qty: 30 TABLET | Refills: 0 | Status: SHIPPED | OUTPATIENT
Start: 2024-01-05 | End: 2024-02-04

## 2024-01-04 RX ORDER — LANOLIN ALCOHOL/MO/W.PET/CERES
3 CREAM (GRAM) TOPICAL
Qty: 30 TABLET | Refills: 0 | Status: SHIPPED | OUTPATIENT
Start: 2024-01-04

## 2024-01-04 RX ORDER — TAMSULOSIN HYDROCHLORIDE 0.4 MG/1
0.4 CAPSULE ORAL
Qty: 30 CAPSULE | Refills: 0 | Status: SHIPPED | OUTPATIENT
Start: 2024-01-04

## 2024-01-04 RX ORDER — POLYETHYLENE GLYCOL 3350 17 G/17G
17 POWDER, FOR SOLUTION ORAL 2 TIMES DAILY
Qty: 1020 G | Refills: 0 | Status: SHIPPED | OUTPATIENT
Start: 2024-01-04 | End: 2024-02-03

## 2024-01-04 RX ORDER — AMOXICILLIN 250 MG
1 CAPSULE ORAL 2 TIMES DAILY
Qty: 60 TABLET | Refills: 0 | Status: SHIPPED | OUTPATIENT
Start: 2024-01-04 | End: 2024-02-03

## 2024-01-04 RX ORDER — FERROUS SULFATE 325(65) MG
325 TABLET ORAL
Qty: 30 TABLET | Refills: 0 | Status: SHIPPED | OUTPATIENT
Start: 2024-01-05

## 2024-01-04 RX ORDER — BISACODYL 10 MG
10 SUPPOSITORY, RECTAL RECTAL DAILY PRN
Qty: 12 SUPPOSITORY | Refills: 0 | Status: SHIPPED | OUTPATIENT
Start: 2024-01-04

## 2024-01-04 RX ORDER — DIGOXIN 125 MCG
125 TABLET ORAL EVERY OTHER DAY
Qty: 15 TABLET | Refills: 0 | Status: SHIPPED | OUTPATIENT
Start: 2024-01-05 | End: 2024-02-04

## 2024-01-04 RX ADMIN — CYANOCOBALAMIN TAB 500 MCG 1000 MCG: 500 TAB at 08:05

## 2024-01-04 RX ADMIN — GLYCERIN 1 DROP: .002; .002; .01 SOLUTION/ DROPS OPHTHALMIC at 08:05

## 2024-01-04 RX ADMIN — FLUDROCORTISONE ACETATE 0.1 MG: 0.1 TABLET ORAL at 08:05

## 2024-01-04 RX ADMIN — LUBIPROSTONE 8 MCG: 8 CAPSULE, GELATIN COATED ORAL at 08:05

## 2024-01-04 RX ADMIN — FERROUS SULFATE TAB 325 MG (65 MG ELEMENTAL FE) 325 MG: 325 (65 FE) TAB at 08:06

## 2024-01-04 RX ADMIN — PANTOPRAZOLE SODIUM 40 MG: 40 TABLET, DELAYED RELEASE ORAL at 08:06

## 2024-01-04 RX ADMIN — POLYETHYLENE GLYCOL 3350 17 G: 17 POWDER, FOR SOLUTION ORAL at 08:05

## 2024-01-04 RX ADMIN — FOLIC ACID 1000 MCG: 1 TABLET ORAL at 08:05

## 2024-01-04 RX ADMIN — DRONEDARONE 400 MG: 400 TABLET, FILM COATED ORAL at 08:06

## 2024-01-04 RX ADMIN — PRIMIDONE 50 MG: 50 TABLET ORAL at 08:05

## 2024-01-04 RX ADMIN — SENNOSIDES AND DOCUSATE SODIUM 1 TABLET: 8.6; 5 TABLET ORAL at 08:05

## 2024-01-04 NOTE — OCCUPATIONAL THERAPY NOTE
Occupational Therapy Progress Note     Patient Name: Miguel Angel Ortega  Today's Date: 1/4/2024  Problem List  Principal Problem:    Stercoral colitis  Active Problems:    Prostate cancer metastatic to bone (HCC)    Acute urinary retention    Permanent atrial fibrillation (HCC)    Acute on chronic anemia    Orthostatic hypotension        01/04/24 0757   Note Type   Note Type Treatment   Pain Assessment   Pain Assessment Tool 0-10   Pain Score No Pain   Restrictions/Precautions   Other Precautions Chair Alarm;Bed Alarm;Fall Risk  (Condom cath)   ADL   Grooming Assistance 5  Supervision/Setup   Grooming Deficit Setup   Grooming Comments Pt completed oral care while sitting EOB with setup only.   UB Bathing Assistance 5  Supervision/Setup   UB Bathing Deficit Setup;Verbal cueing;Supervision/safety;Increased time to complete;Chest;Right arm;Left arm;Abdomen   UB Bathing Comments Pt completed UB bathing while sitting EOB with SUP for safety.   LB Bathing Assistance   (Steadying assist)   LB Bathing Deficit Setup;Verbal cueing;Supervision/safety;Increased time to complete;Steadying;Buttocks;Right upper leg;Left upper leg;Right lower leg including foot;Left lower leg including foot;Perineal area   LB Bathing Comments Pt completed majority of LB bathing while supine in bed prior to donning of thigh high compression garments. Pt completing bathing of BLEs and periarea with SUP while in bed. Pt completing buttocks hygiene while standing EOB with steading assist for safety with support from RW.   UB Dressing Assistance 5  Supervision/Setup   UB Dressing Deficit Setup;Verbal cueing;Supervision/safety;Increased time to complete   UB Dressing Comments Pt donning hospital gown while sitting EOB with SUP.   LB Dressing Assistance 5  Supervision/Setup   LB Dressing Deficit Setup;Verbal cueing;Supervision/safety;Increased time to complete;Don/doff R sock;Don/doff L sock   LB Dressing Comments Pt requiring maxA to don thigh high  compression garments. Pt able to don sock while sitting EOB with SUP.   Toileting Assistance  4  Minimal Assistance   Toileting Deficit Setup;Steadying;Verbal cueing;Supervison/safety;Increased time to complete;Bedside commode;Perineal hygiene   Toileting Comments Pt completed toileting on BSC. Pt completing hygiene following BM with Joao for thoroughness.   Bed Mobility   Supine to Sit 6  Modified independent   Additional Comments Pt completed bed mobiltiy with HOB slightly elevated and no use of bed rails with MI. Orthostatic vitals obtained following sitting. No dizziness reported. Please see below vitals.   Transfers   Sit to Stand   (SBA)   Additional items Increased time required;Verbal cues   Stand to Sit   (SBA)   Additional items Increased time required;Verbal cues   Stand pivot   (SBA)   Additional items Increased time required;Verbal cues   Toilet transfer   (SBA)   Additional items Increased time required;Verbal cues;Commode   Additional Comments Pt completed all functional transfers using RW. STS, SPT and BSC transfers completed with SBA. Pt reporting no dizziness during any transfers although did demonstrate orthostatic BP. Please see below vitals.   Toilet Transfers   Toilet Transfer From Rolling walker   Toilet Transfer Type To and from   Toilet Transfer to Standard bedside commode   Toilet Transfer Technique Stand pivot   Toilet Transfers   (SBA)   Toilet Transfers Comments Please see above transfers section.   Cognition   Overall Cognitive Status WFL   Arousal/Participation Alert;Responsive;Cooperative   Attention Within functional limits   Orientation Level Oriented X4   Memory Within functional limits   Following Commands Follows one step commands without difficulty   Activity Tolerance   Activity Tolerance Patient limited by fatigue   Medical Staff Made Aware RN   Assessment   Assessment Pt seen for OT treatment day 4. Pt willing and able to participate in treatment focused on bed mobility, ADLs  and functional transfers. Pt continues to be limited by orthostatic hypotension. Orthostatic BP obtained throughout session. Please see below vitals. Pt completed bed mobility MI. Pt completed UB ADLs with setup/SUP and majority of LB ADLs with SUP-steadying assist. Pt requiring maxA to don compression stockings. Yolanda required for thoroughness during toileting. Pt completing all functional transfers with SBA using RW for safety. Pt requires additional OT services to increase strength, endurance and balance for improved safety and IND during ADLs and functional transfers. Recommendation remains Level III (Minimum resource intensity) therapy following dc. Pt sitting in recliner with chair alarm activated, call bell placed within reach and all needs met following session.   Plan   Treatment Interventions ADL retraining;Functional transfer training;UE strengthening/ROM;Endurance training;Patient/family training;Energy conservation;Activityengagement   Goal Expiration Date 01/11/24   OT Treatment Day 4   OT Frequency 2-3x/wk   Discharge Recommendation   Rehab Resource Intensity Level, OT III (Minimum Resource Intensity)   AM-PAC Daily Activity Inpatient   Lower Body Dressing 3   Bathing 3   Toileting 3   Upper Body Dressing 3   Grooming 3   Eating 4   Daily Activity Raw Score 19   Daily Activity Standardized Score (Calc for Raw Score >=11) 40.22   AM-PAC Applied Cognition Inpatient   Following a Speech/Presentation 3   Understanding Ordinary Conversation 3   Taking Medications 2   Remembering Where Things Are Placed or Put Away 3   Remembering List of 4-5 Errands 3   Taking Care of Complicated Tasks 3   Applied Cognition Raw Score 17   Applied Cognition Standardized Score 36.52        01/04/24 0805   Vitals   Temperature 97.7 °F (36.5 °C)   Pulse 71   Blood Pressure 105/51   MAP (mmHg) 69   Patient Position - Orthostatic VS Lying - Orthostatic VS   Oxygen Therapy   SpO2 97 %        01/04/24 0821   Vitals   Blood Pressure  (!) 83/39   MAP (mmHg) (!) 54   Patient Position - Orthostatic VS Sitting - Orthostatic VS  (Asymptomatic)        01/04/24 0829   Vitals   Pulse 80   Blood Pressure (!) 94/42   MAP (mmHg) (!) 59   Patient Position - Orthostatic VS Sitting  (After completion of UB ADLs- asymptomatic)   Oxygen Therapy   SpO2 98 %        01/04/24 0836   Vitals   Blood Pressure (!) 88/36   MAP (mmHg) (!) 53   Patient Position - Orthostatic VS Standing - Orthostatic VS  (After toileting- asymptomatic)        01/04/24 0846   Vitals   Pulse 73   Blood Pressure 110/82   MAP (mmHg) 91   Patient Position - Orthostatic VS Sitting  (End of session)   Oxygen Therapy   SpO2 99 %     Pt goals to be met by 1/11/2024:     Pt will demonstrate ability to complete grooming/hygiene tasks @ mod I after set-up.  Pt will demonstrate ability to complete supine<>sit @ mod I in order to increase safety and independence during ADL tasks.  Pt will demonstrate ability to complete UB ADLs including washing/dressing @ mod I in order to increase performance and participation during meaningful tasks  Pt will demonstrate ability to complete LB dressing @ mod I in order to increase safety and independence during meaningful tasks.   Pt will demonstrate ability to bernardo/doff socks/shoes while sitting EOB/chair @ mod I in order to increase safety and independence during meaningful tasks.   Pt will demonstrate ability to complete toileting tasks including CM and pericare @ mod I in order to increase safety and independence during meaningful tasks.  Pt will demonstrate ability to complete EOB, chair, toilet/commode transfers @ mod I in order to increase performance and participation during functional tasks.  Pt will demonstrate ability to stand for 10 minutes while maintaining F+ balance with use of RW for UB support PRN.  Pt will demonstrate ability to tolerate 30 minute OT session with no vc'ing for deep breathing or use of energy conservation techniques in order to  increase activity tolerance during functional tasks.   Pt will demonstrate Good carryover of use of energy conservation/compensatory strategies during ADLs and functional tasks in order to increase safety and reduce risk for falls.   Pt will demonstrate Good attention and participation in continued evaluation of functional ambulation house hold distances in order to assist with safe d/c planning.  Pt will attend to continued cognitive assessments 100% of the time in order to provide most appropriate d/c recommendations.   Pt will follow 100% simple 2-step commands and be A&O x4 consistently with environmental cues to increase participation in functional activities.   Pt will identify 3 areas of interest/hobbies and 1 intervention on how to incorporate into daily life in order to increase interaction with environment and peers as well as increase participation in meaningful tasks.   Pt will demonstrate 100% carryover of BUE HEP in order to increase BUE MS and increase performance during functional tasks upon d/c home.     Laurie Hudson, OTR/L

## 2024-01-04 NOTE — ASSESSMENT & PLAN NOTE
Patient has been having episodes of orthostatic hypotension outpatient. Orthostatic vitals inpatient positive  Cardiology consult: Suspect this is secondary to poor PO intake and blood loss. Please encourage PO intake. Nursing instructed to apply compression socks while awake. ECG demonstrates sinus bradycardia. HR's controlled. OK to continue digoxin and Multaq.   Will place on gentle IVF rehydration - stopped  Tom stockings, slow position changes. Can add midodrine if becomes symptomatic from hypotension  increase florinef to 0.1mg daily and thigh high stockings.   Curbsided cardiology and recommending to continue current regimen. Orthostatic hypotension likely chronic.  Patient would benefit from ambulating with wheelchair to avoid falls  Cardiology follow up outpatient

## 2024-01-04 NOTE — DISCHARGE INSTR - AVS FIRST PAGE
Continue [place] catheter and follow up with urology outpatient.     Continue florinef in the morning and flomax nightly. Orthostatic hypotension is chronic - however, patient is asymptomatic. Continue to work with physical therapy. Patient is to be mostly be mobile with wheelchair to prevent falls with orthostatic hypotension. Can work with physical therapy on walking and orthostatic blood pressure. Continue thigh high stockings. Follow up with cardiology outpatient.     Continue miralax and senokot twice daily with prn dulcolax suppository if no BM in 2-3 days. Follow up with GI outpatient.

## 2024-01-04 NOTE — INCIDENTAL FINDINGS
The following findings require follow up:  Radiographic finding   Finding: CT abdomen pelvis w contrast: Trace basilar right lower lobe groundglass opacities suggestive of inflammatory or infectious bronchiolitis., Abundant stool in the colon and rectal vault compatible with constipation in the appropriate clinical context. No bowel obstruction., Minimal perirectal fat stranding suggestive of mild or early stercoral proctitis. No perirectal abscess., Diffuse osseous sclerotic metastatic disease., Additional chronic findings and negatives as above.    Follow up required: yes   Follow up should be done within 1 month(s)    Please notify the following clinician to assist with the follow up:   Dr. Randee MURO

## 2024-01-04 NOTE — PLAN OF CARE
Problem: Potential for Falls  Goal: Patient will remain free of falls  Description: INTERVENTIONS:  - Educate patient/family on patient safety including physical limitations  - Instruct patient to call for assistance with activity   - Consult OT/PT to assist with strengthening/mobility   - Keep Call bell within reach  - Keep bed low and locked with side rails adjusted as appropriate  - Keep care items and personal belongings within reach  - Initiate and maintain comfort rounds  - Make Fall Risk Sign visible to staff  - Offer Toileting every 2 Hours, in advance of need  - Initiate/Maintain bed/chair alarm  - Obtain necessary fall risk management equipment:   - Apply yellow socks and bracelet for high fall risk patients  - Consider moving patient to room near nurses station  Outcome: Progressing     Problem: PAIN - ADULT  Goal: Verbalizes/displays adequate comfort level or baseline comfort level  Description: Interventions:  - Encourage patient to monitor pain and request assistance  - Assess pain using appropriate pain scale  - Administer analgesics based on type and severity of pain and evaluate response  - Implement non-pharmacological measures as appropriate and evaluate response  - Consider cultural and social influences on pain and pain management  - Notify physician/advanced practitioner if interventions unsuccessful or patient reports new pain  Outcome: Progressing     Problem: INFECTION - ADULT  Goal: Absence or prevention of progression during hospitalization  Description: INTERVENTIONS:  - Assess and monitor for signs and symptoms of infection  - Monitor lab/diagnostic results  - Monitor all insertion sites, i.e. indwelling lines, tubes, and drains  - Monitor endotracheal if appropriate and nasal secretions for changes in amount and color  - [place] appropriate cooling/warming therapies per order  - Administer medications as ordered  - Instruct and encourage patient and family to use good hand hygiene  technique  - Identify and instruct in appropriate isolation precautions for identified infection/condition  Outcome: Progressing     Problem: SAFETY ADULT  Goal: Patient will remain free of falls  Description: INTERVENTIONS:  - Educate patient/family on patient safety including physical limitations  - Instruct patient to call for assistance with activity   - Consult OT/PT to assist with strengthening/mobility   - Keep Call bell within reach  - Keep bed low and locked with side rails adjusted as appropriate  - Keep care items and personal belongings within reach  - Initiate and maintain comfort rounds  - Make Fall Risk Sign visible to staff  - Offer Toileting every 2 Hours, in advance of need  - Initiate/Maintain bed/chair alarm  - Obtain necessary fall risk management equipment:   - Apply yellow socks and bracelet for high fall risk patients  - Consider moving patient to room near nurses station  Outcome: Progressing  Goal: Maintain or return to baseline ADL function  Description: INTERVENTIONS:  - Educate patient/family on patient safety including physical limitations  - Instruct patient to call for assistance with activity   - Consult OT/PT to assist with strengthening/mobility   - Keep Call bell within reach  - Keep bed low and locked with side rails adjusted as appropriate  - Keep care items and personal belongings within reach  - Initiate and maintain comfort rounds  - Make Fall Risk Sign visible to staff  - Offer Toileting every 2 Hours, in advance of need  - Initiate/Maintain bed/chair alarm  - Obtain necessary fall risk management equipment:  - Apply yellow socks and bracelet for high fall risk patients  - Consider moving patient to room near nurses station  Outcome: Progressing  Goal: Maintains/Returns to pre admission functional level  Description: INTERVENTIONS:  - Perform AM-PAC 6 Click Basic Mobility/ Daily Activity assessment daily.  - Set and communicate daily mobility goal to care team and  patient/family/caregiver.   - Collaborate with rehabilitation services on mobility goals if consulted  - Perform Range of Motion 3 times a day.  - Reposition patient every 2 hours.  - Dangle patient 3 times a day  - Stand patient 3 times a day  - Ambulate patient 3 times a day  - Out of bed to chair 3 times a day   - Out of bed for meals 3 times a day  - Out of bed for toileting  - Record patient progress and toleration of activity level   Outcome: Progressing     Problem: Knowledge Deficit  Goal: Patient/family/caregiver demonstrates understanding of disease process, treatment plan, medications, and discharge instructions  Description: Complete learning assessment and assess knowledge base.  Interventions:  - Provide teaching at level of understanding  - Provide teaching via preferred learning methods  Outcome: Progressing     Problem: GASTROINTESTINAL - ADULT  Goal: Minimal or absence of nausea and/or vomiting  Description: INTERVENTIONS:  - Administer IV fluids if ordered to ensure adequate hydration  - Maintain NPO status until nausea and vomiting are resolved  - Nasogastric tube if ordered  - Administer ordered antiemetic medications as needed  - Provide nonpharmacologic comfort measures as appropriate  - Advance diet as tolerated, if ordered  - Consider nutrition services referral to assist patient with adequate nutrition and appropriate food choices  Outcome: Progressing  Goal: Maintains or returns to baseline bowel function  Description: INTERVENTIONS:  - Assess bowel function monitor bowel regimen  - Encourage oral fluids to ensure adequate hydration  - Administer IV fluids if ordered to ensure adequate hydration  - Administer ordered medications as needed  - Encourage mobilization and activity  - Consider nutritional services referral to assist patient with adequate nutrition and appropriate food choices  Outcome: Progressing  Goal: Maintains adequate nutritional intake  Description: INTERVENTIONS:  -  Monitor percentage of each meal consumed  - Identify factors contributing to decreased intake, treat as appropriate  - Assist with meals as needed  - Monitor I&O, weight, and lab values if indicated  - Obtain nutrition services referral as needed  Outcome: Progressing  Goal: Oral mucous membranes remain intact  Description: INTERVENTIONS  - Assess oral mucosa and hygiene practices  - Implement preventative oral hygiene regimen  - Implement oral medicated treatments as ordered  - Initiate Nutrition services referral as needed  Outcome: Progressing     Problem: GENITOURINARY - ADULT  Goal: Maintains or returns to baseline urinary function  Description: INTERVENTIONS:  - Assess urinary function  - Encourage oral fluids to ensure adequate hydration if ordered  - Administer IV fluids as ordered to ensure adequate hydration  - Administer ordered medications as needed  - Offer frequent toileting  - Follow urinary retention protocol if ordered  Outcome: Progressing  Goal: Absence of urinary retention  Description: INTERVENTIONS:  - Assess patient's ability to void and empty bladder  - Monitor I/O  - Bladder scan as needed  - Discuss with physician/AP medications to alleviate retention as needed  - Discuss catheterization for long term situations as appropriate  Outcome: Progressing     Problem: Prexisting or High Potential for Compromised Skin Integrity  Goal: Skin integrity is maintained or improved  Description: INTERVENTIONS:  - Identify patients at risk for skin breakdown  - Assess and monitor skin integrity  - Assess and monitor nutrition and hydration status  - Monitor labs   - Assess for incontinence   - Turn and reposition patient  - Assist with mobility/ambulation  - Relieve pressure over bony prominences  - Avoid friction and shearing  - Provide appropriate hygiene as needed including keeping skin clean and dry  - Evaluate need for skin moisturizer/barrier cream  - Collaborate with interdisciplinary team   -  Patient/family teaching  - Consider wound care consult   Outcome: Progressing

## 2024-01-04 NOTE — PLAN OF CARE
Problem: GASTROINTESTINAL - ADULT  Goal: Minimal or absence of nausea and/or vomiting  Description: INTERVENTIONS:  - Administer IV fluids if ordered to ensure adequate hydration  - Maintain NPO status until nausea and vomiting are resolved  - Nasogastric tube if ordered  - Administer ordered antiemetic medications as needed  - Provide nonpharmacologic comfort measures as appropriate  - Advance diet as tolerated, if ordered  - Consider nutrition services referral to assist patient with adequate nutrition and appropriate food choices  Outcome: Progressing  Goal: Maintains or returns to baseline bowel function  Description: INTERVENTIONS:  - Assess bowel function monitor bowel regimen  - Encourage oral fluids to ensure adequate hydration  - Administer IV fluids if ordered to ensure adequate hydration  - Administer ordered medications as needed  - Encourage mobilization and activity  - Consider nutritional services referral to assist patient with adequate nutrition and appropriate food choices  Outcome: Progressing  Goal: Maintains adequate nutritional intake  Description: INTERVENTIONS:  - Monitor percentage of each meal consumed  - Identify factors contributing to decreased intake, treat as appropriate  - Assist with meals as needed  - Monitor I&O, weight, and lab values if indicated  - Obtain nutrition services referral as needed  Outcome: Progressing  Goal: Oral mucous membranes remain intact  Description: INTERVENTIONS  - Assess oral mucosa and hygiene practices  - Implement preventative oral hygiene regimen  - Implement oral medicated treatments as ordered  - Initiate Nutrition services referral as needed  Outcome: Progressing     Problem: GENITOURINARY - ADULT  Goal: Maintains or returns to baseline urinary function  Description: INTERVENTIONS:  - Assess urinary function  - Encourage oral fluids to ensure adequate hydration if ordered  - Administer IV fluids as ordered to ensure adequate hydration  -  Administer ordered medications as needed  - Offer frequent toileting  - Follow urinary retention protocol if ordered  Outcome: Progressing  Goal: Absence of urinary retention  Description: INTERVENTIONS:  - Assess patient's ability to void and empty bladder  - Monitor I/O  - Bladder scan as needed  - Discuss with physician/AP medications to alleviate retention as needed  - Discuss catheterization for long term situations as appropriate  Outcome: Progressing

## 2024-01-04 NOTE — DISCHARGE SUMMARY
[facility]  Discharge- Miguel Angel Ortega 2/28/1928, 89 y.o. male MRN: [id number]  Unit/Bed#: -Theo Encounter: [id number]  Primary Care Provider: Kisha Armstrong MD   Date and time admitted to hospital: 12/13/2023  9:06 AM    * Stercoral colitis  Assessment & Plan  Patient presents with symptoms of constipation for the last few days.  He saw GI outpatient.  He was started on MiraLAX patient states he went 26 times to the bathroom yesterday had very small amounts of stool and felt like he just could not empty out his bowel movement.  Also had a fall yesterday.  Ct abd pelvis shows Abundant stool in the colon and rectal vault compatible with constipation in the appropriate clinical context. No bowel obstruction.  Minimal perirectal fat stranding suggestive of mild or early stercoral proctitis. No perirectal abscess  Placed on MiraLAX daily and placed on Dulcolax suppository x 2 daily and also placed 3 enemas    GI consult placed - miralax BID, colace BID and Amitiza 8mcg BID. Dulcolax suppository daily prn. Advance to regular diet. Outpatient follow up  Amitiza and linzess not approved by insurance  GI recommending bid miralax and sennakot. PRN dulcolax suppository    Orthostatic hypotension  Assessment & Plan  Patient has been having episodes of orthostatic hypotension outpatient. Orthostatic vitals inpatient positive  Cardiology consult: Suspect this is secondary to poor PO intake and blood loss. Please encourage PO intake. Nursing instructed to apply compression socks while awake. ECG demonstrates sinus bradycardia. HR's controlled. OK to continue digoxin and Multaq.   Will place on gentle IVF rehydration - stopped  Tom stockings, slow position changes. Can add midodrine if becomes symptomatic from hypotension  increase florinef to 0.1mg daily and thigh high stockings.   Curbsided cardiology and recommending to continue current regimen. Orthostatic hypotension likely chronic.  Patient would  benefit from ambulating with wheelchair to avoid falls  Cardiology follow up outpatient    Acute on chronic anemia  Assessment & Plan  Hemoglobin dropped from 10.3-8.7.  low iron and B12 levels. placed on vit b12 and venofer  Resumed home iron supplement  CBC stable  neg stool occult  Obtained blood consent and transfused 1 unit on 12/26 for preop transfusion.     Lab Results   Component Value Date    HGB 8.4 (L) 01/03/2024    HGB 7.8 (L) 01/02/2024    HGB 8.3 (L) 12/31/2023       Permanent atrial fibrillation (HCC)  Assessment & Plan  Continue Multaq, digoxin which are his chronic meds.  Not on anticoagulation  Dig level acceptable  Appreciate cardio eval - continue multaq. Change digoxin to every other day. Was on xarelto in the past - discuss outpatient with his cardiologist about resumption  Asked for cardiac clearance prior to procedure: Patient is considered low risk for adverse cardiovascular outcomes in regards to non cardiac procedure     Acute urinary retention  Assessment & Plan  Acute urinary retention secondary to severe constipation also known history of prostate cancer.  Zuniga catheter placed on admission. Also placed on Flomax   Zuniga removed 12/15 and placed on voiding trial. Some hypotension noted which may be secondary to Flomax and placed on midodrine - dc flomax and midodrine  CT abdomen pelvis: Bladder completely decompressed by Zuniga catheter and cannot be further evaluated. If clinical concern remains, consider clamping of Zuniga for several hours and ultrasound imaging. Zuniga in expected position.   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 Remove zuniga and initiate voiding trial. Restart flomax. Send back to facility on condom catheter. OP follow up     Prostate cancer metastatic to bone (HCC)  Assessment & Plan  Further outpatient follow-up with urology.  Placed on Flomax for acute urinary  retention hold Casodex while inpatient.  Urology restarted patient on flomax - monitor bp      Medical Problems       Resolved Problems  Date Reviewed: 1/4/2024            Resolved    Gram-negative bacteremia 12/29/2023     Resolved by  Remedios Cruz PA-C        Discharging Physician / Practitioner: Remedios Cruz PA-C  PCP: Kisha Armstrong MD  Admission Date:   Admission Orders (From admission, onward)       Ordered        12/13/23 1203  INPATIENT ADMISSION  Once                          Discharge Date: 01/04/24    Consultations During Hospital Stay:  Urology  Cardiology  Infectious disease  Gastroenterology    Procedures Performed:   Cystoscopy with evacuation of clots.  Fulguration of bleeding on 12/27 with urology    Significant Findings / Test Results:   CT abdomen pelvis w contrast: Trace basilar right lower lobe groundglass opacities suggestive of inflammatory or infectious bronchiolitis., Abundant stool in the colon and rectal vault compatible with constipation in the appropriate clinical context. No bowel obstruction., Minimal perirectal fat stranding suggestive of mild or early stercoral proctitis. No perirectal abscess., Diffuse osseous sclerotic metastatic disease., Additional chronic findings and negatives as above.   Abdominal x-ray with large amount of feces within the colon compatible with constipation.  Extensive osteoblastic metastasis.  Old right rib and pubic bone fractures  Abdominal x-ray with no significant stool burden.  Nonobstructive bowel gas pattern.    Incidental Findings:   CT abdomen pelvis w contrast: Trace basilar right lower lobe groundglass opacities suggestive of inflammatory or infectious bronchiolitis., Abundant stool in the colon and rectal vault compatible with constipation in the appropriate clinical context. No bowel obstruction., Minimal perirectal fat stranding suggestive of mild or early stercoral proctitis. No perirectal abscess., Diffuse osseous sclerotic metastatic  disease., Additional chronic findings and negatives as above.    I reviewed the above mentioned incidental findings with the patient and/or family and they expressed understanding.    Test Results Pending at Discharge (will require follow up):   None     Outpatient Tests Requested:  None    Complications:  None    Reason for Admission: Stercoral colitis    Hospital Course:   Miguel Angel Ortega is a 89 y.o. male patient who originally presented to the hospital on 12/13/2023 due to constipation.  Patient was found to have stercoral colitis on imaging and GI was consulted after failed bowel regimen inpatient.  Patient was treated with GoLytely prep which resulted in clear abdominal x-ray with no stool burden.  Patient was planned to be discharged on MiraLAX twice daily, Senokot twice daily and Amitiza twice daily, however insurance would not cover Amitiza or Linzess.  Therefore, patient is to be discharged on MiraLAX twice daily and Senokot twice daily with Dulcolax suppository as needed.  Patient is to follow-up with GI outpatient.  Patient also with hematuria and urinary retention throughout stay.  Urology consulted and had three-way catheter placed following cystoscopy with fulguration on 12/27.  This resulted in improvement of hematuria.  Three-way catheter was removed and patient was initiated on voiding trial.  Patient is to continue Flomax and condom catheter outpatient with urology follow-up.  Patient with chronic orthostatic hypotension throughout stay.  Patient briefly symptomatic, however improved with Florinef daily.  Patient is to continue Florinef and thigh-high MAX stockings.  Cardiology was consulted for assistance, however no medical management changes.  Patient to follow-up outpatient with cardiology.  Digoxin changed to every other day.  Patient is to mostly be wheelchair ambulatory to prevent falls with walking due to chronic orthostatic hypotension.    Please see above list of diagnoses and related  "plan for additional information.     Condition at Discharge: good    Discharge Day Visit / Exam:   Subjective: Patient states that he is feeling well today.  States he was not dizzy today with orthostatic vital signs.  Denies chest pain or shortness of breath.  Agreeable to discharge plan to rehab facility and states he will use the wheelchair more often to prevent falls.  Agreeable to outpatient follow-ups.  Vitals: Blood Pressure: 110/82 (01/04/24 0846)  Pulse: 73 (01/04/24 0846)  Temperature: 97.7 °F (36.5 °C) (01/04/24 0805)  Temp Source: Temporal (01/01/24 0830)  Respirations: 18 (01/03/24 2128)  Height: 5' 11\" (180.3 cm) (12/27/23 1304)  Weight - Scale: 73.9 kg (163 lb) (12/27/23 1304)  SpO2: 99 % (01/04/24 0846)  Exam:   Physical Exam  Vitals reviewed.   Constitutional:       General: He is not in acute distress.     Appearance: Normal appearance. He is not ill-appearing.      Comments: Frail   HENT:      Head: Normocephalic and atraumatic.      Nose: Nose normal.      Mouth/Throat:      Mouth: Mucous membranes are moist.      Pharynx: Oropharynx is clear.   Eyes:      Extraocular Movements: Extraocular movements intact.      Conjunctiva/sclera: Conjunctivae normal.   Cardiovascular:      Rate and Rhythm: Normal rate and regular rhythm.      Pulses: Normal pulses.      Heart sounds: Normal heart sounds. No murmur heard.  Pulmonary:      Effort: Pulmonary effort is normal. No respiratory distress.      Breath sounds: Normal breath sounds. No wheezing.   Abdominal:      General: Abdomen is flat. Bowel sounds are normal. There is no distension.      Palpations: Abdomen is soft.      Tenderness: There is no abdominal tenderness. There is no guarding.   Genitourinary:     Comments: Urine yellow and clear  Musculoskeletal:         General: Normal range of motion.      Cervical back: Normal range of motion.      Right lower leg: No edema.      Left lower leg: No edema.   Skin:     General: Skin is warm. "   Neurological:      General: No focal deficit present.      Mental Status: He is alert and oriented to person, place, and time. Mental status is at baseline.      Motor: No weakness.   Psychiatric:         Mood and Affect: Mood normal.         Behavior: Behavior normal.         Thought Content: Thought content normal.         Judgment: Judgment normal.          Discussion with Family: Updated  (friend) via phone.    Discharge instructions/Information to patient and family:   See after visit summary for information provided to patient and family.      Provisions for Follow-Up Care:  See after visit summary for information related to follow-up care and any pertinent home health orders.      Mobility at time of Discharge:   Basic Mobility Inpatient Raw Score: 19  JH-HLM Goal: 6: Walk 10 steps or more  JH-HLM Achieved: 6: Walk 10 steps or more  HLM Goal achieved. Continue to encourage appropriate mobility.     Disposition:   Assisted Living Facility at [facility]    Planned Readmission: None     Discharge Statement:  I spent 45 minutes discharging the patient. This time was spent on the day of discharge. I had direct contact with the patient on the day of discharge. Greater than 50% of the total time was spent examining patient, answering all patient questions, arranging and discussing plan of care with patient as well as directly providing post-discharge instructions.  Additional time then spent on discharge activities.    Discharge Medications:  See after visit summary for reconciled discharge medications provided to patient and/or family.      **Please Note: This note may have been constructed using a voice recognition system**

## 2024-01-04 NOTE — CASE MANAGEMENT
Case Management Discharge Planning Note    Patient name Miguel Angel Ortega  Location /-01 MRN [id number]  : 1928 Date 2024       Current Admission Date: 2023  Current Admission Diagnosis:Stercoral colitis   Patient Active Problem List    Diagnosis Date Noted    Gross hematuria 2023    Orthostatic hypotension 2023    Acute on chronic anemia 2023    Stercoral colitis 2023    Acute urinary retention 2023    Permanent atrial fibrillation (HCC) 2023    Hyponatremia 2023    Prostate cancer metastatic to bone (HCC) 2023      LOS (days): 22  Geometric Mean LOS (GMLOS) (days): 8.2  Days to GMLOS:-13.7     OBJECTIVE:  Risk of Unplanned Readmission Score: 29.69         Current admission status: Inpatient   Preferred Pharmacy:   [id number] - [place], [place] - [address] [address]  Phone: [phone] Fax: [phone]    [facility] [place] [place] [facility] [address]  [address]  [place] [address]  Phone: [phone] Fax: [phone]    Primary Care Provider: Kisha Armstrong MD    Primary Insurance: MEDICARE  Secondary Insurance: [facility]    DISCHARGE DETAILS:      CM contacted [facility] to let DON know patient is returning today with [facility] and recommendation for WC for ambulation due to orthostatics.         1050 CM met with patient in his room to let him know [facility] aware patient will be returning. Patient reported he has transport WC he will use for ambulation to/from building. Patient comfortable with discharge as PA tells him she cannot fix his orthostatics.

## 2024-01-04 NOTE — PLAN OF CARE
Problem: Potential for Falls  Goal: Patient will remain free of falls  Description: INTERVENTIONS:  - Educate patient/family on patient safety including physical limitations  - Instruct patient to call for assistance with activity   - Consult OT/PT to assist with strengthening/mobility   - Keep Call bell within reach  - Keep bed low and locked with side rails adjusted as appropriate  - Keep care items and personal belongings within reach  - Initiate and maintain comfort rounds  - Make Fall Risk Sign visible to staff  - Offer Toileting every 2 Hours, in advance of need  - Initiate/Maintain bed/chair alarm  - Obtain necessary fall risk management equipment:   - Apply yellow socks and bracelet for high fall risk patients  - Consider moving patient to room near nurses station  Outcome: Adequate for Discharge     Problem: PAIN - ADULT  Goal: Verbalizes/displays adequate comfort level or baseline comfort level  Description: Interventions:  - Encourage patient to monitor pain and request assistance  - Assess pain using appropriate pain scale  - Administer analgesics based on type and severity of pain and evaluate response  - Implement non-pharmacological measures as appropriate and evaluate response  - Consider cultural and social influences on pain and pain management  - Notify physician/advanced practitioner if interventions unsuccessful or patient reports new pain  Outcome: Adequate for Discharge     Problem: INFECTION - ADULT  Goal: Absence or prevention of progression during hospitalization  Description: INTERVENTIONS:  - Assess and monitor for signs and symptoms of infection  - Monitor lab/diagnostic results  - Monitor all insertion sites, i.e. indwelling lines, tubes, and drains  - Monitor endotracheal if appropriate and nasal secretions for changes in amount and color  - [place] appropriate cooling/warming therapies per order  - Administer medications as ordered  - Instruct and encourage patient and family to  use good hand hygiene technique  - Identify and instruct in appropriate isolation precautions for identified infection/condition  Outcome: Adequate for Discharge     Problem: SAFETY ADULT  Goal: Patient will remain free of falls  Description: INTERVENTIONS:  - Educate patient/family on patient safety including physical limitations  - Instruct patient to call for assistance with activity   - Consult OT/PT to assist with strengthening/mobility   - Keep Call bell within reach  - Keep bed low and locked with side rails adjusted as appropriate  - Keep care items and personal belongings within reach  - Initiate and maintain comfort rounds  - Make Fall Risk Sign visible to staff  - Offer Toileting every 2 Hours, in advance of need  - Initiate/Maintain bed/chair alarm  - Obtain necessary fall risk management equipment:   - Apply yellow socks and bracelet for high fall risk patients  - Consider moving patient to room near nurses station  Outcome: Adequate for Discharge  Goal: Maintain or return to baseline ADL function  Description: INTERVENTIONS:  - Educate patient/family on patient safety including physical limitations  - Instruct patient to call for assistance with activity   - Consult OT/PT to assist with strengthening/mobility   - Keep Call bell within reach  - Keep bed low and locked with side rails adjusted as appropriate  - Keep care items and personal belongings within reach  - Initiate and maintain comfort rounds  - Make Fall Risk Sign visible to staff  - Offer Toileting every 2 Hours, in advance of need  - Initiate/Maintain bed/chair alarm  - Obtain necessary fall risk management equipment:  - Apply yellow socks and bracelet for high fall risk patients  - Consider moving patient to room near nurses station  Outcome: Adequate for Discharge  Goal: Maintains/Returns to pre admission functional level  Description: INTERVENTIONS:  - Perform AM-PAC 6 Click Basic Mobility/ Daily Activity assessment daily.  - Set and  communicate daily mobility goal to care team and patient/family/caregiver.   - Collaborate with rehabilitation services on mobility goals if consulted    - Out of bed for toileting  - Record patient progress and toleration of activity level   Outcome: Adequate for Discharge     Problem: DISCHARGE PLANNING  Goal: Discharge to home or other facility with appropriate resources  Description: INTERVENTIONS:  - Identify barriers to discharge w/patient and caregiver  - Arrange for needed discharge resources and transportation as appropriate  - Identify discharge learning needs (meds, wound care, etc.)  - Arrange for interpretive services to assist at discharge as needed  - Refer to Case Management Department for coordinating discharge planning if the patient needs post-hospital services based on physician/advanced practitioner order or complex needs related to functional status, cognitive ability, or social support system  Outcome: Adequate for Discharge     Problem: Knowledge Deficit  Goal: Patient/family/caregiver demonstrates understanding of disease process, treatment plan, medications, and discharge instructions  Description: Complete learning assessment and assess knowledge base.  Interventions:  - Provide teaching at level of understanding  - Provide teaching via preferred learning methods  Outcome: Adequate for Discharge     Problem: GASTROINTESTINAL - ADULT  Goal: Minimal or absence of nausea and/or vomiting  Description: INTERVENTIONS:  - Administer IV fluids if ordered to ensure adequate hydration  - Maintain NPO status until nausea and vomiting are resolved  - Nasogastric tube if ordered  - Administer ordered antiemetic medications as needed  - Provide nonpharmacologic comfort measures as appropriate  - Advance diet as tolerated, if ordered  - Consider nutrition services referral to assist patient with adequate nutrition and appropriate food choices  Outcome: Adequate for Discharge  Goal: Maintains or returns  to baseline bowel function  Description: INTERVENTIONS:  - Assess bowel function monitor bowel regimen  - Encourage oral fluids to ensure adequate hydration  - Administer IV fluids if ordered to ensure adequate hydration  - Administer ordered medications as needed  - Encourage mobilization and activity  - Consider nutritional services referral to assist patient with adequate nutrition and appropriate food choices  Outcome: Adequate for Discharge  Goal: Maintains adequate nutritional intake  Description: INTERVENTIONS:  - Monitor percentage of each meal consumed  - Identify factors contributing to decreased intake, treat as appropriate  - Assist with meals as needed  - Monitor I&O, weight, and lab values if indicated  - Obtain nutrition services referral as needed  Outcome: Adequate for Discharge  Goal: Oral mucous membranes remain intact  Description: INTERVENTIONS  - Assess oral mucosa and hygiene practices  - Implement preventative oral hygiene regimen  - Implement oral medicated treatments as ordered  - Initiate Nutrition services referral as needed  Outcome: Adequate for Discharge     Problem: GENITOURINARY - ADULT  Goal: Maintains or returns to baseline urinary function  Description: INTERVENTIONS:  - Assess urinary function  - Encourage oral fluids to ensure adequate hydration if ordered  - Administer IV fluids as ordered to ensure adequate hydration  - Administer ordered medications as needed  - Offer frequent toileting  - Follow urinary retention protocol if ordered  Outcome: Adequate for Discharge  Goal: Absence of urinary retention  Description: INTERVENTIONS:  - Assess patient's ability to void and empty bladder  - Monitor I/O  - Bladder scan as needed  - Discuss with physician/AP medications to alleviate retention as needed  - Discuss catheterization for long term situations as appropriate  Outcome: Adequate for Discharge     Problem: Prexisting or High Potential for Compromised Skin Integrity  Goal:  Skin integrity is maintained or improved  Description: INTERVENTIONS:  - Identify patients at risk for skin breakdown  - Assess and monitor skin integrity  - Assess and monitor nutrition and hydration status  - Monitor labs   - Assess for incontinence   - Turn and reposition patient  - Assist with mobility/ambulation  - Relieve pressure over bony prominences  - Avoid friction and shearing  - Provide appropriate hygiene as needed including keeping skin clean and dry  - Evaluate need for skin moisturizer/barrier cream  - Collaborate with interdisciplinary team   - Patient/family teaching  - Consider wound care consult   Outcome: Adequate for Discharge     Problem: OCCUPATIONAL THERAPY ADULT  Goal: Performs self-care activities at highest level of function for planned discharge setting.  See evaluation for individualized goals.  Description: Treatment Interventions: ADL retraining, Functional transfer training, UE strengthening/ROM, Endurance training, Patient/family training, Equipment evaluation/education, Compensatory technique education, Continued evaluation, Energy conservation, Activityengagement          See flowsheet documentation for full assessment, interventions and recommendations.   Outcome: Adequate for Discharge     Problem: PHYSICAL THERAPY ADULT  Goal: Performs mobility at highest level of function for planned discharge setting.  See evaluation for individualized goals.  Description: Treatment/Interventions: ADL retraining, Functional transfer training, LE strengthening/ROM, Elevations, Therapeutic exercise, Endurance training, Patient/family training, Equipment eval/education, Bed mobility, Gait training, Compensatory technique education, Spoke to nursing, OT          See flowsheet documentation for full assessment, interventions and recommendations.  Outcome: Adequate for Discharge     Problem: Nutrition/Hydration-ADULT  Goal: Nutrient/Hydration intake appropriate for improving, restoring or  maintaining nutritional needs  Description: Monitor and assess patient's nutrition/hydration status for malnutrition. Collaborate with interdisciplinary team and initiate plan and interventions as ordered.  Monitor patient's weight and dietary intake as ordered or per policy. Utilize nutrition screening tool and intervene as necessary. Determine patient's food preferences and provide high-protein, high-caloric foods as appropriate.     INTERVENTIONS:  - Monitor oral intake, urinary output, labs, and treatment plans  - Assess nutrition and hydration status and recommend course of action  - Evaluate amount of meals eaten  - Assist patient with eating if necessary   - Allow adequate time for meals  - Recommend/ encourage appropriate diets, oral nutritional supplements, and vitamin/mineral supplements  - Order, calculate, and assess calorie counts as needed  - Recommend, monitor, and adjust tube feedings and TPN/PPN based on assessed needs  - Assess need for intravenous fluids  - Provide specific nutrition/hydration education as appropriate  - Include patient/family/caregiver in decisions related to nutrition  Outcome: Adequate for Discharge

## 2024-01-04 NOTE — PLAN OF CARE
Problem: OCCUPATIONAL THERAPY ADULT  Goal: Performs self-care activities at highest level of function for planned discharge setting.  See evaluation for individualized goals.  Description: Treatment Interventions: ADL retraining, Functional transfer training, UE strengthening/ROM, Endurance training, Patient/family training, Equipment evaluation/education, Compensatory technique education, Continued evaluation, Energy conservation, Activityengagement          See flowsheet documentation for full assessment, interventions and recommendations.   Outcome: Progressing  Note: Limitation: Decreased ADL status, Decreased UE strength, Decreased Safe judgement during ADL, Decreased endurance, Decreased self-care trans, Decreased high-level ADLs     Assessment: Pt seen for OT treatment day 4. Pt willing and able to participate in treatment focused on bed mobility, ADLs and functional transfers. Pt continues to be limited by orthostatic hypotension. Orthostatic BP obtained throughout session. Please see below vitals. Pt completed bed mobility MI. Pt completed UB ADLs with setup/SUP and majority of LB ADLs with SUP-steadying assist. Pt requiring maxA to don compression stockings. Yolanda required for thoroughness during toileting. Pt completing all functional transfers with SBA using RW for safety. Pt requires additional OT services to increase strength, endurance and balance for improved safety and IND during ADLs and functional transfers. Recommendation remains Level III (Minimum resource intensity) therapy following dc. Pt sitting in recliner with chair alarm activated, call bell placed within reach and all needs met following session.     Rehab Resource Intensity Level, OT: III (Minimum Resource Intensity)

## 2024-01-08 ENCOUNTER — TELEPHONE (OUTPATIENT)
Dept: UROLOGY | Facility: CLINIC | Age: 89
End: 2024-01-08

## 2024-01-08 RX ORDER — ONDANSETRON 4 MG/1
4 TABLET, FILM COATED ORAL EVERY 8 HOURS PRN
COMMUNITY

## 2024-01-08 RX ORDER — ISOSORBIDE MONONITRATE 30 MG/1
30 TABLET, EXTENDED RELEASE ORAL DAILY
COMMUNITY
Start: 2023-07-11

## 2024-01-08 NOTE — TELEPHONE ENCOUNTER
Spoke with Elizabeth the caregiver, and she reports the [place] cath is not staying on the penis. He is not voiding much, or drinking much at this time. Instructed Elizabeth to have pt void in an urinal or commode every two hour to empty his bladder. Advised of ER precautions and Elizabeth verbalized understanding. She will encourage fluids, and call us back with any questions or concerns.

## 2024-01-19 RX ORDER — POLYVINYL ALCOHOL 14 MG/ML
1 SOLUTION/ DROPS OPHTHALMIC
COMMUNITY

## 2024-01-22 ENCOUNTER — TELEPHONE (OUTPATIENT)
Dept: CARDIOLOGY CLINIC | Facility: CLINIC | Age: 89
End: 2024-01-22

## 2024-01-22 NOTE — TELEPHONE ENCOUNTER
Called pt to resched his apt with Dr River Johnson his caretaker pt would like to come in soon in reference to his BP please call [phone]

## 2024-01-22 NOTE — TELEPHONE ENCOUNTER
Can we schedule him with you somewhere. You saw him in the hospital. We had to cancel with Dr Holman?

## 2024-01-25 NOTE — PROGRESS NOTES
UROLOGY PROGRESS NOTE         NAME: Miguel Angel Ortega  AGE: 89 y.o. SEX: male  : 1928   MRN: [id number]    DATE: 2024  TIME: 7:00 AM    Assessment and Plan   Procedures     Impression:   1. Prostate cancer (HCC)    2. Radiation cystitis    3. History of gross hematuria    4. Neurogenic bladder    5. Urge incontinence    6. Renal cyst         Plan: Our plan is to review his PSA the recent was 1.5.  Will get a hold on Lupron, and Casodex.  Will continue Flomax and timed voiding double void every 2-3 hours while awake.  Most again to give him a trial of Gemtesa to see if it reduces some of his urgency.  The risk and benefits of the medicine were explained to the patient he wished to proceed.    6 months we will have him get a PSA and follow-up in 1 year with an exam and PSA or sooner if there is a concern.  He and his caregiver understand and agree.      Chief Complaint   No chief complaint on file.    History of Present Illness     HPI: Miguel Angel Ortega is a 89 y.o. year old male who presents with follow-up of consultation at [facility] on 2023.  Patient history of prostate cancer status post radiation in the past followed by Dr. Gorman.  He had his radiation about 20 years ago had some PSAs recurrence and was getting Lupron shots every 3 months.    He initially had a condom catheter in place.    On 2023 Dr. Singer took patient to the OR to do cystoscopy clot evacuation with fulguration of the bladder neck and prostatic fossa.  Flomax was added postop.    The plan at that point was to remove the catheter and see how his voiding pattern went with his neurogenic bladder secondary to radiation cystitis.  Upper tract CT scan 2023 showed renal cysts, benign.  Patient has been on Plavix.    We are trying to find out if he has a catheter in or not.    Patient having some urgency with rare urge incontinence.  Flows okay no hematuria no dysuria.  Getting up several times at night when he  takes the Lasix ordered by the PCP.    Review of medications he is on Flomax, Casodex.  Patient states he has been getting hormone shots with Dr. Gorman every 3 months but wants to see [facility] now.    I told him with his PSA at 1.5 and given his age I would hold off on Casodex and Lupron for now.  He understands and agrees.    Postvoid residual was 81 cc on exam abdomen was soft and he had a normal external genitalia exam    His PSA was 1.58 on 1/9/2024 excellent.  The following portions of the patient's history were reviewed and updated as appropriate: allergies, current medications, past family history, past medical history, past social history, past surgical history and problem list.  Past Medical History:   Diagnosis Date    A-fib (HCC)     Coronary artery disease     History of angina     Prostate cancer (HCC)      Past Surgical History:   Procedure Laterality Date    CORONARY ARTERY BYPASS GRAFT  1995    CO CYSTO W/IRRIG & EVAC MULTPLE OBSTRUCTING CLOTS N/A 12/27/2023    Procedure: CYSTOSCOPY EVACUATION OF CLOTS, fulguration of bleeding. insertion 24FR [address] zuniga CBI;  Surgeon: Carson Singer MD;  Location:  MAIN OR;  Service: Urology    TONSILLECTOMY       shoulder  Review of Systems     Const: Denies chills, fever and weight loss.  CV: Denies chest pain.  Resp: Denies SOB.  GI: Denies abdominal pain, nausea and vomiting.  : Denies symptoms other than stated above.  Musculo: Denies back pain.    Objective   There were no vitals taken for this visit.    Physical Exam  Const: Appears healthy and well developed. No signs of acute distress present.  Resp: Respirations are regular and unlabored.   CV: Rate is regular. Rhythm is regular.  Abdomen: Abdomen is soft, nontender, and nondistended. Kidneys are not palpable.  : Normal  Psych: Patient's attitude is cooperative. Mood is normal. Affect is normal.    Current Medications     Current Outpatient Medications:     alendronate (FOSAMAX) 70 mg  tablet, Take 1 tablet by mouth once a week, Disp: , Rfl:     bisacodyl (DULCOLAX) 10 mg suppository, Insert 1 suppository (10 mg total) into the rectum daily as needed for constipation, Disp: 12 suppository, Rfl: 0    Calcium Carbonate 1500 (600 Ca) MG TABS, Take 1,500 mg by mouth daily, Disp: , Rfl:     carboxymethylcellulose 1 % ophthalmic solution, Apply 1 drop to eye Three times daily as needed, Disp: , Rfl:     clopidogrel (PLAVIX) 75 mg tablet, Take 75 mg by mouth daily, Disp: , Rfl:     cyanocobalamin (VITAMIN B-12) 1000 MCG tablet, Take 1 tablet (1,000 mcg total) by mouth daily, Disp: 30 tablet, Rfl: 0    digoxin (LANOXIN) 0.125 mg tablet, Take 1 tablet (125 mcg total) by mouth every other day, Disp: 15 tablet, Rfl: 0    dronedarone (Multaq) 400 mg tablet, Take 400 mg by mouth 2 (two) times a day with meals, Disp: , Rfl:     Ergocalciferol 50 MCG (2000 UT) TABS, Take 2,000 Units by mouth daily, Disp: , Rfl:     ferrous sulfate 325 (65 Fe) mg tablet, Take 1 tablet (325 mg total) by mouth daily with breakfast, Disp: 30 tablet, Rfl: 0    fludrocortisone (FLORINEF) 0.1 mg tablet, Take 1 tablet (0.1 mg total) by mouth daily, Disp: 30 tablet, Rfl: 0    folic acid (FOLVITE) 1 mg tablet, Take 1 tablet by mouth daily, Disp: , Rfl:     isosorbide mononitrate (IMDUR) 30 mg 24 hr tablet, Take 30 mg by mouth daily, Disp: , Rfl:     meclizine (ANTIVERT) 25 mg tablet, Take 1 tablet (25 mg total) by mouth every 8 (eight) hours as needed for dizziness, Disp: 30 tablet, Rfl: 0    melatonin 3 mg, Take 1 tablet (3 mg total) by mouth daily at bedtime as needed (insomnia), Disp: 30 tablet, Rfl: 0    Multiple Vitamins-Minerals (PreserVision AREDS 2) CAPS, Take 1 capsule by mouth in the morning, Disp: , Rfl:     Multiple Vitamins-Minerals (PreserVision AREDS) CAPS, Take 1 capsule by mouth daily, Disp: , Rfl:     Multiple Vitamins-Minerals (PX Mens Multivitamins) TABS, Take 1 tablet by mouth daily, Disp: , Rfl:     ondansetron  (ZOFRAN) 4 mg tablet, Take 4 mg by mouth every 8 (eight) hours as needed, Disp: , Rfl:     pantoprazole (PROTONIX) 40 mg tablet, Take 40 mg by mouth daily, Disp: , Rfl:     polyethylene glycol (MIRALAX) 17 g packet, Take 17 g by mouth 2 (two) times a day, Disp: 1020 g, Rfl: 0    polyvinyl alcohol (LIQUIFILM TEARS) 1.4 % ophthalmic solution, 1 drop, Disp: , Rfl:     primidone (MYSOLINE) 50 mg tablet, Take 50 mg by mouth every 12 (twelve) hours, Disp: , Rfl:     senna-docusate sodium (SENOKOT S) 8.6-50 mg per tablet, Take 1 tablet by mouth 2 (two) times a day, Disp: 60 tablet, Rfl: 0    tamsulosin (FLOMAX) 0.4 mg, Take 1 capsule (0.4 mg total) by mouth daily with dinner, Disp: 30 capsule, Rfl: 0    Vitamins A & D (VITAMIN A & D PO), Take 1 tablet by mouth 2 (two) times a day, Disp: , Rfl:         Cb Ji MD

## 2024-01-29 ENCOUNTER — TELEPHONE (OUTPATIENT)
Dept: UROLOGY | Facility: CLINIC | Age: 89
End: 2024-01-29

## 2024-01-29 NOTE — TELEPHONE ENCOUNTER
"SURGICAL PATHOLOGY REPORT (06/17/2003 4:42 PM EDT)  Lab Results - SURGICAL PATHOLOGY REPORT (06/17/2003 4:42 PM EDT)  Component Value Ref Range Test Method Analysis Time Performed At Pathologist Signature   Surg Path Report              THE         Patient: MIGUEL ANGEL ORTEGA          Path# [id number]        [facility] MR # [id number]      Accessioned 06/17/2003             SURGICAL      YOB: 1928  (Age 75) Loc: [address] [place] [address]          PATHOLOGY       [address]   Gender: M                 Spec. Taken 06/17/2003        [place]          [id number]     [facility] Physician:          RITA RETANA M.D.     ==================================================================     INTERPRETATION AND DIAGNOSIS:  (patience)   06/18/2003 @ 02:57 pm     1-5.  PROSTATE, RIGHT APEX, RIGHT MID, RIGHT BASE, LEFT APEX, LEFT   MID (BX.):  BENIGN PROSTATIC TISSUE.     6.  PROSTATE, LEFT BASE (BX.):  SMALL FOCI OF ADENOCARCINOMA OF THE   PROSTATE, SHAKILA GRADE 3+3=6 INVOLVING TWO (2) OF TWO (2) CORES   (<10%, <10%). SEE NOTE.     NOTE:  This case was shown at the daily  conference   (part 6).                                 MIRNA HARE M.D.  PATIENCE*     *Electronic signature (06/18/2003 @ 02:57 pm) by which I attest that    the above diagnosis is based upon my personal examination of the    slides (and / or other material indicated in the diagnosis), and    that I have reviewed and approved this report.   ==================================================================     GROSS DESCRIPTION     PART #1: RIGHT APEX (kmg)   Resident Pathologist: XOCHITL MERRILL M.D., PH.D.  06/17/2003     The specimen is received in formalin labeled with the patient's name   Miguel Angel Ortega and designated \"Right apex\".  It consists of two pieces   of tissue, the largest measuring 1.3 x 0.2 x 0.1 cm in greatest   dimension.  The entire specimen is submitted.     SUMMARY OF SECTIONS   1  -  A    " "   - 2   1  -  TOTAL   - 2       PART #2: RIGHT MID ([facility])   Resident Pathologist: XOCHITL MERRILL M.D., PH.D.  06/17/2003     The specimen is received in formalin labeled with the patient's name   Miguel Angel Ortega and designated \"Right mid\".  It consists of two pieces   of tissue, the largest measuring 1.3 x 0.2 x 0.1 cm in greatest   dimension.  The entire specimen is submitted.     SUMMARY OF SECTIONS   1  -  A       - 2   1  -  TOTAL   - 2       PART #3: RIGHT BASE ([facility])   Resident Pathologist: XOCHITL MERRILL M.D., PH.D.  06/17/2003     The specimen is received in formalin labeled with the patient's name   Miguel Angel Ortega and designated \"Right base\".  It consists of two pieces   of tissue, the largest measuring 1.7 x 0.2 x 0.1 cm in greatest   dimension.  The entire specimen is submitted.     SUMMARY OF SECTIONS   1  -  A       - 2   1  -  TOTAL   - 2       PART #4: LEFT APEX ([facility])   Resident Pathologist: XOCHITL MERRILL M.D., PH.D.  06/17/2003     The specimen is received in formalin labeled with the patient's name   Miguel Angel Ortega and designated \"Left apex\".  It consists of three   pieces of tissue plus fragment, the largest measuring 1.0 x 0.2 x 0.1   cm in greatest dimension.  The entire specimen is submitted.     SUMMARY OF SECTIONS   1  -  A       - 3   1  -  TOTAL   - 3       PART #5: LEFT MID ([facility])   Resident Pathologist: XOCHITL MERRILL M.D., PH.D.  06/17/2003     The specimen is received in formalin labeled with the patient's name   Miguel Angel Ortega and designated \"Left mid\".  It consists of three pieces   of tissue, the largest measuring 1.2 x 0.2 x 0.1 cm in greatest   dimension.  The entire specimen is submitted.     SUMMARY OF SECTIONS   1  -  A       - 3   1  -  TOTAL   - 3       PART #6: LEFT BASE ([facility])   Resident Pathologist: XOCHITL MERRILL M.D., PH.D.  06/17/2003     The specimen is received in formalin labeled with the patient's name   Jordan Miguel Angel and designated \"Left base\".  It " consists of two pieces   of tissue, the largest measuring 1.5 x 0.2 x 0.1 cm in greatest   dimension.  The entire specimen is submitted.     SUMMARY OF SECTIONS   1  -  A       - 2   1  -  TOTAL   - 2       Other Surgical Pathology Specimens known to the computer:  [id number]     ==================================================================     (End of Report)                               printed 01/08/2019 14:52     Reported by:   [facility], Surgical Pathology   [address], [place] [address]   Tel: [phone]     Final Report Signed on 06/18/2003 at 02:57 pm       PDS

## 2024-01-29 NOTE — TELEPHONE ENCOUNTER
----- Message from Cb Ji MD sent at 1/25/2024  6:58 AM EST -----  We are seeing this yeimy next Wednesday the 31st.  Sherif's been treating him for prostate cancer.  See if we can get his last Lupron shot from their office?  And when his last PSA was?  Also do we know if this yeimy has a Ascencio and or not it was removed prior to leaving [facility] on 12/29/2023.  If we know where he is residing now see if he has a Ascencio and or a condom catheter or what the situation might be I appreciated thanks

## 2024-01-29 NOTE — TELEPHONE ENCOUNTER
Left message with Dr. Farrar office to call our office back. Pt is in assisted living [facility], does not have catheter.

## 2024-01-31 ENCOUNTER — TELEPHONE (OUTPATIENT)
Age: 89
End: 2024-01-31

## 2024-01-31 ENCOUNTER — OFFICE VISIT (OUTPATIENT)
Dept: UROLOGY | Facility: CLINIC | Age: 89
End: 2024-01-31
Payer: MEDICARE

## 2024-01-31 VITALS
BODY MASS INDEX: 23.27 KG/M2 | RESPIRATION RATE: 20 BRPM | TEMPERATURE: 98.2 F | HEIGHT: 71 IN | DIASTOLIC BLOOD PRESSURE: 64 MMHG | SYSTOLIC BLOOD PRESSURE: 148 MMHG | WEIGHT: 166.2 LBS

## 2024-01-31 DIAGNOSIS — N31.9 NEUROGENIC BLADDER: ICD-10-CM

## 2024-01-31 DIAGNOSIS — C61 PROSTATE CANCER (HCC): Primary | ICD-10-CM

## 2024-01-31 DIAGNOSIS — N39.41 URGE INCONTINENCE: ICD-10-CM

## 2024-01-31 DIAGNOSIS — Z87.898 HISTORY OF GROSS HEMATURIA: ICD-10-CM

## 2024-01-31 DIAGNOSIS — R31.0 GROSS HEMATURIA: ICD-10-CM

## 2024-01-31 DIAGNOSIS — N30.40 RADIATION CYSTITIS: ICD-10-CM

## 2024-01-31 DIAGNOSIS — N28.1 RENAL CYST: ICD-10-CM

## 2024-01-31 PROCEDURE — 1124F ACP DISCUSS-NO DSCNMKR DOCD: CPT | Performed by: UROLOGY

## 2024-01-31 PROCEDURE — 99214 OFFICE O/P EST MOD 30 MIN: CPT | Performed by: UROLOGY

## 2024-01-31 RX ORDER — FUROSEMIDE 20 MG/1
TABLET ORAL
COMMUNITY
Start: 2024-01-29

## 2024-01-31 RX ORDER — MAGNESIUM HYDROXIDE/ALUMINUM HYDROXICE/SIMETHICONE 120; 1200; 1200 MG/30ML; MG/30ML; MG/30ML
SUSPENSION ORAL
COMMUNITY

## 2024-01-31 RX ORDER — BICALUTAMIDE 50 MG/1
TABLET, FILM COATED ORAL
COMMUNITY
Start: 2024-01-11 | End: 2024-02-08 | Stop reason: ALTCHOICE

## 2024-01-31 NOTE — TELEPHONE ENCOUNTER
Dianne from [facility] calling in with PSA number. PSA was 1.58. Will send to . This was drawn on 1/29/24. They will be faxing results in.

## 2024-01-31 NOTE — PATIENT INSTRUCTIONS
For the assisted living;    1.  Please continue Flomax which is also called tamsulosin.    2.  Okay to stop the Casodex which is also called bicalutamide.    3.  Samples of Gemtesa 75 mg to take once a day given to help for urgency.  Also prescription was sent in for it.    4.  Slip for PSA to get done in August 2024 urology will call with the results.    5.  Follow-up with Dr. Ji in 1 year or sooner if there is a concern.    6.  No Lupron therapy, and per patient please cancel appointment with his urologist Dr. Gorman

## 2024-02-08 ENCOUNTER — OFFICE VISIT (OUTPATIENT)
Dept: CARDIOLOGY CLINIC | Facility: CLINIC | Age: 89
End: 2024-02-08
Payer: MEDICARE

## 2024-02-08 VITALS
DIASTOLIC BLOOD PRESSURE: 60 MMHG | OXYGEN SATURATION: 98 % | HEART RATE: 63 BPM | HEIGHT: 71 IN | BODY MASS INDEX: 23.52 KG/M2 | WEIGHT: 168 LBS | SYSTOLIC BLOOD PRESSURE: 122 MMHG

## 2024-02-08 DIAGNOSIS — I25.10 CORONARY ARTERY DISEASE INVOLVING NATIVE CORONARY ARTERY OF NATIVE HEART WITHOUT ANGINA PECTORIS: ICD-10-CM

## 2024-02-08 DIAGNOSIS — I48.0 PAROXYSMAL ATRIAL FIBRILLATION (HCC): Primary | ICD-10-CM

## 2024-02-08 DIAGNOSIS — I95.1 ORTHOSTATIC HYPOTENSION: ICD-10-CM

## 2024-02-08 DIAGNOSIS — N31.9 NEUROGENIC BLADDER: Primary | ICD-10-CM

## 2024-02-08 DIAGNOSIS — D64.9 ACUTE ON CHRONIC ANEMIA: ICD-10-CM

## 2024-02-08 PROCEDURE — 99214 OFFICE O/P EST MOD 30 MIN: CPT | Performed by: NURSE PRACTITIONER

## 2024-02-08 RX ORDER — FLUDROCORTISONE ACETATE 0.1 MG/1
0.1 TABLET ORAL DAILY
COMMUNITY

## 2024-02-08 RX ORDER — SOLIFENACIN SUCCINATE 5 MG/1
5 TABLET, FILM COATED ORAL DAILY
Qty: 90 TABLET | Refills: 3 | Status: SHIPPED | OUTPATIENT
Start: 2024-02-08

## 2024-02-08 NOTE — ASSESSMENT & PLAN NOTE
Remote history of CABG. Details are unknown.  He adamantly denies anginal complaints.  Preserved LVEF on 9/2023 echo.  Remains on Plavix 75 mg daily.  No beta blocker due to hypotension.  Not maintained on statin, however LDL was 62 on recent labs and statin therapy is unlikely to provide additional benefit at this time.  Reviewed s/s to report.  Will monitor.

## 2024-02-08 NOTE — TELEPHONE ENCOUNTER
Pt & daughter in office seeing cardiology. Octavio cost $ 1000 for a 90 day script. Please send alternative.

## 2024-02-08 NOTE — PROGRESS NOTES
Cardiology Follow Up    Miguel Angel Ortega  2/28/1928  [id number]  [facility] [place] [address]  [address]  [facility] [address] [phone]    Miguel Angel presents for follow up for atrial fibrillation, orthostatic hypotension.    1. Paroxysmal atrial fibrillation (HCC)  Assessment & Plan:  History of paroxysmal atrial fibrillation following bypass surgery.  He has been maintained on Multaq and digoxin.  Avoiding beta blockers due to hypotension.  He has not been maintained on full anticoagulation and will defer at this time given recent significant anemia requiring blood transfusion.  He remained in sinus rhythm during his nearly month-long hospitalization.  Will continue to monitor. We did review his stroke risk today.  Continue Multaq 400 mg BID and digoxin 125 mcg every other day.  Dig level in April 2024.    Orders:  -     Ambulatory referral to Cardiology  -     Digoxin level; Future; Expected date: 04/15/2024    2. Orthostatic hypotension  Assessment & Plan:  History of hypotension. Not maintained on beta blocker or anti-hypertensives.  Started on Florinef 0.1 mg by the internal medicine team during his recent hospital admission with colitis and hematuria requiring blood transfusion.  Now on furosemide as well through his PCP.  I recommend discontinuation of both Florinef and furosemide with close monitoring of BP.  Could consider use of midodrine instead if needed.  Continue use of compression socks, change position slowly, pump feet prior to standing.  Will defer these changes to his PCP. Will continue to monitor.    Orders:  -     Ambulatory referral to Cardiology    3. Coronary artery disease involving native coronary artery of native heart without angina pectoris  Assessment & Plan:  Remote history of CABG. Details are unknown.  He adamantly denies anginal complaints.  Preserved LVEF on 9/2023 echo.  Remains on Plavix 75 mg daily.  No beta blocker due  to hypotension.  Not maintained on statin, however LDL was 62 on recent labs and statin therapy is unlikely to provide additional benefit at this time.  Reviewed s/s to report.  Will monitor.      4. Acute on chronic anemia  Assessment & Plan:  Drop in hemoglobin from 10-7 during hospital admission in the setting of hematuria.  Received 1 unit PRBC and Venofer infusions.  Continue daily iron supplementation.  Will continue to monitor blood counts. Hemoglobin 8.8 on recent labs.         NATHAN Michelle has a past medical history of CAD s/p remote CABG, paroxysmal atrial fibrillation on Multaq not anticoagulated, hypotension, hyponatremia, anemia, prostate cancer with mets to the bone.    He reports distant bypass surgery. Records are not available and details are unclear. He has not followed with a cardiologist in many years. He has history of paroxysmal atrial fibrillation following bypass surgery. He was previously maintained on warfarin, but has not been on full anticoagulation in many years. He has been maintained on Multaq 400 mg BID, digoxin 125 mcg daily, and Plavix 75 mg daily. He underwent an echocardiogram at [facility] on 9/27/2023 which showed LVEF 55-60% with mild LVH and grade 1 diastolic dysfunction, RV dilation, mild-mod AI, aortic root dilation of 4.6 cm.     He was admitted to [facility] 12/13/2023-1/4/2024. He initially presented with c/o urinary frequency and constipation and was diagnosed with stercoral colitis and urinary retention and hematuria. He was also treated for acute on chronic anemia requiring blood transfusion. He underwent GI and urologic evaluation during admission. Cardiology was consulted for management of orthostatic hypotension with recommendation to avoid diuretics, use compression socks, and treat underlying anemia. During admission Florinef 0.1 mg daily was added by the primary team. Digoxin was reduced to 125 mcg every other day. He remained in sinus rhythm during admission.    2/8/2024:  Miguel Angel presents today accompanied by his family friend Elizabeth Gil. He is currently residing at [facility]. He is accompanied by a medication list. He saw his PCP on 2/5 and was noted to have an elevated BNP, therefore furosemide 20 mg every other day was added. We reviewed his lab results from 1/25/24. CBC shows stable hemoglobin at 8.8. He states he is feeling very well. He denies any lightheadedness or dizziness. No chest pain, shortness of breath, orthopnea. He does have improving edema. He wears compression socks faithfully. No recent bleeding issues. No falls. He tries to eat well. He admits he does not drink enough fluids. He is participating in therapy and doing exercises on his own and states he is tolerating very well.     Medical Problems       Problem List       Prostate cancer metastatic to bone (HCC)    Stercoral colitis    Acute urinary retention    Hyponatremia    Permanent atrial fibrillation (HCC)    Acute on chronic anemia    Orthostatic hypotension    Gross hematuria        Past Medical History:   Diagnosis Date    A-fib (HCC)     Coronary artery disease     History of angina     Prostate cancer (HCC)      Social History     Socioeconomic History    Marital status: Single     Spouse name: Not on file    Number of children: Not on file    Years of education: Not on file    Highest education level: Not on file   Occupational History    Not on file   Tobacco Use    Smoking status: Former     Types: Cigarettes    Smokeless tobacco: Never   Vaping Use    Vaping status: Never Used   Substance and Sexual Activity    Alcohol use: Not Currently    Drug use: Not Currently    Sexual activity: Not on file   Other Topics Concern    Not on file   Social History Narrative    Not on file     Social Determinants of Health     Financial Resource Strain: Low Risk  (9/26/2023)    Received from [facility]    Overall Financial Resource Strain (CARDIA)     Difficulty of Paying Living  Expenses: Not hard at all   Food Insecurity: No Food Insecurity (12/14/2023)    Hunger Vital Sign     Worried About Running Out of Food in the Last Year: Never true     Ran Out of Food in the Last Year: Never true   Transportation Needs: No Transportation Needs (12/14/2023)    PRAPARE - Transportation     Lack of Transportation (Medical): No     Lack of Transportation (Non-Medical): No   Physical Activity: Not on file   Stress: Not on file   Social Connections: Not on file   Intimate Partner Violence: Not At Risk (9/26/2023)    Received from [facility]    Humiliation, Afraid, Rape, and Kick questionnaire     Fear of Current or Ex-Partner: No     Emotionally Abused: No     Physically Abused: No     Sexually Abused: No   Housing Stability: High Risk (12/14/2023)    Housing Stability Vital Sign     Unable to Pay for Housing in the Last Year: No     Number of Places Lived in the Last Year: 3     Unstable Housing in the Last Year: No      Family History   Problem Relation Age of Onset    Hypertension Father      Past Surgical History:   Procedure Laterality Date    CORONARY ARTERY BYPASS GRAFT  1995    [place] CYSTO W/IRRIG & EVAC MULTPLE OBSTRUCTING CLOTS N/A 12/27/2023    Procedure: CYSTOSCOPY EVACUATION OF CLOTS, fulguration of bleeding. insertion 24FR [address] zuniga CBI;  Surgeon: Carson Singer MD;  Location: [facility];  Service: Urology    TONSILLECTOMY         Current Outpatient Medications:     alendronate (FOSAMAX) 70 mg tablet, Take 1 tablet by mouth once a week, Disp: , Rfl:     aluminum-magnesium hydroxide-simethicone (Antacid) 2450-7866-848 mg/30 mL suspension, Take by mouth, Disp: , Rfl:     bisacodyl (DULCOLAX) 10 mg suppository, Insert 1 suppository (10 mg total) into the rectum daily as needed for constipation, Disp: 12 suppository, Rfl: 0    clopidogrel (PLAVIX) 75 mg tablet, Take 75 mg by mouth daily, Disp: , Rfl:     cyanocobalamin (VITAMIN B-12) 1000 MCG tablet, Take 1 tablet (1,000 mcg  total) by mouth daily, Disp: 30 tablet, Rfl: 0    digoxin (LANOXIN) 0.125 mg tablet, Take 1 tablet (125 mcg total) by mouth every other day, Disp: 15 tablet, Rfl: 0    dronedarone (Multaq) 400 mg tablet, Take 400 mg by mouth 2 (two) times a day with meals, Disp: , Rfl:     ferrous sulfate 325 (65 Fe) mg tablet, Take 1 tablet (325 mg total) by mouth daily with breakfast, Disp: 30 tablet, Rfl: 0    fludrocortisone (FLORINEF) 0.1 mg tablet, Take 0.1 mg by mouth daily, Disp: , Rfl:     folic acid (FOLVITE) 1 mg tablet, Take 1 tablet by mouth daily, Disp: , Rfl:     furosemide (LASIX) 20 mg tablet, , Disp: , Rfl:     melatonin 3 mg, Take 1 tablet (3 mg total) by mouth daily at bedtime as needed (insomnia), Disp: 30 tablet, Rfl: 0    Multiple Vitamins-Minerals (PreserVision AREDS) CAPS, Take 1 capsule by mouth daily, Disp: , Rfl:     ondansetron (ZOFRAN) 4 mg tablet, Take 4 mg by mouth every 8 (eight) hours as needed, Disp: , Rfl:     pantoprazole (PROTONIX) 40 mg tablet, Take 40 mg by mouth daily, Disp: , Rfl:     polyethylene glycol (MIRALAX) 17 g packet, Take 17 g by mouth 2 (two) times a day, Disp: 1020 g, Rfl: 0    primidone (MYSOLINE) 50 mg tablet, Take 50 mg by mouth every 12 (twelve) hours, Disp: , Rfl:     senna-docusate sodium (SENOKOT S) 8.6-50 mg per tablet, Take 1 tablet by mouth 2 (two) times a day, Disp: 60 tablet, Rfl: 0    tamsulosin (FLOMAX) 0.4 mg, Take 1 capsule (0.4 mg total) by mouth daily with dinner, Disp: 30 capsule, Rfl: 0    solifenacin (VESICARE) 5 mg tablet, Take 1 tablet (5 mg total) by mouth daily, Disp: 90 tablet, Rfl: 3  No Known Allergies    Labs:     Chemistry        Component Value Date/Time    K 4.5 01/03/2024 0501    K 4.3 11/22/2023 1358     01/03/2024 0501     11/22/2023 1358    CO2 26 01/03/2024 0501    CO2 26 11/22/2023 1358    BUN 21 01/03/2024 0501    BUN 20 11/22/2023 1358    CREATININE 0.98 01/03/2024 0501    CREATININE 1.10 11/22/2023 1358        Component Value  "Date/Time    CALCIUM 8.0 (L) 01/03/2024 0501    CALCIUM 8.3 (L) 11/22/2023 1358    ALKPHOS 50 12/14/2023 0546    ALKPHOS 57 11/22/2023 1358    AST 15 12/14/2023 0546    AST 12 11/22/2023 1358    ALT 7 12/14/2023 0546    ALT 7 11/22/2023 1358        Cardiac testing:    Lipid panel 1/9/2024: C 125. T 63. H 50. L 62    ECG 12/18/2023: Sinus bradycardia. Nonspecific ST abnormality.    Echocardiogram 9/27/2023 ([facility]):    Ascending Aorta: Aortic root is dilated, measures 4.6cm. Ascending   aorta not well visualized. Recommend dedicated thoracic imaging.     Left Ventricle: Left ventricle was not well visualized. Left ventricle   is normal in size. Definity contrast utilized. No evidence of LV thrombus.   There is mild hypertrophy. Systolic function is normal with an ejection   fraction of 55-60%. Wall motion is within normal limits. There is grade I   (mild) diastolic dysfunction.     Left Atrium: Left atrium cavity size is normal.     Right Ventricle: Right ventricle cavity is moderately dilated. Systolic   function is normal. Normal tricuspid annular plane systolic excursion   (TAPSE) >1.7 cm.     Aortic Valve: The aortic valve is trileaflet. There is mild to moderate   regurgitation.     Review of Systems   Constitutional: Negative.   HENT: Negative.     Cardiovascular:  Positive for leg swelling (improving). Negative for chest pain, dyspnea on exertion, irregular heartbeat, near-syncope, orthopnea and palpitations.   Respiratory:  Negative for cough and snoring.    Endocrine: Negative.    Skin: Negative.    Musculoskeletal: Negative.    Gastrointestinal: Negative.    Genitourinary: Negative.    Neurological: Negative.    Psychiatric/Behavioral: Negative.         Vitals:    02/08/24 1509   BP: 122/60   Pulse: 63   SpO2: 98%     Vitals:    02/08/24 1509   Weight: 76.2 kg (168 lb)     Height: 5' 11\" (180.3 cm)   Body mass index is 23.43 kg/m².    Physical Exam  Vitals and nursing note reviewed.   Constitutional:       " General: He is not in acute distress.     Appearance: He is well-developed. He is not diaphoretic.   HENT:      Head: Normocephalic and atraumatic.   Neck:      Vascular: No carotid bruit or JVD.   Cardiovascular:      Rate and Rhythm: Normal rate and regular rhythm. Occasional Extrasystoles are present.     Pulses: Intact distal pulses.      Heart sounds: S1 normal and S2 normal. Murmur heard.      No friction rub. No gallop.      Comments: Mild bilateral ankle edema, compression socks in place  Pulmonary:      Effort: Pulmonary effort is normal. No respiratory distress.      Breath sounds: Normal breath sounds.   Abdominal:      General: There is no distension.      Palpations: Abdomen is soft.      Tenderness: There is no abdominal tenderness.   Musculoskeletal:      Comments: Transported via wheelchair. He is able to stand to bear weight without lightheadedness during my exam.   Skin:     General: Skin is warm and dry.      Findings: No rash.   Neurological:      Mental Status: He is alert and oriented to person, place, and time.   Psychiatric:         Behavior: Behavior normal.

## 2024-02-08 NOTE — ASSESSMENT & PLAN NOTE
Drop in hemoglobin from 10-7 during hospital admission in the setting of hematuria.  Received 1 unit PRBC and Venofer infusions.  Continue daily iron supplementation.  Will continue to monitor blood counts. Hemoglobin 8.8 on recent labs.

## 2024-02-08 NOTE — PATIENT INSTRUCTIONS
Blood pressure is acceptable today.  I recommend discussing with Dr. Armstrong about potentially stopping both the Florinef and furosemide as they counteract eachother and maybe you would do well off both. I do recommend we monitor your blood pressure closely. Continue compression socks. Increase water intake to 64 ounces daily. Do leg pumps before getting up and change position slowly.  Continue current medications. Check a dixogin level this spring - I printed the order for Gadiel Sky to collect.  We will continue to monitor you for recurrent a fib as you are at higher risk of stroke being off a full blood thinner.

## 2024-02-08 NOTE — ASSESSMENT & PLAN NOTE
History of paroxysmal atrial fibrillation following bypass surgery.  He has been maintained on Multaq and digoxin.  Avoiding beta blockers due to hypotension.  He has not been maintained on full anticoagulation and will defer at this time given recent significant anemia requiring blood transfusion.  He remained in sinus rhythm during his nearly month-long hospitalization.  Will continue to monitor. We did review his stroke risk today.  Continue Multaq 400 mg BID and digoxin 125 mcg every other day.  Dig level in April 2024.

## 2024-02-08 NOTE — ASSESSMENT & PLAN NOTE
History of hypotension. Not maintained on beta blocker or anti-hypertensives.  Started on Florinef 0.1 mg by the internal medicine team during his recent hospital admission with colitis and hematuria requiring blood transfusion.  Now on furosemide as well through his PCP.  I recommend discontinuation of both Florinef and furosemide with close monitoring of BP.  Could consider use of midodrine instead if needed.  Continue use of compression socks, change position slowly, pump feet prior to standing.  Will defer these changes to his PCP. Will continue to monitor.

## 2024-02-21 ENCOUNTER — TELEPHONE (OUTPATIENT)
Dept: CARDIOLOGY CLINIC | Facility: CLINIC | Age: 89
End: 2024-02-21

## 2024-02-21 NOTE — TELEPHONE ENCOUNTER
Elizabeth states that Dr. Armstrong is stopping the florinef and keeping the lasix. He is having swelling his legs.     He did not change anything else during that appointment.

## 2024-02-21 NOTE — TELEPHONE ENCOUNTER
Spoke with Elizabeth. Med list updated. He will see Dr. Armstrong for follow up in 2 weeks. BP will be monitored at [facility].

## 2024-03-02 ENCOUNTER — HOSPITAL ENCOUNTER (EMERGENCY)
Facility: HOSPITAL | Age: 89
Discharge: HOME | End: 2024-03-03
Attending: EMERGENCY MEDICINE
Payer: MEDICARE

## 2024-03-02 ENCOUNTER — APPOINTMENT (EMERGENCY)
Dept: RADIOLOGY | Facility: HOSPITAL | Age: 89
End: 2024-03-02
Payer: MEDICARE

## 2024-03-02 ENCOUNTER — APPOINTMENT (EMERGENCY)
Dept: CT IMAGING | Facility: HOSPITAL | Age: 89
End: 2024-03-02
Payer: MEDICARE
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
## 2024-03-02 DIAGNOSIS — S09.90XA INJURY OF HEAD, INITIAL ENCOUNTER: ICD-10-CM

## 2024-03-02 DIAGNOSIS — R33.9 URINARY RETENTION: ICD-10-CM

## 2024-03-02 LAB
ALBUMIN SERPL BCP-MCNC: 3.7 G/DL (ref 3.5–5)
ALP SERPL-CCNC: 52 U/L (ref 34–104)
ALT SERPL W P-5'-P-CCNC: 11 U/L (ref 7–52)
ANION GAP SERPL CALCULATED.3IONS-SCNC: 3 MMOL/L
AST SERPL W P-5'-P-CCNC: 23 U/L (ref 13–39)
BASOPHILS # BLD AUTO: 0.02 THOUSANDS/ÂΜL (ref 0–0.1)
BASOPHILS NFR BLD AUTO: 0 % (ref 0–1)
BILIRUB SERPL-MCNC: 0.71 MG/DL (ref 0.2–1)
BUN SERPL-MCNC: 31 MG/DL (ref 5–25)
CALCIUM SERPL-MCNC: 8.2 MG/DL (ref 8.4–10.2)
CHLORIDE SERPL-SCNC: 101 MMOL/L (ref 96–108)
CO2 SERPL-SCNC: 26 MMOL/L (ref 21–32)
CREAT SERPL-MCNC: 1.17 MG/DL (ref 0.6–1.3)
EOSINOPHIL # BLD AUTO: 0.01 THOUSAND/ÂΜL (ref 0–0.61)
EOSINOPHIL NFR BLD AUTO: 0 % (ref 0–6)
ERYTHROCYTE [DISTWIDTH] IN BLOOD BY AUTOMATED COUNT: 13.2 % (ref 11.6–15.1)
GFR SERPL CREATININE-BSD FRML MDRD: 52 ML/MIN/1.73SQ M
GLUCOSE SERPL-MCNC: 107 MG/DL (ref 65–140)
GLUCOSE SERPL-MCNC: 113 MG/DL (ref 65–140)
HCT VFR BLD AUTO: 32.4 % (ref 36.5–49.3)
HGB BLD-MCNC: 11.4 G/DL (ref 12–17)
IMM GRANULOCYTES # BLD AUTO: 0.04 THOUSAND/UL (ref 0–0.2)
IMM GRANULOCYTES NFR BLD AUTO: 1 % (ref 0–2)
LYMPHOCYTES # BLD AUTO: 0.18 THOUSANDS/ÂΜL (ref 0.6–4.47)
LYMPHOCYTES NFR BLD AUTO: 3 % (ref 14–44)
MCH RBC QN AUTO: 32.7 PG (ref 26.8–34.3)
MCHC RBC AUTO-ENTMCNC: 35.2 G/DL (ref 31.4–37.4)
MCV RBC AUTO: 93 FL (ref 82–98)
MONOCYTES # BLD AUTO: 0.37 THOUSAND/ÂΜL (ref 0.17–1.22)
MONOCYTES NFR BLD AUTO: 7 % (ref 4–12)
NEUTROPHILS # BLD AUTO: 5.09 THOUSANDS/ÂΜL (ref 1.85–7.62)
NEUTS SEG NFR BLD AUTO: 89 % (ref 43–75)
NRBC BLD AUTO-RTO: 0 /100 WBCS
PLATELET # BLD AUTO: 169 THOUSANDS/UL (ref 149–390)
PMV BLD AUTO: 8.6 FL (ref 8.9–12.7)
POTASSIUM SERPL-SCNC: 4.8 MMOL/L (ref 3.5–5.3)
PROT SERPL-MCNC: 6.6 G/DL (ref 6.4–8.4)
RBC # BLD AUTO: 3.49 MILLION/UL (ref 3.88–5.62)
SODIUM SERPL-SCNC: 130 MMOL/L (ref 135–147)
WBC # BLD AUTO: 5.71 THOUSAND/UL (ref 4.31–10.16)

## 2024-03-02 PROCEDURE — 82948 REAGENT STRIP/BLOOD GLUCOSE: CPT

## 2024-03-02 PROCEDURE — 93005 ELECTROCARDIOGRAM TRACING: CPT

## 2024-03-02 PROCEDURE — 72170 X-RAY EXAM OF PELVIS: CPT

## 2024-03-02 PROCEDURE — 70450 CT HEAD/BRAIN W/O DYE: CPT

## 2024-03-02 PROCEDURE — 80053 COMPREHEN METABOLIC PANEL: CPT | Performed by: EMERGENCY MEDICINE

## 2024-03-02 PROCEDURE — 72125 CT NECK SPINE W/O DYE: CPT

## 2024-03-02 PROCEDURE — 85025 COMPLETE CBC W/AUTO DIFF WBC: CPT | Performed by: EMERGENCY MEDICINE

## 2024-03-02 PROCEDURE — 96361 HYDRATE IV INFUSION ADD-ON: CPT

## 2024-03-02 PROCEDURE — 96374 THER/PROPH/DIAG INJ IV PUSH: CPT

## 2024-03-02 PROCEDURE — 99285 EMERGENCY DEPT VISIT HI MDM: CPT

## 2024-03-02 PROCEDURE — 96375 TX/PRO/DX INJ NEW DRUG ADDON: CPT

## 2024-03-02 RX ORDER — ACETAMINOPHEN 325 MG/1
650 TABLET ORAL ONCE
Status: COMPLETED | OUTPATIENT
Start: 2024-03-02 | End: 2024-03-02

## 2024-03-02 RX ORDER — ONDANSETRON 2 MG/ML
4 INJECTION INTRAMUSCULAR; INTRAVENOUS ONCE
Status: COMPLETED | OUTPATIENT
Start: 2024-03-02 | End: 2024-03-02

## 2024-03-02 RX ADMIN — ACETAMINOPHEN 650 MG: 325 TABLET, FILM COATED ORAL at 21:59

## 2024-03-02 RX ADMIN — SODIUM CHLORIDE 1000 ML: 0.9 INJECTION, SOLUTION INTRAVENOUS at 21:58

## 2024-03-02 RX ADMIN — ONDANSETRON 4 MG: 2 INJECTION INTRAMUSCULAR; INTRAVENOUS at 21:59

## 2024-03-02 RX ADMIN — MORPHINE SULFATE 2 MG: 2 INJECTION, SOLUTION INTRAMUSCULAR; INTRAVENOUS at 23:00

## 2024-03-03 VITALS
RESPIRATION RATE: 18 BRPM | BODY MASS INDEX: 24.91 KG/M2 | OXYGEN SATURATION: 98 % | TEMPERATURE: 98.4 F | DIASTOLIC BLOOD PRESSURE: 51 MMHG | HEART RATE: 74 BPM | WEIGHT: 178.57 LBS | SYSTOLIC BLOOD PRESSURE: 97 MMHG

## 2024-03-03 LAB
BILIRUB UR QL STRIP: NEGATIVE
CLARITY UR: NORMAL
COLOR UR: YELLOW
GLUCOSE UR STRIP-MCNC: NEGATIVE MG/DL
HGB UR QL STRIP.AUTO: NEGATIVE
KETONES UR STRIP-MCNC: NEGATIVE MG/DL
LEUKOCYTE ESTERASE UR QL STRIP: NEGATIVE
NITRITE UR QL STRIP: NEGATIVE
PH UR STRIP.AUTO: 6 [PH]
PROT UR STRIP-MCNC: NEGATIVE MG/DL
SP GR UR STRIP.AUTO: 1.02 (ref 1–1.03)
UROBILINOGEN UR QL STRIP.AUTO: 0.2 E.U./DL

## 2024-03-03 PROCEDURE — 81003 URINALYSIS AUTO W/O SCOPE: CPT | Performed by: EMERGENCY MEDICINE

## 2024-03-03 RX ORDER — CLOPIDOGREL BISULFATE 75 MG/1
75 TABLET ORAL DAILY
Status: DISCONTINUED | OUTPATIENT
Start: 2024-03-03 | End: 2024-03-03 | Stop reason: HOSPADM

## 2024-03-03 RX ORDER — PANTOPRAZOLE SODIUM 40 MG/1
40 TABLET, DELAYED RELEASE ORAL
Status: DISCONTINUED | OUTPATIENT
Start: 2024-03-03 | End: 2024-03-03 | Stop reason: HOSPADM

## 2024-03-03 RX ADMIN — PANTOPRAZOLE SODIUM 40 MG: 40 TABLET, DELAYED RELEASE ORAL at 06:56

## 2024-03-03 RX ADMIN — CLOPIDOGREL 75 MG: 75 TABLET ORAL at 08:32

## 2024-03-03 NOTE — ED PROVIDER NOTES
Emergency Department Trauma Note  Miguel Angel Ortega 89 y.o. male MRN: [id number]  Unit/Bed#: ED 06/ED 06 Encounter: [id number]      Trauma Alert: Trauma Acuity: Trauma Evaluation  Model of Arrival: Mode of Arrival: BLS via    Trauma Team: Current Providers  Attending Provider: Isela Haney DO  Attending Provider: Isela Haney DO  Registered Nurse: Alta Calderon RN  Registered Nurse: Viridiana Delgadillo RN  Consultants:     None      History of Present Illness     Chief Complaint:   Chief Complaint   Patient presents with    Fall     Presents from [facility] via EMS for c/o h/a after falling between 0700 and 0900 this morning. Pt on Plavix; + HS, + LOC. Unwitnessed. Pt does not recall any of the rest of the day up until EMS p/u. Aox4 upon arrival. Trauma alert called pre-arrival at 2030.     HPI:  Miguel Angel Ortega is a 89 y.o. male who presents with see below.  Mechanism:Details of Incident: Fall from wheelchair to floor Injury Date: 03/02/24 Injury Time: 0800      Patient is a 89-year-old male brought to the emergency department by EMS from [facility] secondary to fall with head injury and headache, patient reports that he was trying to get out of bed, there was a wheelchair next to the bed that patient thought was locked, when he went to grab it to steady himself the wheelchair moved causing him to lose his balance and fall, he states it was this evening and he laid on the floor for about 2 hours until he was able to crawl out to the lovell and yelled for help, he does not remember anything that happened in between so is concerned about possible LOC, he denies pain or injury other than the back of his head, denies any neck pain, no chest pain, no pain in his abdomen, his hips or his extremities, he denies feeling ill prior to his fall      Review of Systems   Constitutional: Negative.    HENT: Negative.     Eyes: Negative.    Respiratory: Negative.     Cardiovascular: Negative.     Gastrointestinal: Negative.    Endocrine: Negative.    Genitourinary: Negative.    Musculoskeletal: Negative.    Skin: Negative.    Allergic/Immunologic: Negative.    Neurological:  Positive for headaches.   Hematological: Negative.    Psychiatric/Behavioral: Negative.         Historical Information     Immunizations:   Immunization History   Administered Date(s) Administered    COVID-19 MODERNA VACC 0.5 ML IM 01/13/2021, 02/10/2021, 10/20/2021, 11/09/2021    COVID-19 Moderna Vac BIVALENT 12 Yr+ IM 0.5 ML 10/21/2022    Influenza, high dose seasonal 0.7 mL 08/30/2023    Pneumococcal Conjugate Vaccine 20-valent (Pcv20), Polysace 10/17/2016    Pneumococcal Polysaccharide PPV23 02/22/2013    Tuberculin Skin Test 10/02/2023, 10/12/2023       Past Medical History:   Diagnosis Date    A-fib (HCC)     Coronary artery disease     History of angina     Prostate cancer (HCC)        Family History   Problem Relation Age of Onset    Hypertension Father      Past Surgical History:   Procedure Laterality Date    CORONARY ARTERY BYPASS GRAFT  1995    NE CYSTO W/IRRIG & EVAC MULTPLE OBSTRUCTING CLOTS N/A 12/27/2023    Procedure: CYSTOSCOPY EVACUATION OF CLOTS, fulguration of bleeding. insertion 24FR 3 Way zuniga CBI;  Surgeon: Carson Singer MD;  Location:  MAIN OR;  Service: Urology    TONSILLECTOMY       Social History     Tobacco Use    Smoking status: Former     Types: Cigarettes    Smokeless tobacco: Never   Vaping Use    Vaping status: Never Used   Substance Use Topics    Alcohol use: Not Currently    Drug use: Not Currently     E-Cigarette/Vaping    E-Cigarette Use Never User      E-Cigarette/Vaping Substances       Family History: non-contributory    Meds/Allergies   Prior to Admission Medications   Prescriptions Last Dose Informant Patient Reported? Taking?   Multiple Vitamins-Minerals (PreserVision AREDS) CAPS   Yes No   Sig: Take 1 capsule by mouth daily   Vibegron 75 MG TABS   No No   Sig: Take 75 mg by mouth in the  morning   alendronate (FOSAMAX) 70 mg tablet   Yes No   Sig: Take 1 tablet by mouth once a week   aluminum-magnesium hydroxide-simethicone (Antacid) 7463-4086-135 mg/30 mL suspension   Yes No   Sig: Take by mouth   bisacodyl (DULCOLAX) 10 mg suppository   No No   Sig: Insert 1 suppository (10 mg total) into the rectum daily as needed for constipation   clopidogrel (PLAVIX) 75 mg tablet   Yes No   Sig: Take 75 mg by mouth daily   cyanocobalamin (VITAMIN B-12) 1000 MCG tablet   No No   Sig: Take 1 tablet (1,000 mcg total) by mouth daily   digoxin (LANOXIN) 0.125 mg tablet   No No   Sig: Take 1 tablet (125 mcg total) by mouth every other day   dronedarone (Multaq) 400 mg tablet   Yes No   Sig: Take 400 mg by mouth 2 (two) times a day with meals   ferrous sulfate 325 (65 Fe) mg tablet   No No   Sig: Take 1 tablet (325 mg total) by mouth daily with breakfast   folic acid (FOLVITE) 1 mg tablet   Yes No   Sig: Take 1 tablet by mouth daily   furosemide (LASIX) 20 mg tablet   Yes No   melatonin 3 mg   No No   Sig: Take 1 tablet (3 mg total) by mouth daily at bedtime as needed (insomnia)   ondansetron (ZOFRAN) 4 mg tablet   Yes No   Sig: Take 4 mg by mouth every 8 (eight) hours as needed   pantoprazole (PROTONIX) 40 mg tablet   Yes No   Sig: Take 40 mg by mouth daily   polyethylene glycol (MIRALAX) 17 g packet   No No   Sig: Take 17 g by mouth 2 (two) times a day   primidone (MYSOLINE) 50 mg tablet   Yes No   Sig: Take 50 mg by mouth every 12 (twelve) hours   senna-docusate sodium (SENOKOT S) 8.6-50 mg per tablet   No No   Sig: Take 1 tablet by mouth 2 (two) times a day   solifenacin (VESICARE) 5 mg tablet   No No   Sig: Take 1 tablet (5 mg total) by mouth daily   tamsulosin (FLOMAX) 0.4 mg   No No   Sig: Take 1 capsule (0.4 mg total) by mouth daily with dinner      Facility-Administered Medications: None       No Known Allergies    PHYSICAL EXAM    PE limited by: NA    Objective   Vitals:   First set: Temperature: 98.4 °F  (36.9 °C) (03/02/24 2045)  Pulse: 75 (03/02/24 2045)  Respirations: 18 (03/02/24 2045)  Blood Pressure: 123/56 (03/02/24 2045)  SpO2: 95 % (03/02/24 2045)    Primary Survey:   (A) Airway: intact  (B) Breathing: intact  (C) Circulation: Pulses:   normal  (D) Disabliity:  GCS Total:  15  (E) Expose:  Completed    Secondary Survey: (Click on Physical Exam tab above)  Physical Exam  Constitutional:       Appearance: He is well-developed.   HENT:      Head: Normocephalic and atraumatic.        Comments: Tenderness to posterior scalp  Eyes:      Conjunctiva/sclera: Conjunctivae normal.      Pupils: Pupils are equal, round, and reactive to light.   Cardiovascular:      Rate and Rhythm: Normal rate.   Pulmonary:      Effort: Pulmonary effort is normal.   Abdominal:      Palpations: Abdomen is soft.   Musculoskeletal:         General: Normal range of motion.      Cervical back: Normal range of motion and neck supple.   Skin:     General: Skin is warm and dry.   Neurological:      Mental Status: He is alert and oriented to person, place, and time.         Cervical spine cleared by clinical criteria? No (imaging required)      Invasive Devices       Peripheral Intravenous Line  Duration             Peripheral IV 03/02/24 Dorsal (posterior);Right Forearm <1 day              Drain  Duration             External Urinary Catheter Medium 65 days                    Lab Results:   Results Reviewed       Procedure Component Value Units Date/Time    Comprehensive metabolic panel [[id number]]  (Abnormal) Collected: 03/02/24 2201    Lab Status: Final result Specimen: Blood from Arm, Right Updated: 03/02/24 2224     Sodium 130 mmol/L      Potassium 4.8 mmol/L      Chloride 101 mmol/L      CO2 26 mmol/L      ANION GAP 3 mmol/L      BUN 31 mg/dL      Creatinine 1.17 mg/dL      Glucose 107 mg/dL      Calcium 8.2 mg/dL      AST 23 U/L      ALT 11 U/L      Alkaline Phosphatase 52 U/L      Total Protein 6.6 g/dL      Albumin 3.7 g/dL      Total  Bilirubin 0.71 mg/dL      eGFR 52 ml/min/1.73sq m     Narrative:      National Kidney Disease Foundation guidelines for Chronic Kidney Disease (CKD):     Stage 1 with normal or high GFR (GFR > 90 mL/min/1.73 square meters)    Stage 2 Mild CKD (GFR = 60-89 mL/min/1.73 square meters)    Stage 3A Moderate CKD (GFR = 45-59 mL/min/1.73 square meters)    Stage 3B Moderate CKD (GFR = 30-44 mL/min/1.73 square meters)    Stage 4 Severe CKD (GFR = 15-29 mL/min/1.73 square meters)    Stage 5 End Stage CKD (GFR <15 mL/min/1.73 square meters)  Note: GFR calculation is accurate only with a steady state creatinine    CBC and differential [[id number]]  (Abnormal) Collected: 03/02/24 2201    Lab Status: Final result Specimen: Blood from Arm, Right Updated: 03/02/24 2218     WBC 5.71 Thousand/uL      RBC 3.49 Million/uL      Hemoglobin 11.4 g/dL      Hematocrit 32.4 %      MCV 93 fL      MCH 32.7 pg      MCHC 35.2 g/dL      RDW 13.2 %      MPV 8.6 fL      Platelets 169 Thousands/uL      nRBC 0 /100 WBCs      Neutrophils Relative 89 %      Immat GRANS % 1 %      Lymphocytes Relative 3 %      Monocytes Relative 7 %      Eosinophils Relative 0 %      Basophils Relative 0 %      Neutrophils Absolute 5.09 Thousands/µL      Immature Grans Absolute 0.04 Thousand/uL      Lymphocytes Absolute 0.18 Thousands/µL      Monocytes Absolute 0.37 Thousand/µL      Eosinophils Absolute 0.01 Thousand/µL      Basophils Absolute 0.02 Thousands/µL     UA w Reflex to Microscopic w Reflex to Culture [[id number]]     Lab Status: No result Specimen: Urine     Fingerstick Glucose (POCT) [[id number]]  (Normal) Collected: 03/02/24 2048    Lab Status: Final result Updated: 03/02/24 2101     POC Glucose 113 mg/dl                    Imaging Studies:   Direct to CT: Yes  XR pelvis ap only 1 or 2 vw   ED Interpretation by Isela Haney DO (03/02 2341)   No acute findings      TRAUMA - CT head wo contrast   Final Result by Joey Velez DO (03/02 2143)      No  intracranial hemorrhage or calvarial fracture.      Chronic right frontal convexity 0.4 cm extra-axial collection, unchanged, either chronic subdural hematoma or subdural hygroma.               Workstation performed: [id number]         TRAUMA - CT spine cervical wo contrast   Final Result by Joey Velez DO (03/02 2143)      No cervical spine fracture or traumatic malalignment.      Diffuse osseous sclerotic lesions, similar to prior, consistent with metastatic disease         The study was marked in EPIC for immediate notification, per trauma protocol.      Workstation performed: [id number]               Procedures  ECG 12 Lead Documentation Only    Date/Time: 3/2/2024 9:21 PM    Performed by: Isela Haney DO  Authorized by: Isela Haney DO    Indications / Diagnosis:  Fall  ECG reviewed by me, the ED Provider: yes    Patient location:  ED  Previous ECG:     Previous ECG:  Compared to current    Comparison ECG info:  12/18/2023    Similarity:  No change    Comparison to cardiac monitor: Yes    Interpretation:     Interpretation: non-specific    Rate:     ECG rate:  75    ECG rate assessment: normal    Rhythm:     Rhythm: sinus rhythm    Ectopy:     Ectopy: none    QRS:     QRS intervals:  Normal  Conduction:     Conduction: normal    ST segments:     ST segments:  Non-specific    Depression:  III, II, V4, V5 and V6  T waves:     T waves: normal             ED Course  ED Course as of 03/03/24 0138   Sat Mar 02, 2024   2121 Patient stable for CT    Patient has no pain or tenderness in the chest or pelvis, therefore bedside imaging was not performed    Cervical Collar Clearance:  On exam, the patient had no midline point tenderness or paresthesias/numbness/weakness in the extremities. The patient had full range of motion (was then able to flex, extend, and rotate head laterally) without pain. There were no distracting injuries and the patient was not intoxicated.      The patient's cervical spine was cleared   clinically.     Isela Haney,      4407 Patient's family at bedside, they report that there is a bug going around [facility] where patient resides, he has had some nausea and vomiting with decreased p.o. intake and they are concerned he could be dehydrated, labs ordered, IV fluids and Zofran, imaging findings discussed at bedside   2308 Now patient complaining of bilateral hip pain, likely from laying on the stretcher for the last few hours, on initial exam I rocked the pelvis and he had no pain or tenderness, he was also able to lift both of his legs independently without any discomfort, will obtain pelvic x-ray at this point, it is discharged back to [facility]           Medical Decision Making  Patient presents with headache, secondary to head injury sustained prior to arriving to the emergency department.  Given physical exam findings and history, low suspicion for intracranial hemorrhage or significant trauma, carotid or vertebral artery dissection, significant cervical spine injury, facial fracture or other significant injury.  No focal neurological findings on exam.  No persistent confusion, vomiting, vision changes, or intractable pain.  Plan to discharge home with recommendation for graduated return to play, refrain from strenuous exercise or exertion until symptoms completely resolved, refrain from activities that could result in further head injury.    Problems Addressed:  Fall, initial encounter: acute illness or injury  Injury of head, initial encounter: acute illness or injury    Amount and/or Complexity of Data Reviewed  Labs: ordered.  Radiology: ordered and independent interpretation performed.    Risk  OTC drugs.  Prescription drug management.                Disposition  Priority One Transfer: No  Final diagnoses:   Fall, initial encounter   Injury of head, initial encounter     Time reflects when diagnosis was documented in both MDM as applicable and the Disposition within this note        Time User Action Codes Description Comment    3/2/2024 11:40 PM Isela Haney [W19.XXXA] Fall, initial encounter     3/2/2024 11:41 PM Isela Haney [S09.90XA] Injury of head, initial encounter           ED Disposition       ED Disposition   Discharge    Condition   Stable    Date/Time   Sat Mar 2, 2024 11:40 PM    Comment   Miguel Angel Ortega discharge to home/self care.                   Follow-up Information       Follow up With Specialties Details Why Contact Info    Kisha Armstrong MD  In 2 days As needed [address] [place] [address]  [phone]            Patient's Medications   Discharge Prescriptions    No medications on file     No discharge procedures on file.    PDMP Review       None            ED Provider  Electronically Signed by           Isela Haney DO  03/03/24 0138

## 2024-03-03 NOTE — ED NOTES
Bladder scanned for 460 ml after multiple attempts of urination into urinal unsuccessful.      Dinah Walker, RN  03/03/24 9732

## 2024-03-04 PROBLEM — R33.9 URINARY RETENTION: Status: ACTIVE | Noted: 2024-03-04

## 2024-03-04 PROBLEM — N31.9 NEUROGENIC BLADDER: Status: ACTIVE | Noted: 2024-03-04

## 2024-03-04 PROBLEM — N39.41 URGE INCONTINENCE: Status: ACTIVE | Noted: 2024-03-04

## 2024-03-04 PROBLEM — N30.40 RADIATION CYSTITIS: Status: ACTIVE | Noted: 2024-03-04

## 2024-03-04 PROBLEM — Z97.8 FOLEY CATHETER IN PLACE: Status: ACTIVE | Noted: 2024-03-04

## 2024-03-04 PROBLEM — Z87.898 HISTORY OF GROSS HEMATURIA: Status: ACTIVE | Noted: 2024-03-04

## 2024-03-04 LAB
ATRIAL RATE: 75 BPM
P AXIS: 54 DEGREES
PR INTERVAL: 184 MS
QRS AXIS: 68 DEGREES
QRSD INTERVAL: 78 MS
QT INTERVAL: 362 MS
QTC INTERVAL: 404 MS
T WAVE AXIS: 261 DEGREES
VENTRICULAR RATE: 75 BPM

## 2024-03-04 NOTE — PROGRESS NOTES
UROLOGY PROGRESS NOTE         NAME: Miguel Angel Ortega  AGE: 89 y.o. SEX: male  : 1928   MRN: [id number]    DATE: 3/4/2024  TIME: 5:33 PM    Assessment and Plan   Procedures     Impression:   1. Ascencio catheter in place    2. Urinary retention    3. Prostate cancer metastatic to bone (HCC)    4. Urge incontinence    5. Neurogenic bladder    6. Radiation cystitis    7. History of gross hematuria         Plan: Long discussion with the patient his daughter about the options including intermittent cath, continuous Ascencio, suprapubic tube.    Patient and daughter agree on every other day Gemtesa, restarting Flomax and continue timed void double void.  If the patient developed retention again he needs either consider any of the above options or cystoscopy by me for further evaluation.    Will reevaluate in 6 weeks to check how patient is doing.      Chief Complaint   No chief complaint on file.    History of Present Illness     HPI: Miguel Angel Ortega is a 89 y.o. year old male who presents with office visit 2024.  He failed a voiding trial and timed voiding with Flomax and he is here now for a voiding trial.  He was placed on Gemtesa which we will asked them to stop.    Patient with history of prostate cancer status post radiation up last about 20 years ago recent PSA 1.5 we were holding off on Lupron and Casodex.  Patient had a viral infection and a UTI recently completed Keflex but went into retention 700 cc.      The following portions of the patient's history were reviewed and updated as appropriate: allergies, current medications, past family history, past medical history, past social history, past surgical history and problem list.  Past Medical History:   Diagnosis Date    A-fib (HCC)     Coronary artery disease     History of angina     Prostate cancer (HCC)      Past Surgical History:   Procedure Laterality Date    CORONARY ARTERY BYPASS GRAFT      CA CYSTO W/IRRIG & EVAC MULTPLE OBSTRUCTING CLOTS N/A  12/27/2023    Procedure: CYSTOSCOPY EVACUATION OF CLOTS, fulguration of bleeding. insertion 24FR 3 Way zuniga CBI;  Surgeon: Carson Singer MD;  Location:  MAIN OR;  Service: Urology    TONSILLECTOMY       shoulder  Review of Systems     Const: Denies chills, fever and weight loss.  CV: Denies chest pain.  Resp: Denies SOB.  GI: Denies abdominal pain, nausea and vomiting.  : Denies symptoms other than stated above.  Musculo: Denies back pain.    Objective   There were no vitals taken for this visit.    Physical Exam  Const: Appears healthy and well developed. No signs of acute distress present.  Resp: Respirations are regular and unlabored.   CV: Rate is regular. Rhythm is regular.  Abdomen: Abdomen is soft, nontender, and nondistended. Kidneys are not palpable.  : nl  Psych: Patient's attitude is cooperative. Mood is normal. Affect is normal.    Current Medications     Current Outpatient Medications:     alendronate (FOSAMAX) 70 mg tablet, Take 1 tablet by mouth once a week, Disp: , Rfl:     aluminum-magnesium hydroxide-simethicone (Antacid) 1928-6264-560 mg/30 mL suspension, Take by mouth, Disp: , Rfl:     bisacodyl (DULCOLAX) 10 mg suppository, Insert 1 suppository (10 mg total) into the rectum daily as needed for constipation, Disp: 12 suppository, Rfl: 0    clopidogrel (PLAVIX) 75 mg tablet, Take 75 mg by mouth daily, Disp: , Rfl:     cyanocobalamin (VITAMIN B-12) 1000 MCG tablet, Take 1 tablet (1,000 mcg total) by mouth daily, Disp: 30 tablet, Rfl: 0    digoxin (LANOXIN) 0.125 mg tablet, Take 1 tablet (125 mcg total) by mouth every other day, Disp: 15 tablet, Rfl: 0    dronedarone (Multaq) 400 mg tablet, Take 400 mg by mouth 2 (two) times a day with meals, Disp: , Rfl:     ferrous sulfate 325 (65 Fe) mg tablet, Take 1 tablet (325 mg total) by mouth daily with breakfast, Disp: 30 tablet, Rfl: 0    folic acid (FOLVITE) 1 mg tablet, Take 1 tablet by mouth daily, Disp: , Rfl:     furosemide (LASIX) 20 mg  tablet, , Disp: , Rfl:     melatonin 3 mg, Take 1 tablet (3 mg total) by mouth daily at bedtime as needed (insomnia), Disp: 30 tablet, Rfl: 0    Multiple Vitamins-Minerals (PreserVision AREDS) CAPS, Take 1 capsule by mouth daily, Disp: , Rfl:     ondansetron (ZOFRAN) 4 mg tablet, Take 4 mg by mouth every 8 (eight) hours as needed, Disp: , Rfl:     pantoprazole (PROTONIX) 40 mg tablet, Take 40 mg by mouth daily, Disp: , Rfl:     polyethylene glycol (MIRALAX) 17 g packet, Take 17 g by mouth 2 (two) times a day, Disp: 1020 g, Rfl: 0    primidone (MYSOLINE) 50 mg tablet, Take 50 mg by mouth every 12 (twelve) hours, Disp: , Rfl:     senna-docusate sodium (SENOKOT S) 8.6-50 mg per tablet, Take 1 tablet by mouth 2 (two) times a day, Disp: 60 tablet, Rfl: 0    solifenacin (VESICARE) 5 mg tablet, Take 1 tablet (5 mg total) by mouth daily, Disp: 90 tablet, Rfl: 3    tamsulosin (FLOMAX) 0.4 mg, Take 1 capsule (0.4 mg total) by mouth daily with dinner, Disp: 30 capsule, Rfl: 0    Vibegron 75 MG TABS, Take 75 mg by mouth in the morning, Disp: 90 tablet, Rfl: 3        Cb Ji MD

## 2024-03-05 ENCOUNTER — HOSPITAL ENCOUNTER (EMERGENCY)
Facility: HOSPITAL | Age: 89
Discharge: HOME | End: 2024-03-05
Payer: MEDICARE

## 2024-03-05 VITALS
TEMPERATURE: 98.1 F | HEART RATE: 65 BPM | RESPIRATION RATE: 16 BRPM | SYSTOLIC BLOOD PRESSURE: 163 MMHG | OXYGEN SATURATION: 97 % | DIASTOLIC BLOOD PRESSURE: 74 MMHG

## 2024-03-05 DIAGNOSIS — N39.0 UTI (URINARY TRACT INFECTION): Primary | ICD-10-CM

## 2024-03-05 LAB
BACTERIA UR QL AUTO: ABNORMAL /HPF
BILIRUB UR QL STRIP: NEGATIVE
CLARITY UR: CLEAR
COLOR UR: YELLOW
GLUCOSE UR STRIP-MCNC: NEGATIVE MG/DL
HGB UR QL STRIP.AUTO: ABNORMAL
KETONES UR STRIP-MCNC: NEGATIVE MG/DL
LEUKOCYTE ESTERASE UR QL STRIP: ABNORMAL
MUCOUS THREADS UR QL AUTO: ABNORMAL
NITRITE UR QL STRIP: NEGATIVE
NON-SQ EPI CELLS URNS QL MICRO: ABNORMAL /HPF
PH UR STRIP.AUTO: 6 [PH]
PROT UR STRIP-MCNC: ABNORMAL MG/DL
RBC #/AREA URNS AUTO: ABNORMAL /HPF
SP GR UR STRIP.AUTO: 1.01 (ref 1–1.03)
UROBILINOGEN UR QL STRIP.AUTO: 2 E.U./DL
WBC #/AREA URNS AUTO: ABNORMAL /HPF
WBC CLUMPS # UR AUTO: PRESENT /UL

## 2024-03-05 PROCEDURE — 99283 EMERGENCY DEPT VISIT LOW MDM: CPT

## 2024-03-05 PROCEDURE — 87086 URINE CULTURE/COLONY COUNT: CPT | Performed by: PHYSICIAN ASSISTANT

## 2024-03-05 PROCEDURE — 99284 EMERGENCY DEPT VISIT MOD MDM: CPT | Performed by: PHYSICIAN ASSISTANT

## 2024-03-05 PROCEDURE — 81001 URINALYSIS AUTO W/SCOPE: CPT | Performed by: PHYSICIAN ASSISTANT

## 2024-03-05 RX ORDER — CEPHALEXIN 500 MG/1
500 CAPSULE ORAL EVERY 12 HOURS SCHEDULED
Qty: 14 CAPSULE | Refills: 0 | Status: SHIPPED | OUTPATIENT
Start: 2024-03-05 | End: 2024-03-05

## 2024-03-05 RX ORDER — CEPHALEXIN 250 MG/1
500 CAPSULE ORAL ONCE
Status: COMPLETED | OUTPATIENT
Start: 2024-03-05 | End: 2024-03-05

## 2024-03-05 RX ORDER — CEPHALEXIN 500 MG/1
500 CAPSULE ORAL EVERY 12 HOURS SCHEDULED
Qty: 14 CAPSULE | Refills: 0 | Status: SHIPPED | OUTPATIENT
Start: 2024-03-05 | End: 2024-03-12

## 2024-03-05 RX ADMIN — CEPHALEXIN 500 MG: 250 CAPSULE ORAL at 18:34

## 2024-03-05 NOTE — DISCHARGE INSTRUCTIONS
Take antibiotics as prescribed.  Follow-up with urology.  Try to ensure there is not any tension on the Ascencio catheter.  Please return with new or worsening symptoms

## 2024-03-06 LAB — BACTERIA UR CULT: NORMAL

## 2024-03-06 NOTE — ED PROVIDER NOTES
History  Chief Complaint   Patient presents with    Urinary Catheter Problem     Pt reports from [facility], per staff pt catheter had blood around it. On arrival small amount of dried blood noted at tip or urethra, urine in drainage bag mayco in color, pt denies trauma or pain      89-year-old male presents to the emergency department from [facility] for evaluation of blood around urinary catheter.  Patient had catheter placed Sunday morning due to urinary retention.  Noted to have blood around it today.  Patient has no complaints.  Daughter reports patient did report feeling feverish overnight.  Patient is on Plavix.        Prior to Admission Medications   Prescriptions Last Dose Informant Patient Reported? Taking?   Multiple Vitamins-Minerals (PreserVision AREDS) CAPS   Yes No   Sig: Take 1 capsule by mouth daily   Vibegron 75 MG TABS   No No   Sig: Take 75 mg by mouth in the morning   alendronate (FOSAMAX) 70 mg tablet   Yes No   Sig: Take 1 tablet by mouth once a week   aluminum-magnesium hydroxide-simethicone (Antacid) 6228-1135-524 mg/30 mL suspension   Yes No   Sig: Take by mouth   bisacodyl (DULCOLAX) 10 mg suppository   No No   Sig: Insert 1 suppository (10 mg total) into the rectum daily as needed for constipation   clopidogrel (PLAVIX) 75 mg tablet   Yes No   Sig: Take 75 mg by mouth daily   cyanocobalamin (VITAMIN B-12) 1000 MCG tablet   No No   Sig: Take 1 tablet (1,000 mcg total) by mouth daily   digoxin (LANOXIN) 0.125 mg tablet   No No   Sig: Take 1 tablet (125 mcg total) by mouth every other day   dronedarone (Multaq) 400 mg tablet   Yes No   Sig: Take 400 mg by mouth 2 (two) times a day with meals   ferrous sulfate 325 (65 Fe) mg tablet   No No   Sig: Take 1 tablet (325 mg total) by mouth daily with breakfast   folic acid (FOLVITE) 1 mg tablet   Yes No   Sig: Take 1 tablet by mouth daily   furosemide (LASIX) 20 mg tablet   Yes No   melatonin 3 mg   No No   Sig: Take 1 tablet  (3 mg total) by mouth daily at bedtime as needed (insomnia)   ondansetron (ZOFRAN) 4 mg tablet   Yes No   Sig: Take 4 mg by mouth every 8 (eight) hours as needed   pantoprazole (PROTONIX) 40 mg tablet   Yes No   Sig: Take 40 mg by mouth daily   polyethylene glycol (MIRALAX) 17 g packet   No No   Sig: Take 17 g by mouth 2 (two) times a day   primidone (MYSOLINE) 50 mg tablet   Yes No   Sig: Take 50 mg by mouth every 12 (twelve) hours   senna-docusate sodium (SENOKOT S) 8.6-50 mg per tablet   No No   Sig: Take 1 tablet by mouth 2 (two) times a day   solifenacin (VESICARE) 5 mg tablet   No No   Sig: Take 1 tablet (5 mg total) by mouth daily   tamsulosin (FLOMAX) 0.4 mg   No No   Sig: Take 1 capsule (0.4 mg total) by mouth daily with dinner      Facility-Administered Medications: None       Past Medical History:   Diagnosis Date    A-fib (HCC)     Coronary artery disease     History of angina     Prostate cancer (HCC)        Past Surgical History:   Procedure Laterality Date    CORONARY ARTERY BYPASS GRAFT  1995    [facility] CYSTO W/IRRIG & EVAC MULTPLE OBSTRUCTING CLOTS N/A 12/27/2023    Procedure: CYSTOSCOPY EVACUATION OF CLOTS, fulguration of bleeding. insertion 24FR [address] zuniga CBI;  Surgeon: Carson Singer MD;  Location: [facility];  Service: Urology    TONSILLECTOMY         Family History   Problem Relation Age of Onset    Hypertension Father      I have reviewed and agree with the history as documented.    E-Cigarette/Vaping    E-Cigarette Use Never User      E-Cigarette/Vaping Substances     Social History     Tobacco Use    Smoking status: Former     Types: Cigarettes    Smokeless tobacco: Never   Vaping Use    Vaping status: Never Used   Substance Use Topics    Alcohol use: Not Currently    Drug use: Not Currently       Review of Systems   Constitutional: Negative.    Respiratory: Negative.     Cardiovascular: Negative.    Genitourinary:         Blood at tip of penis   Musculoskeletal: Negative.    Skin: Negative.     Neurological: Negative.    All other systems reviewed and are negative.      Physical Exam  Physical Exam  Vitals and nursing note reviewed. Exam conducted with a chaperone present (juan miguel RN).   Constitutional:       General: He is not in acute distress.     Appearance: Normal appearance. He is not ill-appearing, toxic-appearing or diaphoretic.   HENT:      Head: Normocephalic.   Eyes:      Conjunctiva/sclera: Conjunctivae normal.   Cardiovascular:      Rate and Rhythm: Normal rate and regular rhythm.   Pulmonary:      Effort: Pulmonary effort is normal.   Abdominal:      General: Abdomen is flat. Bowel sounds are normal. There is no distension.      Palpations: Abdomen is soft.      Tenderness: There is no abdominal tenderness.   Genitourinary:     Rectum: No external hemorrhoid.      Comments: Ascencio catheter in place.  Small amount of dried blood on the tip of the meatus. No active bleeding  Externally rectum unremarkable. Brown stool.  Skin:     General: Skin is warm and dry.   Neurological:      General: No focal deficit present.      Mental Status: He is alert.         Vital Signs  ED Triage Vitals   Temperature Pulse Respirations Blood Pressure SpO2   03/05/24 1655 03/05/24 1655 03/05/24 1655 03/05/24 1655 03/05/24 1655   97.6 °F (36.4 °C) 68 20 149/63 95 %      Temp Source Heart Rate Source Patient Position - Orthostatic VS BP Location FiO2 (%)   03/05/24 1655 03/05/24 1655 03/1935 03/1935 --   Temporal Monitor Lying Right arm       Pain Score       --                  Vitals:    03/05/24 1655 03/05/24 1715 03/05/24 1845 03/1935   BP: 149/63 138/63 161/74 163/74   Pulse: 68 69 65 65   Patient Position - Orthostatic VS:    Lying         Visual Acuity      ED Medications  Medications   cephalexin (KEFLEX) capsule 500 mg (500 mg Oral Given 3/5/24 1834)       Diagnostic Studies  Results Reviewed       Procedure Component Value Units Date/Time    Urine Microscopic [[id number]]  (Abnormal)  Collected: 03/05/24 1752    Lab Status: Final result Specimen: Urine, Indwelling Ascencio Catheter Updated: 03/05/24 1810     RBC, UA 20-30 /hpf      WBC, UA 10-20 /hpf      Epithelial Cells Occasional /hpf      Bacteria, UA Occasional /hpf      WBC Clumps present     MUCUS THREADS Occasional    Urine culture [[id number]] Collected: 03/05/24 1752    Lab Status: In process Specimen: Urine, Indwelling Ascencio Catheter Updated: 03/05/24 1809    UA w Reflex to Microscopic w Reflex to Culture [[id number]]  (Abnormal) Collected: 03/05/24 1752    Lab Status: Final result Specimen: Urine, Indwelling Ascencio Catheter Updated: 03/05/24 1800     Color, UA Yellow     Clarity, UA Clear     Specific Gravity, UA 1.015     pH, UA 6.0     Leukocytes, UA Small     Nitrite, UA Negative     Protein, UA 30 (1+) mg/dl      Glucose, UA Negative mg/dl      Ketones, UA Negative mg/dl      Urobilinogen, UA 2.0 E.U./dl      Bilirubin, UA Negative     Occult Blood, UA Large                   No orders to display              Procedures  Procedures         ED Course  ED Course as of 03/05/24 2122   Tue Mar 05, 2024   1814 Blood, UA(!): Large   1814 UA concerning for UTI. Noted for large blood. Will start on abx. Culture in process. Has follow up with urology on Monday.                                              Medical Decision Making  89-year-old male presented to the emergency department for evaluation of blood at Ascencio catheter site.  Vitals and medical record reviewed.  Patient at risk for fall but not limited to catheter irritation versus trauma, UTI, hemorrhoid.  Patient in no obvious external hemorrhoids.  UA was concerning for UTI.  Likely patient experienced some irritation at catheter site causing small amount of blood, currently controlled with no active bleeding at this time.  He was started on oral antibiotics.  Will follow-up with urology on Monday.  Return precautions were discussed and patient and daughter verbalized understanding.   Patient is clinically and hemodynamically stable for discharge    Problems Addressed:  UTI (urinary tract infection): acute illness or injury    Amount and/or Complexity of Data Reviewed  Labs: ordered. Decision-making details documented in ED Course.    Risk  Prescription drug management.             Disposition  Final diagnoses:   UTI (urinary tract infection)     Time reflects when diagnosis was documented in both MDM as applicable and the Disposition within this note       Time User Action Codes Description Comment    3/5/2024  6:16 PM AmbroseChelsey cesar Add [N39.0] UTI (urinary tract infection)           ED Disposition       ED Disposition   Discharge    Condition   Stable    Date/Time   Tue Mar 5, 2024  6:16 PM    Comment   Miguel Angel Ortega discharge to home/self care.                   Follow-up Information       Follow up With Specialties Details Why Contact Info    Kisha Armstrong MD    [address] [place] [address] [id number] [phone]      Cb Ji MD Urology   [address] [place] [id number]  [phone]              Discharge Medication List as of 3/5/2024  6:17 PM        START taking these medications    Details   cephalexin (KEFLEX) 500 mg capsule Take 1 capsule (500 mg total) by mouth every 12 (twelve) hours for 7 days, Starting Tue 3/5/2024, Until Tue 3/12/2024, Normal           CONTINUE these medications which have NOT CHANGED    Details   alendronate (FOSAMAX) 70 mg tablet Take 1 tablet by mouth once a week, Historical Med      aluminum-magnesium hydroxide-simethicone (Antacid) 5315-1053-844 mg/30 mL suspension Take by mouth, Historical Med      bisacodyl (DULCOLAX) 10 mg suppository Insert 1 suppository (10 mg total) into the rectum daily as needed for constipation, Starting u 1/4/2024, Print      clopidogrel (PLAVIX) 75 mg tablet Take 75 mg by mouth daily, Historical Med      cyanocobalamin (VITAMIN B-12) 1000 MCG tablet Take 1 tablet (1,000 mcg total) by mouth daily, Starting  Fri 1/5/2024, Print      digoxin (LANOXIN) 0.125 mg tablet Take 1 tablet (125 mcg total) by mouth every other day, Starting Fri 1/5/2024, Until Thu 2/8/2024, Print      dronedarone (Multaq) 400 mg tablet Take 400 mg by mouth 2 (two) times a day with meals, Historical Med      ferrous sulfate 325 (65 Fe) mg tablet Take 1 tablet (325 mg total) by mouth daily with breakfast, Starting Fri 1/5/2024, Print      folic acid (FOLVITE) 1 mg tablet Take 1 tablet by mouth daily, Historical Med      furosemide (LASIX) 20 mg tablet Historical Med      melatonin 3 mg Take 1 tablet (3 mg total) by mouth daily at bedtime as needed (insomnia), Starting Thu 1/4/2024, Print      Multiple Vitamins-Minerals (PreserVision AREDS) CAPS Take 1 capsule by mouth daily, Historical Med      ondansetron (ZOFRAN) 4 mg tablet Take 4 mg by mouth every 8 (eight) hours as needed, Historical Med      pantoprazole (PROTONIX) 40 mg tablet Take 40 mg by mouth daily, Historical Med      polyethylene glycol (MIRALAX) 17 g packet Take 17 g by mouth 2 (two) times a day, Starting Thu 1/4/2024, Until Thu 2/8/2024, Print      primidone (MYSOLINE) 50 mg tablet Take 50 mg by mouth every 12 (twelve) hours, Historical Med      senna-docusate sodium (SENOKOT S) 8.6-50 mg per tablet Take 1 tablet by mouth 2 (two) times a day, Starting Thu 1/4/2024, Until Thu 2/8/2024, Print      solifenacin (VESICARE) 5 mg tablet Take 1 tablet (5 mg total) by mouth daily, Starting Thu 2/8/2024, Normal      tamsulosin (FLOMAX) 0.4 mg Take 1 capsule (0.4 mg total) by mouth daily with dinner, Starting Thu 1/4/2024, Print      Vibegron 75 MG TABS Take 75 mg by mouth in the morning, Starting Thu 2/22/2024, Normal             No discharge procedures on file.    PDMP Review       None            ED Provider  Electronically Signed by             Chelsey Foley PA-C  03/05/24 2122

## 2024-03-06 NOTE — ED NOTES
Round trip cancelled as pts daughter informed nurse that she has someone coming to pick pt up to take back to facility.     Mar Guzmán, RN  03/05/24 7431

## 2024-03-11 ENCOUNTER — TELEPHONE (OUTPATIENT)
Age: 89
End: 2024-03-11

## 2024-03-11 ENCOUNTER — OFFICE VISIT (OUTPATIENT)
Dept: UROLOGY | Facility: CLINIC | Age: 89
End: 2024-03-11
Payer: MEDICARE

## 2024-03-11 VITALS
HEART RATE: 69 BPM | SYSTOLIC BLOOD PRESSURE: 140 MMHG | RESPIRATION RATE: 20 BRPM | BODY MASS INDEX: 24.3 KG/M2 | DIASTOLIC BLOOD PRESSURE: 68 MMHG | WEIGHT: 173.6 LBS | TEMPERATURE: 98.2 F | OXYGEN SATURATION: 98 % | HEIGHT: 71 IN

## 2024-03-11 DIAGNOSIS — R33.9 URINARY RETENTION: ICD-10-CM

## 2024-03-11 DIAGNOSIS — N39.41 URGE INCONTINENCE: ICD-10-CM

## 2024-03-11 DIAGNOSIS — Z87.898 HISTORY OF GROSS HEMATURIA: ICD-10-CM

## 2024-03-11 DIAGNOSIS — N31.9 NEUROGENIC BLADDER: ICD-10-CM

## 2024-03-11 DIAGNOSIS — C79.51 PROSTATE CANCER METASTATIC TO BONE (HCC): ICD-10-CM

## 2024-03-11 DIAGNOSIS — N30.40 RADIATION CYSTITIS: ICD-10-CM

## 2024-03-11 DIAGNOSIS — Z97.8 FOLEY CATHETER IN PLACE: Primary | ICD-10-CM

## 2024-03-11 DIAGNOSIS — C61 PROSTATE CANCER METASTATIC TO BONE (HCC): ICD-10-CM

## 2024-03-11 PROCEDURE — 99214 OFFICE O/P EST MOD 30 MIN: CPT | Performed by: UROLOGY

## 2024-03-11 RX ORDER — TAMSULOSIN HYDROCHLORIDE 0.4 MG/1
0.4 CAPSULE ORAL
Qty: 90 CAPSULE | Refills: 3 | Status: SHIPPED | OUTPATIENT
Start: 2024-03-11

## 2024-03-11 RX ORDER — ADHESIVE TAPE 1.5"X360"
TAPE, NON-MEDICATED TOPICAL
COMMUNITY

## 2024-03-11 NOTE — TELEPHONE ENCOUNTER
Patient's caregiver Elizabeth called to inform provider that the patient has been taking Flomax 0.4 mg and it was just not our med list. But she had it on hers, and just wanted to let us know.     If any questions call Elizabeth [phone]

## 2024-03-11 NOTE — PATIENT INSTRUCTIONS
Please have patient take the Gemtesa every other day.  Continue timed voiding double void every 2-3 hours while awake.  Please restart Flomax very important.    Please make sure the medical doctor managing the patient has him on a regiment to reduce the risk of constipation if possible please.    Needs a follow-up with me in 4 weeks to see how things are going.  If has to have a catheter put back in please let us know.

## 2024-03-14 NOTE — TELEPHONE ENCOUNTER
Gadielrhina hallman cannot do [facility] they suggested he do home health. In meantime he has been flowing pretty good & taking every gemtesa Advised to call office with any concerns. Reviewed he needs go to ER if unable to urinate when not in office.

## 2024-04-10 PROBLEM — R33.8 BENIGN PROSTATIC HYPERPLASIA WITH URINARY RETENTION: Status: ACTIVE | Noted: 2024-04-10

## 2024-04-10 PROBLEM — N31.2 HYPOTONIC BLADDER: Status: ACTIVE | Noted: 2024-04-10

## 2024-04-10 PROBLEM — N40.1 BENIGN PROSTATIC HYPERPLASIA WITH URINARY RETENTION: Status: ACTIVE | Noted: 2024-04-10

## 2024-04-10 NOTE — PROGRESS NOTES
UROLOGY PROGRESS NOTE         NAME: Miguel Angel Ortega  AGE: 89 y.o. SEX: male  : 1928   MRN: [id number]    DATE: 4/10/2024  TIME: 2:45 PM    Assessment and Plan   Procedures     Impression:   1. Radiation cystitis    2. Urge incontinence    3. Neurogenic bladder    4. Prostate cancer metastatic to bone (HCC)    5. History of urinary retention    6. Hypotonic bladder    7. Benign prostatic hyperplasia with urinary retention         Plan: Continue every other day Gemtesa, continue daily Flomax and bladder retraining with timed voiding and double void.    Follow-up with me in 6 months regarding his history of prostate cancer PSA 1 week prior to appointment.  All questions were answered to the patient and daughter to their satisfaction.      Chief Complaint   No chief complaint on file.    History of Present Illness     HPI: Miguel Angel Ortega is a 89 y.o. year old male who presents with refractory urinary retention.  Patient has failed voiding trials, on Flomax.  He has been off Gemtesa.    History of prostate cancer status post the radiation about 20 years ago recent PSA 1.5 but we were holding off of Lupron and Casodex.  At last office visit we discussed with the patient and his daughter about the options of intermittent cath, continuous Ascencio or suprapubic tube.  Patient and daughter agreed on every other day Gemtesa, restarting the Flomax and going on timed voiding double void.  If the patient did develop urinary retention he would consider any of the above's or cystoscopy for further evaluation.    Patient is here today to check on voiding pattern.  Good has not required a catheter.  Patient on cranberry juice I told him it is okay to continue.  Patient has nocturia x 1, good flow, no dysuria no hematuria no leakage.  He is doing every other day Gemtesa and the daily Flomax.  He continues to timed void and double void.          The following portions of the patient's history were reviewed and updated as  appropriate: allergies, current medications, past family history, past medical history, past social history, past surgical history and problem list.  Past Medical History:   Diagnosis Date    A-fib (HCC)     Coronary artery disease     History of angina     Prostate cancer (HCC)      Past Surgical History:   Procedure Laterality Date    CORONARY ARTERY BYPASS GRAFT  1995    [facility] CYSTO W/IRRIG & EVAC MULTPLE OBSTRUCTING CLOTS N/A 12/27/2023    Procedure: CYSTOSCOPY EVACUATION OF CLOTS, fulguration of bleeding. insertion 24FR 3 Way zuniga CBI;  Surgeon: Carson Singer MD;  Location:  MAIN OR;  Service: Urology    TONSILLECTOMY       shoulder  Review of Systems     Const: Denies chills, fever and weight loss.  CV: Denies chest pain.  Resp: Denies SOB.  GI: Denies abdominal pain, nausea and vomiting.  : Denies symptoms other than stated above.  Musculo: Denies back pain.    Objective   There were no vitals taken for this visit.    Physical Exam  Const: Appears healthy and well developed. No signs of acute distress present.  Resp: Respirations are regular and unlabored.   CV: Rate is regular. Rhythm is regular.  Abdomen: Abdomen is soft, nontender, and nondistended. Kidneys are not palpable.  : Bladder nontender nondistended PVR 40 cc  Psych: Patient's attitude is cooperative. Mood is normal. Affect is normal.    Current Medications     Current Outpatient Medications:     alendronate (FOSAMAX) 70 mg tablet, Take 1 tablet by mouth once a week, Disp: , Rfl:     aluminum-magnesium hydroxide-simethicone (Antacid) 3885-3376-510 mg/30 mL suspension, Take by mouth, Disp: , Rfl:     bisacodyl (DULCOLAX) 10 mg suppository, Insert 1 suppository (10 mg total) into the rectum daily as needed for constipation, Disp: 12 suppository, Rfl: 0    clopidogrel (PLAVIX) 75 mg tablet, Take 75 mg by mouth daily, Disp: , Rfl:     cyanocobalamin (VITAMIN B-12) 1000 MCG tablet, Take 1 tablet (1,000 mcg total) by mouth daily, Disp: 30 tablet,  Rfl: 0    digoxin (LANOXIN) 0.125 mg tablet, Take 1 tablet (125 mcg total) by mouth every other day, Disp: 15 tablet, Rfl: 0    dronedarone (Multaq) 400 mg tablet, Take 400 mg by mouth 2 (two) times a day with meals, Disp: , Rfl:     ferrous sulfate 325 (65 Fe) mg tablet, Take 1 tablet (325 mg total) by mouth daily with breakfast (Patient not taking: Reported on 3/11/2024), Disp: 30 tablet, Rfl: 0    folic acid (FOLVITE) 1 mg tablet, Take 1 tablet by mouth daily, Disp: , Rfl:     furosemide (LASIX) 20 mg tablet, , Disp: , Rfl:     melatonin 3 mg, Take 1 tablet (3 mg total) by mouth daily at bedtime as needed (insomnia), Disp: 30 tablet, Rfl: 0    Multiple Vitamins-Minerals (PreserVision AREDS) CAPS, Take 1 capsule by mouth daily, Disp: , Rfl:     ondansetron (ZOFRAN) 4 mg tablet, Take 4 mg by mouth every 8 (eight) hours as needed, Disp: , Rfl:     pantoprazole (PROTONIX) 40 mg tablet, Take 40 mg by mouth daily, Disp: , Rfl:     polyethylene glycol (MIRALAX) 17 g packet, Take 17 g by mouth 2 (two) times a day, Disp: 1020 g, Rfl: 0    primidone (MYSOLINE) 50 mg tablet, Take 50 mg by mouth every 12 (twelve) hours, Disp: , Rfl:     Saline GEL, into each nostril, Disp: , Rfl:     senna-docusate sodium (SENOKOT S) 8.6-50 mg per tablet, Take 1 tablet by mouth 2 (two) times a day, Disp: 60 tablet, Rfl: 0    solifenacin (VESICARE) 5 mg tablet, Take 1 tablet (5 mg total) by mouth daily (Patient not taking: Reported on 3/11/2024), Disp: 90 tablet, Rfl: 3    tamsulosin (FLOMAX) 0.4 mg, Take 1 capsule (0.4 mg total) by mouth daily with dinner, Disp: 30 capsule, Rfl: 0    tamsulosin (FLOMAX) 0.4 mg, Take 1 capsule (0.4 mg total) by mouth daily with dinner, Disp: 90 capsule, Rfl: 3    tamsulosin (FLOMAX) 0.4 mg, Take 1 capsule (0.4 mg total) by mouth daily with dinner, Disp: 90 capsule, Rfl: 3    Vibegron 75 MG TABS, Take 75 mg by mouth in the morning, Disp: 90 tablet, Rfl: 3        Cb Ji MD

## 2024-04-16 ENCOUNTER — OFFICE VISIT (OUTPATIENT)
Dept: UROLOGY | Facility: CLINIC | Age: 89
End: 2024-04-16
Payer: MEDICARE

## 2024-04-16 DIAGNOSIS — C61 PROSTATE CANCER METASTATIC TO BONE (HCC): ICD-10-CM

## 2024-04-16 DIAGNOSIS — N39.41 URGE INCONTINENCE: ICD-10-CM

## 2024-04-16 DIAGNOSIS — N40.1 BENIGN PROSTATIC HYPERPLASIA WITH URINARY RETENTION: ICD-10-CM

## 2024-04-16 DIAGNOSIS — N31.9 NEUROGENIC BLADDER: ICD-10-CM

## 2024-04-16 DIAGNOSIS — Z87.898 HISTORY OF URINARY RETENTION: ICD-10-CM

## 2024-04-16 DIAGNOSIS — R33.8 BENIGN PROSTATIC HYPERPLASIA WITH URINARY RETENTION: ICD-10-CM

## 2024-04-16 DIAGNOSIS — N31.2 HYPOTONIC BLADDER: ICD-10-CM

## 2024-04-16 DIAGNOSIS — C79.51 PROSTATE CANCER METASTATIC TO BONE (HCC): ICD-10-CM

## 2024-04-16 DIAGNOSIS — N30.40 RADIATION CYSTITIS: Primary | ICD-10-CM

## 2024-04-16 LAB — POST-VOID RESIDUAL VOLUME, ML POC: 40 ML

## 2024-04-16 PROCEDURE — 51798 US URINE CAPACITY MEASURE: CPT | Performed by: UROLOGY

## 2024-04-16 PROCEDURE — 99213 OFFICE O/P EST LOW 20 MIN: CPT | Performed by: UROLOGY

## 2024-04-16 RX ORDER — ACETAMINOPHEN 325 MG/1
TABLET ORAL
COMMUNITY
Start: 2024-03-28

## 2024-04-16 RX ORDER — DIPHENOXYLATE HYDROCHLORIDE AND ATROPINE SULFATE 2.5; .025 MG/1; MG/1
TABLET ORAL
COMMUNITY
Start: 2024-03-22

## 2024-04-16 RX ORDER — CARBOXYMETHYLCELLULOSE SODIUM AND GLYCERIN 5; 9 MG/ML; MG/ML
SOLUTION/ DROPS OPHTHALMIC
COMMUNITY

## 2024-06-13 ENCOUNTER — APPOINTMENT (EMERGENCY)
Dept: CT IMAGING | Facility: HOSPITAL | Age: 89
DRG: 536 | End: 2024-06-13
Payer: MEDICARE

## 2024-06-13 ENCOUNTER — APPOINTMENT (EMERGENCY)
Dept: RADIOLOGY | Facility: HOSPITAL | Age: 89
DRG: 536 | End: 2024-06-13
Payer: MEDICARE

## 2024-06-13 ENCOUNTER — HOSPITAL ENCOUNTER (INPATIENT)
Facility: HOSPITAL | Age: 89
LOS: 4 days | Discharge: HOME HEALTH | DRG: 536 | End: 2024-06-17
Attending: EMERGENCY MEDICINE | Admitting: FAMILY MEDICINE
Payer: MEDICARE

## 2024-06-13 DIAGNOSIS — N39.41 URGE INCONTINENCE: ICD-10-CM

## 2024-06-13 DIAGNOSIS — S32.591A CLOSED FRACTURE OF RAMUS OF RIGHT PUBIS, INITIAL ENCOUNTER (HCC): Primary | ICD-10-CM

## 2024-06-13 DIAGNOSIS — I95.1 ORTHOSTATIC HYPOTENSION: ICD-10-CM

## 2024-06-13 DIAGNOSIS — R42 LIGHTHEADEDNESS: ICD-10-CM

## 2024-06-13 LAB
ALBUMIN SERPL BCP-MCNC: 4 G/DL (ref 3.5–5)
ALP SERPL-CCNC: 89 U/L (ref 34–104)
ALT SERPL W P-5'-P-CCNC: 11 U/L (ref 7–52)
ANION GAP SERPL CALCULATED.3IONS-SCNC: 8 MMOL/L (ref 4–13)
AST SERPL W P-5'-P-CCNC: 18 U/L (ref 13–39)
BASOPHILS # BLD AUTO: 0.06 THOUSANDS/ÂΜL (ref 0–0.1)
BASOPHILS NFR BLD AUTO: 0 % (ref 0–1)
BILIRUB SERPL-MCNC: 0.44 MG/DL (ref 0.2–1)
BUN SERPL-MCNC: 24 MG/DL (ref 5–25)
CALCIUM SERPL-MCNC: 8.8 MG/DL (ref 8.4–10.2)
CHLORIDE SERPL-SCNC: 97 MMOL/L (ref 96–108)
CO2 SERPL-SCNC: 22 MMOL/L (ref 21–32)
CREAT SERPL-MCNC: 1.15 MG/DL (ref 0.6–1.3)
EOSINOPHIL # BLD AUTO: 0.17 THOUSAND/ÂΜL (ref 0–0.61)
EOSINOPHIL NFR BLD AUTO: 1 % (ref 0–6)
ERYTHROCYTE [DISTWIDTH] IN BLOOD BY AUTOMATED COUNT: 14.6 % (ref 11.6–15.1)
GFR SERPL CREATININE-BSD FRML MDRD: 53 ML/MIN/1.73SQ M
GLUCOSE SERPL-MCNC: 138 MG/DL (ref 65–140)
HCT VFR BLD AUTO: 29.5 % (ref 36.5–49.3)
HGB BLD-MCNC: 10.1 G/DL (ref 12–17)
IMM GRANULOCYTES # BLD AUTO: 0.11 THOUSAND/UL (ref 0–0.2)
IMM GRANULOCYTES NFR BLD AUTO: 1 % (ref 0–2)
LYMPHOCYTES # BLD AUTO: 1.64 THOUSANDS/ÂΜL (ref 0.6–4.47)
LYMPHOCYTES NFR BLD AUTO: 12 % (ref 14–44)
MCH RBC QN AUTO: 32.6 PG (ref 26.8–34.3)
MCHC RBC AUTO-ENTMCNC: 34.2 G/DL (ref 31.4–37.4)
MCV RBC AUTO: 95 FL (ref 82–98)
MONOCYTES # BLD AUTO: 1.43 THOUSAND/ÂΜL (ref 0.17–1.22)
MONOCYTES NFR BLD AUTO: 11 % (ref 4–12)
NEUTROPHILS # BLD AUTO: 10.04 THOUSANDS/ÂΜL (ref 1.85–7.62)
NEUTS SEG NFR BLD AUTO: 75 % (ref 43–75)
NRBC BLD AUTO-RTO: 0 /100 WBCS
OSMOLALITY UR/SERPL-RTO: 280 MMOL/KG (ref 282–298)
PLATELET # BLD AUTO: 218 THOUSANDS/UL (ref 149–390)
PMV BLD AUTO: 8 FL (ref 8.9–12.7)
POTASSIUM SERPL-SCNC: 4.4 MMOL/L (ref 3.5–5.3)
PROT SERPL-MCNC: 6.8 G/DL (ref 6.4–8.4)
RBC # BLD AUTO: 3.1 MILLION/UL (ref 3.88–5.62)
SODIUM SERPL-SCNC: 127 MMOL/L (ref 135–147)
WBC # BLD AUTO: 13.45 THOUSAND/UL (ref 4.31–10.16)

## 2024-06-13 PROCEDURE — 73502 X-RAY EXAM HIP UNI 2-3 VIEWS: CPT

## 2024-06-13 PROCEDURE — 97167 OT EVAL HIGH COMPLEX 60 MIN: CPT

## 2024-06-13 PROCEDURE — 87081 CULTURE SCREEN ONLY: CPT | Performed by: FAMILY MEDICINE

## 2024-06-13 PROCEDURE — 72170 X-RAY EXAM OF PELVIS: CPT

## 2024-06-13 PROCEDURE — 97116 GAIT TRAINING THERAPY: CPT

## 2024-06-13 PROCEDURE — 99223 1ST HOSP IP/OBS HIGH 75: CPT | Performed by: FAMILY MEDICINE

## 2024-06-13 PROCEDURE — 97163 PT EVAL HIGH COMPLEX 45 MIN: CPT

## 2024-06-13 PROCEDURE — 85025 COMPLETE CBC W/AUTO DIFF WBC: CPT | Performed by: PHYSICIAN ASSISTANT

## 2024-06-13 PROCEDURE — 87147 CULTURE TYPE IMMUNOLOGIC: CPT | Performed by: FAMILY MEDICINE

## 2024-06-13 PROCEDURE — 80053 COMPREHEN METABOLIC PANEL: CPT | Performed by: PHYSICIAN ASSISTANT

## 2024-06-13 PROCEDURE — 70450 CT HEAD/BRAIN W/O DYE: CPT

## 2024-06-13 PROCEDURE — 83930 ASSAY OF BLOOD OSMOLALITY: CPT

## 2024-06-13 PROCEDURE — 72192 CT PELVIS W/O DYE: CPT

## 2024-06-13 PROCEDURE — 72125 CT NECK SPINE W/O DYE: CPT

## 2024-06-13 PROCEDURE — 36415 COLL VENOUS BLD VENIPUNCTURE: CPT | Performed by: PHYSICIAN ASSISTANT

## 2024-06-13 PROCEDURE — 97530 THERAPEUTIC ACTIVITIES: CPT

## 2024-06-13 PROCEDURE — 99285 EMERGENCY DEPT VISIT HI MDM: CPT

## 2024-06-13 PROCEDURE — 99285 EMERGENCY DEPT VISIT HI MDM: CPT | Performed by: PHYSICIAN ASSISTANT

## 2024-06-13 PROCEDURE — 71045 X-RAY EXAM CHEST 1 VIEW: CPT

## 2024-06-13 PROCEDURE — 93005 ELECTROCARDIOGRAM TRACING: CPT

## 2024-06-13 RX ORDER — AMOXICILLIN 250 MG
1 CAPSULE ORAL DAILY PRN
Status: DISCONTINUED | OUTPATIENT
Start: 2024-06-13 | End: 2024-06-16

## 2024-06-13 RX ORDER — ACETAMINOPHEN 325 MG/1
975 TABLET ORAL EVERY 8 HOURS SCHEDULED
Status: DISCONTINUED | OUTPATIENT
Start: 2024-06-13 | End: 2024-06-17 | Stop reason: HOSPADM

## 2024-06-13 RX ORDER — SODIUM CHLORIDE 9 MG/ML
75 INJECTION, SOLUTION INTRAVENOUS CONTINUOUS
Status: DISCONTINUED | OUTPATIENT
Start: 2024-06-13 | End: 2024-06-14

## 2024-06-13 RX ORDER — OXYCODONE HYDROCHLORIDE AND ACETAMINOPHEN 5; 325 MG/1; MG/1
1 TABLET ORAL ONCE
Status: COMPLETED | OUTPATIENT
Start: 2024-06-13 | End: 2024-06-13

## 2024-06-13 RX ORDER — OXYCODONE HYDROCHLORIDE 5 MG/1
5 TABLET ORAL EVERY 6 HOURS PRN
Qty: 12 TABLET | Refills: 0 | Status: SHIPPED | OUTPATIENT
Start: 2024-06-13 | End: 2024-06-13 | Stop reason: HOSPADM

## 2024-06-13 RX ORDER — BISACODYL 10 MG
10 SUPPOSITORY, RECTAL RECTAL DAILY PRN
COMMUNITY

## 2024-06-13 RX ORDER — PANTOPRAZOLE SODIUM 40 MG/1
40 TABLET, DELAYED RELEASE ORAL
Status: DISCONTINUED | OUTPATIENT
Start: 2024-06-14 | End: 2024-06-17 | Stop reason: HOSPADM

## 2024-06-13 RX ORDER — LIDOCAINE 50 MG/G
1 PATCH TOPICAL DAILY
Status: DISCONTINUED | OUTPATIENT
Start: 2024-06-14 | End: 2024-06-17 | Stop reason: HOSPADM

## 2024-06-13 RX ORDER — FOLIC ACID 1 MG/1
1000 TABLET ORAL DAILY
Status: DISCONTINUED | OUTPATIENT
Start: 2024-06-14 | End: 2024-06-17 | Stop reason: HOSPADM

## 2024-06-13 RX ORDER — SODIUM CHLORIDE 9 MG/ML
75 INJECTION, SOLUTION INTRAVENOUS CONTINUOUS
Status: DISCONTINUED | OUTPATIENT
Start: 2024-06-13 | End: 2024-06-13

## 2024-06-13 RX ORDER — DIGOXIN 125 MCG
125 TABLET ORAL EVERY OTHER DAY
Status: DISCONTINUED | OUTPATIENT
Start: 2024-06-15 | End: 2024-06-17 | Stop reason: HOSPADM

## 2024-06-13 RX ORDER — LANOLIN ALCOHOL/MO/W.PET/CERES
3 CREAM (GRAM) TOPICAL
Status: DISCONTINUED | OUTPATIENT
Start: 2024-06-13 | End: 2024-06-17 | Stop reason: HOSPADM

## 2024-06-13 RX ORDER — PRIMIDONE 50 MG/1
50 TABLET ORAL EVERY 12 HOURS SCHEDULED
Status: DISCONTINUED | OUTPATIENT
Start: 2024-06-13 | End: 2024-06-17 | Stop reason: HOSPADM

## 2024-06-13 RX ORDER — OXYCODONE HYDROCHLORIDE 5 MG/1
2.5 TABLET ORAL EVERY 6 HOURS PRN
Status: DISCONTINUED | OUTPATIENT
Start: 2024-06-13 | End: 2024-06-17 | Stop reason: HOSPADM

## 2024-06-13 RX ORDER — HEPARIN SODIUM 5000 [USP'U]/ML
5000 INJECTION, SOLUTION INTRAVENOUS; SUBCUTANEOUS EVERY 8 HOURS SCHEDULED
Status: DISCONTINUED | OUTPATIENT
Start: 2024-06-13 | End: 2024-06-17 | Stop reason: HOSPADM

## 2024-06-13 RX ORDER — TAMSULOSIN HYDROCHLORIDE 0.4 MG/1
0.4 CAPSULE ORAL
Status: DISCONTINUED | OUTPATIENT
Start: 2024-06-13 | End: 2024-06-17 | Stop reason: HOSPADM

## 2024-06-13 RX ORDER — CLOPIDOGREL BISULFATE 75 MG/1
75 TABLET ORAL DAILY
Status: DISCONTINUED | OUTPATIENT
Start: 2024-06-14 | End: 2024-06-17 | Stop reason: HOSPADM

## 2024-06-13 RX ORDER — FUROSEMIDE 20 MG/1
20 TABLET ORAL EVERY OTHER DAY
Status: DISCONTINUED | OUTPATIENT
Start: 2024-06-14 | End: 2024-06-14

## 2024-06-13 RX ORDER — DIGOXIN 125 MCG
125 TABLET ORAL EVERY OTHER DAY
COMMUNITY

## 2024-06-13 RX ORDER — ONDANSETRON 4 MG/1
4 TABLET, ORALLY DISINTEGRATING ORAL ONCE
Status: COMPLETED | OUTPATIENT
Start: 2024-06-13 | End: 2024-06-13

## 2024-06-13 RX ADMIN — OXYCODONE HYDROCHLORIDE AND ACETAMINOPHEN 1 TABLET: 5; 325 TABLET ORAL at 09:35

## 2024-06-13 RX ADMIN — SODIUM CHLORIDE 75 ML/HR: 0.9 INJECTION, SOLUTION INTRAVENOUS at 18:05

## 2024-06-13 RX ADMIN — ONDANSETRON 4 MG: 4 TABLET, ORALLY DISINTEGRATING ORAL at 14:03

## 2024-06-13 RX ADMIN — ACETAMINOPHEN 975 MG: 325 TABLET ORAL at 16:57

## 2024-06-13 RX ADMIN — SODIUM CHLORIDE 75 ML/HR: 0.9 INJECTION, SOLUTION INTRAVENOUS at 16:57

## 2024-06-13 RX ADMIN — GLYCERIN 1 DROP: .002; .002; .01 SOLUTION/ DROPS OPHTHALMIC at 20:55

## 2024-06-13 RX ADMIN — DRONEDARONE 400 MG: 400 TABLET, FILM COATED ORAL at 18:00

## 2024-06-13 RX ADMIN — SODIUM CHLORIDE 500 ML: 0.9 INJECTION, SOLUTION INTRAVENOUS at 14:10

## 2024-06-13 RX ADMIN — Medication 3 MG: at 20:55

## 2024-06-13 RX ADMIN — TAMSULOSIN HYDROCHLORIDE 0.4 MG: 0.4 CAPSULE ORAL at 17:18

## 2024-06-13 RX ADMIN — HEPARIN SODIUM 5000 UNITS: 5000 INJECTION, SOLUTION INTRAVENOUS; SUBCUTANEOUS at 20:50

## 2024-06-13 RX ADMIN — OXYCODONE HYDROCHLORIDE AND ACETAMINOPHEN 1 TABLET: 5; 325 TABLET ORAL at 10:47

## 2024-06-13 RX ADMIN — PRIMIDONE 50 MG: 50 TABLET ORAL at 20:50

## 2024-06-13 RX ADMIN — ACETAMINOPHEN 975 MG: 325 TABLET ORAL at 23:36

## 2024-06-13 NOTE — PLAN OF CARE
Problem: OCCUPATIONAL THERAPY ADULT  Goal: Performs self-care activities at highest level of function for planned discharge setting.  See evaluation for individualized goals.  Description: Treatment Interventions: ADL retraining, Visual perceptual retraining, Functional transfer training, UE strengthening/ROM, Endurance training, Cognitive reorientation, Neuromuscular reeducation, Equipment evaluation/education, Patient/family training, Fine motor coordination activities, Compensatory technique education, UE splinting, Continued evaluation, Energy conservation, Activityengagement, Cardiac education          See flowsheet documentation for full assessment, interventions and recommendations.   Note: Limitation: Decreased ADL status, Decreased endurance, Decreased high-level ADLs, Decreased self-care trans  Prognosis: Good  Assessment: Pt is a 89 y.o. male, [facility] to [facility]13/[facility] d/t experiencing a fall at [facility]. Dx: acute right symphysis/superior pubic ramus and right inferior pubic ramus fractures. Pt with PMHx impacting their performance during ADL tasks, including: acute urinary retention, hyponatremia, permanent Afib, prostate cancer metastatic to bone, angina, s/p CABG 1995 . Prior to admission to the hospital Pt was performing ADLs without physical assistance. IADLs with physical assistance. Functional transfers/ambulation without physical assistance. Cognitive status was PTA was Intact. OT order placed to assess Pt's ADLs, cognitive status, and performance during functional tasks in order to maximize safety and independence while making most appropriate d/c recommendations. PT/OT co-evaluation completed at this time d/t significant mobility deficits and safety concerns. Pt's clinical presentation is currently unstable/unpredictable given new onset deficits that effect Pt's occupational performance and ability to safely return to OF including decrease activity tolerance, decrease standing balance, decrease  performance during ADL tasks, increased pain, decrease generalized strength, and decrease performance during functional transfers combined with medical complications of hypotension, poor blood pressure control, wounds, and need for input for mobility technique/safety. Personal factors affecting Pt at time of initial evaluation include: advanced age, past experience, and recent fall(s)/fall history. Pt will benefit from continued skilled OT services to address deficits as defined above and to maximize level independence/participation during ADLs and functional tasks to facilitate return toward PLOF and improved quality of life. From an occupational therapy standpoint, recommendation at time of d/c would be Level II: Moderate Resource Intensity Therapy.     Rehab Resource Intensity Level, OT: II (Moderate Resource Intensity)

## 2024-06-13 NOTE — PLAN OF CARE
Problem: PAIN - ADULT  Goal: Verbalizes/displays adequate comfort level or baseline comfort level  Description: Interventions:  - Encourage patient to monitor pain and request assistance  - Assess pain using appropriate pain scale  - Administer analgesics based on type and severity of pain and evaluate response  - Implement non-pharmacological measures as appropriate and evaluate response  - Consider cultural and social influences on pain and pain management  - Notify physician/advanced practitioner if interventions unsuccessful or patient reports new pain  Outcome: Progressing     Problem: SAFETY ADULT  Goal: Patient will remain free of falls  Description: INTERVENTIONS:  - Educate patient/family on patient safety including physical limitations  - Instruct patient to call for assistance with activity   - Consult OT/PT to assist with strengthening/mobility   - Keep Call bell within reach  - Keep bed low and locked with side rails adjusted as appropriate  - Keep care items and personal belongings within reach  - Initiate and maintain comfort rounds  - Make Fall Risk Sign visible to staff  - Offer Toileting every  . Hours, in advance of need  - Initiate/Maintain .alarm  - Obtain necessary fall risk management equipment: ..  - Apply yellow socks and bracelet for high fall risk patients  - Consider moving patient to room near nurses station  Outcome: Progressing  Goal: Maintain or return to baseline ADL function  Description: INTERVENTIONS:  -  Assess patient's ability to carry out ADLs; assess patient's baseline for ADL function and identify physical deficits which impact ability to perform ADLs (bathing, care of mouth/teeth, toileting, grooming, dressing, etc.)  - Assess/evaluate cause of self-care deficits   - Assess range of motion  - Assess patient's mobility; develop plan if impaired  - Assess patient's need for assistive devices and provide as appropriate  - Encourage maximum independence but intervene and  supervise when necessary  - Involve family in performance of ADLs  - Assess for home care needs following discharge   - Consider OT consult to assist with ADL evaluation and planning for discharge  - Provide patient education as appropriate  Outcome: Progressing  Goal: Maintains/Returns to pre admission functional level  Description: INTERVENTIONS:  - Perform AM-PAC 6 Click Basic Mobility/ Daily Activity assessment daily.  - Set and communicate daily mobility goal to care team and patient/family/caregiver.   - Collaborate with rehabilitation services on mobility goals if consulted  - Perform Range of Motion . times a day.  - Reposition patient every . hours.  - Dangle patient . times a day  - Stand patient . times a day  - Ambulate patient . times a day  - Out of bed to chair . times a day   - Out of bed for meals .. times a day  - Out of bed for toileting  - Record patient progress and toleration of activity level   Outcome: Progressing     Problem: DISCHARGE PLANNING  Goal: Discharge to home or other facility with appropriate resources  Description: INTERVENTIONS:  - Identify barriers to discharge w/patient and caregiver  - Arrange for needed discharge resources and transportation as appropriate  - Identify discharge learning needs (meds, wound care, etc.)  - Arrange for interpretive services to assist at discharge as needed  - Refer to Case Management Department for coordinating discharge planning if the patient needs post-hospital services based on physician/advanced practitioner order or complex needs related to functional status, cognitive ability, or social support system  Outcome: Progressing     Problem: SKIN/TISSUE INTEGRITY - ADULT  Goal: Skin Integrity remains intact(Skin Breakdown Prevention)  Description: Assess:  -Perform Mario assessment every .  -Clean and moisturize skin every .  -Inspect skin when repositioning, toileting, and assisting with ADLS  -Assess under medical devices such as . every  .  -Assess extremities for adequate circulation and sensation     Bed Management:  -Have minimal linens on bed & keep smooth, unwrinkled  -Change linens as needed when moist or perspiring  -Avoid sitting or lying in one position for more than . hours while in bed  -Keep HOB at .degrees     Toileting:  -Offer bedside commode  -Assess for incontinence every .  -Use incontinent care products after each incontinent episode such as .    Activity:  -Mobilize patient . times a day  -Encourage activity and walks on unit  -Encourage or provide ROM exercises   -Turn and reposition patient every . Hours  -Use appropriate equipment to lift or move patient in bed  -Instruct/ Assist with weight shifting every . when out of bed in chair  -Consider limitation of chair time . hour intervals    Skin Care:  -Avoid use of baby powder, tape, friction and shearing, hot water or constrictive clothing  -Relieve pressure over bony prominences using .  -Do not massage red bony areas    Next Steps:  -Teach patient strategies to minimize risks such as .   -Consider consults to  interdisciplinary teams such as ...  Outcome: Progressing  Goal: Incision(s), wounds(s) or drain site(s) healing without S/S of infection  Description: INTERVENTIONS  - Assess and document dressing, incision, wound bed, drain sites and surrounding tissue  - Provide patient and family education  - Perform skin care/dressing changes every .  Outcome: Progressing  Goal: Pressure injury heals and does not worsen  Description: Interventions:  - Implement low air loss mattress or specialty surface (Criteria met)  - Apply silicone foam dressing  - Instruct/assist with weight shifting every . minutes when in chair   - Limit chair time to . hour intervals  - Use special pressure reducing interventions such as . when in chair   - Apply fecal or urinary incontinence containment device   - Perform passive or active ROM every .  - Turn and reposition patient & offload bony  prominences every . hours   - Utilize friction reducing device or surface for transfers   - Consider consults to  interdisciplinary teams such as .  - Use incontinent care products after each incontinent episode such as ...  - Consider nutrition services referral as needed  Outcome: Progressing     Problem: MUSCULOSKELETAL - ADULT  Goal: Maintain or return mobility to safest level of function  Description: INTERVENTIONS:  - Assess patient's ability to carry out ADLs; assess patient's baseline for ADL function and identify physical deficits which impact ability to perform ADLs (bathing, care of mouth/teeth, toileting, grooming, dressing, etc.)  - Assess/evaluate cause of self-care deficits   - Assess range of motion  - Assess patient's mobility  - Assess patient's need for assistive devices and provide as appropriate  - Encourage maximum independence but intervene and supervise when necessary  - Involve family in performance of ADLs  - Assess for home care needs following discharge   - Consider OT consult to assist with ADL evaluation and planning for discharge  - Provide patient education as appropriate  Outcome: Progressing  Goal: Maintain proper alignment of affected body part  Description: INTERVENTIONS:  - Support, maintain and protect limb and body alignment  - Provide patient/ family with appropriate education  Outcome: Progressing

## 2024-06-13 NOTE — ASSESSMENT & PLAN NOTE
Chronic - Baseline approx 130  127 on admission   Monitor with gentle hydration  Sodium studies pending

## 2024-06-13 NOTE — ED PROVIDER NOTES
Emergency Department Trauma Note  Miguel Angel Ortega 89 y.o. male MRN: [id number]  Unit/Bed#: [facility]TONINO POOL/ANTONINO Encounter: [id number]      Trauma Alert: Trauma Acuity: Trauma Evaluation  Model of Arrival: Mode of Arrival: BLS via    Trauma Team: Current Providers  Attending Provider: Ruben Toussaint DO  Attending Provider: Sherita Martinez MD  Physician Assistant: Mervin Gaviria PA-C  Registered Nurse: Laurie Mcintyre RN  Physical Therapist: Steve Bills PT  : ALONA Seymour  Consultants:     None      History of Present Illness     Chief Complaint:   Chief Complaint   Patient presents with    Fall     Fall while attempting to unlock door. Fell and struck head on railing. Complaining of right hip pain. Takes plavix daily.      HPI:  Miguel Angel Ortega is a 89 y.o. male who presents with fall.  Mechanism:Details of Incident: Fall while attempting to unlock door. Fell and struck head on railing. Complaining of right hip pain. Takes plavix daily. Injury Date: 06/13/24 Injury Time: 0730 Injury Occurence Location - Specify County: [facility]    Patient is a 89-year-old male on Plavix who presents by ambulance from nursing facility for the concern of fall with head injury.  Patient was ambulating back to his room with a walker and tripped falling hitting his right head, arm and hip off of the ground.  Denies any LOC.  Patient has a history of a pelvic fracture and was complaining of right hip pain from the fall.  Daughter states that he seems to always have pain there after he falls.  Patient admits to a small headache.  Has an abrasion to the right elbow but no pain in the right arm.  Denies any chest pain, shortness of breath cough, abdominal pain nausea vomiting, blurred vision neck pain, back pain, knee pain.        Review of Systems   All other systems reviewed and are negative.      Historical Information     Immunizations:   Immunization History   Administered Date(s)  Administered    COVID-19 MODERNA VACC 0.5 ML IM 01/13/2021, 02/10/2021, 10/20/2021, 11/09/2021    COVID-19 Moderna Vac BIVALENT 12 Yr+ IM 0.5 ML 10/21/2022    Influenza, high dose seasonal 0.7 mL 08/30/2023    Pneumococcal Conjugate Vaccine 20-valent (Pcv20), Polysace 10/17/2016    Pneumococcal Polysaccharide PPV23 02/22/2013    Tuberculin Skin Test 10/02/2023, 10/12/2023       Past Medical History:   Diagnosis Date    A-fib (HCC)     Coronary artery disease     History of angina     Prostate cancer (HCC)        Family History   Problem Relation Age of Onset    Hypertension Father      Past Surgical History:   Procedure Laterality Date    CORONARY ARTERY BYPASS GRAFT  1995    RI CYSTO W/IRRIG & EVAC MULTPLE OBSTRUCTING CLOTS N/A 12/27/2023    Procedure: CYSTOSCOPY EVACUATION OF CLOTS, fulguration of bleeding. insertion 24FR [address] zuniga CBI;  Surgeon: Carson Singer MD;  Location:  MAIN OR;  Service: Urology    TONSILLECTOMY       Social History     Tobacco Use    Smoking status: Former     Types: Cigarettes    Smokeless tobacco: Never   Vaping Use    Vaping status: Never Used   Substance Use Topics    Alcohol use: Not Currently    Drug use: Not Currently     E-Cigarette/Vaping    E-Cigarette Use Never User      E-Cigarette/Vaping Substances       Family History: non-contributory    Meds/Allergies   Prior to Admission Medications   Prescriptions Last Dose Informant Patient Reported? Taking?   CalProtect 0.44-20.6 % OINT   Yes No   Carboxymethylcellul-Glycerin (Refresh Optive) 0.5-0.9 % SOLN   Yes No   Sig: Apply to eye   Multiple Vitamins-Minerals (PreserVision AREDS) CAPS   Yes No   Sig: Take 1 capsule by mouth daily   Saline GEL   Yes No   Sig: into each nostril   Vibegron 75 MG TABS   No No   Sig: Take 75 mg by mouth in the morning   acetaminophen (TYLENOL) 325 mg tablet   Yes No   aluminum-magnesium hydroxide-simethicone (Antacid) 4198-8052-872 mg/30 mL suspension   Yes No   Sig: Take by mouth   bisacodyl  (DULCOLAX) 10 mg suppository   No No   Sig: Insert 1 suppository (10 mg total) into the rectum daily as needed for constipation   clopidogrel (PLAVIX) 75 mg tablet   Yes No   Sig: Take 75 mg by mouth daily   cyanocobalamin (VITAMIN B-12) 1000 MCG tablet   No No   Sig: Take 1 tablet (1,000 mcg total) by mouth daily   digoxin (LANOXIN) 0.125 mg tablet   No No   Sig: Take 1 tablet (125 mcg total) by mouth every other day   diphenoxylate-atropine (LOMOTIL) 2.5-0.025 mg per tablet   Yes No   dronedarone (Multaq) 400 mg tablet   Yes No   Sig: Take 400 mg by mouth 2 (two) times a day with meals   ferrous sulfate 325 (65 Fe) mg tablet   No No   Sig: Take 1 tablet (325 mg total) by mouth daily with breakfast   Patient not taking: Reported on 3/11/2024   folic acid (FOLVITE) 1 mg tablet   Yes No   Sig: Take 1 tablet by mouth daily   furosemide (LASIX) 20 mg tablet   Yes No   melatonin 3 mg   No No   Sig: Take 1 tablet (3 mg total) by mouth daily at bedtime as needed (insomnia)   ondansetron (ZOFRAN) 4 mg tablet   Yes No   Sig: Take 4 mg by mouth every 8 (eight) hours as needed   Patient not taking: Reported on 4/16/2024   pantoprazole (PROTONIX) 40 mg tablet   Yes No   Sig: Take 40 mg by mouth daily   polyethylene glycol (MIRALAX) 17 g packet   No No   Sig: Take 17 g by mouth 2 (two) times a day   primidone (MYSOLINE) 50 mg tablet   Yes No   Sig: Take 50 mg by mouth every 12 (twelve) hours   senna-docusate sodium (SENOKOT S) 8.6-50 mg per tablet   No No   Sig: Take 1 tablet by mouth 2 (two) times a day   tamsulosin (FLOMAX) 0.4 mg   No No   Sig: Take 1 capsule (0.4 mg total) by mouth daily with dinner      Facility-Administered Medications: None       No Known Allergies    PHYSICAL EXAM    PE limited by: n/a    Objective   Vitals:   First set: Temperature: 97.6 °F (36.4 °C) (06/13/24 0900)  Pulse: 71 (06/13/24 0900)  Respirations: 16 (06/13/24 0900)  Blood Pressure: 144/63 (06/13/24 0900)  SpO2: 96 % (06/13/24  0900)    Primary Survey:   (A) Airway: normal  (B) Breathing: normal  (C) Circulation: Pulses:   normal  (D) Disabliity:  GCS Total:  15  (E) Expose:  Completed    Secondary Survey: (Click on Physical Exam tab above)  Physical Exam  Vitals and nursing note reviewed.   Constitutional:       General: He is in acute distress.      Appearance: He is well-developed.   HENT:      Head: Normocephalic.        Mouth/Throat:      Mouth: Oropharynx is clear and moist.   Eyes:      Extraocular Movements: EOM normal.      Pupils: Pupils are equal, round, and reactive to light.   Cardiovascular:      Rate and Rhythm: Normal rate and regular rhythm.      Heart sounds: No murmur heard.  Pulmonary:      Effort: Pulmonary effort is normal. No respiratory distress.      Breath sounds: Normal breath sounds.   Abdominal:      General: Bowel sounds are normal.      Palpations: Abdomen is soft.      Tenderness: There is no abdominal tenderness.   Musculoskeletal:      Cervical back: Normal range of motion. No tenderness or bony tenderness.      Thoracic back: No tenderness or bony tenderness.      Lumbar back: No tenderness or bony tenderness.      Right hip: Tenderness present.      Right knee: Normal.      Left knee: Normal.   Skin:     General: Skin is warm and dry.      Capillary Refill: Capillary refill takes less than 2 seconds.          Neurological:      Mental Status: He is alert and oriented to person, place, and time.   Psychiatric:         Mood and Affect: Mood and affect normal.         Behavior: Behavior normal.         Cervical spine cleared by clinical criteria? No (imaging required)      Invasive Devices       Peripheral Intravenous Line  Duration             Peripheral IV 06/13/24 Left;Ventral (anterior) Forearm <1 day                    Lab Results:   Results Reviewed       Procedure Component Value Units Date/Time    Comprehensive metabolic panel [[id number]]  (Abnormal) Collected: 06/13/24 1412    Lab Status: Final  result Specimen: Blood from Arm, Left Updated: 06/13/24 1435     Sodium 127 mmol/L      Potassium 4.4 mmol/L      Chloride 97 mmol/L      CO2 22 mmol/L      ANION GAP 8 mmol/L      BUN 24 mg/dL      Creatinine 1.15 mg/dL      Glucose 138 mg/dL      Calcium 8.8 mg/dL      AST 18 U/L      ALT 11 U/L      Alkaline Phosphatase 89 U/L      Total Protein 6.8 g/dL      Albumin 4.0 g/dL      Total Bilirubin 0.44 mg/dL      eGFR 53 ml/min/1.73sq m     Narrative:      National Kidney Disease Foundation guidelines for Chronic Kidney Disease (CKD):     Stage 1 with normal or high GFR (GFR > 90 mL/min/1.73 square meters)    Stage 2 Mild CKD (GFR = 60-89 mL/min/1.73 square meters)    Stage 3A Moderate CKD (GFR = 45-59 mL/min/1.73 square meters)    Stage 3B Moderate CKD (GFR = 30-44 mL/min/1.73 square meters)    Stage 4 Severe CKD (GFR = 15-29 mL/min/1.73 square meters)    Stage 5 End Stage CKD (GFR <15 mL/min/1.73 square meters)  Note: GFR calculation is accurate only with a steady state creatinine    CBC and differential [[id number]]  (Abnormal) Collected: 06/13/24 1412    Lab Status: Final result Specimen: Blood from Arm, Left Updated: 06/13/24 1417     WBC 13.45 Thousand/uL      RBC 3.10 Million/uL      Hemoglobin 10.1 g/dL      Hematocrit 29.5 %      MCV 95 fL      MCH 32.6 pg      MCHC 34.2 g/dL      RDW 14.6 %      MPV 8.0 fL      Platelets 218 Thousands/uL      nRBC 0 /100 WBCs      Segmented % 75 %      Immature Grans % 1 %      Lymphocytes % 12 %      Monocytes % 11 %      Eosinophils Relative 1 %      Basophils Relative 0 %      Absolute Neutrophils 10.04 Thousands/µL      Absolute Immature Grans 0.11 Thousand/uL      Absolute Lymphocytes 1.64 Thousands/µL      Absolute Monocytes 1.43 Thousand/µL      Eosinophils Absolute 0.17 Thousand/µL      Basophils Absolute 0.06 Thousands/µL                    Imaging Studies:   Direct to CT: Yes  CT pelvis wo contrast   Final Result by Ruben Ellsworth MD (06/13 1025)   1. Acute right  symphysis/superior pubic ramus and right inferior pubic ramus fractures as described.   2. Chronic bilateral ramus fracture deformities redemonstrated.   3. Extensive bony metastases.         Workstation performed: [id number]         XR hip/pelv 2-3 vws right if performed   Final Result by Carl Rush MD (06/13 0926)      Suspected acute nondisplaced fracture of the right superior pubic ramus. If clinically warranted, consider CT for further evaluation.      The study was marked in EPIC for immediate notification.      Workstation performed: [id number]         TRAUMA - CT spine cervical wo contrast   Final Result by Carl Rush MD (06/13 0997)      No acute cervical spine fracture or traumatic malalignment.      Diffuse osseous metastatic disease, similar to the prior study.      The study was marked in EPIC for immediate notification.            Workstation performed: [id number]         TRAUMA - CT head wo contrast   Final Result by Carl Rush MD (06/13 0933)      No acute intracranial abnormality.      Unchanged small chronic right frontal extra-axial collection.      The study was marked in EPIC for immediate notification.                  Workstation performed: [id number]         XR Trauma chest portable   Final Result by Carl Rush MD (06/13 0936)      No acute cardiopulmonary disease.            Workstation performed: [id number]         XR Trauma pelvis ap only 1 or 2 vw   Final Result by Carl Rush MD (06/13 0929)      Suspected acute nondisplaced fracture of the right superior pubic ramus. If clinically warranted, consider CT for further evaluation.      The study was marked in EPIC for immediate notification.      Workstation performed: [id number]               Procedures  ECG 12 Lead Documentation Only    Date/Time: 6/13/2024 2:51 PM    Performed by: Mervin Gaviria PA-C  Authorized by: Mervin Gaviria PA-C    Indications / Diagnosis:   Lightheadedness  ECG reviewed by me, the ED Provider: yes    Patient location:  ED  Rate:     ECG rate:  61    ECG rate assessment: normal    Rhythm:     Rhythm: sinus rhythm    ST segments:     ST segments:  Non-specific  T waves:     T waves: non-specific             ED Course  ED Course as of 06/13/24 1453   Thu Jun 13, 2024   1150 Patient did ambulate but only minimally, reach out to care management who recommended PT OT evaluation, patient has had an assisted living facility and ultimately just trying to clarify that the patient will be stable under the circumstances.  PT OT, Radha Mcclure recommendation was to obtain orthopedic consult due to weightbearing status.  Dr. Merida  was added to the conversation for this recommendation.   1333 The orthopedic did recommend weightbearing as tolerated physical therapy and Occupational Therapy now at bedside to evaluate patient need           Medical Decision Making  Patient is a 89-year-old male on Plavix who presents by ambulance from nursing facility for the concern of fall with head injury.  Patient was ambulating back to his room with a walker and tripped falling hitting his right head, arm and hip off of the ground.  Denies any LOC.  Patient has a history of a pelvic fracture and was complaining of right hip pain from the fall.  Daughter states that he seems to always have pain there after he falls.  Patient admits to a small headache.  Has an abrasion to the right elbow but no pain in the right arm.  Denies any chest pain, shortness of breath cough, abdominal pain nausea vomiting, blurred vision neck pain, back pain, knee pain.    On examination was complaining of right hip pain.  On imaging studies the patient was found to have a nondisplaced right pubic rami fracture.  The patient did have pain.  Patient was able to ambulate with a walker but needed some assistance.  Patient was positive for orthostatic hypotension and was lightheaded when doing so.  The  patient's EKG was without any acute ischemic changes.  Patient did not have any chest pain or syncope.  Patient's case was discussed disgusted with the hospitalist service due to the orthostatic hypotension and lightheadedness.  PT OT evaluation was performed in the emergency department.    Amount and/or Complexity of Data Reviewed  Labs: ordered. Decision-making details documented in ED Course.  Radiology: ordered and independent interpretation performed. Decision-making details documented in ED Course.  ECG/medicine tests: ordered and independent interpretation performed. Decision-making details documented in ED Course.  Discussion of management or test interpretation with external provider(s): Terry Latham  Prescription drug management.  Decision regarding hospitalization.                Disposition  [facility] One Transfer: No  Final diagnoses:   Closed fracture of ramus of right pubis, initial encounter (HCC)   Orthostatic hypotension   Lightheadedness     Time reflects when diagnosis was documented in both MDM as applicable and the Disposition within this note       Time User Action Codes Description Comment    6/13/2024 10:45 AM Mervin Gaviria [S32.591A] Closed fracture of ramus of right pubis, initial encounter (HCC)     6/13/2024  1:49 PM Mervin Gaviria [I95.1] Orthostatic hypotension     6/13/2024  2:00 PM Mervin Gaviria [R42] Lightheadedness           ED Disposition       ED Disposition   Admit    Condition   Stable    Date/Time   Thu Jun 13, 2024  2:22 PM    Comment   Case was discussed with terry  and the patient's admission status was agreed to be Admission Status: observation status to the service of Dr. stewart .               Follow-up Information       Follow up With Specialties Details Why Contact Info    Roge Laoz MD Orthopedic Surgery, Sports Medicine Call   [address] [facility]  [address] [place] [address] [phone]            Patient's Medications   Discharge Prescriptions     No medications on file         PDMP Review       None            ED Provider  Electronically Signed by           Mervin Gaviria PA-C  06/13/24 6218

## 2024-06-13 NOTE — ASSESSMENT & PLAN NOTE
Had mechanical fall at [facility] while using walker  CT pelvis showing right superior and right inferior pubic rami fx  CXR, CT head, CT cervical spine negative for acute process   PT/OT consulted  Orthopedics consulted  Analgesia prn   Patient was to be mostly wheelchair bound at time of last discharge due to falls and chronic orthostatic hypotension

## 2024-06-13 NOTE — ASSESSMENT & PLAN NOTE
florinef has been stopped outpatient due to lower extremity edema   Patient has chronic symptomatic orthostasis  Per last discharge - patient was to be mostly wheelchair bound and using compression socks, slow position changes  Can trial midodrine if needed  PT/OT  Gentle hydration

## 2024-06-13 NOTE — DISCHARGE INSTRUCTIONS
"NARCOTIC PAIN MEDICINE INSTRUCTIONS:    You have been prescribed a narcotic pain medicine.  This type of medicine is not like other medications and you should understand additional information regarding it.  You have been provided only a limited supply of this medicine and if you need more you must seen your Primary Care Provider &/or your Pain Therapist, if you have one.      Please read the following information.  If you have any questions, please do not leave here until you have your questions answered.       1.  The pain medicine will not likely make all of your pain go away, but it will hopefully take the edge off of your pain.      2.  Use the narcotic pain medicine only as directed!  Be sure to avoid taking this medicine on an empty stomach.  Keep yourself well hydrated when taking this medicine.    3.  Narcotic can cause sedation (sleepiness) and delayed reactions which puts you and those around you at risk of injury or death.  You must not drive any vehicle, operate any machinery, make any important/life-changing or legal decisions, participate in physical activities, drink alcohol or take any other medicines that can cause drossiness.      4.  If you feel excessively tired or unable to coordinate your activities DO NOT TAKE any more of this medication!    5.  Narcotic pain medications taken regularly will cause constipation.  If you need to take this medication regularly use an over-the-counter stool softener such as docusate, (Colace) or laxatives called Milk of Magnesia or Mirilax following the 's directions.     6.   While taking the narcotic pain medications keep it in a secure location in your home.  Avoid leaving them in common areas where others can mistakenly take them- including unsuspecting children.  Also, please understand that narcotics are a sought after \"street drug,\" and criminals may seek to steal them from you.  If you have this medicine stolen, please contact local law " enforcement and have a report of the event filed.  Ask to have a copy of the report because your healthcare providers will want to review it.       7.  Any remaining tablets or pills must be disposed of carefully.  Most pharmacies will accept returned tablets or pills.  They will destroy them using safe methods.  Do not flush them down the toilet!

## 2024-06-13 NOTE — OCCUPATIONAL THERAPY NOTE
"    Occupational Therapy Evaluation     Patient Name: Miguel Angel Ortega  Today's Date: 6/13/2024  Problem List  Active Problems:  There are no active Hospital Problems.    Past Medical History  Past Medical History:   Diagnosis Date    A-fib (HCC)     Coronary artery disease     History of angina     Prostate cancer (HCC)      Past Surgical History  Past Surgical History:   Procedure Laterality Date    CORONARY ARTERY BYPASS GRAFT  1995    [place] CYSTO W/IRRIG & EVAC MULTPLE OBSTRUCTING CLOTS N/A 12/27/2023    Procedure: CYSTOSCOPY EVACUATION OF CLOTS, fulguration of bleeding. insertion 24FR [address] zuniga CBI;  Surgeon: Carson Singer MD;  Location:  MAIN OR;  Service: Urology    TONSILLECTOMY        06/13/24 0261   Note Type   Note type Evaluation   Pain Assessment   Pain Assessment Tool FLACC   Pain Location/Orientation Orientation: Right;Location: Hip  (\"it only hurts when I'm moving\")   Pain Rating: FLACC (Rest) - Face 0   Pain Rating: FLACC (Rest) - Legs 0   Pain Rating: FLACC (Rest) - Activity 0   Pain Rating: FLACC (Rest) - Cry 0   Pain Rating: FLACC (Rest) - Consolability 0   Score: FLACC (Rest) 0   Pain Rating: FLACC (Activity) - Face 1   Pain Rating: FLACC (Activity) - Legs 0   Pain Rating: FLACC (Activity) - Activity 0   Pain Rating: FLACC (Activity) - Cry 0   Pain Rating: FLACC (Activity) - Consolability 0   Score: FLACC (Activity) 1   Restrictions/Precautions   Other Precautions Fall Risk;Multiple lines;Telemetry;Hard of hearing   Home Living   Type of Home Assisted living   Home Layout One level;Performs ADLs on one level;Able to live on main level with bedroom/bathroom   Bathroom Shower/Tub Walk-in shower   Bathroom Toilet Raised   Bathroom Equipment Shower chair;Grab bars in shower   Home Equipment Walker;Hemiwalker   Additional Comments Pt reports living at [facility] custodial. RW or wheelchair (d/t hypotension) at baseline.   Prior Function   Level of [place] Independent with ADLs;Independent with " "functional mobility;Needs assistance with IADLS   Lives With Facility staff   IADLs Family/Friend/Other provides transportation;Family/Friend/Other provides medication management;Family/Friend/Other provides meals   Falls in the last 6 months 1 to 4  (one fall recent for admission)   Comments Assists for showers 2x/week   Subjective   Subjective \"I was told by the therapists there to start using the walker more instead of the wheelchair, I've been walking up to 200ft. I recently started to dress all by myself too\"   ADL   UB Dressing Assistance 5  Supervision/Setup   UB Dressing Deficit Setup;Verbal cueing;Increased time to complete   LB Dressing Assistance 3  Moderate Assistance   LB Dressing Deficit Setup;Requires assistive device for steadying;Verbal cueing;Increased time to complete;Don/doff R sock;Thread RLE into underwear;Pull up over hips   Additional Comments UB ADLs @ S/set-up while seated at EOB. LB ADLs @ Mod A. Pt able to don L sock but required assist to don R sock d/t R hip pain. Increased support for clothing management while standing d/t decreased standing balance/tolerance.   Bed Mobility   Supine to Sit 5  Supervision   Additional items HOB elevated   Additional Comments Pt supine in bed at beginning of session. Supine to sit @ S.   Transfers   Sit to Stand 5  Supervision   Additional items Increased time required;Verbal cues   Stand to Sit 5  Supervision   Additional items Increased time required;Verbal cues   Stand pivot   (SBA -> Steadying assist)   Additional Comments Two STS from EOB to RW @ S. Pt able to maintain standing for approximately 3 minutes @ S with BUE support on RW for orthostatic vitals to be taken. Initial SPT from EOB to recliner with RW @ SBA. Family reports pt does not use RW to pivot to wheelchair at baseline. SPT from recliner to EOB without AD @ Steadying assist d/t difficulty weight-bearing onto RLE. See PT note for further mobility.   Balance   Static Sitting Normal "   Dynamic Sitting Good   Static Standing Fair   Dynamic Standing Poor +   Activity Tolerance   Activity Tolerance Patient limited by pain   Medical Staff Made Aware Spoke with PT Steve   RUE Assessment   RUE Assessment WFL   LUE Assessment   LUE Assessment WFL   Hand Function   Gross Motor Coordination Functional   Fine Motor Coordination Functional   Cognition   Overall Cognitive Status WFL   Arousal/Participation Alert;Responsive;Cooperative   Attention Within functional limits   Orientation Level Oriented X4   Memory Within functional limits   Following Commands Follows one step commands without difficulty   Comments [place] requiring increased volume and repetition of commands/cues   Assessment   Limitation Decreased ADL status;Decreased endurance;Decreased high-level ADLs;Decreased self-care trans   Prognosis Good   Assessment Pt is a 89 y.o. male, admitted to [facility] 6/13/2024 d/t experiencing a fall at [facility]. Dx: acute right symphysis/superior pubic ramus and right inferior pubic ramus fractures. Pt with PMHx impacting their performance during ADL tasks, including: acute urinary retention, hyponatremia, permanent Afib, prostate cancer metastatic to bone, angina, s/p CABG 1995 . Prior to admission to the hospital Pt was performing ADLs without physical assistance. IADLs with physical assistance. Functional transfers/ambulation without physical assistance. Cognitive status was PTA was Intact. OT order placed to assess Pt's ADLs, cognitive status, and performance during functional tasks in order to maximize safety and independence while making most appropriate d/c recommendations. PT/OT co-evaluation completed at this time d/t significant mobility deficits and safety concerns. Pt's clinical presentation is currently unstable/unpredictable given new onset deficits that effect Pt's occupational performance and ability to safely return to PLOF including decrease activity tolerance, decrease standing balance, decrease  performance during ADL tasks, increased pain, decrease generalized strength, and decrease performance during functional transfers combined with medical complications of hypotension, poor blood pressure control, wounds, and need for input for mobility technique/safety. Personal factors affecting Pt at time of initial evaluation include: advanced age, past experience, and recent fall(s)/fall history. Pt will benefit from continued skilled OT services to address deficits as defined above and to maximize level independence/participation during ADLs and functional tasks to facilitate return toward PLOF and improved quality of life. From an occupational therapy standpoint, recommendation at time of d/c would be Level II: Moderate Resource Intensity Therapy.   Plan   Treatment Interventions ADL retraining;Visual perceptual retraining;Functional transfer training;UE strengthening/ROM;Endurance training;Cognitive reorientation;Neuromuscular reeducation;Equipment evaluation/education;Patient/family training;Fine motor coordination activities;Compensatory technique education;UE splinting;Continued evaluation;Energy conservation;Activityengagement;Cardiac education   Goal Expiration Date 06/27/24   OT Frequency 2-3x/wk   Discharge Recommendation   Rehab Resource Intensity Level, OT II (Moderate Resource Intensity)   AM-PAC Daily Activity Inpatient   Lower Body Dressing 2   Bathing 3   Toileting 2   Upper Body Dressing 3   Grooming 3   Eating 4   Daily Activity Raw Score 17   Daily Activity Standardized Score (Calc for Raw Score >=11) 37.26   AM-PAC Applied Cognition Inpatient   Following a Speech/Presentation 3   Understanding Ordinary Conversation 4   Taking Medications 3   Remembering Where Things Are Placed or Put Away 3   Remembering List of 4-5 Errands 2   Taking Care of Complicated Tasks 2   Applied Cognition Raw Score 17   Applied Cognition Standardized Score 36.52   End of Consult   Patient Position at End of Consult  Other (comment)  (seated at EOB with PT)      06/13/24 1323 06/13/24 1326 06/13/24 1329   Vitals   Blood Pressure 147/63 106/51 (!) 99/44   MAP (mmHg) 91  --   --    BP Location Left arm Left arm Left arm   BP Method Automatic Automatic Automatic   Patient Position - Orthostatic VS Lying Standing - Orthostatic VS Standing for 3 minutes - Orthostatic VS   *Pt asymptomatic throughout    The patient's raw score on the AM-PAC Daily Activity Inpatient Short Form is 17. A raw score of less than 19 suggests the patient may benefit from discharge to post-acute rehabilitation services. Please refer to the recommendation of the Occupational Therapist for safe discharge planning.    Pt goals to be met by 6/27/2024    Pt will demonstrate ability to complete grooming/hygiene tasks @ Mod I after set-up.  Pt will demonstrate ability to complete supine<>sit @ Mod I in order to increase safety and independence during ADL tasks.  Pt will demonstrate ability to complete UB ADLs including washing/dressing @ Mod I in order to increase performance and participation during meaningful tasks  Pt will demonstrate ability to complete LB dressing @ Mod I in order to increase safety and independence during meaningful tasks.   Pt will demonstrate ability to complete toileting tasks including CM and pericare @ Mod I in order to increase safety and independence during meaningful tasks.  Pt will demonstrate ability to complete EOB, chair, toilet/commode transfers @ Mod I in order to increase performance and participation during functional tasks.  Pt will demonstrate ability to stand for 3-5 minutes while maintaining Fair + balance with use of RW for UB support PRN.  Pt will demonstrate ability to tolerate 30-35 minute OT session with no vc'ing for deep breathing or use of energy conservation techniques in order to increase activity tolerance during functional tasks.   Pt will demonstrate Good carryover of use of energy conservation/compensatory  strategies during ADLs and functional tasks in order to increase safety and reduce risk for falls.   Pt will demonstrate Good attention and participation in continued evaluation of functional ambulation house hold distances in order to assist with safe d/c planning.  Pt will attend to continued cognitive assessments 100% of the time in order to provide most appropriate d/c recommendations.   Pt will follow 100% simple 2-step commands and be A&O x4 consistently with environmental cues to increase participation in functional activities.   Pt will identify 3 areas of interest/hobbies and 1 intervention on how to incorporate into daily life in order to increase interaction with environment and peers as well as increase participation in meaningful tasks.   Pt will demonstrate 100% carryover of BUE HEP in order to increase BUE MS and increase performance during functional tasks upon d/c home.    Treasure Smalls, OTR/L

## 2024-06-13 NOTE — ED NOTES
Ambulation trial completed with assist x2 and RW. Patient was able to tolerate few steps, but unable to turn or transfer to wheelchair independently. Patient resides in assisted living. PT/OT eval ordered for dispo.      Laurie Mcintyre RN  06/13/24 [id number]

## 2024-06-13 NOTE — PLAN OF CARE
Problem: PHYSICAL THERAPY ADULT  Goal: Performs mobility at highest level of function for planned discharge setting.  See evaluation for individualized goals.  Description: Treatment/Interventions: Functional transfer training, LE strengthening/ROM, Elevations, Therapeutic exercise, Endurance training, Patient/family training, Equipment eval/education, Bed mobility, Gait training, Compensatory technique education, Spoke to nursing, OT          See flowsheet documentation for full assessment, interventions and recommendations.  Note: Prognosis: Fair  Problem List: Decreased strength, Decreased endurance, Impaired balance, Decreased mobility, Pain, Impaired hearing, Impaired sensation  Assessment: Pt is a 89 y.o. male seen for PT evaluation s/p admission to [facility] on 6/13/2024 s/p fall with R pubic rami fracture. Order placed for PT services.  Upon evaluation: Pt is presenting with impaired functional mobility due to pain, decreased strength, decreased endurance, impaired balance, gait deviations, impaired hearing, and fall risk requiring  SPV assistance for bed mobility and steadying assistance for transfers and ambulation with RW . Pt's clinical presentation is currently unstable/unpredictable given the functional mobility deficits above, especially weakness, pain, decreased activity tolerance, decreased functional mobility tolerance, and orthostasis , coupled with fall risks as indicated by AM-PAC 6-Clicks: 17/24 as well as hx of falls and impaired balance and combined with medical complications of pain impacting overall mobility status, abnormal WBCs, abnormal sodium values, fear/retreat, and need for input for mobility technique/safety.  Pt's PMHx and comorbidities that may affect physical performance and progress include: A fib, limited hearing, and cancer history and/or treatment. Personal factors affecting pt at time of IE include: anxiety, advanced age, inability to perform IADLs,  inability to navigate level surfaces without external assistance, inability to navigate community distances, and recent fall(s)/fall history. Pt will benefit from continued skilled PT services to address deficits as defined above and to maximize level of functional mobility to facilitate return toward PLOF and improved QOL. From PT/mobility standpoint, recommendation at time of d/c would be Level II (Moderate Resource Intensity pending progress in order to reduce fall risk and maximize pt's functional independence and consistency with mobility in order to facilitate return to PLOF.  Recommend trial with walker next 1-2 sessions and ther ex next 1-2 sessions.  Barriers to Discharge: Other (Comment)  Barriers to Discharge Comments: pain, orthostasis, nausea, lightheadedness, recent fall, anxiety, fear of falling  Rehab Resource Intensity Level, PT: II (Moderate Resource Intensity)    See flowsheet documentation for full assessment.

## 2024-06-13 NOTE — PHYSICAL THERAPY NOTE
PHYSICAL THERAPY EVALUATION      NAME:  Miguel Angel Ortega  DATE: 06/13/24    AGE:   89 y.o.  Mrn:   [id number]  ADMIT DX:  Orthostatic hypotension [I95.1]  Lightheadedness [R42]  Closed fracture of ramus of right pubis, initial encounter ([facility]) [S32.592K]    Past Medical History:   Diagnosis Date    A-fib ([facility])     Coronary artery disease     History of angina     Prostate cancer ([facility])      Length Of Stay: 0  Performed at least 2 patient identifiers during session: Name and Birthday         PHYSICAL THERAPY EVALUATION:       06/13/24 1316   PT Last Visit   PT Visit Date 06/13/24   Note Type   Note type Evaluation   Pain Assessment   Pain Assessment Tool 0-10   Pain Score   (no pain at rest; 4/10 pain with WBing/activity)   Hospital Pain Intervention(s) Repositioned;Ambulation/increased activity   Restrictions/Precautions   Weight Bearing Precautions Per Order Yes  (per secure chat messaging from Dr. Kimble (ortho))   RLE Weight Bearing Per Order WBAT   LLE Weight Bearing Per Order WBAT   Other Precautions Fall Risk;Pain;Hard of hearing  (orthostasis)   Home Living   Type of Home Assisted living  ([facility] since transitioning from [facility] in March)   Home Layout Performs ADLs on one level;Elevator  (dining room downstairs but utilizes elevator)   Bathroom Shower/Tub Walk-in shower   Bathroom Toilet Raised   Bathroom Equipment Shower chair;Grab bars in shower   Home Equipment Wheelchair-manual  (Rollator)   Additional Comments Dining room is about 70 ft of walking from pt's room   Prior Function   Level of [place] Independent with ADLs;Independent with functional mobility;Needs assistance with IADLS  (receives assist 2x/wk to shower)   Lives With   (apt at [facility] with facility staff to assist PRN)   Receives Help From Family  ([facility] staff)   IADLs Family/Friend/Other provides transportation;Family/Friend/Other provides meals;Family/Friend/Other provides medication management   Falls in the  "last 6 months 1 to 4   Comments Pt has recently been ambulating in halls [facility] with RW mod (I).  Intermittently utilizes W/C to get to dining room depending upon fatigue level.   General   Family/Caregiver Present Yes   Cognition   Overall Cognitive Status WFL   Arousal/Participation Cooperative   Orientation Level Oriented X4   Following Commands Follows one step commands without difficulty   Comments hard of hearing and anxious   Subjective   Subjective \"How am I doing?\"   RLE Assessment   RLE Assessment X   Strength RLE   R Hip Flexion 3+/5   R Knee Extension 4-/5   R Ankle Dorsiflexion 4/5   LLE Assessment   LLE Assessment WFL   Light Touch   RLE Light Touch Grossly intact   LLE Light Touch Impaired   LLE Light Touch Comments toes   Proprioception   RLE Proprioception Grossly intact   LLE Proprioception Grossly Intact   Bed Mobility   Supine to Sit 5  Supervision   Additional items Increased time required;Verbal cues;HOB elevated   Transfers   Sit to Stand 5  Supervision   Stand to Sit 5  Supervision   Stand pivot   (SBA - steadying assist)   Additional Comments Using RW   Ambulation/Elevation   Gait pattern Improper Weight shift;Antalgic;Excessively slow;Inconsistent geoff;Short stride   Gait Assistance   (SBA - steadying assist)   Additional items Assist x 1;Verbal cues   Assistive Device   (rollator)   Distance 19 ft   Balance   Static Sitting Good   Dynamic Sitting Fair +   Static Standing Fair   Dynamic Standing Poor +   Ambulatory Fair -   Endurance Deficit   Endurance Deficit Yes   Endurance Deficit Description lightheadedness, pain   Activity Tolerance   Activity Tolerance Patient limited by fatigue;Patient limited by pain   Medical Staff Made Aware SHERIN Amanda   Nurse Made Aware Yes   Assessment   Prognosis Fair   Problem List Decreased strength;Decreased endurance;Impaired balance;Decreased mobility;Pain;Impaired hearing;Impaired sensation   Barriers to Discharge Other (Comment)   Barriers to " Discharge Comments pain, orthostasis, nausea, lightheadedness, recent fall, anxiety, fear of falling   Goals   STG Expiration Date 06/27/24   PT Treatment Day 1   Plan   Treatment/Interventions Functional transfer training;LE strengthening/ROM;Elevations;Therapeutic exercise;Endurance training;Patient/family training;Equipment eval/education;Bed mobility;Gait training;Compensatory technique education;Spoke to nursing;OT   PT Frequency 3-5x/wk   Discharge Recommendation   Rehab Resource Intensity Level, PT II (Moderate Resource Intensity)   Additional Comments if pt returns to [facility] at this time; PT recommends pt be W/C level and ring for assistance with transfers   AM-PAC Basic Mobility Inpatient   Turning in Flat Bed Without Bedrails 3   Lying on Back to Sitting on Edge of Flat Bed Without Bedrails 3   Moving Bed to Chair 3   Standing Up From Chair Using Arms 3   Walk in Room 3   Climb 3-5 Stairs With Railing 2   Basic Mobility Inpatient Raw Score 17   Basic Mobility Standardized Score 39.67   [facility] Highest Level Of Mobility   -HL Goal 5: Stand one or more mins   -HLM Achieved 7: Walk 25 feet or more   Additional Treatment Session   Start Time 1353   End Time 1417   Treatment Assessment Pt tolerates tx session fair.  Pt limited by nausea, anxiety, fear of falling, orthostasis (with feeling somewhat lightheaded), & pain with WBing/upright mobility.  BP noted to be 98/65 post ambulation.  No reports of feeling SOB.   Equipment Use Pt performs sit to stand no AD steadying assist and then completes SPT no AD steadying assist with increased time and fear.  Toilet transfer completed with steadying assist primarily for eccentric control on descent. Pt ambulates 19 ft x 1 using rollator and SBA.  Sit to supine SPV with VCs.   End of Consult   Patient Position at End of Consult Supine   End of Consult Comments family and PCA present in room         06/13/24 1323 06/13/24 1326 06/13/24 1329   Vitals   Pulse 59  --    --    Heart Rate Source Monitor  --   --    Respirations  --   --   --    Blood Pressure 147/63 106/51 (!) 99/44   MAP (mmHg) 91  --   --    BP Location Left arm Left arm Left arm   BP Method Automatic Automatic Automatic   Patient Position - Orthostatic VS Lying Standing - Orthostatic VS Standing for 3 minutes - Orthostatic VS      06/13/24 1330 06/13/24 1400   Vitals   Pulse 59  --    Heart Rate Source Monitor  --    Respirations 16  --    Blood Pressure (!) 99/44 98/65  (post ambulation to bathroom)   MAP (mmHg)  --   --    BP Location  --   --    BP Method  --   --    Patient Position - Orthostatic VS  --   --      (Please find full objective findings from PT assessment regarding body systems outlined above).     Assessment: Pt is a 89 y.o. male seen for PT evaluation s/p admission to [facility] on 6/13/2024 s/p fall with R pubic rami fracture. Order placed for PT services.  Upon evaluation: Pt is presenting with impaired functional mobility due to pain, decreased strength, decreased endurance, impaired balance, gait deviations, impaired hearing, and fall risk requiring  SPV assistance for bed mobility and steadying assistance for transfers and ambulation with RW . Pt's clinical presentation is currently unstable/unpredictable given the functional mobility deficits above, especially weakness, pain, decreased activity tolerance, decreased functional mobility tolerance, and orthostasis , coupled with fall risks as indicated by AM-PAC 6-Clicks: 17/24 as well as hx of falls and impaired balance and combined with medical complications of pain impacting overall mobility status, abnormal WBCs, abnormal sodium values, fear/retreat, and need for input for mobility technique/safety.  Pt's PMHx and comorbidities that may affect physical performance and progress include: A fib, limited hearing, and cancer history and/or treatment. Personal factors affecting pt at time of IE include: anxiety, advanced  age, inability to perform IADLs, inability to navigate level surfaces without external assistance, inability to navigate community distances, and recent fall(s)/fall history. Pt will benefit from continued skilled PT services to address deficits as defined above and to maximize level of functional mobility to facilitate return toward PLOF and improved QOL. From PT/mobility standpoint, recommendation at time of d/c would be Level II (Moderate Resource Intensity pending progress in order to reduce fall risk and maximize pt's functional independence and consistency with mobility in order to facilitate return to PLOF.  Recommend trial with walker next 1-2 sessions and ther ex next 1-2 sessions.     The patient's AM-PAC Basic Mobility Inpatient Short Form Raw Score is 17. A Raw score of greater than 16 suggests the patient may benefit from discharge to home. Please also refer to the recommendation of the Physical Therapist for safe discharge planning.       Goals: Pt will: Perform bed mobility tasks with modified I to reposition in bed and prepare for transfers. Pt will perform transfers with modified I to increase Indep in prior living environment and decrease burden of care and prepare for ambulation. Pt will ambulate with rollator for >/= 75 ft with  Supervision  to decrease risk for falls, improve activity tolerance, improve gait quality, and promote proper use of assistive device and to access home environment. Pt will participate in objective balance assessment to determine baseline fall risk.        Steve Bills, PT,DPT

## 2024-06-13 NOTE — H&P
[facility]  H&P  Name: Miguel Angel Ortega 89 y.o. male I MRN: [id number]  Unit/Bed#: -01 I Date of Admission: 6/13/2024   Date of Service: 6/13/2024 I Hospital Day: 0      Assessment & Plan   * Closed fracture of multiple pubic rami, right, initial encounter ([facility])  Assessment & Plan  Had mechanical fall at [facility] while using walker  CT pelvis showing right superior and right inferior pubic rami fx  CXR, CT head, CT cervical spine negative for acute process   PT/OT consulted  Orthopedics consulted  Analgesia prn   Patient was to be mostly wheelchair bound at time of last discharge due to falls and chronic orthostatic hypotension     Benign prostatic hyperplasia with urinary retention  Assessment & Plan  Continue Gemtesa and Flomax  Follows with urology outpatient    Orthostatic hypotension  Assessment & Plan  florinef has been stopped outpatient due to lower extremity edema   Patient has chronic symptomatic orthostasis  Per last discharge - patient was to be mostly wheelchair bound and using compression socks, slow position changes  Can trial midodrine if needed  PT/OT  Gentle hydration     Hyponatremia  Assessment & Plan  Chronic - Baseline approx 130  127 on admission   Monitor with gentle hydration  Sodium studies pending     Paroxysmal atrial fibrillation (HCC)  Assessment & Plan  Continue digoxin every other day and Multaq twice daily  Not on blood thinners due to falls    Prostate cancer metastatic to bone (HCC)  Assessment & Plan  Seen on imaging   Continue outpatient follow up           VTE Pharmacologic Prophylaxis:   Moderate Risk (Score 3-4) - Pharmacological DVT Prophylaxis Ordered: heparin.  Code Status: Level 3 - DNAR and DNI   Discussion with family: Updated  (friend) at bedside.    Anticipated Length of Stay: Patient will be admitted on an inpatient basis with an anticipated length of stay of greater than 2 midnights secondary to requiring PT/OT eval's for right  "pubic rami fracture.    Total Time Spent on Date of Encounter in care of patient: 60 mins. This time was spent on one or more of the following: performing physical exam; counseling and coordination of care; obtaining or reviewing history; documenting in the medical record; reviewing/ordering tests, medications or procedures; communicating with other healthcare professionals and discussing with patient's family/caregivers.    Chief Complaint: \"I fell today at my assisted leaving\"    History of Present Illness:  Miguel Angel Ortega is a 89 y.o. male with a PMH of orthostatic hypotension, hyponatremia, A-fib, BPH with urinary retention who presents with fall at assisted living. Patient states he was coming back from lunch when he was getting his key off of his necklace to unlock his door and then he lost his balance and fell. Denies any dizziness or syncope prior to falling. Patient states he has been using his wheel chair mostly to get around but PT encourages him to use his walker as he is able, so today he was using his walker.     Review of Systems:  Review of Systems   Constitutional:  Negative for diaphoresis, fatigue and fever.   Respiratory:  Negative for cough, chest tightness, shortness of breath and wheezing.    Cardiovascular:  Negative for chest pain, palpitations and leg swelling.   Gastrointestinal:  Negative for abdominal distention, abdominal pain, constipation, diarrhea, nausea and vomiting.   Genitourinary:  Negative for difficulty urinating, dysuria and hematuria.   Musculoskeletal:  Positive for arthralgias and gait problem. Negative for back pain.   Skin:  Positive for wound. Negative for color change.   Neurological:  Negative for dizziness, syncope, weakness, numbness and headaches.   Psychiatric/Behavioral:  Negative for agitation, behavioral problems and confusion.        Past Medical and Surgical History:   Past Medical History:   Diagnosis Date    A-fib (HCC)     Coronary artery disease     " History of angina     Prostate cancer (HCC)        Past Surgical History:   Procedure Laterality Date    CORONARY ARTERY BYPASS GRAFT  1995    VT CYSTO W/IRRIG & EVAC MULTPLE OBSTRUCTING CLOTS N/A 12/27/2023    Procedure: CYSTOSCOPY EVACUATION OF CLOTS, fulguration of bleeding. insertion 24FR [address] zuniga CBI;  Surgeon: Carson Singer MD;  Location:  MAIN OR;  Service: Urology    TONSILLECTOMY         Meds/Allergies:  Prior to Admission medications    Medication Sig Start Date End Date Taking? Authorizing Provider   acetaminophen (TYLENOL) 325 mg tablet  3/28/24  Yes Historical Provider, MD   aluminum-magnesium hydroxide-simethicone (Antacid) 3476-4522-409 mg/30 mL suspension Take by mouth   Yes Historical Provider, MD   Carboxymethylcellul-Glycerin (Refresh Optive) 0.5-0.9 % SOLN Apply 1 drop to eye 3 (three) times a day   Yes Historical Provider, MD   clopidogrel (PLAVIX) 75 mg tablet Take 75 mg by mouth daily   Yes Historical Provider, MD   cyanocobalamin (VITAMIN B-12) 1000 MCG tablet Take 1 tablet (1,000 mcg total) by mouth daily 1/5/24  Yes Remedios Cruz PA-C   digoxin (LANOXIN) 0.125 mg tablet Take 125 mcg by mouth daily   Yes Historical Provider, MD   dronedarone (Multaq) 400 mg tablet Take 400 mg by mouth 2 (two) times a day with meals   Yes Historical Provider, MD   folic acid (FOLVITE) 1 mg tablet Take 1 tablet by mouth daily   Yes Historical Provider, MD   furosemide (LASIX) 20 mg tablet  1/29/24  Yes Historical Provider, MD   melatonin 3 mg Take 1 tablet (3 mg total) by mouth daily at bedtime as needed (insomnia) 1/4/24  Yes Remedios Cruz PA-C   Multiple Vitamins-Minerals (PreserVision AREDS) CAPS Take 1 capsule by mouth daily   Yes Historical Provider, MD   pantoprazole (PROTONIX) 40 mg tablet Take 40 mg by mouth daily   Yes Historical Provider, MD   primidone (MYSOLINE) 50 mg tablet Take 50 mg by mouth every 12 (twelve) hours   Yes Historical Provider, MD   tamsulosin (FLOMAX) 0.4 mg Take 1 capsule  (0.4 mg total) by mouth daily with dinner 3/11/24  Yes Cb Ji MD   Vibegron 75 MG TABS Take 75 mg by mouth in the morning  Patient taking differently: Take 75 mg by mouth every other day 2/22/24  Yes Cb Ji MD   oxyCODONE (Roxicodone) 5 immediate release tablet Take 1 tablet (5 mg total) by mouth every 6 (six) hours as needed for severe pain Max Daily Amount: 20 mg 6/13/24 6/13/24 Yes Mervin Gaviria PA-C   Saline GEL into each nostril    Historical Provider, MD   senna-docusate sodium (SENOKOT S) 8.6-50 mg per tablet Take 1 tablet by mouth 2 (two) times a day 1/4/24 2/8/24  Remedios Cruz PA-C   bisacodyl (DULCOLAX) 10 mg suppository Insert 1 suppository (10 mg total) into the rectum daily as needed for constipation  Patient not taking: Reported on 6/13/2024 1/4/24 6/13/24  Remedios Cruz PA-C   CalProtect 0.44-20.6 % OINT  4/4/24 6/13/24  Historical Provider, MD   digoxin (LANOXIN) 0.125 mg tablet Take 1 tablet (125 mcg total) by mouth every other day 1/5/24 6/13/24  Remedios Cruz PA-C   diphenoxylate-atropine (LOMOTIL) 2.5-0.025 mg per tablet  3/22/24 6/13/24  Historical Provider, MD   ferrous sulfate 325 (65 Fe) mg tablet Take 1 tablet (325 mg total) by mouth daily with breakfast  Patient not taking: Reported on 3/11/2024 1/5/24 6/13/24  Remedios Cruz PA-C   ondansetron (ZOFRAN) 4 mg tablet Take 4 mg by mouth every 8 (eight) hours as needed  Patient not taking: Reported on 4/16/2024 6/13/24  Historical Provider, MD   polyethylene glycol (MIRALAX) 17 g packet Take 17 g by mouth 2 (two) times a day 1/4/24 6/13/24  Remedios Cruz PA-C     I have reviewed home medications with patient personally.    Allergies: No Known Allergies    Social History:  Marital Status: Single   Patient Pre-hospital Living Situation: Assisted Living  Patient Pre-hospital Level of Mobility: manual wheelchair  Patient Pre-hospital Diet Restrictions: None  Substance Use History:   Social History     Substance and Sexual  "Activity   Alcohol Use Not Currently     Social History     Tobacco Use   Smoking Status Former    Types: Cigarettes   Smokeless Tobacco Never     Social History     Substance and Sexual Activity   Drug Use Not Currently       Family History:  Family History   Problem Relation Age of Onset    Hypertension Father        Physical Exam:     Vitals:   Blood Pressure: (!) 125/104 (06/13/24 1645)  Pulse: 73 (06/13/24 1645)  Temperature: 97.7 °F (36.5 °C) (06/13/24 1501)  Temp Source: Temporal (06/13/24 0900)  Respirations: 18 (06/13/24 1501)  Height: 5' 11\" (180.3 cm) (06/13/24 1521)  Weight - Scale: 82.6 kg (182 lb 1.6 oz) (06/13/24 1521)  SpO2: 97 % (06/13/24 1645)    Physical Exam  Vitals reviewed.   Constitutional:       General: He is not in acute distress.     Appearance: Normal appearance. He is not ill-appearing.      Comments: frail   HENT:      Head: Normocephalic and atraumatic.      Nose: Nose normal.      Mouth/Throat:      Mouth: Mucous membranes are moist.      Pharynx: Oropharynx is clear.   Eyes:      Extraocular Movements: Extraocular movements intact.      Conjunctiva/sclera: Conjunctivae normal.   Cardiovascular:      Rate and Rhythm: Normal rate and regular rhythm.      Pulses: Normal pulses.      Heart sounds: Murmur heard.   Pulmonary:      Effort: Pulmonary effort is normal. No respiratory distress.      Breath sounds: Normal breath sounds. No wheezing.   Abdominal:      General: Abdomen is flat. Bowel sounds are normal. There is no distension.      Palpations: Abdomen is soft.      Tenderness: There is no abdominal tenderness. There is no guarding.   Musculoskeletal:         General: Normal range of motion.      Cervical back: Normal range of motion.      Right lower leg: No edema.      Left lower leg: No edema.   Skin:     General: Skin is warm.      Comments: Small hematoma right pubis   Neurological:      General: No focal deficit present.      Mental Status: He is alert and oriented to person, " place, and time. Mental status is at baseline.      Motor: No weakness.   Psychiatric:         Mood and Affect: Mood normal.         Behavior: Behavior normal.         Thought Content: Thought content normal.         Judgment: Judgment normal.          Additional Data:     Lab Results:  Results from last 7 days   Lab Units 06/13/24  1412   WBC Thousand/uL 13.45*   HEMOGLOBIN g/dL 10.1*   HEMATOCRIT % 29.5*   PLATELETS Thousands/uL 218   SEGS PCT % 75   LYMPHO PCT % 12*   MONO PCT % 11   EOS PCT % 1     Results from last 7 days   Lab Units 06/13/24  1412   SODIUM mmol/L 127*   POTASSIUM mmol/L 4.4   CHLORIDE mmol/L 97   CO2 mmol/L 22   BUN mg/dL 24   CREATININE mg/dL 1.15   ANION GAP mmol/L 8   CALCIUM mg/dL 8.8   ALBUMIN g/dL 4.0   TOTAL BILIRUBIN mg/dL 0.44   ALK PHOS U/L 89   ALT U/L 11   AST U/L 18   GLUCOSE RANDOM mg/dL 138             Lab Results   Component Value Date    HGBA1C 4.7 01/09/2024           Lines/Drains:  Invasive Devices       Peripheral Intravenous Line  Duration             Peripheral IV 06/13/24 Left;Ventral (anterior) Forearm <1 day                        Imaging: Reviewed radiology reports from this admission including: chest xray, chest CT scan, abdominal/pelvic CT, and CT head  CT pelvis wo contrast   Final Result by Ruben Ellsworth MD (06/13 1834)   1. Acute right symphysis/superior pubic ramus and right inferior pubic ramus fractures as described.   2. Chronic bilateral ramus fracture deformities redemonstrated.   3. Extensive bony metastases.         Workstation performed: [id number]         XR hip/pelv 2-3 vws right if performed   Final Result by Carl Rush MD (06/13 5318)      Suspected acute nondisplaced fracture of the right superior pubic ramus. If clinically warranted, consider CT for further evaluation.      The study was marked in EPIC for immediate notification.      Workstation performed: [id number]         TRAUMA - CT spine cervical wo contrast   Final Result by Carl  Cooper Rush MD (06/13 0984)      No acute cervical spine fracture or traumatic malalignment.      Diffuse osseous metastatic disease, similar to the prior study.      The study was marked in EPIC for immediate notification.            Workstation performed: [id number]         TRAUMA - CT head wo contrast   Final Result by Carl Rush MD (06/13 0981)      No acute intracranial abnormality.      Unchanged small chronic right frontal extra-axial collection.      The study was marked in EPIC for immediate notification.                  Workstation performed: [id number]         XR Trauma chest portable   Final Result by Carl Rush MD (06/13 0941)      No acute cardiopulmonary disease.            Workstation performed: [id number]         XR Trauma pelvis ap only 1 or 2 vw   Final Result by Carl Rush MD (06/13 0927)      Suspected acute nondisplaced fracture of the right superior pubic ramus. If clinically warranted, consider CT for further evaluation.      The study was marked in EPIC for immediate notification.      Workstation performed: [id number]             EKG and Other Studies Reviewed on Admission:   EKG: NSR. HR 61.    ** Please Note: This note has been constructed using a voice recognition system. **

## 2024-06-14 LAB
ANION GAP SERPL CALCULATED.3IONS-SCNC: 5 MMOL/L (ref 4–13)
ATRIAL RATE: 312 BPM
BUN SERPL-MCNC: 24 MG/DL (ref 5–25)
CALCIUM SERPL-MCNC: 8.4 MG/DL (ref 8.4–10.2)
CHLORIDE SERPL-SCNC: 99 MMOL/L (ref 96–108)
CO2 SERPL-SCNC: 25 MMOL/L (ref 21–32)
CREAT SERPL-MCNC: 1.2 MG/DL (ref 0.6–1.3)
ERYTHROCYTE [DISTWIDTH] IN BLOOD BY AUTOMATED COUNT: 14.6 % (ref 11.6–15.1)
GFR SERPL CREATININE-BSD FRML MDRD: 50 ML/MIN/1.73SQ M
GLUCOSE SERPL-MCNC: 91 MG/DL (ref 65–140)
HCT VFR BLD AUTO: 26 % (ref 36.5–49.3)
HGB BLD-MCNC: 9 G/DL (ref 12–17)
MAGNESIUM SERPL-MCNC: 1.9 MG/DL (ref 1.9–2.7)
MCH RBC QN AUTO: 32.5 PG (ref 26.8–34.3)
MCHC RBC AUTO-ENTMCNC: 34.6 G/DL (ref 31.4–37.4)
MCV RBC AUTO: 94 FL (ref 82–98)
MRSA NOSE QL CULT: ABNORMAL
MRSA NOSE QL CULT: ABNORMAL
OSMOLALITY UR: 390 MMOL/KG
P AXIS: 77 DEGREES
PLATELET # BLD AUTO: 154 THOUSANDS/UL (ref 149–390)
PMV BLD AUTO: 8 FL (ref 8.9–12.7)
POTASSIUM SERPL-SCNC: 4.6 MMOL/L (ref 3.5–5.3)
PR INTERVAL: 200 MS
QRS AXIS: 65 DEGREES
QRSD INTERVAL: 78 MS
QT INTERVAL: 426 MS
QTC INTERVAL: 428 MS
RBC # BLD AUTO: 2.77 MILLION/UL (ref 3.88–5.62)
SODIUM 24H UR-SCNC: 54 MOL/L
SODIUM SERPL-SCNC: 129 MMOL/L (ref 135–147)
T WAVE AXIS: 82 DEGREES
VENTRICULAR RATE: 61 BPM
WBC # BLD AUTO: 6.18 THOUSAND/UL (ref 4.31–10.16)

## 2024-06-14 PROCEDURE — 99222 1ST HOSP IP/OBS MODERATE 55: CPT | Performed by: ORTHOPAEDIC SURGERY

## 2024-06-14 PROCEDURE — 83735 ASSAY OF MAGNESIUM: CPT

## 2024-06-14 PROCEDURE — 97530 THERAPEUTIC ACTIVITIES: CPT

## 2024-06-14 PROCEDURE — 99232 SBSQ HOSP IP/OBS MODERATE 35: CPT | Performed by: FAMILY MEDICINE

## 2024-06-14 PROCEDURE — 97116 GAIT TRAINING THERAPY: CPT

## 2024-06-14 PROCEDURE — 80048 BASIC METABOLIC PNL TOTAL CA: CPT

## 2024-06-14 PROCEDURE — 85027 COMPLETE CBC AUTOMATED: CPT

## 2024-06-14 PROCEDURE — 83935 ASSAY OF URINE OSMOLALITY: CPT

## 2024-06-14 PROCEDURE — 84300 ASSAY OF URINE SODIUM: CPT

## 2024-06-14 PROCEDURE — 97542 WHEELCHAIR MNGMENT TRAINING: CPT

## 2024-06-14 RX ORDER — MIDODRINE HYDROCHLORIDE 5 MG/1
5 TABLET ORAL
Status: DISCONTINUED | OUTPATIENT
Start: 2024-06-14 | End: 2024-06-17 | Stop reason: HOSPADM

## 2024-06-14 RX ADMIN — CYANOCOBALAMIN TAB 500 MCG 1000 MCG: 500 TAB at 08:38

## 2024-06-14 RX ADMIN — FOLIC ACID 1000 MCG: 1 TABLET ORAL at 08:38

## 2024-06-14 RX ADMIN — HEPARIN SODIUM 5000 UNITS: 5000 INJECTION, SOLUTION INTRAVENOUS; SUBCUTANEOUS at 21:25

## 2024-06-14 RX ADMIN — ACETAMINOPHEN 975 MG: 325 TABLET ORAL at 04:45

## 2024-06-14 RX ADMIN — PRIMIDONE 50 MG: 50 TABLET ORAL at 08:39

## 2024-06-14 RX ADMIN — FUROSEMIDE 20 MG: 20 TABLET ORAL at 08:39

## 2024-06-14 RX ADMIN — PRIMIDONE 50 MG: 50 TABLET ORAL at 21:25

## 2024-06-14 RX ADMIN — PANTOPRAZOLE SODIUM 40 MG: 40 TABLET, DELAYED RELEASE ORAL at 08:39

## 2024-06-14 RX ADMIN — TAMSULOSIN HYDROCHLORIDE 0.4 MG: 0.4 CAPSULE ORAL at 17:38

## 2024-06-14 RX ADMIN — LIDOCAINE 5% 1 PATCH: 700 PATCH TOPICAL at 08:39

## 2024-06-14 RX ADMIN — CLOPIDOGREL 75 MG: 75 TABLET ORAL at 08:39

## 2024-06-14 RX ADMIN — DRONEDARONE 400 MG: 400 TABLET, FILM COATED ORAL at 17:38

## 2024-06-14 RX ADMIN — OXYCODONE HYDROCHLORIDE 2.5 MG: 5 TABLET ORAL at 14:49

## 2024-06-14 RX ADMIN — ACETAMINOPHEN 975 MG: 325 TABLET ORAL at 14:49

## 2024-06-14 RX ADMIN — HEPARIN SODIUM 5000 UNITS: 5000 INJECTION, SOLUTION INTRAVENOUS; SUBCUTANEOUS at 04:45

## 2024-06-14 RX ADMIN — OXYCODONE HYDROCHLORIDE 2.5 MG: 5 TABLET ORAL at 04:46

## 2024-06-14 RX ADMIN — GLYCERIN 1 DROP: .002; .002; .01 SOLUTION/ DROPS OPHTHALMIC at 17:38

## 2024-06-14 RX ADMIN — MIDODRINE HYDROCHLORIDE 5 MG: 5 TABLET ORAL at 17:38

## 2024-06-14 RX ADMIN — GLYCERIN 1 DROP: .002; .002; .01 SOLUTION/ DROPS OPHTHALMIC at 08:39

## 2024-06-14 RX ADMIN — HEPARIN SODIUM 5000 UNITS: 5000 INJECTION, SOLUTION INTRAVENOUS; SUBCUTANEOUS at 14:50

## 2024-06-14 RX ADMIN — DRONEDARONE 400 MG: 400 TABLET, FILM COATED ORAL at 08:39

## 2024-06-14 RX ADMIN — ACETAMINOPHEN 975 MG: 325 TABLET ORAL at 21:25

## 2024-06-14 RX ADMIN — GLYCERIN 1 DROP: .002; .002; .01 SOLUTION/ DROPS OPHTHALMIC at 21:28
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
## 2024-06-14 NOTE — ASSESSMENT & PLAN NOTE
florinef has been stopped outpatient due to lower extremity edema   Patient has chronic symptomatic orthostasis  Per last discharge - patient was to be mostly wheelchair bound and using compression socks, slow position changes  trial midodrine if needed  PT/OT  Gentle hydration stopped

## 2024-06-14 NOTE — ASSESSMENT & PLAN NOTE
Had mechanical fall at [facility] while using walker  CT pelvis showing right superior and right inferior pubic rami fx  CXR, CT head, CT cervical spine negative for acute process   PT/OT consulted  Orthopedics consulted.  No surgical intervention at this time  Analgesia prn   Patient was to be mostly wheelchair bound at time of last discharge due to falls and chronic orthostatic hypotension however he was walking with a walker at assisted living facility.  Appreciate physical therapy and Occupational Therapy recommendations and will discuss with facility in regards to him returning back there versus going to rehab

## 2024-06-14 NOTE — PROGRESS NOTES
[facility]  Progress Note  Name: Miguel Angel Ortega I  MRN: [id number]  Unit/Bed#: MS Daily-01 I Date of Admission: 6/13/2024   Date of Service: 6/14/2024 I Hospital Day: 1    Assessment & Plan   * Closed fracture of multiple pubic rami, right, initial encounter ([facility])  Assessment & Plan  Had mechanical fall at [facility] while using walker  CT pelvis showing right superior and right inferior pubic rami fx  CXR, CT head, CT cervical spine negative for acute process   PT/OT consulted  Orthopedics consulted.  No surgical intervention at this time  Analgesia prn   Patient was to be mostly wheelchair bound at time of last discharge due to falls and chronic orthostatic hypotension however he was walking with a walker at assisted living facility.  Appreciate physical therapy and Occupational Therapy recommendations and will discuss with facility in regards to him returning back there versus going to rehab    Orthostatic hypotension  Assessment & Plan  florinef has been stopped outpatient due to lower extremity edema   Patient has chronic symptomatic orthostasis  Per last discharge - patient was to be mostly wheelchair bound and using compression socks, slow position changes  trial midodrine if needed  PT/OT  Gentle hydration stopped    Hyponatremia  Assessment & Plan  Chronic - Baseline approx 130  127 on admission   Gentle hydration and sodium improved to 129.  Repeat BMP in a.m.    Benign prostatic hyperplasia with urinary retention  Assessment & Plan  Continue Gemtesa and Flomax  Follows with urology outpatient    Paroxysmal atrial fibrillation (HCC)  Assessment & Plan  Continue digoxin every other day and Multaq twice daily  Not on blood thinners due to falls    Prostate cancer metastatic to bone (HCC)  Assessment & Plan  Seen on imaging   Continue outpatient follow up           VTE Pharmacologic Prophylaxis:   Moderate Risk (Score 3-4) - Pharmacological DVT Prophylaxis Ordered: heparin.    Mobility:    Basic Mobility Inpatient Raw Score: 17  JH-HLM Goal: 5: Stand one or more mins  JH-HLM Achieved: 5: Stand (1 or more minutes)  JH-HLM Goal achieved. Continue to encourage appropriate mobility.    Patient Centered Rounds: I performed bedside rounds with nursing staff today.   Discussions with Specialists or Other Care Team Provider: pelvic fracture    Education and Discussions with Family / Patient: will update    Total Time Spent on Date of Encounter in care of patient: 35 mins. This time was spent on one or more of the following: performing physical exam; counseling and coordination of care; obtaining or reviewing history; documenting in the medical record; reviewing/ordering tests, medications or procedures; communicating with other healthcare professionals and discussing with patient's family/caregivers.    Current Length of Stay: 1 day(s)  Current Patient Status: Inpatient   Certification Statement: The patient will continue to require additional inpatient hospital stay due to pelvic fracture  Discharge Plan: Anticipate discharge in 24-48 hrs to rehab facility.  vs assisted living with PT OT services    Code Status: Level 3 - DNAR and DNI    Subjective: Patient states that he has no pain when he does not move but when he moves his hip area then he gets 4 x 10 pain.  Otherwise denies any issues but when he stands up he is definitely still orthostatic      Objective:     Vitals:   Temp (24hrs), Av.9 °F (36.6 °C), Min:97.7 °F (36.5 °C), Max:98.1 °F (36.7 °C)    Temp:  [97.7 °F (36.5 °C)-98.1 °F (36.7 °C)] 98.1 °F (36.7 °C)  HR:  [59-73] 68  Resp:  [18] 18  BP: ()/() 89/38  SpO2:  [94 %-99 %] 98 %  Body mass index is 25.4 kg/m².     Input and Output Summary (last 24 hours):     Intake/Output Summary (Last 24 hours) at 2024 1334  Last data filed at 2024 1209  Gross per 24 hour   Intake 878.75 ml   Output 1785 ml   Net -906.25 ml       Physical Exam:   Physical Exam  Vitals and nursing note  reviewed.   Constitutional:       Appearance: Normal appearance. He is ill-appearing.   HENT:      Head: Normocephalic and atraumatic.      Right Ear: External ear normal.      Left Ear: External ear normal.      Nose: Nose normal.      Mouth/Throat:      Pharynx: Oropharynx is clear.   Eyes:      Pupils: Pupils are equal, round, and reactive to light.   Cardiovascular:      Rate and Rhythm: Normal rate and regular rhythm.      Heart sounds: Normal heart sounds.   Pulmonary:      Effort: Pulmonary effort is normal.      Breath sounds: Normal breath sounds.   Abdominal:      General: Bowel sounds are normal.      Palpations: Abdomen is soft.      Tenderness: There is no abdominal tenderness.   Musculoskeletal:         General: Tenderness present.      Cervical back: Normal range of motion and neck supple.      Comments: tenderness right hip area noted   Skin:     General: Skin is warm and dry.      Capillary Refill: Capillary refill takes less than 2 seconds.   Neurological:      General: No focal deficit present.      Mental Status: He is alert and oriented to person, place, and time.   Psychiatric:         Mood and Affect: Mood normal.            Additional Data:     Labs:  Results from last 7 days   Lab Units 06/14/24  0443 06/13/24  1412   WBC Thousand/uL 6.18 13.45*   HEMOGLOBIN g/dL 9.0* 10.1*   HEMATOCRIT % 26.0* 29.5*   PLATELETS Thousands/uL 154 218   SEGS PCT %  --  75   LYMPHO PCT %  --  12*   MONO PCT %  --  11   EOS PCT %  --  1     Results from last 7 days   Lab Units 06/14/24  0443 06/13/24  1412   SODIUM mmol/L 129* 127*   POTASSIUM mmol/L 4.6 4.4   CHLORIDE mmol/L 99 97   CO2 mmol/L 25 22   BUN mg/dL 24 24   CREATININE mg/dL 1.20 1.15   ANION GAP mmol/L 5 8   CALCIUM mg/dL 8.4 8.8   ALBUMIN g/dL  --  4.0   TOTAL BILIRUBIN mg/dL  --  0.44   ALK PHOS U/L  --  89   ALT U/L  --  11   AST U/L  --  18   GLUCOSE RANDOM mg/dL 91 138                       Lines/Drains:  Invasive Devices       Peripheral  Intravenous Line  Duration             Peripheral IV 06/13/24 Left;Ventral (anterior) Forearm <1 day                          Imaging: Reviewed radiology reports from this admission including: abdominal/pelvic CT    Recent Cultures (last 7 days):         Last 24 Hours Medication List:   Current Facility-Administered Medications   Medication Dose Route Frequency Provider Last Rate    acetaminophen  975 mg Oral Q8H YAMINI Remedios Roseau, PA-C      Artificial Tears  1 drop Both Eyes TID Remedios Roseau, PA-C      clopidogrel  75 mg Oral Daily Remedios Roseau, PA-C      cyanocobalamin  1,000 mcg Oral Daily Remedios Roseau, PA-C      [START ON 6/15/2024] digoxin  125 mcg Oral Every Other Day Remedios Roseau, PA-C      dronedarone  400 mg Oral BID With Meals Remedios Roseau, PA-C      folic acid  1,000 mcg Oral Daily Remedios Roseau, PA-C      furosemide  20 mg Oral Every Other Day Remedios Roseau, PA-C      heparin (porcine)  5,000 Units Subcutaneous Q8H YAMINI Remedios Roseau, PA-C      lidocaine  1 patch Topical Daily Remedios Roseau, PA-C      melatonin  3 mg Oral HS PRN Remedios Roseau, PA-C      midodrine  5 mg Oral TID AC Sherita Martinez MD      oxyCODONE  2.5 mg Oral Q6H PRN Remedios Roseau, PA-C      pantoprazole  40 mg Oral Daily Before Breakfast Remedios Roseau, PA-C      primidone  50 mg Oral Q12H YAMINI Remedios Roseau, PA-C      senna-docusate sodium  1 tablet Oral Daily PRN Remedios Roseau, PA-C      tamsulosin  0.4 mg Oral Daily With Dinner Remedios Roseau, PA-C      Vibegron  75 mg Oral Every Other Day Remedios Nancy, PA-C          Today, Patient Was Seen By: Sherita Martinez MD    **Please Note: This note may have been constructed using a voice recognition system.**

## 2024-06-14 NOTE — PHYSICAL THERAPY NOTE
PHYSICAL THERAPY NOTE          Patient Name: Miguel Angel Ortega  Today's Date: 6/14/2024    Received repeat order for patient. Continue to follow POC established on 6/13/24.     Radha Mcclure, PT,DPT

## 2024-06-14 NOTE — ASSESSMENT & PLAN NOTE
Chronic - Baseline approx 130  127 on admission   Gentle hydration and sodium improved to 129.  Repeat BMP in a.m.

## 2024-06-14 NOTE — CASE MANAGEMENT
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     Benign prostatic hyperplasia with urinary retention 04/10/2024     Ascencio catheter in place 2024     Urinary retention 2024     Urge incontinence 2024     Radiation cystitis 2024     Neurogenic bladder 2024     History of urinary retention 2024     Coronary artery disease involving native coronary artery of native heart without angina pectoris 2024     Gross hematuria 2023     Orthostatic hypotension 2023     Acute on chronic anemia 2023     Stercoral colitis 2023     Acute urinary retention 2023     Paroxysmal atrial fibrillation (HCC) 2023     Hyponatremia 2023     Prostate cancer metastatic to bone (HCC) 2023       LOS (days): 1  Geometric Mean LOS (GMLOS) (days): 2.8  Days to GMLOS:1.8     OBJECTIVE:  Risk of Unplanned Readmission Score: 28.1         Current admission status: Inpatient   Preferred Pharmacy:   [id number] [address] [address]  Phone: [phone] Fax: [phone]    [facility]  [place], [place] - [address]  [address]  [facility] [address]  Phone: [phone] Fax: [phone]    [place], [place] - [address] [facility]  [address] [facility] [address]  Phone: [phone] Fax: [phone]    [id number] - [place], [place] - [address] [address]  Phone: [phone]  Fax: [phone]    Primary Care Provider: Kisha Armstrong MD    Primary Insurance: MEDICARE  Secondary Insurance: [facility]    DISCHARGE DETAILS:     CM spoke to patient's EC/POA Elizabeth - she is agreeable to Monday discharge and agrees with this plan.     CM reached out to [facility] - they cannot accept patient over the weekend, Monday would be best for discharge planning. CM reviewed patient's clinical and update from PT session of patient's progress at  use and supervision standby.     CM updated patient's friend Elizabeth on [facility] accepting patient back and preference is Monday - morning would be best to get him there in the morning any time works for her.     CM to follow patient's care and discharge needs.

## 2024-06-14 NOTE — OCCUPATIONAL THERAPY NOTE
Occupational Therapy Note        Patient Name: Miguel Angel Ortega  Today's Date: 6/14/2024      Received repeat order for patient. Continue to follow POC established on 6/13/24.     Haile Augustin MS, OTR/L

## 2024-06-14 NOTE — PLAN OF CARE
Problem: PHYSICAL THERAPY ADULT  Goal: Performs mobility at highest level of function for planned discharge setting.  See evaluation for individualized goals.  Description: Treatment/Interventions: Functional transfer training, LE strengthening/ROM, Elevations, Therapeutic exercise, Endurance training, Patient/family training, Equipment eval/education, Bed mobility, Gait training, Compensatory technique education, Spoke to nursing, OT          See flowsheet documentation for full assessment, interventions and recommendations.  Outcome: Progressing  Note: Prognosis: Fair  Problem List: Decreased strength, Decreased endurance, Impaired balance, Decreased mobility, Pain, Impaired hearing, Impaired sensation  Assessment: Pt tolerates tx session fairly.  He continues to be orthostatic with noted drop from sitting 115/47 to standing 3 minutes 89/38.  This appears to be a chronic issue for this pt as it is thorougly documented last admission and the recommendation at that time was for pt to be W/C level.  Pt reports his therapist at the [facility] encouraged him to walk to the dining room independently.  The recommendation at this time remains W/C level for D/C back to [facility] safely and sustainably.  PT receives order for thigh high TEDs for LEs and donned during session for W/C propulsion and ambulation.  Pt requests to return to back post session and states he wanted to lay down for a few hours; TEDs doffed to lay in bed and PT notified RN.  Pt is cognitively intact and demonstrates good safety awareness and insight.  Barriers to Discharge: Other (Comment)  Barriers to Discharge Comments: orthostasis; recent fall; elevated fall risk  Rehab Resource Intensity Level, PT: III (Minimum Resource Intensity)    See flowsheet documentation for full assessment.

## 2024-06-14 NOTE — PHYSICAL THERAPY NOTE
" PHYSICAL THERAPY TREATMENT NOTE      NAME:  Miguel Angel Ortega  DATE: 06/14/24    Length Of Stay: 1  Performed at least 2 patient identifiers during session: Name and Birthday         TREATMENT FLOW SHEET:       06/14/24 1322   PT Last Visit   PT Visit Date 06/14/24   Note Type   Note Type Treatment   Pain Assessment   Pain Assessment Tool FLACC   Pain Location/Orientation Location: Pelvis   Pain Rating: FLACC (Rest) - Face 0   Pain Rating: FLACC (Rest) - Legs 0   Pain Rating: FLACC (Rest) - Activity 0   Pain Rating: FLACC (Rest) - Cry 0   Pain Rating: FLACC (Rest) - Consolability 0   Score: FLACC (Rest) 0   Pain Rating: FLACC (Activity) - Face 1   Pain Rating: FLACC (Activity) - Legs 0   Pain Rating: FLACC (Activity) - Activity 0   Pain Rating: FLACC (Activity) - Cry 1   Pain Rating: FLACC (Activity) - Consolability 0   Score: FLACC (Activity) 2   Restrictions/Precautions   Weight Bearing Precautions Per Order Yes   RLE Weight Bearing Per Order WBAT   LLE Weight Bearing Per Order WBAT   Other Precautions Chair Alarm;Bed Alarm;Fall Risk;Pain;Hard of hearing   General   Chart Reviewed Yes   Response to Previous Treatment Patient reporting fatigue but able to participate.   Family/Caregiver Present No   Cognition   Arousal/Participation Cooperative   Orientation Level Oriented X4   Subjective   Subjective \"I just feel a little out of it honestly\"   Bed Mobility   Sit to Supine 5  Supervision   Additional items Increased time required   Transfers   Sit to Stand 5  Supervision   Additional items Increased time required;Verbal cues   Stand to Sit   (SBA)   Additional items Increased time required;Verbal cues   Stand pivot   (SBA)   Additional items Increased time required;Verbal cues   Toilet transfer 4  Minimal assistance  (min A for eccentric control on descent; ascending off toilet SBA)   Additional items Standard toilet;Increased time required;Verbal cues  (grab bar)   Additional Comments SPTs completed with and " without RW.  Pt's POA wanted pt to trial transfers to/from W/C without use of RW.   Ambulation/Elevation   Gait pattern Improper Weight shift;Inconsistent geoff   Gait Assistance   (SBA)   Additional items Assist x 1;Verbal cues   Assistive Device Rolling walker   Distance 12 ft x 1; 14 ft x 1   Ambulation/Elevation Additional Comments slight lightheadedness reported during ambulation   Wheelchair Activities   Wheelchair Parts Management Yes   Left Brakes Level of Assistance Independent   Right Brakes Level of Assistance Independent   Propulsion Yes   Propulsion Type 1 Manual   Level 1 Level tile   Method 1 Right lower extremity;Left lower extremity   Level of Assistance 1 Independent   Balance   Static Sitting Normal   Dynamic Sitting Good   Static Standing Fair +   Dynamic Standing Fair -   Ambulatory Fair -   Endurance Deficit   Endurance Deficit Yes   Activity Tolerance   Activity Tolerance Patient limited by fatigue   Medical Staff Made Aware PCA   Nurse Made Aware Yes Lisset   Exercises   Ankle Pumps Sitting;20 reps;AROM;Bilateral   Assessment   Prognosis Fair   Problem List Decreased strength;Decreased endurance;Impaired balance;Decreased mobility;Pain;Impaired hearing;Impaired sensation   Assessment Pt tolerates tx session fairly.  He continues to be orthostatic with noted drop from sitting 115/47 to standing 3 minutes 89/38.  This appears to be a chronic issue for this pt as it is thorougly documented last admission and the recommendation at that time was for pt to be W/C level.  Pt reports his therapist at the [facility] encouraged him to walk to the dining room independently.  The recommendation at this time remains W/C level for D/C back to [facility] safely and sustainably.  PT receives order for thigh high TEDs for LEs and donned during session for W/C propulsion and ambulation.  Pt requests to return to back post session and states he wanted to lay down for a few hours; TEDs doffed to lay in bed and PT notified  RN.  Pt is cognitively intact and demonstrates good safety awareness and insight.   Barriers to Discharge Other (Comment)   Barriers to Discharge Comments orthostasis; recent fall; elevated fall risk   Goals   STG Expiration Date 06/27/24   PT Treatment Day 2   Plan   Progress   (slow progress, orthostatic hypotension)   PT Frequency 3-5x/wk   Discharge Recommendation   Rehab Resource Intensity Level, PT III (Minimum Resource Intensity)   Additional Comments 1.) W/C level.  2.) Ring for assistance with transfers initially upon return to [facility].  3.) Only walk with therapist present   AM-PAC Basic Mobility Inpatient   Turning in Flat Bed Without Bedrails 3   Lying on Back to Sitting on Edge of Flat Bed Without Bedrails 3   Moving Bed to Chair 3   Standing Up From Chair Using Arms 3   Walk in Room 3   Climb 3-5 Stairs With Railing 2   Basic Mobility Inpatient Raw Score 17   Basic Mobility Standardized Score 39.67   [facility] Highest Level Of Mobility   -HLM Goal 5: Stand one or more mins   -HLM Achieved 6: Walk 10 steps or more   Education   Education Provided Mobility training   Patient Demonstrates verbal understanding   End of Consult   Patient Position at End of Consult Supine;Bed/Chair alarm activated;All needs within reach         06/14/24 1325 06/14/24 1329 06/14/24 1331   Vitals   Pulse 65  --  68   Blood Pressure (!) 115/47 (!) 90/39 (!) 89/38   MAP (mmHg) 70 (!) 56 (!) 55   Patient Position - Orthostatic VS Sitting - Orthostatic VS Standing - Orthostatic VS Standing for 3 minutes - Orthostatic VS      06/14/24 1346 06/14/24 1404   Vitals   Pulse 68 69   Blood Pressure (!) 127/48 135/51   MAP (mmHg) 74 79   Patient Position - Orthostatic VS Other (Comment)  (sitting:  following donning of thigh high TEDs and LE ther-ex) Lying       The patient's AM-PAC Basic Mobility Inpatient Short Form Raw Score is 17. A Raw score of greater than 16 suggests the patient may benefit from discharge to home. Please also  refer to the recommendation of the Physical Therapist for safe discharge planning.         Steve Bills, PT,DPT

## 2024-06-15 LAB
ANION GAP SERPL CALCULATED.3IONS-SCNC: 5 MMOL/L (ref 4–13)
BUN SERPL-MCNC: 25 MG/DL (ref 5–25)
CALCIUM SERPL-MCNC: 8.4 MG/DL (ref 8.4–10.2)
CHLORIDE SERPL-SCNC: 98 MMOL/L (ref 96–108)
CO2 SERPL-SCNC: 25 MMOL/L (ref 21–32)
CREAT SERPL-MCNC: 1.2 MG/DL (ref 0.6–1.3)
ERYTHROCYTE [DISTWIDTH] IN BLOOD BY AUTOMATED COUNT: 14.9 % (ref 11.6–15.1)
FERRITIN SERPL-MCNC: 129 NG/ML (ref 24–336)
GFR SERPL CREATININE-BSD FRML MDRD: 50 ML/MIN/1.73SQ M
GLUCOSE SERPL-MCNC: 98 MG/DL (ref 65–140)
HCT VFR BLD AUTO: 25.9 % (ref 36.5–49.3)
HGB BLD-MCNC: 8.9 G/DL (ref 12–17)
IRON SATN MFR SERPL: 21 % (ref 15–50)
IRON SERPL-MCNC: 48 UG/DL (ref 50–212)
MCH RBC QN AUTO: 32.6 PG (ref 26.8–34.3)
MCHC RBC AUTO-ENTMCNC: 34.4 G/DL (ref 31.4–37.4)
MCV RBC AUTO: 95 FL (ref 82–98)
PLATELET # BLD AUTO: 142 THOUSANDS/UL (ref 149–390)
PMV BLD AUTO: 7.6 FL (ref 8.9–12.7)
POTASSIUM SERPL-SCNC: 4.4 MMOL/L (ref 3.5–5.3)
RBC # BLD AUTO: 2.73 MILLION/UL (ref 3.88–5.62)
SODIUM SERPL-SCNC: 128 MMOL/L (ref 135–147)
TIBC SERPL-MCNC: 234 UG/DL (ref 250–450)
UIBC SERPL-MCNC: 186 UG/DL (ref 155–355)
VIT B12 SERPL-MCNC: 790 PG/ML (ref 180–914)
WBC # BLD AUTO: 5.24 THOUSAND/UL (ref 4.31–10.16)

## 2024-06-15 PROCEDURE — 99232 SBSQ HOSP IP/OBS MODERATE 35: CPT | Performed by: FAMILY MEDICINE

## 2024-06-15 PROCEDURE — 82728 ASSAY OF FERRITIN: CPT | Performed by: FAMILY MEDICINE

## 2024-06-15 PROCEDURE — 85027 COMPLETE CBC AUTOMATED: CPT | Performed by: FAMILY MEDICINE

## 2024-06-15 PROCEDURE — 83540 ASSAY OF IRON: CPT | Performed by: FAMILY MEDICINE

## 2024-06-15 PROCEDURE — 83550 IRON BINDING TEST: CPT | Performed by: FAMILY MEDICINE

## 2024-06-15 PROCEDURE — 80048 BASIC METABOLIC PNL TOTAL CA: CPT | Performed by: FAMILY MEDICINE

## 2024-06-15 PROCEDURE — 82607 VITAMIN B-12: CPT | Performed by: FAMILY MEDICINE

## 2024-06-15 RX ADMIN — DRONEDARONE 400 MG: 400 TABLET, FILM COATED ORAL at 10:28

## 2024-06-15 RX ADMIN — DRONEDARONE 400 MG: 400 TABLET, FILM COATED ORAL at 17:08

## 2024-06-15 RX ADMIN — PANTOPRAZOLE SODIUM 40 MG: 40 TABLET, DELAYED RELEASE ORAL at 05:47

## 2024-06-15 RX ADMIN — MIDODRINE HYDROCHLORIDE 5 MG: 5 TABLET ORAL at 12:12

## 2024-06-15 RX ADMIN — MIDODRINE HYDROCHLORIDE 5 MG: 5 TABLET ORAL at 05:47

## 2024-06-15 RX ADMIN — DIGOXIN 125 MCG: 125 TABLET ORAL at 10:27

## 2024-06-15 RX ADMIN — GLYCERIN 1 DROP: .002; .002; .01 SOLUTION/ DROPS OPHTHALMIC at 10:28

## 2024-06-15 RX ADMIN — ACETAMINOPHEN 975 MG: 325 TABLET ORAL at 05:47

## 2024-06-15 RX ADMIN — ACETAMINOPHEN 975 MG: 325 TABLET ORAL at 21:38

## 2024-06-15 RX ADMIN — GLYCERIN 1 DROP: .002; .002; .01 SOLUTION/ DROPS OPHTHALMIC at 21:39

## 2024-06-15 RX ADMIN — SENNOSIDES AND DOCUSATE SODIUM 1 TABLET: 8.6; 5 TABLET ORAL at 12:14

## 2024-06-15 RX ADMIN — LIDOCAINE 5% 1 PATCH: 700 PATCH TOPICAL at 10:28

## 2024-06-15 RX ADMIN — GLYCERIN 1 DROP: .002; .002; .01 SOLUTION/ DROPS OPHTHALMIC at 17:08

## 2024-06-15 RX ADMIN — FOLIC ACID 1000 MCG: 1 TABLET ORAL at 10:28

## 2024-06-15 RX ADMIN — ACETAMINOPHEN 975 MG: 325 TABLET ORAL at 14:52

## 2024-06-15 RX ADMIN — HEPARIN SODIUM 5000 UNITS: 5000 INJECTION, SOLUTION INTRAVENOUS; SUBCUTANEOUS at 21:39

## 2024-06-15 RX ADMIN — PRIMIDONE 50 MG: 50 TABLET ORAL at 21:38

## 2024-06-15 RX ADMIN — CYANOCOBALAMIN TAB 500 MCG 1000 MCG: 500 TAB at 10:27

## 2024-06-15 RX ADMIN — CLOPIDOGREL 75 MG: 75 TABLET ORAL at 10:27

## 2024-06-15 RX ADMIN — HEPARIN SODIUM 5000 UNITS: 5000 INJECTION, SOLUTION INTRAVENOUS; SUBCUTANEOUS at 05:47

## 2024-06-15 RX ADMIN — PRIMIDONE 50 MG: 50 TABLET ORAL at 10:28

## 2024-06-15 RX ADMIN — TAMSULOSIN HYDROCHLORIDE 0.4 MG: 0.4 CAPSULE ORAL at 17:08

## 2024-06-15 RX ADMIN — MIDODRINE HYDROCHLORIDE 5 MG: 5 TABLET ORAL at 17:08

## 2024-06-15 RX ADMIN — HEPARIN SODIUM 5000 UNITS: 5000 INJECTION, SOLUTION INTRAVENOUS; SUBCUTANEOUS at 14:52

## 2024-06-15 NOTE — PROGRESS NOTES
[facility]  Progress Note  Name: Miguel Angel Ortega I  MRN: [id number]  Unit/Bed#: MS [facility]01 I Date of Admission: 6/13/2024   Date of Service: 6/15/2024 I Hospital Day: 2    Assessment & Plan   * Closed fracture of multiple pubic rami, right, initial encounter ([facility])  Assessment & Plan  Had mechanical fall at [facility] while using walker  CT pelvis showing right superior and right inferior pubic rami fx  CXR, CT head, CT cervical spine negative for acute process   PT/OT consulted  Orthopedics consulted.  No surgical intervention at this time  Analgesia prn   Patient was to be mostly wheelchair bound at time of last discharge due to falls and chronic orthostatic hypotension however he was walking with a walker at assisted living facility.  Appreciate physical therapy and Occupational Therapy recommendations and dc back to assisted living PT OT services if they agree.  he has been ambulating to the bathroom here with supervision and is doing well no significant orthostasis noted after starting midodrine and continuing fluid restriction    Orthostatic hypotension  Assessment & Plan  florinef has been stopped outpatient due to lower extremity edema   Patient has chronic symptomatic orthostasis  PT/OT  Gentle hydration stopped.  Placed on midodrine and continue MAX stockings and seems to be improving    Hyponatremia  Assessment & Plan  Chronic - Baseline approx 130  127 on admission   Gentle hydration and sodium improved to 129.  Baseline sodium appears to be 128-129 will avoid salt tablets at this time and continue fluid restriction only for now and observe also continue MAX stockings    Benign prostatic hyperplasia with urinary retention  Assessment & Plan  Continue Gemtesa and Flomax  Follows with urology outpatient    Paroxysmal atrial fibrillation (HCC)  Assessment & Plan  Continue digoxin every other day and Multaq twice daily  Not on blood thinners due to falls    Prostate cancer metastatic to bone  ([facility])  Assessment & Plan  Seen on imaging   Continue outpatient follow up         VTE Pharmacologic Prophylaxis:   Moderate Risk (Score 3-4) - Pharmacological DVT Prophylaxis Ordered: heparin.    Mobility:   Basic Mobility Inpatient Raw Score: 17  JH-HLM Goal: 5: Stand one or more mins  JH-HLM Achieved: 7: Walk 25 feet or more  JH-HLM Goal achieved. Continue to encourage appropriate mobility.    Patient Centered Rounds: I performed bedside rounds with nursing staff today.   Discussions with Specialists or Other Care Team Provider: none    Education and Discussions with Family / Patient: Updated  (friend) via phone.    Total Time Spent on Date of Encounter in care of patient: 35 mins. This time was spent on one or more of the following: performing physical exam; counseling and coordination of care; obtaining or reviewing history; documenting in the medical record; reviewing/ordering tests, medications or procedures; communicating with other healthcare professionals and discussing with patient's family/caregivers.    Current Length of Stay: 2 day(s)  Current Patient Status: Inpatient   Certification Statement: The patient will continue to require additional inpatient hospital stay due to  rami fracture  Discharge Plan: Anticipate discharge in 24-48 hrs to prior assisted or independent living facility. With pt/ot services    Code Status: Level 3 - DNAR and DNI    Subjective:   Patient denies any chest pain or shortness of breath he states his pain is currently controlled he has been trying to ambulate to the bathroom and feels okay denies any dizziness or lightheadedness.    Objective:     Vitals:   Temp (24hrs), Av.7 °F (36.5 °C), Min:97.7 °F (36.5 °C), Max:97.7 °F (36.5 °C)    Temp:  [97.7 °F (36.5 °C)] 97.7 °F (36.5 °C)  HR:  [63-69] 65  Resp:  [14-16] 14  BP: ()/(38-51) 122/48  SpO2:  [96 %-100 %] 96 %  Body mass index is 25.4 kg/m².     Input and Output Summary (last 24 hours):      Intake/Output Summary (Last 24 hours) at 6/15/2024 1231  Last data filed at 6/15/2024 1218  Gross per 24 hour   Intake 300 ml   Output 1350 ml   Net -1050 ml       Physical Exam:   Physical Exam  Vitals and nursing note reviewed.   Constitutional:       Appearance: Normal appearance.   HENT:      Head: Normocephalic and atraumatic.      Right Ear: External ear normal.      Left Ear: External ear normal.      Nose: Nose normal.      Mouth/Throat:      Pharynx: Oropharynx is clear.   Cardiovascular:      Rate and Rhythm: Normal rate and regular rhythm.      Heart sounds: Normal heart sounds.   Pulmonary:      Effort: Pulmonary effort is normal.      Breath sounds: Normal breath sounds.   Abdominal:      General: Bowel sounds are normal.      Palpations: Abdomen is soft.      Tenderness: There is no abdominal tenderness.   Musculoskeletal:         General: Normal range of motion.      Cervical back: Normal range of motion and neck supple.      Right lower leg: No edema.      Left lower leg: No edema.      Comments: tenderness on palpation of the right groin area   Skin:     General: Skin is warm and dry.      Capillary Refill: Capillary refill takes less than 2 seconds.   Neurological:      General: No focal deficit present.      Mental Status: He is alert and oriented to person, place, and time.   Psychiatric:         Mood and Affect: Mood normal.            Additional Data:     Labs:  Results from last 7 days   Lab Units 06/15/24  0508 06/14/24 0443 06/13/24  1412   WBC Thousand/uL 5.24   < > 13.45*   HEMOGLOBIN g/dL 8.9*   < > 10.1*   HEMATOCRIT % 25.9*   < > 29.5*   PLATELETS Thousands/uL 142*   < > 218   SEGS PCT %  --   --  75   LYMPHO PCT %  --   --  12*   MONO PCT %  --   --  11   EOS PCT %  --   --  1    < > = values in this interval not displayed.     Results from last 7 days   Lab Units 06/15/24  0508 06/14/24 0443 06/13/24  1412   SODIUM mmol/L 128*   < > 127*   POTASSIUM mmol/L 4.4   < > 4.4    CHLORIDE mmol/L 98   < > 97   CO2 mmol/L 25   < > 22   BUN mg/dL 25   < > 24   CREATININE mg/dL 1.20   < > 1.15   ANION GAP mmol/L 5   < > 8   CALCIUM mg/dL 8.4   < > 8.8   ALBUMIN g/dL  --   --  4.0   TOTAL BILIRUBIN mg/dL  --   --  0.44   ALK PHOS U/L  --   --  89   ALT U/L  --   --  11   AST U/L  --   --  18   GLUCOSE RANDOM mg/dL 98   < > 138    < > = values in this interval not displayed.                       Lines/Drains:  Invasive Devices       Peripheral Intravenous Line  Duration             Peripheral IV 06/13/24 Left;Ventral (anterior) Forearm 1 day                          Imaging: Reviewed radiology reports from this admission including: abdominal/pelvic CT    Recent Cultures (last 7 days):         Last 24 Hours Medication List:   Current Facility-Administered Medications   Medication Dose Route Frequency Provider Last Rate    acetaminophen  975 mg Oral Q8H YAMINI Remedios [place], PA-C      Artificial Tears  1 drop Both Eyes TID Remedios [place], PA-C      clopidogrel  75 mg Oral Daily Remedios [place], PA-C      cyanocobalamin  1,000 mcg Oral Daily Remedios [place], PA-C      digoxin  125 mcg Oral Every Other Day Remedios [place], PA-C      dronedarone  400 mg Oral BID With Meals Remedios [place], PA-C      folic acid  1,000 mcg Oral Daily Remedios [place], PA-C      heparin (porcine)  5,000 Units Subcutaneous Q8H YAMINI Remedios [place], PA-C      lidocaine  1 patch Topical Daily Remedios [place], PA-C      melatonin  3 mg Oral HS PRN Remedios [place], PA-C      midodrine  5 mg Oral TID AC Sherita Martinez MD      oxyCODONE  2.5 mg Oral Q6H PRN Remedios [place], PA-C      pantoprazole  40 mg Oral Daily Before Breakfast Remedios [place], PA-C      primidone  50 mg Oral Q12H YAMINI [place]ios [place], PA-C      senna-docusate sodium  1 tablet Oral Daily PRN Remedios [place], PA-C      tamsulosin  0.4 mg Oral Daily With Dinner Remedios Nancy, PA-C      Vibegron  75 mg Oral Every Other Day Remedios Cruz, PA-C          Today, Patient Was Seen By: Sherita Martinez MD    **Please  Note: This note may have been constructed using a voice recognition system.**

## 2024-06-15 NOTE — ASSESSMENT & PLAN NOTE
Chronic - Baseline approx 130  127 on admission   Gentle hydration and sodium improved to 129.  Baseline sodium appears to be 128-129 will avoid salt tablets at this time and continue fluid restriction only for now and observe also continue MAX stockings

## 2024-06-15 NOTE — ASSESSMENT & PLAN NOTE
Had mechanical fall at [facility] while using walker  CT pelvis showing right superior and right inferior pubic rami fx  CXR, CT head, CT cervical spine negative for acute process   PT/OT consulted  Orthopedics consulted.  No surgical intervention at this time  Analgesia prn   Patient was to be mostly wheelchair bound at time of last discharge due to falls and chronic orthostatic hypotension however he was walking with a walker at assisted living facility.  Appreciate physical therapy and Occupational Therapy recommendations and dc back to assisted living PT OT services if they agree.  he has been ambulating to the bathroom here with supervision and is doing well no significant orthostasis noted after starting midodrine and continuing fluid restriction

## 2024-06-15 NOTE — PLAN OF CARE
Problem: PAIN - ADULT  Goal: Verbalizes/displays adequate comfort level or baseline comfort level  Description: Interventions:  - Encourage patient to monitor pain and request assistance  - Assess pain using appropriate pain scale  - Administer analgesics based on type and severity of pain and evaluate response  - Implement non-pharmacological measures as appropriate and evaluate response  - Consider cultural and social influences on pain and pain management  - Notify physician/advanced practitioner if interventions unsuccessful or patient reports new pain  Outcome: Progressing     Problem: SAFETY ADULT  Goal: Patient will remain free of falls  Description: INTERVENTIONS:  - Educate patient/family on patient safety including physical limitations  - Instruct patient to call for assistance with activity   - Consult OT/PT to assist with strengthening/mobility   - Keep Call bell within reach  - Keep bed low and locked with side rails adjusted as appropriate  - Keep care items and personal belongings within reach  - Initiate and maintain comfort rounds  - Make Fall Risk Sign visible to staff  - Offer Toileting every  . Hours, in advance of need  - Initiate/Maintain .alarm  - Obtain necessary fall risk management equipment: ..  - Apply yellow socks and bracelet for high fall risk patients  - Consider moving patient to room near nurses station  Outcome: Progressing  Goal: Maintain or return to baseline ADL function  Description: INTERVENTIONS:  -  Assess patient's ability to carry out ADLs; assess patient's baseline for ADL function and identify physical deficits which impact ability to perform ADLs (bathing, care of mouth/teeth, toileting, grooming, dressing, etc.)  - Assess/evaluate cause of self-care deficits   - Assess range of motion  - Assess patient's mobility; develop plan if impaired  - Assess patient's need for assistive devices and provide as appropriate  - Encourage maximum independence but intervene and  supervise when necessary  - Involve family in performance of ADLs  - Assess for home care needs following discharge   - Consider OT consult to assist with ADL evaluation and planning for discharge  - Provide patient education as appropriate  Outcome: Progressing  Goal: Maintains/Returns to pre admission functional level  Description: INTERVENTIONS:  - Perform AM-PAC 6 Click Basic Mobility/ Daily Activity assessment daily.  - Set and communicate daily mobility goal to care team and patient/family/caregiver.   - Collaborate with rehabilitation services on mobility goals if consulted  - Perform Range of Motion . times a day.  - Reposition patient every . hours.  - Dangle patient . times a day  - Stand patient . times a day  - Ambulate patient . times a day  - Out of bed to chair . times a day   - Out of bed for meals .. times a day  - Out of bed for toileting  - Record patient progress and toleration of activity level   Outcome: Progressing     Problem: DISCHARGE PLANNING  Goal: Discharge to home or other facility with appropriate resources  Description: INTERVENTIONS:  - Identify barriers to discharge w/patient and caregiver  - Arrange for needed discharge resources and transportation as appropriate  - Identify discharge learning needs (meds, wound care, etc.)  - Arrange for interpretive services to assist at discharge as needed  - Refer to Case Management Department for coordinating discharge planning if the patient needs post-hospital services based on physician/advanced practitioner order or complex needs related to functional status, cognitive ability, or social support system  Outcome: Progressing     Problem: SKIN/TISSUE INTEGRITY - ADULT  Goal: Skin Integrity remains intact(Skin Breakdown Prevention)  Description: Assess:  -Perform Mario assessment every .  -Clean and moisturize skin every .  -Inspect skin when repositioning, toileting, and assisting with ADLS  -Assess under medical devices such as . every  .  -Assess extremities for adequate circulation and sensation     Bed Management:  -Have minimal linens on bed & keep smooth, unwrinkled  -Change linens as needed when moist or perspiring  -Avoid sitting or lying in one position for more than . hours while in bed  -Keep HOB at .degrees     Toileting:  -Offer bedside commode  -Assess for incontinence every .  -Use incontinent care products after each incontinent episode such as .    Activity:  -Mobilize patient . times a day  -Encourage activity and walks on unit  -Encourage or provide ROM exercises   -Turn and reposition patient every . Hours  -Use appropriate equipment to lift or move patient in bed  -Instruct/ Assist with weight shifting every . when out of bed in chair  -Consider limitation of chair time . hour intervals    Skin Care:  -Avoid use of baby powder, tape, friction and shearing, hot water or constrictive clothing  -Relieve pressure over bony prominences using .  -Do not massage red bony areas    Next Steps:  -Teach patient strategies to minimize risks such as .   -Consider consults to  interdisciplinary teams such as ...  Outcome: Progressing  Goal: Incision(s), wounds(s) or drain site(s) healing without S/S of infection  Description: INTERVENTIONS  - Assess and document dressing, incision, wound bed, drain sites and surrounding tissue  - Provide patient and family education  - Perform skin care/dressing changes every .  Outcome: Progressing  Goal: Pressure injury heals and does not worsen  Description: Interventions:  - Implement low air loss mattress or specialty surface (Criteria met)  - Apply silicone foam dressing  - Instruct/assist with weight shifting every . minutes when in chair   - Limit chair time to . hour intervals  - Use special pressure reducing interventions such as . when in chair   - Apply fecal or urinary incontinence containment device   - Perform passive or active ROM every .  - Turn and reposition patient & offload bony  prominences every . hours   - Utilize friction reducing device or surface for transfers   - Consider consults to  interdisciplinary teams such as .  - Use incontinent care products after each incontinent episode such as ...  - Consider nutrition services referral as needed  Outcome: Progressing     Problem: MUSCULOSKELETAL - ADULT  Goal: Maintain or return mobility to safest level of function  Description: INTERVENTIONS:  - Assess patient's ability to carry out ADLs; assess patient's baseline for ADL function and identify physical deficits which impact ability to perform ADLs (bathing, care of mouth/teeth, toileting, grooming, dressing, etc.)  - Assess/evaluate cause of self-care deficits   - Assess range of motion  - Assess patient's mobility  - Assess patient's need for assistive devices and provide as appropriate  - Encourage maximum independence but intervene and supervise when necessary  - Involve family in performance of ADLs  - Assess for home care needs following discharge   - Consider OT consult to assist with ADL evaluation and planning for discharge  - Provide patient education as appropriate  Outcome: Progressing  Goal: Maintain proper alignment of affected body part  Description: INTERVENTIONS:  - Support, maintain and protect limb and body alignment  - Provide patient/ family with appropriate education  Outcome: Progressing     Problem: Prexisting or High Potential for Compromised Skin Integrity  Goal: Skin integrity is maintained or improved  Description: INTERVENTIONS:  - Identify patients at risk for skin breakdown  - Assess and monitor skin integrity  - Assess and monitor nutrition and hydration status  - Monitor labs   - Assess for incontinence   - Turn and reposition patient  - Assist with mobility/ambulation  - Relieve pressure over bony prominences  - Avoid friction and shearing  - Provide appropriate hygiene as needed including keeping skin clean and dry  - Evaluate need for skin  moisturizer/barrier cream  - Collaborate with interdisciplinary team   - Patient/family teaching  - Consider wound care consult   Outcome: Progressing

## 2024-06-15 NOTE — ASSESSMENT & PLAN NOTE
florinef has been stopped outpatient due to lower extremity edema   Patient has chronic symptomatic orthostasis  PT/OT  Gentle hydration stopped.  Placed on midodrine and continue MAX stockings and seems to be improving

## 2024-06-16 LAB
ANION GAP SERPL CALCULATED.3IONS-SCNC: 4 MMOL/L (ref 4–13)
BUN SERPL-MCNC: 22 MG/DL (ref 5–25)
CALCIUM SERPL-MCNC: 8.4 MG/DL (ref 8.4–10.2)
CHLORIDE SERPL-SCNC: 99 MMOL/L (ref 96–108)
CO2 SERPL-SCNC: 26 MMOL/L (ref 21–32)
CREAT SERPL-MCNC: 1.23 MG/DL (ref 0.6–1.3)
ERYTHROCYTE [DISTWIDTH] IN BLOOD BY AUTOMATED COUNT: 14.6 % (ref 11.6–15.1)
GFR SERPL CREATININE-BSD FRML MDRD: 49 ML/MIN/1.73SQ M
GLUCOSE SERPL-MCNC: 98 MG/DL (ref 65–140)
HCT VFR BLD AUTO: 26.2 % (ref 36.5–49.3)
HGB BLD-MCNC: 8.9 G/DL (ref 12–17)
HGB RETIC QN AUTO: 35.9 PG (ref 30–38.3)
IMM RETICS NFR: 20.4 % (ref 0–14)
MCH RBC QN AUTO: 32.6 PG (ref 26.8–34.3)
MCHC RBC AUTO-ENTMCNC: 34 G/DL (ref 31.4–37.4)
MCV RBC AUTO: 96 FL (ref 82–98)
PLATELET # BLD AUTO: 151 THOUSANDS/UL (ref 149–390)
PMV BLD AUTO: 8.1 FL (ref 8.9–12.7)
POTASSIUM SERPL-SCNC: 4.3 MMOL/L (ref 3.5–5.3)
RBC # BLD AUTO: 2.73 MILLION/UL (ref 3.88–5.62)
RETICS # AUTO: ABNORMAL 10*3/UL (ref 14356–105094)
RETICS # CALC: 2.55 % (ref 0.37–1.87)
SODIUM SERPL-SCNC: 129 MMOL/L (ref 135–147)
WBC # BLD AUTO: 5.7 THOUSAND/UL (ref 4.31–10.16)

## 2024-06-16 PROCEDURE — 80048 BASIC METABOLIC PNL TOTAL CA: CPT | Performed by: FAMILY MEDICINE

## 2024-06-16 PROCEDURE — 85027 COMPLETE CBC AUTOMATED: CPT | Performed by: FAMILY MEDICINE

## 2024-06-16 PROCEDURE — 99233 SBSQ HOSP IP/OBS HIGH 50: CPT | Performed by: STUDENT IN AN ORGANIZED HEALTH CARE EDUCATION/TRAINING PROGRAM

## 2024-06-16 PROCEDURE — 85046 RETICYTE/HGB CONCENTRATE: CPT | Performed by: STUDENT IN AN ORGANIZED HEALTH CARE EDUCATION/TRAINING PROGRAM

## 2024-06-16 RX ORDER — AMOXICILLIN 250 MG
2 CAPSULE ORAL DAILY
Status: DISCONTINUED | OUTPATIENT
Start: 2024-06-16 | End: 2024-06-17 | Stop reason: HOSPADM

## 2024-06-16 RX ADMIN — PRIMIDONE 50 MG: 50 TABLET ORAL at 21:11

## 2024-06-16 RX ADMIN — GLYCERIN 1 DROP: .002; .002; .01 SOLUTION/ DROPS OPHTHALMIC at 08:03

## 2024-06-16 RX ADMIN — MIDODRINE HYDROCHLORIDE 5 MG: 5 TABLET ORAL at 11:17

## 2024-06-16 RX ADMIN — MIDODRINE HYDROCHLORIDE 5 MG: 5 TABLET ORAL at 06:02

## 2024-06-16 RX ADMIN — ACETAMINOPHEN 975 MG: 325 TABLET ORAL at 06:02

## 2024-06-16 RX ADMIN — DRONEDARONE 400 MG: 400 TABLET, FILM COATED ORAL at 18:11

## 2024-06-16 RX ADMIN — FOLIC ACID 1000 MCG: 1 TABLET ORAL at 08:02

## 2024-06-16 RX ADMIN — ACETAMINOPHEN 975 MG: 325 TABLET ORAL at 21:11

## 2024-06-16 RX ADMIN — PANTOPRAZOLE SODIUM 40 MG: 40 TABLET, DELAYED RELEASE ORAL at 06:02

## 2024-06-16 RX ADMIN — SENNOSIDES AND DOCUSATE SODIUM 1 TABLET: 8.6; 5 TABLET ORAL at 11:17

## 2024-06-16 RX ADMIN — HEPARIN SODIUM 5000 UNITS: 5000 INJECTION, SOLUTION INTRAVENOUS; SUBCUTANEOUS at 15:21

## 2024-06-16 RX ADMIN — CYANOCOBALAMIN TAB 500 MCG 1000 MCG: 500 TAB at 08:02

## 2024-06-16 RX ADMIN — SENNOSIDES AND DOCUSATE SODIUM 2 TABLET: 8.6; 5 TABLET ORAL at 18:11

## 2024-06-16 RX ADMIN — GLYCERIN 1 DROP: .002; .002; .01 SOLUTION/ DROPS OPHTHALMIC at 18:11

## 2024-06-16 RX ADMIN — DRONEDARONE 400 MG: 400 TABLET, FILM COATED ORAL at 08:02

## 2024-06-16 RX ADMIN — HEPARIN SODIUM 5000 UNITS: 5000 INJECTION, SOLUTION INTRAVENOUS; SUBCUTANEOUS at 06:02

## 2024-06-16 RX ADMIN — GLYCERIN 1 DROP: .002; .002; .01 SOLUTION/ DROPS OPHTHALMIC at 21:11

## 2024-06-16 RX ADMIN — ACETAMINOPHEN 975 MG: 325 TABLET ORAL at 15:21

## 2024-06-16 RX ADMIN — TAMSULOSIN HYDROCHLORIDE 0.4 MG: 0.4 CAPSULE ORAL at 18:11

## 2024-06-16 RX ADMIN — HEPARIN SODIUM 5000 UNITS: 5000 INJECTION, SOLUTION INTRAVENOUS; SUBCUTANEOUS at 21:11

## 2024-06-16 RX ADMIN — Medication 3 MG: at 21:18

## 2024-06-16 RX ADMIN — CLOPIDOGREL 75 MG: 75 TABLET ORAL at 08:02

## 2024-06-16 RX ADMIN — LIDOCAINE 5% 1 PATCH: 700 PATCH TOPICAL at 08:03

## 2024-06-16 RX ADMIN — PRIMIDONE 50 MG: 50 TABLET ORAL at 08:02

## 2024-06-16 NOTE — PROGRESS NOTES
"[facility]  Progress Note  Name: Miguel Angel Ortega I  MRN: [id number]  Unit/Bed#: MS Daily-01 I Date of Admission: 6/13/2024   Date of Service: 6/16/2024 I Hospital Day: 3    Assessment & Plan   * Closed fracture of multiple pubic rami, right, initial encounter ([facility])  Assessment & Plan  Had mechanical fall at [facility] while using walker; patient denies dizziness/lightheadedness at time of the fall, denies palpitation, noted that he was using the walker and he lifted both hands of the walker reaching keychain when he lost balance/support and fell.     CT pelvis showing right superior and right inferior pubic rami fx  CXR, CT head, CT cervical spine negative for acute process   PT/OT consulted  Orthopedics consulted.  No surgical intervention at this time  Analgesia prn   Patient was to be mostly wheelchair bound at time of last discharge due to falls and chronic orthostatic hypotension however he was walking with a walker at assisted living facility.  Appreciate physical therapy and Occupational Therapy recommendations and dc back to assisted living PT OT services if they agree.  he has been ambulating to the bathroom here with supervision and is doing well no significant orthostasis noted after starting midodrine and continuing fluid restriction    Per CM \"CM reached out to [facility] - they cannot accept patient over the weekend, Monday would be best for discharge planning. \"     Benign prostatic hyperplasia with urinary retention  Assessment & Plan  Continue Gemtesa and Flomax  Follows with urology outpatient    Orthostatic hypotension  Assessment & Plan  florinef has been stopped outpatient due to lower extremity edema   Patient has chronic symptomatic orthostasis  PT/OT  Gentle hydration stopped.  Placed on midodrine and continue MAX stockings and seems to be improving    Normocytic anemia  Assessment & Plan  Stable hgb ~9-10  Normocytic, normal folate and B12 , no iron deficiency , " no evidence to suggest bleeding  Most likely due to age / hypoproliferative   Will check retic count , otherwise regular CBC/monitoring with PCP and no further w/u as long as no acute drop or relevant symptoms     Hyponatremia  Assessment & Plan  Chronic - Baseline approx 130  127 on admission   Gentle hydration and sodium improved to 129.  Baseline sodium appears to be 128-129 will avoid salt tablets at this time and continue fluid restriction only for now and observe also continue MAX stockings    Paroxysmal atrial fibrillation (HCC)  Assessment & Plan  Continue digoxin every other day and Multaq twice daily  Not on blood thinners due to falls    Prostate cancer metastatic to bone (HCC)  Assessment & Plan  Seen on imaging   Continue outpatient follow up with urology, Dr. Ji with repeat PSA prior to next visit as was instructed               VTE Pharmacologic Prophylaxis:   Moderate Risk (Score 3-4) - Pharmacological DVT Prophylaxis Ordered: heparin.    Mobility:   Basic Mobility Inpatient Raw Score: 22  JH-HLM Goal: 7: Walk 25 feet or more  JH-HLM Achieved: 7: Walk 25 feet or more  JH-HLM Goal achieved. Continue to encourage appropriate mobility.    Patient Centered Rounds: I performed bedside rounds with nursing staff today.   Discussions with Specialists or Other Care Team Provider: Outpatient urology note reviewed, PT OT eval and recommendations reviewed, and  disposition planning noted.     Education and Discussions with Family / Patient: Updated  (friend) via phone. Elizabeth      Total Time Spent on Date of Encounter in care of patient: 50 mins. This time was spent on one or more of the following: performing physical exam; counseling and coordination of care; obtaining or reviewing history; documenting in the medical record; reviewing/ordering tests, medications or procedures; communicating with other healthcare professionals and discussing with patient's  family/caregivers.    Current Length of Stay: 3 day(s)  Current Patient Status: Inpatient   Certification Statement:  Plan for disposition back to facility   Discharge Plan: Anticipate discharge tomorrow to rehab facility.    Code Status: Level 3 - DNAR and DNI    Subjective:   Patient is seen and examined, he is supine in bed in no acute distress  Patient noted that he was out of the bed earlier use the walker and denies any weakness or lightheadedness  He confirmed that the recent fall happened when he left his hands over the walker and he is paying more attention when out of the bed now given the phone  Denies pelvic pain and noting that his right lower extremity movement has improved was able to lift his leg while supine in the bed and move his feet without limitation  Denies any new symptoms or concern otherwise  Agreeable to above assessment and plan  Updated friend at bedside and called and updated Elizabeth, his .     Objective:     Vitals:   Temp (24hrs), Av.7 °F (36.5 °C), Min:97.5 °F (36.4 °C), Max:97.9 °F (36.6 °C)    Temp:  [97.5 °F (36.4 °C)-97.9 °F (36.6 °C)] 97.5 °F (36.4 °C)  HR:  [59-62] 59  BP: (115-162)/(46-61) 115/46  SpO2:  [96 %-97 %] 96 %  Body mass index is 25.4 kg/m².     Input and Output Summary (last 24 hours):     Intake/Output Summary (Last 24 hours) at 2024  Last data filed at 2024  Gross per 24 hour   Intake 180 ml   Output 1300 ml   Net -1120 ml       Physical Exam:   Physical Exam   - GEN: Appears well, alert and oriented x 3, pleasant and cooperative, in no acute distress  - HEENT: Difficulty hearing noted on exam, anicteric, mucous membranes moist, PERRL and EOMI   - NECK: No lymphadenopathy, JVD or carotid bruits   - HEART: RRR, normal S1 and S2, no murmurs, clicks, gallops or rubs   - LUNGS: Clear to auscultation bilaterally; no wheezes, rales, or rhonchi  - ABDOMEN: Normal bowel sounds, soft, no tenderness, no distention, no organomegaly or  masses felt on exam.   - EXTREMITIES: Peripheral pulses normal; no clubbing, cyanosis, or edema  - NEURO: No focal findings, CN II-XII are grossly intact.   - Musculoskeletal: 5/5 strength, normal ROM, no swollen or erythematous joints.   - SKIN: Normal without suspicious lesions on exposed skin      Additional Data:     Labs:  Results from last 7 days   Lab Units 06/16/24  0500 06/14/24  0443 06/13/24  1412   WBC Thousand/uL 5.70   < > 13.45*   HEMOGLOBIN g/dL 8.9*   < > 10.1*   HEMATOCRIT % 26.2*   < > 29.5*   PLATELETS Thousands/uL 151   < > 218   SEGS PCT %  --   --  75   LYMPHO PCT %  --   --  12*   MONO PCT %  --   --  11   EOS PCT %  --   --  1    < > = values in this interval not displayed.     Results from last 7 days   Lab Units 06/16/24  0500 06/14/24  0443 06/13/24  1412   SODIUM mmol/L 129*   < > 127*   POTASSIUM mmol/L 4.3   < > 4.4   CHLORIDE mmol/L 99   < > 97   CO2 mmol/L 26   < > 22   BUN mg/dL 22   < > 24   CREATININE mg/dL 1.23   < > 1.15   ANION GAP mmol/L 4   < > 8   CALCIUM mg/dL 8.4   < > 8.8   ALBUMIN g/dL  --   --  4.0   TOTAL BILIRUBIN mg/dL  --   --  0.44   ALK PHOS U/L  --   --  89   ALT U/L  --   --  11   AST U/L  --   --  18   GLUCOSE RANDOM mg/dL 98   < > 138    < > = values in this interval not displayed.                       Lines/Drains:  Invasive Devices       Peripheral Intravenous Line  Duration             Peripheral IV 06/13/24 Left;Ventral (anterior) Forearm 3 days                          Imaging: Reviewed radiology reports from this admission including: CT pelvis    Recent Cultures (last 7 days):         Last 24 Hours Medication List:   Current Facility-Administered Medications   Medication Dose Route Frequency Provider Last Rate    acetaminophen  975 mg Oral Q8H YAMINI Remedios Fergus, PA-C      Artificial Tears  1 drop Both Eyes TID Remedios Fergus, PA-C      clopidogrel  75 mg Oral Daily Remedios Fergus, PA-C      cyanocobalamin  1,000 mcg Oral Daily Remedios Fergus, PA-C      digoxin   125 mcg Oral Every Other Day Remedios Sangamon, KAMI      dronedarone  400 mg Oral BID With Meals Remedios Sangamon, PA-JUS      folic acid  1,000 mcg Oral Daily Remedios Sangamon, PA-JUS      heparin (porcine)  5,000 Units Subcutaneous Q8H YAMINI Remedios Sangamon, KAMI      lidocaine  1 patch Topical Daily Remedios Sangamon, KAMI      melatonin  3 mg Oral HS PRN Remedios Sangamon, KAMI      midodrine  5 mg Oral TID AC Sherita Martinez MD      oxyCODONE  2.5 mg Oral Q6H PRN Remedios Sangamon, KAMI      pantoprazole  40 mg Oral Daily Before Breakfast Remedios Sangamon, KAMI      primidone  50 mg Oral Q12H YAMINI Remedios Sangamon, KAMI      senna-docusate sodium  2 tablet Oral Daily Ramos Turner DO      tamsulosin  0.4 mg Oral Daily With Dinner Remedios Nancy, KAMI      Vibegron  75 mg Oral Every Other Day Remedios Cruz PA-C          Today, Patient Was Seen By: Ramos Turner DO    **Please Note: This note may have been constructed using a voice recognition system.**

## 2024-06-16 NOTE — ASSESSMENT & PLAN NOTE
Stable hgb ~9-10  Normocytic, normal folate and B12 , no iron deficiency , no evidence to suggest bleeding  Most likely due to age / hypoproliferative   Will check retic count , otherwise regular CBC/monitoring with PCP and no further w/u as long as no acute drop or relevant symptoms

## 2024-06-16 NOTE — ASSESSMENT & PLAN NOTE
"Had mechanical fall at [facility] while using walker; patient denies dizziness/lightheadedness at time of the fall, denies palpitation, noted that he was using the walker and he lifted both hands of the walker reaching keychain when he lost balance/support and fell.     CT pelvis showing right superior and right inferior pubic rami fx  CXR, CT head, CT cervical spine negative for acute process   PT/OT consulted  Orthopedics consulted.  No surgical intervention at this time  Analgesia prn   Patient was to be mostly wheelchair bound at time of last discharge due to falls and chronic orthostatic hypotension however he was walking with a walker at assisted living facility.  Appreciate physical therapy and Occupational Therapy recommendations and dc back to assisted living PT OT services if they agree.  he has been ambulating to the bathroom here with supervision and is doing well no significant orthostasis noted after starting midodrine and continuing fluid restriction    Per CM \"CM reached out to [facility] - they cannot accept patient over the weekend, Monday would be best for discharge planning. \"   "

## 2024-06-16 NOTE — PLAN OF CARE
Problem: PAIN - ADULT  Goal: Verbalizes/displays adequate comfort level or baseline comfort level  Description: Interventions:  - Encourage patient to monitor pain and request assistance  - Assess pain using appropriate pain scale  - Administer analgesics based on type and severity of pain and evaluate response  - Implement non-pharmacological measures as appropriate and evaluate response  - Consider cultural and social influences on pain and pain management  - Notify physician/advanced practitioner if interventions unsuccessful or patient reports new pain  Outcome: Progressing     Problem: SAFETY ADULT  Goal: Patient will remain free of falls  Description: INTERVENTIONS:  - Educate patient/family on patient safety including physical limitations  - Instruct patient to call for assistance with activity   - Consult OT/PT to assist with strengthening/mobility   - Keep Call bell within reach  - Keep bed low and locked with side rails adjusted as appropriate  - Keep care items and personal belongings within reach  - Initiate and maintain comfort rounds  - Make Fall Risk Sign visible to staff  - Offer Toileting every  2 Hours, in advance of need  - Initiate/Maintain bed/chair alarm  - Apply yellow socks and bracelet for high fall risk patients  - Consider moving patient to room near nurses station  Outcome: Progressing  Goal: Maintain or return to baseline ADL function  Description: INTERVENTIONS:  -  Assess patient's ability to carry out ADLs; assess patient's baseline for ADL function and identify physical deficits which impact ability to perform ADLs (bathing, care of mouth/teeth, toileting, grooming, dressing, etc.)  - Assess/evaluate cause of self-care deficits   - Assess range of motion  - Assess patient's mobility; develop plan if impaired  - Assess patient's need for assistive devices and provide as appropriate  - Encourage maximum independence but intervene and supervise when necessary  - Involve family in  performance of ADLs  - Assess for home care needs following discharge   - Consider OT consult to assist with ADL evaluation and planning for discharge  - Provide patient education as appropriate  Outcome: Progressing  Goal: Maintains/Returns to pre admission functional level  Description: INTERVENTIONS:  - Perform AM-PAC 6 Click Basic Mobility/ Daily Activity assessment daily.  - Set and communicate daily mobility goal to care team and patient/family/caregiver.   - Collaborate with rehabilitation services on mobility goals if consulted  - Ambulate patient 5 times a day  - Out of bed for toileting  - Record patient progress and toleration of activity level   Outcome: Progressing     Problem: DISCHARGE PLANNING  Goal: Discharge to home or other facility with appropriate resources  Description: INTERVENTIONS:  - Identify barriers to discharge w/patient and caregiver  - Arrange for needed discharge resources and transportation as appropriate  - Identify discharge learning needs (meds, wound care, etc.)  - Arrange for interpretive services to assist at discharge as needed  - Refer to Case Management Department for coordinating discharge planning if the patient needs post-hospital services based on physician/advanced practitioner order or complex needs related to functional status, cognitive ability, or social support system  Outcome: Progressing     Problem: SKIN/TISSUE INTEGRITY - ADULT  Goal: Skin Integrity remains intact(Skin Breakdown Prevention)  Description: Assess:  -Perform Mario assessment every .  -Clean and moisturize skin every .  -Inspect skin when repositioning, toileting, and assisting with ADLS  -Assess under medical devices such as . every .  -Assess extremities for adequate circulation and sensation     Bed Management:  -Have minimal linens on bed & keep smooth, unwrinkled  -Change linens as needed when moist or perspiring  -Avoid sitting or lying in one position for more than . hours while in  bed  -Keep HOB at .degrees     Toileting:  -Offer bedside commode  -Assess for incontinence every .  -Use incontinent care products after each incontinent episode such as .    Activity:  -Mobilize patient . times a day  -Encourage activity and walks on unit  -Encourage or provide ROM exercises   -Turn and reposition patient every . Hours  -Use appropriate equipment to lift or move patient in bed  -Instruct/ Assist with weight shifting every . when out of bed in chair  -Consider limitation of chair time . hour intervals    Skin Care:  -Avoid use of baby powder, tape, friction and shearing, hot water or constrictive clothing  -Relieve pressure over bony prominences using .  -Do not massage red bony areas    Next Steps:  -Teach patient strategies to minimize risks such as .   -Consider consults to  interdisciplinary teams such as ...  Outcome: Progressing     Problem: MUSCULOSKELETAL - ADULT  Goal: Maintain or return mobility to safest level of function  Description: INTERVENTIONS:  - Assess patient's ability to carry out ADLs; assess patient's baseline for ADL function and identify physical deficits which impact ability to perform ADLs (bathing, care of mouth/teeth, toileting, grooming, dressing, etc.)  - Assess/evaluate cause of self-care deficits   - Assess range of motion  - Assess patient's mobility  - Assess patient's need for assistive devices and provide as appropriate  - Encourage maximum independence but intervene and supervise when necessary  - Involve family in performance of ADLs  - Assess for home care needs following discharge   - Consider OT consult to assist with ADL evaluation and planning for discharge  - Provide patient education as appropriate  Outcome: Progressing  Goal: Maintain proper alignment of affected body part  Description: INTERVENTIONS:  - Support, maintain and protect limb and body alignment  - Provide patient/ family with appropriate education  Outcome: Progressing     Problem:  GASTROINTESTINAL - ADULT  Goal: Maintains or returns to baseline bowel function  Description: INTERVENTIONS:  - Assess bowel function  - Encourage oral fluids to ensure adequate hydration  - Administer IV fluids if ordered to ensure adequate hydration  - Administer ordered medications as needed  - Encourage mobilization and activity  - Consider nutritional services referral to assist patient with adequate nutrition and appropriate food choices  Outcome: Progressing     Problem: Prexisting or High Potential for Compromised Skin Integrity  Goal: Skin integrity is maintained or improved  Description: INTERVENTIONS:  - Identify patients at risk for skin breakdown  - Assess and monitor skin integrity  - Assess and monitor nutrition and hydration status  - Monitor labs   - Assess for incontinence   - Turn and reposition patient  - Assist with mobility/ambulation  - Relieve pressure over bony prominences  - Avoid friction and shearing  - Provide appropriate hygiene as needed including keeping skin clean and dry  - Evaluate need for skin moisturizer/barrier cream  - Collaborate with interdisciplinary team   - Patient/family teaching  - Consider wound care consult   Outcome: Progressing

## 2024-06-17 VITALS
WEIGHT: 182.1 LBS | RESPIRATION RATE: 16 BRPM | BODY MASS INDEX: 25.49 KG/M2 | OXYGEN SATURATION: 94 % | SYSTOLIC BLOOD PRESSURE: 140 MMHG | DIASTOLIC BLOOD PRESSURE: 53 MMHG | TEMPERATURE: 97.3 F | HEART RATE: 74 BPM | HEIGHT: 71 IN

## 2024-06-17 PROCEDURE — 99239 HOSP IP/OBS DSCHRG MGMT >30: CPT | Performed by: FAMILY MEDICINE

## 2024-06-17 RX ORDER — ENOXAPARIN SODIUM 100 MG/ML
30 INJECTION SUBCUTANEOUS DAILY
Start: 2024-06-17 | End: 2024-07-11

## 2024-06-17 RX ORDER — ACETAMINOPHEN 325 MG/1
650 TABLET ORAL EVERY 6 HOURS PRN
Start: 2024-06-17

## 2024-06-17 RX ORDER — LIDOCAINE 50 MG/G
1 PATCH TOPICAL DAILY
Qty: 14 PATCH | Refills: 0 | Status: SHIPPED | OUTPATIENT
Start: 2024-06-18 | End: 2024-07-02

## 2024-06-17 RX ORDER — MIDODRINE HYDROCHLORIDE 5 MG/1
2.5 TABLET ORAL 2 TIMES DAILY
Qty: 30 TABLET | Refills: 0 | Status: SHIPPED | OUTPATIENT
Start: 2024-06-17 | End: 2024-07-17

## 2024-06-17 RX ADMIN — GLYCERIN 1 DROP: .002; .002; .01 SOLUTION/ DROPS OPHTHALMIC at 08:21

## 2024-06-17 RX ADMIN — DIGOXIN 125 MCG: 125 TABLET ORAL at 08:21

## 2024-06-17 RX ADMIN — PANTOPRAZOLE SODIUM 40 MG: 40 TABLET, DELAYED RELEASE ORAL at 08:24

## 2024-06-17 RX ADMIN — CLOPIDOGREL 75 MG: 75 TABLET ORAL at 08:21

## 2024-06-17 RX ADMIN — CYANOCOBALAMIN TAB 500 MCG 1000 MCG: 500 TAB at 08:21

## 2024-06-17 RX ADMIN — LIDOCAINE 5% 1 PATCH: 700 PATCH TOPICAL at 08:22

## 2024-06-17 RX ADMIN — MIDODRINE HYDROCHLORIDE 5 MG: 5 TABLET ORAL at 11:54

## 2024-06-17 RX ADMIN — DRONEDARONE 400 MG: 400 TABLET, FILM COATED ORAL at 08:23

## 2024-06-17 RX ADMIN — PRIMIDONE 50 MG: 50 TABLET ORAL at 08:21

## 2024-06-17 RX ADMIN — FOLIC ACID 1000 MCG: 1 TABLET ORAL at 08:21

## 2024-06-17 RX ADMIN — MIDODRINE HYDROCHLORIDE 5 MG: 5 TABLET ORAL at 05:40

## 2024-06-17 RX ADMIN — ACETAMINOPHEN 975 MG: 325 TABLET ORAL at 05:40

## 2024-06-17 RX ADMIN — HEPARIN SODIUM 5000 UNITS: 5000 INJECTION, SOLUTION INTRAVENOUS; SUBCUTANEOUS at 05:45

## 2024-06-17 RX ADMIN — SENNOSIDES AND DOCUSATE SODIUM 2 TABLET: 8.6; 5 TABLET ORAL at 08:21

## 2024-06-17 NOTE — PLAN OF CARE
Problem: PAIN - ADULT  Goal: Verbalizes/displays adequate comfort level or baseline comfort level  Description: Interventions:  - Encourage patient to monitor pain and request assistance  - Assess pain using appropriate pain scale  - Administer analgesics based on type and severity of pain and evaluate response  - Implement non-pharmacological measures as appropriate and evaluate response  - Consider cultural and social influences on pain and pain management  - Notify physician/advanced practitioner if interventions unsuccessful or patient reports new pain  Outcome: Not Progressing     Problem: SAFETY ADULT  Goal: Patient will remain free of falls  Description: INTERVENTIONS:  - Educate patient/family on patient safety including physical limitations  - Instruct patient to call for assistance with activity   - Consult OT/PT to assist with strengthening/mobility   - Keep Call bell within reach  - Keep bed low and locked with side rails adjusted as appropriate  - Keep care items and personal belongings within reach  - Initiate and maintain comfort rounds  - Make Fall Risk Sign visible to staff  - Offer Toileting every  2 Hours, in advance of need  - Initiate/Maintain bed/chair alarm  - Apply yellow socks and bracelet for high fall risk patients  - Consider moving patient to room near nurses station  Outcome: Not Progressing  Goal: Maintain or return to baseline ADL function  Description: INTERVENTIONS:  -  Assess patient's ability to carry out ADLs; assess patient's baseline for ADL function and identify physical deficits which impact ability to perform ADLs (bathing, care of mouth/teeth, toileting, grooming, dressing, etc.)  - Assess/evaluate cause of self-care deficits   - Assess range of motion  - Assess patient's mobility; develop plan if impaired  - Assess patient's need for assistive devices and provide as appropriate  - Encourage maximum independence but intervene and supervise when necessary  - Involve family  in performance of ADLs  - Assess for home care needs following discharge   - Consider OT consult to assist with ADL evaluation and planning for discharge  - Provide patient education as appropriate  Outcome: Not Progressing  Goal: Maintains/Returns to pre admission functional level  Description: INTERVENTIONS:  - Perform AM-PAC 6 Click Basic Mobility/ Daily Activity assessment daily.  - Set and communicate daily mobility goal to care team and patient/family/caregiver.   - Collaborate with rehabilitation services on mobility goals if consulted  - Ambulate patient 5 times a day  - Out of bed for toileting  - Record patient progress and toleration of activity level   Outcome: Not Progressing     Problem: DISCHARGE PLANNING  Goal: Discharge to home or other facility with appropriate resources  Description: INTERVENTIONS:  - Identify barriers to discharge w/patient and caregiver  - Arrange for needed discharge resources and transportation as appropriate  - Identify discharge learning needs (meds, wound care, etc.)  - Arrange for interpretive services to assist at discharge as needed  - Refer to Case Management Department for coordinating discharge planning if the patient needs post-hospital services based on physician/advanced practitioner order or complex needs related to functional status, cognitive ability, or social support system  Outcome: Not Progressing

## 2024-06-17 NOTE — ASSESSMENT & PLAN NOTE
Had mechanical fall at [facility] while using walker; patient denies dizziness/lightheadedness at time of the fall, denies palpitation, noted that he was using the walker and he lifted both hands of the walker reaching keychain when he lost balance/support and fell.     CT pelvis showing right superior and right inferior pubic rami fx  CXR, CT head, CT cervical spine negative for acute process   PT/OT consulted  Orthopedics consulted.  No surgical intervention at this time  Analgesia prn   Patient was to be mostly wheelchair bound at time of last discharge due to falls and chronic orthostatic hypotension however he was walking with a walker at assisted living facility.  Appreciate physical therapy and Occupational Therapy recommendations and dc back to assisted living PT OT services if they agree.  he has been ambulating to the bathroom here with supervision and is doing well no significant orthostasis noted after starting midodrine and continuing fluid restriction    discharge back to previous assisted living facility with PT OT services

## 2024-06-17 NOTE — ASSESSMENT & PLAN NOTE
Seen on imaging   Continue outpatient follow up with urology, Dr. Ji with repeat PSA prior to next visit as was instructed

## 2024-06-17 NOTE — PLAN OF CARE
Problem: PAIN - ADULT  Goal: Verbalizes/displays adequate comfort level or baseline comfort level  Description: Interventions:  - Encourage patient to monitor pain and request assistance  - Assess pain using appropriate pain scale  - Administer analgesics based on type and severity of pain and evaluate response  - Implement non-pharmacological measures as appropriate and evaluate response  - Consider cultural and social influences on pain and pain management  - Notify physician/advanced practitioner if interventions unsuccessful or patient reports new pain  Outcome: Progressing     Problem: SAFETY ADULT  Goal: Patient will remain free of falls  Description: INTERVENTIONS:  - Educate patient/family on patient safety including physical limitations  - Instruct patient to call for assistance with activity   - Consult OT/PT to assist with strengthening/mobility   - Keep Call bell within reach  - Keep bed low and locked with side rails adjusted as appropriate  - Keep care items and personal belongings within reach  - Initiate and maintain comfort rounds  - Make Fall Risk Sign visible to staff  - Offer Toileting every  2 Hours, in advance of need  - Initiate/Maintain bed/chair alarm  - Apply yellow socks and bracelet for high fall risk patients  - Consider moving patient to room near nurses station  Outcome: Progressing  Goal: Maintain or return to baseline ADL function  Description: INTERVENTIONS:  -  Assess patient's ability to carry out ADLs; assess patient's baseline for ADL function and identify physical deficits which impact ability to perform ADLs (bathing, care of mouth/teeth, toileting, grooming, dressing, etc.)  - Assess/evaluate cause of self-care deficits   - Assess range of motion  - Assess patient's mobility; develop plan if impaired  - Assess patient's need for assistive devices and provide as appropriate  - Encourage maximum independence but intervene and supervise when necessary  - Involve family in  performance of ADLs  - Assess for home care needs following discharge   - Consider OT consult to assist with ADL evaluation and planning for discharge  - Provide patient education as appropriate  Outcome: Progressing  Goal: Maintains/Returns to pre admission functional level  Description: INTERVENTIONS:  - Perform AM-PAC 6 Click Basic Mobility/ Daily Activity assessment daily.  - Set and communicate daily mobility goal to care team and patient/family/caregiver.   - Collaborate with rehabilitation services on mobility goals if consulted  - Ambulate patient 5 times a day  - Out of bed for toileting  - Record patient progress and toleration of activity level   Outcome: Progressing     Problem: DISCHARGE PLANNING  Goal: Discharge to home or other facility with appropriate resources  Description: INTERVENTIONS:  - Identify barriers to discharge w/patient and caregiver  - Arrange for needed discharge resources and transportation as appropriate  - Identify discharge learning needs (meds, wound care, etc.)  - Arrange for interpretive services to assist at discharge as needed  - Refer to Case Management Department for coordinating discharge planning if the patient needs post-hospital services based on physician/advanced practitioner order or complex needs related to functional status, cognitive ability, or social support system  Outcome: Progressing     Problem: SKIN/TISSUE INTEGRITY - ADULT  Goal: Skin Integrity remains intact(Skin Breakdown Prevention)  Description: Assess:  -Perform Mario assessment every .  -Clean and moisturize skin every .  -Inspect skin when repositioning, toileting, and assisting with ADLS  -Assess under medical devices such as . every .  -Assess extremities for adequate circulation and sensation     Bed Management:  -Have minimal linens on bed & keep smooth, unwrinkled  -Change linens as needed when moist or perspiring  -Avoid sitting or lying in one position for more than . hours while in  bed  -Keep HOB at .degrees     Toileting:  -Offer bedside commode  -Assess for incontinence every .  -Use incontinent care products after each incontinent episode such as .    Activity:  -Mobilize patient . times a day  -Encourage activity and walks on unit  -Encourage or provide ROM exercises   -Turn and reposition patient every . Hours  -Use appropriate equipment to lift or move patient in bed  -Instruct/ Assist with weight shifting every . when out of bed in chair  -Consider limitation of chair time . hour intervals    Skin Care:  -Avoid use of baby powder, tape, friction and shearing, hot water or constrictive clothing  -Relieve pressure over bony prominences using .  -Do not massage red bony areas    Next Steps:  -Teach patient strategies to minimize risks such as .   -Consider consults to  interdisciplinary teams such as ...  Outcome: Progressing     Problem: MUSCULOSKELETAL - ADULT  Goal: Maintain or return mobility to safest level of function  Description: INTERVENTIONS:  - Assess patient's ability to carry out ADLs; assess patient's baseline for ADL function and identify physical deficits which impact ability to perform ADLs (bathing, care of mouth/teeth, toileting, grooming, dressing, etc.)  - Assess/evaluate cause of self-care deficits   - Assess range of motion  - Assess patient's mobility  - Assess patient's need for assistive devices and provide as appropriate  - Encourage maximum independence but intervene and supervise when necessary  - Involve family in performance of ADLs  - Assess for home care needs following discharge   - Consider OT consult to assist with ADL evaluation and planning for discharge  - Provide patient education as appropriate  Outcome: Progressing  Goal: Maintain proper alignment of affected body part  Description: INTERVENTIONS:  - Support, maintain and protect limb and body alignment  - Provide patient/ family with appropriate education  Outcome: Progressing     Problem:  Prexisting or High Potential for Compromised Skin Integrity  Goal: Skin integrity is maintained or improved  Description: INTERVENTIONS:  - Identify patients at risk for skin breakdown  - Assess and monitor skin integrity  - Assess and monitor nutrition and hydration status  - Monitor labs   - Assess for incontinence   - Turn and reposition patient  - Assist with mobility/ambulation  - Relieve pressure over bony prominences  - Avoid friction and shearing  - Provide appropriate hygiene as needed including keeping skin clean and dry  - Evaluate need for skin moisturizer/barrier cream  - Collaborate with interdisciplinary team   - Patient/family teaching  - Consider wound care consult   Outcome: Progressing     Problem: GASTROINTESTINAL - ADULT  Goal: Maintains or returns to baseline bowel function  Description: INTERVENTIONS:  - Assess bowel function  - Encourage oral fluids to ensure adequate hydration  - Administer IV fluids if ordered to ensure adequate hydration  - Administer ordered medications as needed  - Encourage mobilization and activity  - Consider nutritional services referral to assist patient with adequate nutrition and appropriate food choices  Outcome: Progressing

## 2024-06-17 NOTE — NURSING NOTE
Patient discharged back to [facility]. Taken out via wheelchair with PCA. All belongings sent with patient at this time.

## 2024-06-17 NOTE — ASSESSMENT & PLAN NOTE
Stable hgb ~9-10  Normocytic, normal folate and B12 , no iron deficiency , no evidence to suggest bleeding  Most likely due to age / hypoproliferative

## 2024-06-17 NOTE — CASE MANAGEMENT
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     Benign prostatic hyperplasia with urinary retention 04/10/2024     Ascencio catheter in place 2024     Urinary retention 2024     Urge incontinence 2024     Radiation cystitis 2024     Neurogenic bladder 2024     History of urinary retention 2024     Coronary artery disease involving native coronary artery of native heart without angina pectoris 2024     Gross hematuria 2023     Orthostatic hypotension 2023     Normocytic anemia 2023     Stercoral colitis 2023     Acute urinary retention 2023     Paroxysmal atrial fibrillation (HCC) 2023     Hyponatremia 2023     Prostate cancer metastatic to bone (HCC) 2023       LOS (days): 4  Geometric Mean LOS (GMLOS) (days): 2.8  Days to GMLOS:-1.2     OBJECTIVE:  Risk of Unplanned Readmission Score: 29.72         Current admission status: Inpatient   Preferred Pharmacy:   [id number] [address] [address]  Phone: [phone] Fax: [phone]    [facility]  [place], [place] - [address]  [address]  [facility] [address]  Phone: [phone] Fax: [phone]    [place], [place] - [address] [facility]  [address] [facility] [address]  Phone: [phone] Fax: [phone]    [facility]/pharmacy #[id number] - [place], [place] - [address] [address]  Phone: [phone] Fax:  [phone]    Primary Care Provider: Kisha Armstrong MD    Primary Insurance: MEDICARE  Secondary Insurance: [facility]    DISCHARGE DETAILS:       Treatment Team Recommendation: Assisted Living  Discharge Destination Plan:: Assisted Living  Transport at Discharge : Family           ETA of Transport (Date): 06/17/24  ETA of Transport (Time): 1230     Transfer Mode: Ambulate        IMM Given (Date):: 06/17/24  IMM Given to:: Patient        CM spoke to patient at the bedside, reviewed DC IMM with patient and informed that patient can stay an additional 4 hours for reconsidering appealing the discharge as the medicare rights were review on the day of discharge. Pt verbalized understanding and feels ready to go home and does not intend to stay 4 hours to reconsider.   Patient will reach out to friend Elizabeth who will take him to [facility] as long as nursing feels patient is able to transfer well. If not, can explore alternate forms of transportation.     CM to follow patient's care and discharge needs.
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
## 2024-06-27 RX ORDER — ALENDRONATE SODIUM 70 MG/1
TABLET ORAL
COMMUNITY

## 2024-06-27 RX ORDER — ISOSORBIDE MONONITRATE 30 MG/1
TABLET, EXTENDED RELEASE ORAL
COMMUNITY

## 2024-06-27 RX ORDER — TRAMADOL HYDROCHLORIDE 50 MG/1
TABLET ORAL
COMMUNITY

## 2024-06-27 RX ORDER — CARBOXYMETHYLCELLULOSE SODIUM 5 MG/ML
1 SOLUTION/ DROPS OPHTHALMIC
COMMUNITY

## 2024-06-27 RX ORDER — DIAPER,BRIEF,INFANT-TODD,DISP
EACH MISCELLANEOUS
COMMUNITY
Start: 2024-05-24

## 2024-06-27 RX ORDER — MECLIZINE HYDROCHLORIDE 25 MG/1
TABLET ORAL
COMMUNITY

## 2024-06-27 RX ORDER — LOPERAMIDE HYDROCHLORIDE 2 MG/1
1 CAPSULE ORAL 4 TIMES DAILY PRN
COMMUNITY

## 2024-07-01 ENCOUNTER — OFFICE VISIT (OUTPATIENT)
Dept: OBGYN CLINIC | Facility: CLINIC | Age: 89
End: 2024-07-01
Payer: MEDICARE

## 2024-07-01 VITALS
SYSTOLIC BLOOD PRESSURE: 150 MMHG | HEART RATE: 76 BPM | WEIGHT: 182 LBS | OXYGEN SATURATION: 98 % | TEMPERATURE: 97.8 F | HEIGHT: 71 IN | BODY MASS INDEX: 25.48 KG/M2 | DIASTOLIC BLOOD PRESSURE: 60 MMHG

## 2024-07-01 DIAGNOSIS — S32.591A CLOSED FRACTURE OF RAMUS OF RIGHT PUBIS, INITIAL ENCOUNTER (HCC): ICD-10-CM

## 2024-07-01 DIAGNOSIS — S32.591A CLOSED FRACTURE OF MULTIPLE PUBIC RAMI, RIGHT, INITIAL ENCOUNTER (HCC): Primary | ICD-10-CM

## 2024-07-01 PROCEDURE — 99213 OFFICE O/P EST LOW 20 MIN: CPT | Performed by: ORTHOPAEDIC SURGERY

## 2024-07-01 RX ORDER — CEPHALEXIN 500 MG/1
TABLET ORAL
COMMUNITY

## 2024-07-01 NOTE — PROGRESS NOTES
ASSESSMENT/PLAN:    Diagnoses and all orders for this visit:    Closed fracture of multiple pubic rami, right, initial encounter (Formerly [facility])    Closed fracture of ramus of right pubis, initial encounter (Formerly [facility])  -     Ambulatory referral to Orthopedic Surgery  -     Ambulatory referral to Orthopedic Surgery        Plan: I would recommend follow-up in 4 to 6 weeks.  X-rays will be obtained if clinically indicated.  He is to work with physical therapy at [facility] and a prescription was written in his notes for physical therapy to work with him on range of motion, strengthening, ambulation with walker assistance and progressing to a restorative program.  The office should be contacted if questions or concerns arise.  His daughter was present with him today.    Return for 4 to 6 weeks.      _____________________________________________________  CHIEF COMPLAINT:  Chief Complaint   Patient presents with    Right Hip - Pain         SUBJECTIVE:  Miguel Angel Ortega is a 89 y.o. year old male who presents for follow up of his pelvic fracture.  He was seen in the hospital about 2 and half weeks ago with bilateral rami fractures.  He was discharged and states that he is not receiving any physical therapy.  He states they are not trying to get him to walk indicating that they were waiting for recommendations from me at the time of this appointment.  He complains of pain in the right groin.  He denies any left-sided symptoms.  He notes pain when he rolls over from his back onto his right side while in bed, when arising from a seated position or with standing.      PAST MEDICAL HISTORY:  Past Medical History:   Diagnosis Date    A-fib (Formerly [facility])     Coronary artery disease     History of angina     Prostate cancer (Formerly [facility])        PAST SURGICAL HISTORY:  Past Surgical History:   Procedure Laterality Date    CORONARY ARTERY BYPASS GRAFT  1995    [place] CYSTO W/IRRIG & EVAC MULTPLE OBSTRUCTING CLOTS N/A 12/27/2023    Procedure: CYSTOSCOPY  EVACUATION OF CLOTS, fulguration of bleeding. insertion 24FR 3 Way zuniga CBI;  Surgeon: Carson Singer MD;  Location: [facility];  Service: Urology    TONSILLECTOMY         FAMILY HISTORY:  Family History   Problem Relation Age of Onset    Hypertension Father        SOCIAL HISTORY:  Social History     Tobacco Use    Smoking status: Former     Types: Cigarettes    Smokeless tobacco: Never   Vaping Use    Vaping status: Never Used   Substance Use Topics    Alcohol use: Not Currently    Drug use: Not Currently       MEDICATIONS:    Current Outpatient Medications:     acetaminophen (TYLENOL) 325 mg tablet, Take 2 tablets (650 mg total) by mouth every 6 (six) hours as needed for mild pain, Disp: , Rfl:     aluminum-magnesium hydroxide-simethicone (Antacid) 0956-8318-481 mg/30 mL suspension, Take by mouth, Disp: , Rfl:     bisacodyl (Dulcolax) 10 mg suppository, Insert 10 mg into the rectum daily as needed for constipation, Disp: , Rfl:     Carboxymethylcellul-Glycerin (Refresh Optive) 0.5-0.9 % SOLN, Apply 1 drop to eye 3 (three) times a day, Disp: , Rfl:     Cholecalciferol 1.25 MG (27057 UT) TABS, Cholecalciferol Oral     take 1 wafer once a week.    active, Disp: , Rfl:     clopidogrel (PLAVIX) 75 mg tablet, Take 75 mg by mouth daily, Disp: , Rfl:     cyanocobalamin (VITAMIN B-12) 1000 MCG tablet, Take 1 tablet (1,000 mcg total) by mouth daily, Disp: 30 tablet, Rfl: 0    digoxin (LANOXIN) 0.125 mg tablet, Take 125 mcg by mouth every other day, Disp: , Rfl:     dronedarone (Multaq) 400 mg tablet, Take 400 mg by mouth 2 (two) times a day with meals, Disp: , Rfl:     folic acid (FOLVITE) 1 mg tablet, Take 1 tablet by mouth daily, Disp: , Rfl:     hydrocortisone 0.5 % cream, , Disp: , Rfl:     lidocaine (LIDODERM) 5 %, Apply 1 patch topically over 12 hours daily for 14 days Remove & Discard patch within 12 hours to rt hip, Disp: 14 patch, Rfl: 0    magnesium hydroxide (MILK OF MAGNESIA) 400 mg/5 mL oral suspension, Take  by mouth, Disp: , Rfl:     meclizine (ANTIVERT) 25 mg tablet, Meclizine Oral     1 tib    active, Disp: , Rfl:     melatonin 3 mg, Take 1 tablet (3 mg total) by mouth daily at bedtime as needed (insomnia), Disp: 30 tablet, Rfl: 0    midodrine (PROAMATINE) 5 mg tablet, Take 0.5 tablets (2.5 mg total) by mouth 2 (two) times a day Only give if sbp <120, Disp: 30 tablet, Rfl: 0    Multiple Vitamins-Minerals (PreserVision AREDS) CAPS, Take 1 capsule by mouth daily, Disp: , Rfl:     pantoprazole (PROTONIX) 40 mg tablet, Take 40 mg by mouth daily, Disp: , Rfl:     primidone (MYSOLINE) 50 mg tablet, Take 50 mg by mouth every 12 (twelve) hours, Disp: , Rfl:     Saline GEL, into each nostril, Disp: , Rfl:     senna-docusate sodium (SENOKOT S) 8.6-50 mg per tablet, Take 1 tablet by mouth 2 (two) times a day (Patient taking differently: Take 1 tablet by mouth daily as needed for constipation), Disp: 60 tablet, Rfl: 0    tamsulosin (FLOMAX) 0.4 mg, Take 1 capsule (0.4 mg total) by mouth daily with dinner, Disp: 90 capsule, Rfl: 3    alendronate (FOSAMAX) 70 mg tablet, Alendronate Oral (Weekly)     1 q 7 days in am on empty stomach w/ full glass of water    active (Patient not taking: Reported on 7/1/2024), Disp: , Rfl:     carboxymethylcellulose 0.5 % SOLN, Apply 1 drop to eye (Patient not taking: Reported on 7/1/2024), Disp: , Rfl:     Cephalexin 500 MG tablet, Cephalexin Oral     1 tib   active (Patient not taking: Reported on 7/1/2024), Disp: , Rfl:     Darolutamide 300 MG TABS, Darolutamide Oral     2 tab bid    active (Patient not taking: Reported on 7/1/2024), Disp: , Rfl:     enoxaparin (LOVENOX) 30 mg/0.3 mL, Inject 0.3 mL (30 mg total) under the skin in the morning for 24 days (Patient not taking: Reported on 7/1/2024), Disp: , Rfl:     isosorbide mononitrate (IMDUR) 30 mg 24 hr tablet, Isosorbide Mononitrate Oral 24 hr Tab     1 bid    active (Patient not taking: Reported on 7/1/2024), Disp: , Rfl:     loperamide  (IMODIUM) 2 mg capsule, Take 1 capsule by mouth 4 (four) times a day as needed, Disp: , Rfl:     traMADol (ULTRAM) 50 mg tablet, Tramadol Oral     1 q 6hrs prn    active (Patient not taking: Reported on 7/1/2024), Disp: , Rfl:     Vibegron 75 MG TABS, Take 75 mg by mouth every other day (Patient not taking: Reported on 7/1/2024), Disp: , Rfl:     ALLERGIES:  No Known Allergies    REVIEW OF SYSTEMS:  Pertinent items are noted in HPI.  A comprehensive review of systems was negative.      _____________________________________________________  PHYSICAL EXAMINATION:  General: alert and appears comfortable  Psychiatric: Normal  HEENT:  Normocephalic, atraumatic  Cardiovascular:  Regular  Pulmonary: No wheezing or stridor  Skin: No masses, erthema, lacerations, fluctation, ulcerations  Neurovascular: Gross motor and sensory exams are intact and pulses palpable.    MUSCULOSKELETAL EXAMINATION:    The bilateral hip exam demonstrates no complaints with rotation.  He does lack some internal rotation but denies pain.  He had slight pain at the right groin with flexion/extension and no complaints with left hip flexion/extension.  He has no tenderness to AP and lateral pelvic compression.    His hospital records were reviewed      Heber Mckeon

## 2024-07-09 ENCOUNTER — OFFICE VISIT (OUTPATIENT)
Dept: CARDIOLOGY CLINIC | Facility: CLINIC | Age: 89
End: 2024-07-09
Payer: MEDICARE

## 2024-07-09 ENCOUNTER — TELEPHONE (OUTPATIENT)
Dept: CARDIOLOGY CLINIC | Facility: CLINIC | Age: 89
End: 2024-07-09

## 2024-07-09 VITALS
TEMPERATURE: 98 F | WEIGHT: 173.4 LBS | SYSTOLIC BLOOD PRESSURE: 100 MMHG | HEART RATE: 67 BPM | DIASTOLIC BLOOD PRESSURE: 40 MMHG | OXYGEN SATURATION: 99 % | HEIGHT: 71 IN | BODY MASS INDEX: 24.27 KG/M2

## 2024-07-09 DIAGNOSIS — I48.0 PAROXYSMAL ATRIAL FIBRILLATION (HCC): Primary | ICD-10-CM

## 2024-07-09 DIAGNOSIS — I95.1 ORTHOSTATIC HYPOTENSION: ICD-10-CM

## 2024-07-09 DIAGNOSIS — I25.10 CORONARY ARTERY DISEASE INVOLVING NATIVE CORONARY ARTERY OF NATIVE HEART WITHOUT ANGINA PECTORIS: Primary | ICD-10-CM

## 2024-07-09 DIAGNOSIS — I48.0 PAROXYSMAL ATRIAL FIBRILLATION (HCC): ICD-10-CM

## 2024-07-09 PROCEDURE — 99214 OFFICE O/P EST MOD 30 MIN: CPT | Performed by: NURSE PRACTITIONER

## 2024-07-09 NOTE — TELEPHONE ENCOUNTER
Can you please call Dr. Armstrong's office to see if a dig level has been collected yet this year? If not, I will order.  Thank you

## 2024-07-09 NOTE — PROGRESS NOTES
Cardiology Follow Up    Miguel Angel Ortega  2/28/1928  [id number]  [facility] [place] [address]  [address]  [facility] [address] [phone]    Miguel Angel presents for follow up for atrial fibrillation, orthostatic hypotension.     1. Coronary artery disease involving native coronary artery of native heart without angina pectoris  Assessment & Plan:  Remote history of CABG. Details are unknown.  He adamantly denies anginal complaints.  Preserved LVEF on 9/2023 echo.  Remains on Plavix 75 mg daily.  No beta blocker due to hypotension.  Not maintained on statin, however LDL was 62 on recent labs and statin therapy is unlikely to provide additional benefit at this time.  Reviewed s/s to report.  Will monitor.  2. Paroxysmal atrial fibrillation (HCC)  Assessment & Plan:  History of paroxysmal atrial fibrillation following bypass surgery.  He has been maintained on Multaq and digoxin.  Avoiding beta blockers due to hypotension.  He has not been maintained on full anticoagulation and will defer at this time given recent significant anemia requiring blood transfusion.  He remained in sinus rhythm at 's and during hospital stays.  Will continue to monitor. We did review his stroke risk today.  Continue Multaq 400 mg BID and digoxin 125 mcg every other day.  Will request labs from PCP as dig level was ordered in April.  3. Orthostatic hypotension  Assessment & Plan:  History of hypotension. Not maintained on beta blocker or anti-hypertensives.  On Florinef in 12/2023, which was discontinued due to fluid retention.  Now on midodrine 2.5 mg BID with acceptable BP. Orthostatic drop from 130 to 100 on exam today.  Hold midodrine if BP > 140/90.  Continue use of compression socks, change position slowly, pump feet prior to standing.       HPI  Miguel Angel has a past medical history of CAD s/p remote CABG, paroxysmal atrial fibrillation on Multaq not anticoagulated, hypotension,  hyponatremia, anemia, prostate cancer with mets to the bone.     He reports distant bypass surgery. Records are not available and details are unclear. He has not followed with a cardiologist in many years. He has history of paroxysmal atrial fibrillation following bypass surgery. He was previously maintained on warfarin, but has not been on full anticoagulation in many years. He has been maintained on Multaq 400 mg BID, digoxin 125 mcg daily, and Plavix 75 mg daily. He underwent an echocardiogram at [facility] on 9/27/2023 which showed LVEF 55-60% with mild LVH and grade 1 diastolic dysfunction, RV dilation, mild-mod AI, aortic root dilation of 4.6 cm.      He was admitted to [facility] 12/13/2023-1/4/2024. He initially presented with c/o urinary frequency and constipation and was diagnosed with stercoral colitis and urinary retention and hematuria. He was also treated for acute on chronic anemia requiring blood transfusion. He underwent GI and urologic evaluation during admission. Cardiology was consulted for management of orthostatic hypotension with recommendation to avoid diuretics, use compression socks, and treat underlying anemia. During admission Florinef 0.1 mg daily was added by the primary team. Digoxin was reduced to 125 mcg every other day. He remained in sinus rhythm during admission.    He followed up on 2/8/2024. He was on furosemide 20 mg every other day due to edema/weight gain and elevated BNP. He was still taking Florinef. I requested that both Florinef and furosemide be discontinued and he be monitored off both. Ultimately PCP decided to stop Florinef and continue furosemide.     He was seen at [facility] ER 3/2/2024 with a fall with concern for loss of consciousness. Trauma evaluation with CT imaging as well as ECG and lab work was unremarkable and he was discharged home.  He was admitted to [facility] 6/13-6/17/2024 for pelvic fracture following a mechanical fall. During admission he remained in sinus rhythm. Furosemide  was discontinued and midodrine 2.5 mg BID started.      7/9/2024: Miguel Angel presents for routine follow up accompanied by a family friend, Elizabeth Gil. He states he is doing very well. He is residing at [facility] in their assisted living. He started home PT today which he tolerated well. He denies issues with pain at present. He is adamant that he did not have lightheadedness with this most recent fall. He has been receiving midodrine 2.5 mg twice daily. No recent lightheadedness. No recurrent falls. No bleeding issues. He states he is not getting his BP checked at [facility]. No chest pain, shortness of breath. Edema is minimal. Not on a diuretic currently. He is wearing compression socks faithfully.     Medical Problems       Problem List       Prostate cancer metastatic to bone ([facility])    Stercoral colitis    Acute urinary retention    Hyponatremia    Paroxysmal atrial fibrillation ([facility])    Coronary artery disease involving native coronary artery of native heart without angina pectoris    Normocytic anemia    Orthostatic hypotension    Gross hematuria    Ascencio catheter in place    Urinary retention    Urge incontinence    Radiation cystitis    Neurogenic bladder    History of urinary retention    Hypotonic bladder    Benign prostatic hyperplasia with urinary retention    Closed fracture of multiple pubic rami, right, initial encounter ([facility])        Past Medical History:   Diagnosis Date    A-fib ([facility])     Coronary artery disease     History of angina     Prostate cancer ([facility])      Social History     Socioeconomic History    Marital status: Single     Spouse name: Not on file    Number of children: Not on file    Years of education: Not on file    Highest education level: Not on file   Occupational History    Not on file   Tobacco Use    Smoking status: Former     Types: Cigarettes    Smokeless tobacco: Never   Vaping Use    Vaping status: Never Used   Substance and Sexual Activity    Alcohol use: Not  Currently    Drug use: Not Currently    Sexual activity: Not Currently   Other Topics Concern    Not on file   Social History Narrative    Not on file     Social Determinants of Health     Financial Resource Strain: Low Risk  (9/26/2023)    [facility]    Overall Financial Resource Strain (CARDIA)     Difficulty of Paying Living Expenses: Not hard at all   Food Insecurity: No Food Insecurity (6/13/2024)    Hunger Vital Sign     Worried About Running Out of Food in the Last Year: Never true     Ran Out of Food in the Last Year: Never true   Transportation Needs: No Transportation Needs (6/13/2024)    PRAPARE - Transportation     Lack of Transportation (Medical): No     Lack of Transportation (Non-Medical): No   Physical Activity: Not on file   Stress: Not on file   Social Connections: Unknown (6/18/2024)    Received from [employer]     How often do you feel lonely or isolated from those around you? (Adult - for ages 18 years and over): Not on file   Intimate Partner Violence: Not At Risk (9/26/2023)    [facility]    Humiliation, Afraid, Rape, and Kick questionnaire     Fear of Current or Ex-Partner: No     Emotionally Abused: No     Physically Abused: No     Sexually Abused: No   Housing Stability: High Risk (6/13/2024)    Housing Stability Vital Sign     Unable to Pay for Housing in the Last Year: No     Number of Times Moved in the Last Year: 2     Homeless in the Last Year: No      Family History   Problem Relation Age of Onset    Hypertension Father      Past Surgical History:   Procedure Laterality Date    CORONARY ARTERY BYPASS GRAFT  1995    [place] CYSTO W/IRRIG & EVAC MULTPLE OBSTRUCTING CLOTS N/A 12/27/2023    Procedure: CYSTOSCOPY EVACUATION OF CLOTS, fulguration of bleeding. insertion 24FR 3 Way zuniga CBSATINDER;  Surgeon: Carson Singer MD;  Location:  MAIN OR;  Service:  Urology    TONSILLECTOMY         Current Outpatient Medications:     acetaminophen (TYLENOL) 325 mg tablet, Take 2 tablets (650 mg total) by mouth every 6 (six) hours as needed for mild pain, Disp: , Rfl:     aluminum-magnesium hydroxide-simethicone (Antacid) 2404-2887-894 mg/30 mL suspension, Take by mouth, Disp: , Rfl:     bisacodyl (Dulcolax) 10 mg suppository, Insert 10 mg into the rectum daily as needed for constipation, Disp: , Rfl:     Carboxymethylcellul-Glycerin (Refresh Optive) 0.5-0.9 % SOLN, Apply 1 drop to eye 3 (three) times a day, Disp: , Rfl:     Cholecalciferol 1.25 MG (93681 UT) TABS, Cholecalciferol Oral     take 1 wafer once a week.    active, Disp: , Rfl:     clopidogrel (PLAVIX) 75 mg tablet, Take 75 mg by mouth daily, Disp: , Rfl:     cyanocobalamin (VITAMIN B-12) 1000 MCG tablet, Take 1 tablet (1,000 mcg total) by mouth daily, Disp: 30 tablet, Rfl: 0    digoxin (LANOXIN) 0.125 mg tablet, Take 125 mcg by mouth every other day, Disp: , Rfl:     dronedarone (Multaq) 400 mg tablet, Take 400 mg by mouth 2 (two) times a day with meals, Disp: , Rfl:     folic acid (FOLVITE) 1 mg tablet, Take 1 tablet by mouth daily, Disp: , Rfl:     hydrocortisone 0.5 % cream, , Disp: , Rfl:     meclizine (ANTIVERT) 25 mg tablet, Meclizine Oral     1 tib    active, Disp: , Rfl:     melatonin 3 mg, Take 1 tablet (3 mg total) by mouth daily at bedtime as needed (insomnia), Disp: 30 tablet, Rfl: 0    midodrine (PROAMATINE) 5 mg tablet, Take 0.5 tablets (2.5 mg total) by mouth 2 (two) times a day Only give if sbp <120, Disp: 30 tablet, Rfl: 0    Multiple Vitamins-Minerals (PreserVision AREDS) CAPS, Take 1 capsule by mouth daily, Disp: , Rfl:     pantoprazole (PROTONIX) 40 mg tablet, Take 40 mg by mouth daily, Disp: , Rfl:     primidone (MYSOLINE) 50 mg tablet, Take 50 mg by mouth every 12 (twelve) hours, Disp: , Rfl:     Saline GEL, into each nostril, Disp: , Rfl:     senna-docusate sodium (SENOKOT S) 8.6-50 mg per  tablet, Take 1 tablet by mouth 2 (two) times a day (Patient taking differently: Take 1 tablet by mouth daily as needed for constipation), Disp: 60 tablet, Rfl: 0    tamsulosin (FLOMAX) 0.4 mg, Take 1 capsule (0.4 mg total) by mouth daily with dinner, Disp: 90 capsule, Rfl: 3    Vibegron 75 MG TABS, Take 75 mg by mouth every other day, Disp: , Rfl:     loperamide (IMODIUM) 2 mg capsule, Take 1 capsule by mouth 4 (four) times a day as needed, Disp: , Rfl:   No Known Allergies    Labs:     Chemistry        Component Value Date/Time    K 4.3 06/16/2024 0500    K 4.1 02/27/2024 0616    CL 99 06/16/2024 0500     02/27/2024 0616    CO2 26 06/16/2024 0500    CO2 27 02/27/2024 0616    BUN 22 06/16/2024 0500    BUN 21 (H) 02/27/2024 0616    BUN 20 11/22/2023 1358    CREATININE 1.23 06/16/2024 0500    CREATININE 1.2 02/27/2024 0616        Component Value Date/Time    CALCIUM 8.4 06/16/2024 0500    CALCIUM 8.6 02/27/2024 0616    ALKPHOS 89 06/13/2024 1412    ALKPHOS 57 11/22/2023 1358    AST 18 06/13/2024 1412    AST 12 11/22/2023 1358    ALT 11 06/13/2024 1412    ALT 7 11/22/2023 1358        Cardiac testing:     Lipid panel 1/9/2024: C 125. T 63. H 50. L 62     ECG 12/18/2023: Sinus bradycardia. Nonspecific ST abnormality.     Echocardiogram 9/27/2023 ([facility]):    Ascending Aorta: Aortic root is dilated, measures 4.6cm. Ascending   aorta not well visualized. Recommend dedicated thoracic imaging.     Left Ventricle: Left ventricle was not well visualized. Left ventricle   is normal in size. Definity contrast utilized. No evidence of LV thrombus.   There is mild hypertrophy. Systolic function is normal with an ejection   fraction of 55-60%. Wall motion is within normal limits. There is grade I   (mild) diastolic dysfunction.     Left Atrium: Left atrium cavity size is normal.     Right Ventricle: Right ventricle cavity is moderately dilated. Systolic   function is normal. Normal tricuspid annular plane systolic excursion  "  (TAPSE) >1.7 cm.     Aortic Valve: The aortic valve is trileaflet. There is mild to moderate   regurgitation.     Review of Systems   Constitutional: Negative.   HENT: Negative.     Cardiovascular:  Positive for leg swelling. Negative for chest pain, dyspnea on exertion, irregular heartbeat, near-syncope, orthopnea and palpitations.   Respiratory:  Negative for cough and snoring.    Endocrine: Negative.    Skin: Negative.    Musculoskeletal: Negative.    Gastrointestinal: Negative.    Genitourinary: Negative.    Neurological: Negative.    Psychiatric/Behavioral: Negative.         Vitals:    07/09/24 1439   BP: (!) 100/40   Pulse:    Temp:    SpO2:      Vitals:    07/09/24 1359   Weight: 78.7 kg (173 lb 6.4 oz)     Height: 5' 11\" (180.3 cm)   Body mass index is 24.18 kg/m².    Physical Exam  Vitals and nursing note reviewed.   Constitutional:       General: He is not in acute distress.     Appearance: He is well-developed. He is not diaphoretic.   HENT:      Head: Normocephalic and atraumatic.   Neck:      Vascular: No carotid bruit or JVD.   Cardiovascular:      Rate and Rhythm: Normal rate and regular rhythm. No extrasystoles are present.     Pulses: Intact distal pulses.      Heart sounds: Normal heart sounds, S1 normal and S2 normal. No murmur heard.     No friction rub. No gallop.      Comments: Mild lower leg edema. Compression socks in place  Pulmonary:      Effort: Pulmonary effort is normal. No respiratory distress.      Breath sounds: Normal breath sounds.   Abdominal:      General: There is no distension.      Palpations: Abdomen is soft.      Tenderness: There is no abdominal tenderness.   Musculoskeletal:      Comments: Relies on wheelchair for transportation in the office   Skin:     General: Skin is warm and dry.      Findings: No rash.   Neurological:      Mental Status: He is alert and oriented to person, place, and time.   Psychiatric:         Mood and Affect: Mood and affect normal.         " Behavior: Behavior normal.

## 2024-07-09 NOTE — PATIENT INSTRUCTIONS
Your blood pressure did drop 30 points when standing. For now, continue midodrine 2.5 mg twice daily. Check blood pressure prior to giving midodrine and hold the dose if BP is > 140/90.

## 2024-07-09 NOTE — TELEPHONE ENCOUNTER
I spoke with the office. They thought he had one done but they are not finding it. They are looking for it and will call us back it they do or do not find it.

## 2024-07-09 NOTE — TELEPHONE ENCOUNTER
Please call his friend Elizabeth Gil and let her know to have the dig level drawn - the order is in his chart.  Thank you!

## 2024-07-10 NOTE — TELEPHONE ENCOUNTER
Elizabeth called back, advised will try and find out if has had it drawn advised we had spoken to PCP office and they don't have a level  advised that it has been ordered in the system, and would like to get a dig level.     Elizabeth will call office back to let us know he has had it drawn.

## 2024-07-11 ENCOUNTER — TELEPHONE (OUTPATIENT)
Age: 89
End: 2024-07-11

## 2024-07-11 NOTE — TELEPHONE ENCOUNTER
Elizabeth returned call, PCP has no record of recent digoxin level, she does not believe it was done elsewhere. Per chart last level was 12/15/23.    Scripts for lab work was faxed to [facility] [phone].

## 2024-07-11 NOTE — TELEPHONE ENCOUNTER
[facility] contacted pt had an appt the other day on 7/9/2024. Didn't receive the after visit summary.     Faxed office note, and after visit summary to [phone]

## 2024-07-11 NOTE — TELEPHONE ENCOUNTER
[place] facility called back,received the after summary list, asking why eye drops were d/c, advised pt stated was not taking. Verbally understood

## 2024-07-26 RX ORDER — POLYETHYLENE GLYCOL 3350 17 G/17G
POWDER, FOR SOLUTION ORAL
COMMUNITY
Start: 2024-06-26

## 2024-07-29 ENCOUNTER — OFFICE VISIT (OUTPATIENT)
Dept: OBGYN CLINIC | Facility: CLINIC | Age: 89
End: 2024-07-29
Payer: MEDICARE

## 2024-07-29 VITALS
DIASTOLIC BLOOD PRESSURE: 50 MMHG | HEART RATE: 60 BPM | BODY MASS INDEX: 24.22 KG/M2 | TEMPERATURE: 97.7 F | OXYGEN SATURATION: 97 % | HEIGHT: 71 IN | SYSTOLIC BLOOD PRESSURE: 110 MMHG | WEIGHT: 173 LBS

## 2024-07-29 DIAGNOSIS — S32.591A CLOSED FRACTURE OF MULTIPLE PUBIC RAMI, RIGHT, INITIAL ENCOUNTER (HCC): ICD-10-CM

## 2024-07-29 DIAGNOSIS — S32.591A CLOSED FRACTURE OF RAMUS OF RIGHT PUBIS, INITIAL ENCOUNTER (HCC): Primary | ICD-10-CM

## 2024-07-29 PROCEDURE — 99212 OFFICE O/P EST SF 10 MIN: CPT | Performed by: ORTHOPAEDIC SURGERY

## 2024-07-29 RX ORDER — CARBOXYMETHYLCELLULOSE SODIUM 5 MG/ML
SOLUTION/ DROPS OPHTHALMIC
COMMUNITY
Start: 2024-07-17

## 2024-07-29 NOTE — PROGRESS NOTES
"Patient Name:  Miguel Angel Ortega  MRN:  [id number]    Assessment     1. Closed fracture of ramus of right pubis, initial encounter ([facility])        2. Closed fracture of multiple pubic rami, right, initial encounter ([facility])            Plan     Patient is to continue physical therapy as needed. Discussed with patient and his daughter that this type of injury could have long-term limitations on activity. He will follow up with our office on an as needed basis if symptoms return or worsen.     Return if symptoms worsen or fail to improve.      Subjective   Miguel Angel Ortega returns for follow-up of right hip injury.  Patient's daughter was present at today's visit as well. The patient is 6.5 week(s) post closed fracture of right pubic rami and returns for routine follow-up. Patient complains of soreness after physical therapy. He states that he expereicnes overall improvements with walker. He states his discomfort is 4/10.    Objective     /50   Pulse 60   Temp 97.7 °F (36.5 °C) (Temporal)   Ht 5' 11\" (1.803 m)   Wt 78.5 kg (173 lb)   SpO2 97%   BMI 24.13 kg/m²     right Hip -  Patient presents with no anatomical deformity  Uses Wheelchair assistive devices. Uses walker at home  No tenderness upon palpation on groin or anterior hip joint  ROM: within normal limits  Normal passive circumduction   Strength: 3/5 throughout  Knee flexor and extensor mechanism intact  Ankle DF and PF mechanism intact  2+ TP and DP pulses with brisk capillary refill to the toes  Sural, saphenous, tibial, superficial and deep peroneal motor and sensory distribution intact  Sensation to light touch intact distally            Scribe Attestation      I,:  Kendra Martinez am acting as a scribe while in the presence of the attending physician.:       I,:  Heber Mckeon personally performed the services described in this documentation    as scribed in my presence.:               "

## 2024-07-30 ENCOUNTER — TELEPHONE (OUTPATIENT)
Dept: CARDIOLOGY CLINIC | Facility: CLINIC | Age: 89
End: 2024-07-30

## 2024-07-30 NOTE — TELEPHONE ENCOUNTER
Elizabeth Gil his POA called  [phone] Radha put him on a medication to bring his BP up  Midodrine.  He was told not to take it if his BP was 140 and higher,. Does he still need to wear his comp socks.        7/15  135/52 AM      7/16   121/51  /51    7/17  102.50 AM   121/75 PM    7/18    99/42 AM     114/65  PM    7/19  118/66  AM   125/60 PM    7/20  107/62  AM    152/40  PM    7/21  118/54  AM      155/56  PM    7/22  98/40  AM     155/80  PM

## 2024-07-30 NOTE — TELEPHONE ENCOUNTER
Spoke with ryland, she is aware. She wanted to let you know there is no issue with the top number. But, the bottom as dropped as low as 30 a few times. It has done it constantly and they can't explain a reason behind it. They are monitoring it.

## 2024-07-30 NOTE — TELEPHONE ENCOUNTER
He does not need to wear the compression stocking.  His blood pressure is quite good on the midodrine and he should continue at the same dose of 2.5 mg twice a day.  If the blood pressure persistently stays over 150, then he can reduce the midodrine to once a day only.

## 2024-08-14 NOTE — ASSESSMENT & PLAN NOTE
Further outpatient follow-up with urology.  Placed on Flomax for acute urinary retention hold Casodex while inpatient.  stop flomax due to urinary urgency and orthostatic hypotension   Nursing please mail no show letter as patient does not have active [employer] account. Thanks.

## 2024-09-20 ENCOUNTER — HOSPITAL ENCOUNTER (EMERGENCY)
Facility: HOSPITAL | Age: 89
Discharge: HOME | End: 2024-09-20
Attending: EMERGENCY MEDICINE
Payer: MEDICARE

## 2024-09-20 ENCOUNTER — APPOINTMENT (EMERGENCY)
Dept: CT IMAGING | Facility: HOSPITAL | Age: 89
End: 2024-09-20
Payer: MEDICARE

## 2024-09-20 ENCOUNTER — APPOINTMENT (EMERGENCY)
Dept: RADIOLOGY | Facility: HOSPITAL | Age: 89
End: 2024-09-20
Payer: MEDICARE

## 2024-09-20 VITALS
OXYGEN SATURATION: 98 % | TEMPERATURE: 97.7 F | DIASTOLIC BLOOD PRESSURE: 104 MMHG | RESPIRATION RATE: 20 BRPM | WEIGHT: 175.93 LBS | BODY MASS INDEX: 24.54 KG/M2 | SYSTOLIC BLOOD PRESSURE: 152 MMHG | HEART RATE: 65 BPM

## 2024-09-20 DIAGNOSIS — R11.2 NAUSEA VOMITING AND DIARRHEA: Primary | ICD-10-CM

## 2024-09-20 DIAGNOSIS — R19.7 NAUSEA VOMITING AND DIARRHEA: Primary | ICD-10-CM

## 2024-09-20 LAB
2HR DELTA HS TROPONIN: 5 NG/L
ALBUMIN SERPL BCG-MCNC: 4.3 G/DL (ref 3.5–5)
ALP SERPL-CCNC: 121 U/L (ref 34–104)
ALT SERPL W P-5'-P-CCNC: <3 U/L (ref 7–52)
ANION GAP SERPL CALCULATED.3IONS-SCNC: 12 MMOL/L (ref 4–13)
AST SERPL W P-5'-P-CCNC: 14 U/L (ref 13–39)
BACTERIA UR QL AUTO: NORMAL /HPF
BASOPHILS # BLD AUTO: 0.03 THOUSANDS/ΜL (ref 0–0.1)
BASOPHILS NFR BLD AUTO: 0 % (ref 0–1)
BILIRUB SERPL-MCNC: 0.56 MG/DL (ref 0.2–1)
BILIRUB UR QL STRIP: NEGATIVE
BNP SERPL-MCNC: 173 PG/ML (ref 0–100)
BUN SERPL-MCNC: 21 MG/DL (ref 5–25)
CALCIUM SERPL-MCNC: 9.1 MG/DL (ref 8.4–10.2)
CARDIAC TROPONIN I PNL SERPL HS: 11 NG/L
CARDIAC TROPONIN I PNL SERPL HS: 6 NG/L
CHLORIDE SERPL-SCNC: 96 MMOL/L (ref 96–108)
CLARITY UR: CLEAR
CO2 SERPL-SCNC: 20 MMOL/L (ref 21–32)
COLOR UR: YELLOW
CREAT SERPL-MCNC: 1.08 MG/DL (ref 0.6–1.3)
EOSINOPHIL # BLD AUTO: 0.05 THOUSAND/ΜL (ref 0–0.61)
EOSINOPHIL NFR BLD AUTO: 1 % (ref 0–6)
ERYTHROCYTE [DISTWIDTH] IN BLOOD BY AUTOMATED COUNT: 12.8 % (ref 11.6–15.1)
FLUAV RNA RESP QL NAA+PROBE: NEGATIVE
FLUBV RNA RESP QL NAA+PROBE: NEGATIVE
GFR SERPL CREATININE-BSD FRML MDRD: 57 ML/MIN/1.73SQ M
GLUCOSE SERPL-MCNC: 165 MG/DL (ref 65–140)
GLUCOSE UR STRIP-MCNC: NEGATIVE MG/DL
HCT VFR BLD AUTO: 30.9 % (ref 36.5–49.3)
HGB BLD-MCNC: 10.9 G/DL (ref 12–17)
HGB UR QL STRIP.AUTO: ABNORMAL
IMM GRANULOCYTES # BLD AUTO: 0.06 THOUSAND/UL (ref 0–0.2)
IMM GRANULOCYTES NFR BLD AUTO: 1 % (ref 0–2)
KETONES UR STRIP-MCNC: NEGATIVE MG/DL
LACTATE SERPL-SCNC: 1.6 MMOL/L (ref 0.5–2)
LACTATE SERPL-SCNC: 2.7 MMOL/L (ref 0.5–2)
LEUKOCYTE ESTERASE UR QL STRIP: NEGATIVE
LIPASE SERPL-CCNC: 29 U/L (ref 11–82)
LYMPHOCYTES # BLD AUTO: 0.83 THOUSANDS/ΜL (ref 0.6–4.47)
LYMPHOCYTES NFR BLD AUTO: 11 % (ref 14–44)
MAGNESIUM SERPL-MCNC: 2 MG/DL (ref 1.9–2.7)
MCH RBC QN AUTO: 32.8 PG (ref 26.8–34.3)
MCHC RBC AUTO-ENTMCNC: 35.3 G/DL (ref 31.4–37.4)
MCV RBC AUTO: 93 FL (ref 82–98)
MONOCYTES # BLD AUTO: 0.66 THOUSAND/ΜL (ref 0.17–1.22)
MONOCYTES NFR BLD AUTO: 9 % (ref 4–12)
NEUTROPHILS # BLD AUTO: 5.89 THOUSANDS/ΜL (ref 1.85–7.62)
NEUTS SEG NFR BLD AUTO: 78 % (ref 43–75)
NITRITE UR QL STRIP: NEGATIVE
NON-SQ EPI CELLS URNS QL MICRO: NORMAL /HPF
NRBC BLD AUTO-RTO: 0 /100 WBCS
PH UR STRIP.AUTO: 7 [PH]
PLATELET # BLD AUTO: 193 THOUSANDS/UL (ref 149–390)
PMV BLD AUTO: 8.1 FL (ref 8.9–12.7)
POTASSIUM SERPL-SCNC: 4.2 MMOL/L (ref 3.5–5.3)
PROT SERPL-MCNC: 7.1 G/DL (ref 6.4–8.4)
PROT UR STRIP-MCNC: NEGATIVE MG/DL
QRS AXIS: 66 DEGREES
QRSD INTERVAL: 86 MS
QT INTERVAL: 468 MS
QTC INTERVAL: 459 MS
RBC # BLD AUTO: 3.32 MILLION/UL (ref 3.88–5.62)
RBC #/AREA URNS AUTO: NORMAL /HPF
RSV RNA RESP QL NAA+PROBE: NEGATIVE
SARS-COV-2 RNA RESP QL NAA+PROBE: NEGATIVE
SODIUM SERPL-SCNC: 128 MMOL/L (ref 135–147)
SP GR UR STRIP.AUTO: 1.02 (ref 1–1.03)
T WAVE AXIS: 14 DEGREES
UROBILINOGEN UR QL STRIP.AUTO: 0.2 E.U./DL
VENTRICULAR RATE: 58 BPM
WBC # BLD AUTO: 7.52 THOUSAND/UL (ref 4.31–10.16)
WBC #/AREA URNS AUTO: NORMAL /HPF

## 2024-09-20 PROCEDURE — 83690 ASSAY OF LIPASE: CPT | Performed by: EMERGENCY MEDICINE

## 2024-09-20 PROCEDURE — 83735 ASSAY OF MAGNESIUM: CPT | Performed by: EMERGENCY MEDICINE

## 2024-09-20 PROCEDURE — 0241U HB NFCT DS VIR RESP RNA 4 TRGT: CPT | Performed by: EMERGENCY MEDICINE

## 2024-09-20 PROCEDURE — 99285 EMERGENCY DEPT VISIT HI MDM: CPT | Performed by: EMERGENCY MEDICINE

## 2024-09-20 PROCEDURE — 83880 ASSAY OF NATRIURETIC PEPTIDE: CPT | Performed by: EMERGENCY MEDICINE

## 2024-09-20 PROCEDURE — 96374 THER/PROPH/DIAG INJ IV PUSH: CPT

## 2024-09-20 PROCEDURE — 85025 COMPLETE CBC W/AUTO DIFF WBC: CPT | Performed by: EMERGENCY MEDICINE

## 2024-09-20 PROCEDURE — 71045 X-RAY EXAM CHEST 1 VIEW: CPT

## 2024-09-20 PROCEDURE — 80053 COMPREHEN METABOLIC PANEL: CPT | Performed by: EMERGENCY MEDICINE

## 2024-09-20 PROCEDURE — 83605 ASSAY OF LACTIC ACID: CPT | Performed by: EMERGENCY MEDICINE

## 2024-09-20 PROCEDURE — 84484 ASSAY OF TROPONIN QUANT: CPT | Performed by: EMERGENCY MEDICINE

## 2024-09-20 PROCEDURE — 81001 URINALYSIS AUTO W/SCOPE: CPT | Performed by: EMERGENCY MEDICINE

## 2024-09-20 PROCEDURE — 74176 CT ABD & PELVIS W/O CONTRAST: CPT

## 2024-09-20 PROCEDURE — 96361 HYDRATE IV INFUSION ADD-ON: CPT

## 2024-09-20 PROCEDURE — 93005 ELECTROCARDIOGRAM TRACING: CPT

## 2024-09-20 PROCEDURE — 99285 EMERGENCY DEPT VISIT HI MDM: CPT

## 2024-09-20 PROCEDURE — 36415 COLL VENOUS BLD VENIPUNCTURE: CPT | Performed by: EMERGENCY MEDICINE

## 2024-09-20 RX ORDER — ONDANSETRON 2 MG/ML
4 INJECTION INTRAMUSCULAR; INTRAVENOUS ONCE
Status: COMPLETED | OUTPATIENT
Start: 2024-09-20 | End: 2024-09-20

## 2024-09-20 RX ADMIN — ONDANSETRON 4 MG: 2 INJECTION INTRAMUSCULAR; INTRAVENOUS at 10:17

## 2024-09-20 RX ADMIN — SODIUM CHLORIDE 250 ML: 0.9 INJECTION, SOLUTION INTRAVENOUS at 12:39

## 2024-09-20 NOTE — ED PROVIDER NOTES
"1. Nausea vomiting and diarrhea      ED Disposition       ED Disposition   Discharge    Condition   Stable    Date/Time   Fri Sep 20, 2024 12:50 PM    Comment   Miguel Angel Ortega discharge to home/self care.                   Assessment & Plan       Medical Decision Making  Based on the history and medical screening exam performed the may be at risk for COVID/flu/RSV, food poisoning, viral gastroenteritis, other enteritis, colitis, diverticulitis.    Based on the work-up performed in the emergency room which includes physical examination, and which may include laboratory studies and imaging as warranted including advanced imaging such as CT scan or ultrasound, the diagnostic considerations are narrowed to exclude limb or life-threatening process.    The patient is stable for discharge.  Labs/urine negative.  COVID testing negative.  Chest x-ray negative.  CT abdomen pelvis negative.  Appropriate for discharge    Amount and/or Complexity of Data Reviewed  Labs: ordered. Decision-making details documented in ED Course.     Details: At patient baseline including chronic hyponatremia  Radiology: ordered and independent interpretation performed. Decision-making details documented in ED Course.     Details: CT abdomen pelvis negative, chest x-ray negative  ECG/medicine tests: ordered and independent interpretation performed. Decision-making details documented in ED Course.     Details: Sinus rhythm rate 50    Risk  Prescription drug management.                     Medications   sodium chloride 0.9 % bolus 250 mL (250 mL Intravenous New Bag 9/20/24 1239)   ondansetron (ZOFRAN) injection 4 mg (4 mg Intravenous Given 9/20/24 1017)       History of Present Illness       Patient sent from [facility] for evaluation of nausea/vomiting/diarrhea.  Patient complains of stomach upset.  States he was fine until he ate breakfast when then it \"started coming out of both ends\".  Patient denies chest pain or shortness of " breath.  According to the facility, multiple residents have COVID      History provided by:  Patient   used: No    Flu Symptoms  Presenting symptoms: diarrhea, nausea and vomiting    Presenting symptoms: no cough, no fever, no headaches, no myalgias, no rhinorrhea, no shortness of breath and no sore throat    Severity:  Moderate  Onset quality:  Gradual  Progression:  Unchanged  Chronicity:  New  Relieved by:  Nothing  Worsened by:  Nothing  Ineffective treatments:  None tried  Associated symptoms: no chills, no ear pain, no congestion, no neck stiffness and no syncope    Risk factors: being elderly        Review of Systems   Constitutional:  Negative for chills and fever.   HENT:  Negative for congestion, ear pain, hearing loss, rhinorrhea, sore throat, trouble swallowing and voice change.    Eyes:  Negative for pain and discharge.   Respiratory:  Negative for cough, shortness of breath and wheezing.    Cardiovascular:  Negative for chest pain and palpitations.   Gastrointestinal:  Positive for diarrhea, nausea and vomiting. Negative for abdominal pain, blood in stool and constipation.   Genitourinary:  Negative for dysuria, flank pain, frequency and hematuria.   Musculoskeletal:  Negative for joint swelling, myalgias, neck pain and neck stiffness.   Skin:  Negative for rash and wound.   Neurological:  Negative for dizziness, seizures, syncope, facial asymmetry and headaches.   Psychiatric/Behavioral:  Negative for hallucinations, self-injury and suicidal ideas.    All other systems reviewed and are negative.          Objective     ED Triage Vitals [09/20/24 1005]   Temperature Pulse Blood Pressure Respirations SpO2 Patient Position - Orthostatic VS   97.7 °F (36.5 °C) 64 (!) 204/86 18 100 % Lying      Temp Source Heart Rate Source BP Location FiO2 (%) Pain Score    Axillary Monitor Right arm -- --        Physical Exam  Vitals and nursing note reviewed.   Constitutional:       General: He is not  in acute distress.     Appearance: He is well-developed.   HENT:      Head: Normocephalic and atraumatic.      Right Ear: External ear normal.      Left Ear: External ear normal.   Eyes:      General: No scleral icterus.        Right eye: No discharge.         Left eye: No discharge.      Extraocular Movements: Extraocular movements intact.      Conjunctiva/sclera: Conjunctivae normal.   Cardiovascular:      Rate and Rhythm: Normal rate and regular rhythm.      Heart sounds: Normal heart sounds. No murmur heard.  Pulmonary:      Effort: Pulmonary effort is normal.      Breath sounds: Examination of the right-lower field reveals rhonchi and rales. Examination of the left-lower field reveals rhonchi and rales. Rhonchi and rales present. No wheezing.   Abdominal:      General: Bowel sounds are normal. There is no distension.      Palpations: Abdomen is soft.      Tenderness: There is no abdominal tenderness. There is no guarding or rebound.   Musculoskeletal:         General: No deformity. Normal range of motion.      Cervical back: Normal range of motion and neck supple.   Skin:     General: Skin is warm and dry.      Findings: No rash.   Neurological:      General: No focal deficit present.      Mental Status: He is alert and oriented to person, place, and time.      Cranial Nerves: No cranial nerve deficit.   Psychiatric:         Mood and Affect: Mood normal.         Behavior: Behavior normal.         Thought Content: Thought content normal.         Judgment: Judgment normal.         Labs Reviewed   CBC AND DIFFERENTIAL - Abnormal       Result Value    WBC 7.52      RBC 3.32 (*)     Hemoglobin 10.9 (*)     Hematocrit 30.9 (*)     MCV 93      MCH 32.8      MCHC 35.3      RDW 12.8      MPV 8.1 (*)     Platelets 193      nRBC 0      Segmented % 78 (*)     Immature Grans % 1      Lymphocytes % 11 (*)     Monocytes % 9      Eosinophils Relative 1      Basophils Relative 0      Absolute Neutrophils 5.89      Absolute  Immature Grans 0.06      Absolute Lymphocytes 0.83      Absolute Monocytes 0.66      Eosinophils Absolute 0.05      Basophils Absolute 0.03     COMPREHENSIVE METABOLIC PANEL - Abnormal    Sodium 128 (*)     Potassium 4.2      Chloride 96      CO2 20 (*)     ANION GAP 12      BUN 21      Creatinine 1.08      Glucose 165 (*)     Calcium 9.1      AST 14      ALT <3 (*)     Alkaline Phosphatase 121 (*)     Total Protein 7.1      Albumin 4.3      Total Bilirubin 0.56      eGFR 57      Narrative:     National Kidney Disease Foundation guidelines for Chronic Kidney Disease (CKD):     Stage 1 with normal or high GFR (GFR > 90 mL/min/1.73 square meters)    Stage 2 Mild CKD (GFR = 60-89 mL/min/1.73 square meters)    Stage 3A Moderate CKD (GFR = 45-59 mL/min/1.73 square meters)    Stage 3B Moderate CKD (GFR = 30-44 mL/min/1.73 square meters)    Stage 4 Severe CKD (GFR = 15-29 mL/min/1.73 square meters)    Stage 5 End Stage CKD (GFR <15 mL/min/1.73 square meters)  Note: GFR calculation is accurate only with a steady state creatinine   B-TYPE NATRIURETIC PEPTIDE (BNP) - Abnormal     (*)    LACTIC ACID, PLASMA (W/REFLEX IF RESULT > 2.0) - Abnormal    LACTIC ACID 2.7 (*)     Narrative:     Result may be elevated if tourniquet was used during collection.   UA W REFLEX TO MICROSCOPIC WITH REFLEX TO CULTURE - Abnormal    Color, UA Yellow      Clarity, UA Clear      Specific Gravity, UA 1.020      pH, UA 7.0      Leukocytes, UA Negative      Nitrite, UA Negative      Protein, UA Negative      Glucose, UA Negative      Ketones, UA Negative      Urobilinogen, UA 0.2      Bilirubin, UA Negative      Occult Blood, UA Trace-Intact (*)    COVID19, INFLUENZA A/B, RSV PCR, [facility] - Normal    SARS-CoV-2 Negative      INFLUENZA A PCR Negative      INFLUENZA B PCR Negative      RSV PCR Negative      Narrative:     This test has been performed using the CoV-2/Flu/RSV plus assay on the [employer] GeneXpert platform. This test has been validated  by the  and verified by the performing laboratory.     This test is designed to amplify and detect the following: nucleocapsid (N), envelope (E), and RNA-dependent RNA polymerase (RdRP) genes of the SARS-CoV-2 genome; matrix (M), basic polymerase (PB2), and acidic protein (PA) segments of the influenza A genome; matrix (M) and non-structural protein (NS) segments of the influenza B genome, and the nucleocapsid genes of RSV A and RSV B.     Positive results are indicative of the presence of Flu A, Flu B, RSV, and/or SARS-CoV-2 RNA. Positive results for SARS-CoV-2 or suspected novel influenza should be reported to state, local, or federal health departments according to local reporting requirements.      All results should be assessed in conjunction with clinical presentation and other laboratory markers for clinical management.     FOR PEDIATRIC PATIENTS - copy/paste COVID Guidelines URL to browser: [email or web address]      HS TROPONIN I 0HR - Normal    hs TnI 0hr 6     LIPASE - Normal    Lipase 29     MAGNESIUM - Normal    Magnesium 2.0     LACTIC ACID 2 HOUR - Normal    LACTIC ACID 1.6      Narrative:     Result may be elevated if tourniquet was used during collection.   URINE MICROSCOPIC - Normal    RBC, UA 0-1      WBC, UA 0-1      Epithelial Cells Occasional      Bacteria, UA Occasional     HS TROPONIN I 2HR   HS TROPONIN I 4HR     CT abdomen pelvis wo contrast   Final Interpretation by Guillaume Parks MD (09/20 4842)      No acute abdominopelvic process within the limits of this unenhanced examination.      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      Workstation performed: [id number]         XR chest portable   ED Interpretation by Filiberto Rojas MD (09/20 7239)   NAD        Final Interpretation by Christopher Salcido MD (09/20 5688)      No  acute cardiopulmonary disease.      Widespread sclerotic osseous metastatic disease      Resident: Devin De I, the attending radiologist, have reviewed the images and agree with the final report above.      Workstation performed: [id number]             ECG 12 Lead Documentation Only    Date/Time: 9/20/2024 10:23 AM    Performed by: Filiberto Rojas MD  Authorized by: Filiberto Rojas MD    ECG reviewed by me, the ED Provider: yes    Patient location:  ED  Previous ECG:     Previous ECG:  Unavailable  Interpretation:     Interpretation: non-specific    Rate:     ECG rate:  58    ECG rate assessment: bradycardic    Rhythm:     Rhythm: sinus bradycardia    Ectopy:     Ectopy: none    QRS:     QRS axis:  Normal    QRS intervals:  Normal  Conduction:     Conduction: normal    ST segments:     ST segments:  Normal  T waves:     T waves: normal        ED Medication and Procedure Management   Prior to Admission Medications   Prescriptions Last Dose Informant Patient Reported? Taking?   Carboxymethylcellul-Glycerin (Refresh Optive) 0.5-0.9 % SOLN   Yes No   Sig: Apply 1 drop to eye 3 (three) times a day   Cholecalciferol 1.25 MG (85287 UT) TABS   Yes No   Sig: Cholecalciferol Oral     take 1 wafer once a week.    active   HealthyLax 17 g packet   Yes No   Multiple Vitamins-Minerals (PreserVision AREDS) CAPS   Yes No   Sig: Take 1 capsule by mouth daily   Refresh Tears 0.5 % SOLN   Yes No   Saline GEL   Yes No   Sig: into each nostril   Vibegron 75 MG TABS   No No   Sig: Take 75 mg by mouth every other day   acetaminophen (TYLENOL) 325 mg tablet   No No   Sig: Take 2 tablets (650 mg total) by mouth every 6 (six) hours as needed for mild pain   aluminum-magnesium hydroxide-simethicone (Antacid) 5992-0474-140 mg/30 mL suspension   Yes No   Sig: Take by mouth   bisacodyl (Dulcolax) 10 mg suppository   Yes No   Sig: Insert 10 mg into the rectum daily as needed for constipation   clopidogrel (PLAVIX) 75 mg  tablet   Yes No   Sig: Take 75 mg by mouth daily   cyanocobalamin (VITAMIN B-12) 1000 MCG tablet   No No   Sig: Take 1 tablet (1,000 mcg total) by mouth daily   digoxin (LANOXIN) 0.125 mg tablet   Yes No   Sig: Take 125 mcg by mouth every other day   dronedarone (Multaq) 400 mg tablet   Yes No   Sig: Take 400 mg by mouth 2 (two) times a day with meals   folic acid (FOLVITE) 1 mg tablet   Yes No   Sig: Take 1 tablet by mouth daily   hydrocortisone 0.5 % cream   Yes No   loperamide (IMODIUM) 2 mg capsule   Yes No   Sig: Take 1 capsule by mouth 4 (four) times a day as needed   meclizine (ANTIVERT) 25 mg tablet   Yes No   Sig: Meclizine Oral     1 tib    active   melatonin 3 mg   No No   Sig: Take 1 tablet (3 mg total) by mouth daily at bedtime as needed (insomnia)   midodrine (PROAMATINE) 5 mg tablet   No No   Sig: Take 0.5 tablets (2.5 mg total) by mouth 2 (two) times a day Only give if sbp <120   pantoprazole (PROTONIX) 40 mg tablet   Yes No   Sig: Take 40 mg by mouth daily   primidone (MYSOLINE) 50 mg tablet   Yes No   Sig: Take 50 mg by mouth every 12 (twelve) hours   senna-docusate sodium (SENOKOT S) 8.6-50 mg per tablet   No No   Sig: Take 1 tablet by mouth 2 (two) times a day   Patient taking differently: Take 1 tablet by mouth daily as needed for constipation   tamsulosin (FLOMAX) 0.4 mg   No No   Sig: Take 1 capsule (0.4 mg total) by mouth daily with dinner      Facility-Administered Medications: None     Patient's Medications   Discharge Prescriptions    No medications on file     No discharge procedures on file.     Filiberto Rojas MD  09/20/24 7376

## 2024-09-20 NOTE — ED NOTES
Patient reports he is too weak for d/c. Provider aware and at bedside     Sarah Pinzon RN  09/20/24 7824

## 2024-09-27 NOTE — ASSESSMENT & PLAN NOTE
History of hypotension. Not maintained on beta blocker or anti-hypertensives.  On Florinef in 12/2023, which was discontinued due to fluid retention.  Now on midodrine 2.5 mg BID with acceptable BP. Orthostatic drop from 130 to 100 on exam today.  Hold midodrine if BP > 140/90.  Continue use of compression socks, change position slowly, pump feet prior to standing.  
History of paroxysmal atrial fibrillation following bypass surgery.  He has been maintained on Multaq and digoxin.  Avoiding beta blockers due to hypotension.  He has not been maintained on full anticoagulation and will defer at this time given recent significant anemia requiring blood transfusion.  He remained in sinus rhythm at 's and during hospital stays.  Will continue to monitor. We did review his stroke risk today.  Continue Multaq 400 mg BID and digoxin 125 mcg every other day.  Will request labs from PCP as dig level was ordered in April.  
Remote history of CABG. Details are unknown.  He adamantly denies anginal complaints.  Preserved LVEF on 9/2023 echo.  Remains on Plavix 75 mg daily.  No beta blocker due to hypotension.  Not maintained on statin, however LDL was 62 on recent labs and statin therapy is unlikely to provide additional benefit at this time.  Reviewed s/s to report.  Will monitor.  
Vaccine status unknown

## 2024-10-13 NOTE — PROGRESS NOTES
UROLOGY PROGRESS NOTE         NAME: Miguel Angel Ortega  AGE: 89 y.o. SEX: male  : 1928   MRN: [id number]    DATE: 10/13/2024  TIME: 11:27 AM    Assessment and Plan   Procedures     Impression:   1. Prostate cancer metastatic to bone (HCC)  2. Urge incontinence  3. Benign prostatic hyperplasia with urinary retention  4. Hypotonic bladder  5. Radiation cystitis  6. History of urinary retention       Plan: Regarding his hip pain I recommended asking the PCP to consider Mobic at the 7 or 15 mg dose.  Until they hear back I recommended a heating pad over the right hip.  Certainly, with his bone scan ordered from a previous urology practice in 2023 did show metastatic disease to the bone this certainly could be contributing to his pain as well.  If that is the case they may need to consider a narcotic.    Regarding his prostate cancer and elevated PSA working a recommended repeat PSA in March or  and I will call his caregiver with the results.  Certainly if the number would continue to trend higher we may need to reconsider androgen deprivation therapy.  Also may need to reimage with a bone scan or PET scan.    Regarding his voiding he will continue timed voiding double void, daily Flomax, and every other day Gemtesa that seems to work well.      Chief Complaint   No chief complaint on file.    History of Present Illness     HPI: Miguel Angel Ortega is a 89 y.o. year old male who presents with follow-up office visit from 2024.  Patient with history of refractory urinary retention and has failed multiple voiding trials on Flomax.  History of prostate cancer status post radiation 20 some years ago.    Patient, and I and his daughter discuss management of his voiding symptoms and including intermittent cath, Ascencio, SP tube they elected for every other day Gemtesa restarting Flomax and timed voiding double void and if he would develop urinary retention cystoscopy would be a consideration.  Patient's PSA  2/13/2024 was 1.72.    Patient had CT scan on 9/20/2024 that showed renal cysts otherwise normal upper tracts.  No new intra-abdominal processes did show the osseous metastatic disease as before.  So noted was an enlarged prostate.  Was in the ER with nausea and vomiting.    Patient's PSA is up from 1.7 to 3.5.  Will discuss possibility of Lupron with he and his family today.  Enrique patient's PSA today we discussed the options of observation versus Lupron.  My recommendation was observation they agree.  And we will follow that up with a PSA in 4 months.    He is complaining of right hip pain from recent fall.  Pain comes and goes    He is not having any voiding complaints his PVR was 190.  He is using Gemtesa every other day, and Flomax daily and doing timed voiding double void.  Also not having any signs or symptoms of a UTI.      The following portions of the patient's history were reviewed and updated as appropriate: allergies, current medications, past family history, past medical history, past social history, past surgical history and problem list.  Past Medical History:   Diagnosis Date    A-fib (HCC)     Coronary artery disease     History of angina     Prostate cancer (HCC)      Past Surgical History:   Procedure Laterality Date    CORONARY ARTERY BYPASS GRAFT  1995    CA CYSTO W/IRRIG & EVAC MULTPLE OBSTRUCTING CLOTS N/A 12/27/2023    Procedure: CYSTOSCOPY EVACUATION OF CLOTS, fulguration of bleeding. insertion 24FR 3 Way zuniga CBI;  Surgeon: Carson Singer MD;  Location: [facility];  Service: Urology    TONSILLECTOMY       shoulder  Review of Systems     Const: Denies chills, fever and weight loss.  CV: Denies chest pain.  Resp: Denies SOB.  GI: Denies abdominal pain, nausea and vomiting.  : Denies symptoms other than stated above.  Musculo: Denies back pain.    Objective   There were no vitals taken for this visit.    Physical Exam  Const: Appears healthy and well developed. No signs of acute distress  present.  Resp: Respirations are regular and unlabored.   CV: Rate is regular. Rhythm is regular.  Abdomen: Abdomen is soft, nontender, and nondistended. Kidneys are not palpable.  : Normal external detail exam bladder nontender.  He was having right sided hip pain  Psych: Patient's attitude is cooperative. Mood is normal. Affect is normal.    Current Medications     Current Outpatient Medications:     acetaminophen (TYLENOL) 325 mg tablet, Take 2 tablets (650 mg total) by mouth every 6 (six) hours as needed for mild pain, Disp: , Rfl:     aluminum-magnesium hydroxide-simethicone (Antacid) 4365-8450-379 mg/30 mL suspension, Take by mouth, Disp: , Rfl:     bisacodyl (Dulcolax) 10 mg suppository, Insert 10 mg into the rectum daily as needed for constipation, Disp: , Rfl:     Carboxymethylcellul-Glycerin (Refresh Optive) 0.5-0.9 % SOLN, Apply 1 drop to eye 3 (three) times a day, Disp: , Rfl:     Cholecalciferol 1.25 MG (30200 UT) TABS, Cholecalciferol Oral     take 1 wafer once a week.    active, Disp: , Rfl:     clopidogrel (PLAVIX) 75 mg tablet, Take 75 mg by mouth daily, Disp: , Rfl:     cyanocobalamin (VITAMIN B-12) 1000 MCG tablet, Take 1 tablet (1,000 mcg total) by mouth daily, Disp: 30 tablet, Rfl: 0    digoxin (LANOXIN) 0.125 mg tablet, Take 125 mcg by mouth every other day, Disp: , Rfl:     dronedarone (Multaq) 400 mg tablet, Take 400 mg by mouth 2 (two) times a day with meals, Disp: , Rfl:     folic acid (FOLVITE) 1 mg tablet, Take 1 tablet by mouth daily, Disp: , Rfl:     HealthyLax 17 g packet, , Disp: , Rfl:     hydrocortisone 0.5 % cream, , Disp: , Rfl:     loperamide (IMODIUM) 2 mg capsule, Take 1 capsule by mouth 4 (four) times a day as needed, Disp: , Rfl:     meclizine (ANTIVERT) 25 mg tablet, Meclizine Oral     1 tib    active, Disp: , Rfl:     melatonin 3 mg, Take 1 tablet (3 mg total) by mouth daily at bedtime as needed (insomnia), Disp: 30 tablet, Rfl: 0    midodrine (PROAMATINE) 5 mg tablet,  Take 0.5 tablets (2.5 mg total) by mouth 2 (two) times a day Only give if sbp <120, Disp: 30 tablet, Rfl: 0    Multiple Vitamins-Minerals (PreserVision AREDS) CAPS, Take 1 capsule by mouth daily, Disp: , Rfl:     pantoprazole (PROTONIX) 40 mg tablet, Take 40 mg by mouth daily, Disp: , Rfl:     primidone (MYSOLINE) 50 mg tablet, Take 50 mg by mouth every 12 (twelve) hours, Disp: , Rfl:     Refresh Tears 0.5 % SOLN, , Disp: , Rfl:     Saline GEL, into each nostril, Disp: , Rfl:     senna-docusate sodium (SENOKOT S) 8.6-50 mg per tablet, Take 1 tablet by mouth 2 (two) times a day (Patient taking differently: Take 1 tablet by mouth daily as needed for constipation), Disp: 60 tablet, Rfl: 0    tamsulosin (FLOMAX) 0.4 mg, Take 1 capsule (0.4 mg total) by mouth daily with dinner, Disp: 90 capsule, Rfl: 3    Vibegron 75 MG TABS, Take 75 mg by mouth every other day, Disp: , Rfl:         Cb Ji MD

## 2024-10-16 ENCOUNTER — TELEPHONE (OUTPATIENT)
Dept: UROLOGY | Facility: CLINIC | Age: 89
End: 2024-10-16

## 2024-10-16 RX ORDER — TOBRAMYCIN AND DEXAMETHASONE 3; 1 MG/ML; MG/ML
SUSPENSION/ DROPS OPHTHALMIC
COMMUNITY
Start: 2024-09-06

## 2024-10-16 NOTE — TELEPHONE ENCOUNTER
Call placed to ryland to have pt complete psa testing ordered by Dr. Ji. Ryland howard will go to [facility].

## 2024-10-17 ENCOUNTER — APPOINTMENT (OUTPATIENT)
Dept: LAB | Facility: CLINIC | Age: 89
End: 2024-10-17
Payer: MEDICARE

## 2024-10-17 DIAGNOSIS — C79.51 PROSTATE CANCER METASTATIC TO BONE (HCC): ICD-10-CM

## 2024-10-17 DIAGNOSIS — C61 PROSTATE CANCER METASTATIC TO BONE (HCC): ICD-10-CM

## 2024-10-17 DIAGNOSIS — C61 PROSTATE CANCER (HCC): ICD-10-CM

## 2024-10-17 LAB — PSA SERPL-MCNC: 3.57 NG/ML (ref 0–4)

## 2024-10-17 PROCEDURE — 84153 ASSAY OF PSA TOTAL: CPT

## 2024-10-17 PROCEDURE — 36415 COLL VENOUS BLD VENIPUNCTURE: CPT

## 2024-10-22 RX ORDER — ONDANSETRON 4 MG/1
TABLET, FILM COATED ORAL
COMMUNITY
Start: 2024-09-20

## 2024-10-22 RX ORDER — MIDODRINE HYDROCHLORIDE 2.5 MG/1
TABLET ORAL
COMMUNITY
Start: 2024-09-14

## 2024-10-22 RX ORDER — BACITRACIN ZINC, NEOMYCIN SULFATE, AND POLYMYXIN B SULFATE 400; 3.5; 5 [IU]/G; MG/G; [IU]/G
OINTMENT TOPICAL
COMMUNITY
Start: 2024-10-15

## 2024-10-23 ENCOUNTER — OFFICE VISIT (OUTPATIENT)
Dept: UROLOGY | Facility: CLINIC | Age: 89
End: 2024-10-23
Payer: MEDICARE

## 2024-10-23 VITALS
OXYGEN SATURATION: 99 % | WEIGHT: 173.2 LBS | HEART RATE: 75 BPM | TEMPERATURE: 97 F | DIASTOLIC BLOOD PRESSURE: 62 MMHG | SYSTOLIC BLOOD PRESSURE: 142 MMHG | BODY MASS INDEX: 24.16 KG/M2

## 2024-10-23 DIAGNOSIS — R33.8 BENIGN PROSTATIC HYPERPLASIA WITH URINARY RETENTION: ICD-10-CM

## 2024-10-23 DIAGNOSIS — N39.41 URGE INCONTINENCE: ICD-10-CM

## 2024-10-23 DIAGNOSIS — N40.1 BENIGN PROSTATIC HYPERPLASIA WITH URINARY RETENTION: ICD-10-CM

## 2024-10-23 DIAGNOSIS — C79.51 PROSTATE CANCER METASTATIC TO BONE (HCC): Primary | ICD-10-CM

## 2024-10-23 DIAGNOSIS — N31.2 HYPOTONIC BLADDER: ICD-10-CM

## 2024-10-23 DIAGNOSIS — Z87.898 HISTORY OF URINARY RETENTION: ICD-10-CM

## 2024-10-23 DIAGNOSIS — C61 PROSTATE CANCER METASTATIC TO BONE (HCC): Primary | ICD-10-CM

## 2024-10-23 DIAGNOSIS — N30.40 RADIATION CYSTITIS: ICD-10-CM

## 2024-10-23 LAB — POST-VOID RESIDUAL VOLUME, ML POC: 193 ML

## 2024-10-23 PROCEDURE — 51798 US URINE CAPACITY MEASURE: CPT | Performed by: UROLOGY

## 2024-10-23 PROCEDURE — 99214 OFFICE O/P EST MOD 30 MIN: CPT | Performed by: UROLOGY

## 2024-10-23 NOTE — PATIENT INSTRUCTIONS
Please get PSA in March 2025 Dr. Ji will call with results.    Please check with PCP regarding option of Mobic to help for the right hip pain after your recent fall.    Recommend heating pad over the right hip area as needed.

## 2024-12-09 ENCOUNTER — OFFICE VISIT (OUTPATIENT)
Dept: CARDIOLOGY CLINIC | Facility: CLINIC | Age: 89
End: 2024-12-09
Payer: MEDICARE

## 2024-12-09 VITALS
BODY MASS INDEX: 25.17 KG/M2 | HEIGHT: 71 IN | WEIGHT: 179.8 LBS | OXYGEN SATURATION: 97 % | HEART RATE: 89 BPM | SYSTOLIC BLOOD PRESSURE: 120 MMHG | TEMPERATURE: 98.2 F | DIASTOLIC BLOOD PRESSURE: 50 MMHG

## 2024-12-09 DIAGNOSIS — I95.1 ORTHOSTATIC HYPOTENSION: ICD-10-CM

## 2024-12-09 DIAGNOSIS — I25.10 CORONARY ARTERY DISEASE INVOLVING NATIVE CORONARY ARTERY OF NATIVE HEART WITHOUT ANGINA PECTORIS: Primary | ICD-10-CM

## 2024-12-09 DIAGNOSIS — I48.0 PAROXYSMAL ATRIAL FIBRILLATION (HCC): ICD-10-CM

## 2024-12-09 PROCEDURE — 99214 OFFICE O/P EST MOD 30 MIN: CPT | Performed by: NURSE PRACTITIONER

## 2024-12-09 RX ORDER — DIGOXIN 125 MCG
125 TABLET ORAL EVERY OTHER DAY
COMMUNITY

## 2024-12-09 RX ORDER — SILVER SULFADIAZINE 10 G/1000G
CREAM TOPICAL
COMMUNITY
Start: 2024-11-26

## 2024-12-09 RX ORDER — MELOXICAM 15 MG/1
TABLET ORAL
COMMUNITY
Start: 2024-12-01

## 2024-12-09 NOTE — ASSESSMENT & PLAN NOTE
History of paroxysmal atrial fibrillation following bypass surgery.  He has been maintained on Multaq and digoxin.  Avoiding beta blockers due to hypotension.  He has not been maintained on full anticoagulation and will defer at this time given recent significant anemia requiring blood transfusion.  He remained in sinus rhythm at OV's and during hospital stays.  Recent ECG at PCP office.  Will continue to monitor. We did review his stroke risk today.  Continue Multaq 400 mg BID and digoxin 125 mcg every other day.  Requested dig level again as I did not receive a copy.

## 2024-12-09 NOTE — ASSESSMENT & PLAN NOTE
History of hypotension. Not maintained on beta blocker or anti-hypertensives.  On Florinef in 12/2023, which was discontinued due to fluid retention.  Now on midodrine 2.5 mg BID with acceptable BP. 10 point drop from 130 to 120 systolic with change from sitting to standing.  Hold midodrine if BP > 140/90.  Continue use of compression socks, change position slowly, pump feet prior to standing.

## 2024-12-09 NOTE — PROGRESS NOTES
Cardiology Follow Up    Miguel Angel Ortega  2/28/1928  [id number]  [facility] [place] [address]  [address]  [facility] [address] [phone]    Miguel Angel presents for follow up for atrial fibrillation, orthostatic hypotension.     1. Coronary artery disease involving native coronary artery of native heart without angina pectoris  Assessment & Plan:  Remote history of CABG. Details are unknown.  He adamantly denies anginal complaints.  Preserved LVEF on 9/2023 echo.  Remains on Plavix 75 mg daily.  No beta blocker due to hypotension.  Not maintained on statin, however LDL was 62 on recent labs and statin therapy is unlikely to provide additional benefit at this time.  Reviewed s/s to report.  Will monitor.  2. Paroxysmal atrial fibrillation (HCC)  Assessment & Plan:  History of paroxysmal atrial fibrillation following bypass surgery.  He has been maintained on Multaq and digoxin.  Avoiding beta blockers due to hypotension.  He has not been maintained on full anticoagulation and will defer at this time given recent significant anemia requiring blood transfusion.  He remained in sinus rhythm at OV's and during hospital stays.  Recent ECG at PCP office.  Will continue to monitor. We did review his stroke risk today.  Continue Multaq 400 mg BID and digoxin 125 mcg every other day.  Requested dig level again as I did not receive a copy.  Orders:  -     Digoxin level  3. Orthostatic hypotension  Assessment & Plan:  History of hypotension. Not maintained on beta blocker or anti-hypertensives.  On Florinef in 12/2023, which was discontinued due to fluid retention.  Now on midodrine 2.5 mg BID with acceptable BP. 10 point drop from 130 to 120 systolic with change from sitting to standing.  Hold midodrine if BP > 140/90.  Continue use of compression socks, change position slowly, pump feet prior to standing.     HPI  Miguel Angel has a past medical history of CAD s/p remote  CABG, paroxysmal atrial fibrillation on Multaq not anticoagulated, hypotension, hyponatremia, anemia, prostate cancer with mets to the bone.     He reports distant bypass surgery. Records are not available and details are unclear. He has not followed with a cardiologist in many years. He has history of paroxysmal atrial fibrillation following bypass surgery. He was previously maintained on warfarin, but has not been on full anticoagulation in many years. He has been maintained on Multaq 400 mg BID, digoxin 125 mcg daily, and Plavix 75 mg daily. He underwent an echocardiogram at [facility] on 9/27/2023 which showed LVEF 55-60% with mild LVH and grade 1 diastolic dysfunction, RV dilation, mild-mod AI, aortic root dilation of 4.6 cm.      He was admitted to [facility] 12/13/2023-1/4/2024. He initially presented with c/o urinary frequency and constipation and was diagnosed with stercoral colitis and urinary retention and hematuria. He was also treated for acute on chronic anemia requiring blood transfusion. He underwent GI and urologic evaluation during admission. Cardiology was consulted for management of orthostatic hypotension with recommendation to avoid diuretics, use compression socks, and treat underlying anemia. During admission Florinef 0.1 mg daily was added by the primary team. Digoxin was reduced to 125 mcg every other day. He remained in sinus rhythm during admission.     He followed up on 2/8/2024. He was on furosemide 20 mg every other day due to edema/weight gain and elevated BNP. He was still taking Florinef. I requested that both Florinef and furosemide be discontinued and he be monitored off both. Ultimately PCP decided to stop Florinef and continue furosemide.      He was seen at [facility] ER 3/2/2024 with a fall with concern for loss of consciousness. Trauma evaluation with CT imaging as well as ECG and lab work was unremarkable and he was discharged home.  He was admitted to [facility] 6/13-6/17/2024 for pelvic fracture  "following a mechanical fall. During admission he remained in sinus rhythm. Furosemide was discontinued and midodrine 2.5 mg BID started.     12/9/2024: Miguel Angel presents for routine follow up accompanied by his family friend Elizabeth. He continues to reside at [facility]. He brings along paperwork with his current medication list today. He states he is doing very well. He remains on midodrine 2.5 mg BID. BP has been staying around 120-130 systolic. He is now walking twice daily the length of their hallway and tolerating this very well. He denies any chest pain, lightheadedness, syncope/near syncope. No edema. No palpitations. No shortness of breath. I ordered a digoxin level at his last visit but did not get the result. He states his PCP has been seeing him monthly and just did an EKG at his visit this month which he said \"looked good\".     Medical Problems       Problem List       Prostate cancer metastatic to bone ([facility])    Stercoral colitis    Acute urinary retention    Hyponatremia    Paroxysmal atrial fibrillation ([facility])    Coronary artery disease involving native coronary artery of native heart without angina pectoris    Normocytic anemia    Orthostatic hypotension    Gross hematuria    Ascencio catheter in place    Urinary retention    Urge incontinence    Radiation cystitis    Neurogenic bladder    History of urinary retention    Hypotonic bladder    Benign prostatic hyperplasia with urinary retention    Closed fracture of multiple pubic rami, right, initial encounter ([facility])        Past Medical History:   Diagnosis Date    A-fib ([facility])     Coronary artery disease     History of angina     Prostate cancer ([facility])      Social History     Socioeconomic History    Marital status: Single     Spouse name: Not on file    Number of children: Not on file    Years of education: Not on file    Highest education level: Not on file   Occupational History    Not on file   Tobacco Use    Smoking status: Former     Types: " Cigarettes    Smokeless tobacco: Never   Vaping Use    Vaping status: Never Used   Substance and Sexual Activity    Alcohol use: Not Currently    Drug use: Not Currently    Sexual activity: Not Currently   Other Topics Concern    Not on file   Social History Narrative    Not on file     Social Drivers of Health     Financial Resource Strain: Low Risk  (9/26/2023)    Received from [facility], [facility]    Overall Financial Resource Strain (CARDIA)     Difficulty of Paying Living Expenses: Not hard at all   Food Insecurity: No Food Insecurity (6/13/2024)    Nursing - Inadequate Food Risk Classification     Worried About Running Out of Food in the Last Year: Never true     Ran Out of Food in the Last Year: Never true     Ran Out of Food in the Last Year: Not on file   Transportation Needs: No Transportation Needs (6/13/2024)    PRAPARE - Transportation     Lack of Transportation (Medical): No     Lack of Transportation (Non-Medical): No   Physical Activity: Not on file   Stress: Not on file   Social Connections: Unknown (6/18/2024)    Received from [employer]    Social Connections     How often do you feel lonely or isolated from those around you? (Adult - for ages 18 years and over): Not on file   Intimate Partner Violence: Not At Risk (9/26/2023)    Received from [facility], [facility]    Humiliation, Afraid, Rape, and Kick questionnaire     Fear of Current or Ex-Partner: No     Emotionally Abused: No     Physically Abused: No     Sexually Abused: No   Housing Stability: High Risk (6/13/2024)    Housing Stability Vital Sign     Unable to Pay for Housing in the Last Year: No     Number of Times Moved in the Last Year: 2     Homeless in the Last Year: No      Family History   Problem Relation Age of Onset    Hypertension Father      Past Surgical History:   Procedure Laterality Date    CORONARY ARTERY BYPASS GRAFT  1995    [place] CYSTO W/IRRIG & EVAC  MULTPLE OBSTRUCTING CLOTS N/A 12/27/2023    Procedure: CYSTOSCOPY EVACUATION OF CLOTS, fulguration of bleeding. insertion 24FR 3 Way zuniga CBI;  Surgeon: Carson Singer MD;  Location:  MAIN OR;  Service: Urology    TONSILLECTOMY         Current Outpatient Medications:     acetaminophen (TYLENOL) 325 mg tablet, Take 2 tablets (650 mg total) by mouth every 6 (six) hours as needed for mild pain, Disp: , Rfl:     aluminum-magnesium hydroxide-simethicone (Antacid) 5494-2855-546 mg/30 mL suspension, Take by mouth, Disp: , Rfl:     bisacodyl (Dulcolax) 10 mg suppository, Insert 10 mg into the rectum daily as needed for constipation, Disp: , Rfl:     clopidogrel (PLAVIX) 75 mg tablet, Take 75 mg by mouth daily, Disp: , Rfl:     cyanocobalamin (VITAMIN B-12) 1000 MCG tablet, Take 1 tablet (1,000 mcg total) by mouth daily, Disp: 30 tablet, Rfl: 0    digoxin (Digox) 0.125 mg tablet, Take 125 mcg by mouth every other day, Disp: , Rfl:     dronedarone (Multaq) 400 mg tablet, Take 400 mg by mouth 2 (two) times a day with meals, Disp: , Rfl:     folic acid (FOLVITE) 1 mg tablet, Take 1 tablet by mouth daily, Disp: , Rfl:     HealthyLax 17 g packet, , Disp: , Rfl:     loperamide (IMODIUM) 2 mg capsule, Take 1 capsule by mouth 4 (four) times a day as needed, Disp: , Rfl:     melatonin 3 mg, Take 1 tablet (3 mg total) by mouth daily at bedtime as needed (insomnia), Disp: 30 tablet, Rfl: 0    meloxicam (MOBIC) 15 mg tablet, , Disp: , Rfl:     midodrine (PROAMATINE) 5 mg tablet, Take 0.5 tablets (2.5 mg total) by mouth 2 (two) times a day Only give if sbp <120, Disp: 30 tablet, Rfl: 0    Multiple Vitamins-Minerals (PreserVision AREDS) CAPS, Take 1 capsule by mouth daily, Disp: , Rfl:     ondansetron (ZOFRAN) 4 mg tablet, , Disp: , Rfl:     pantoprazole (PROTONIX) 40 mg tablet, Take 40 mg by mouth daily, Disp: , Rfl:     primidone (MYSOLINE) 50 mg tablet, Take 50 mg by mouth every 12 (twelve) hours, Disp: , Rfl:     Refresh Tears 0.5  % SOLN, , Disp: , Rfl:     Saline GEL, into each nostril, Disp: , Rfl:     SSD 1 % cream, , Disp: , Rfl:     tamsulosin (FLOMAX) 0.4 mg, Take 1 capsule (0.4 mg total) by mouth daily with dinner, Disp: 90 capsule, Rfl: 3    Vibegron 75 MG TABS, Take 75 mg by mouth every other day, Disp: , Rfl:     Neomycin-Bacitracin-Polymyxin (GNP Triple Antibiotic) OINT, , Disp: , Rfl:     senna-docusate sodium (SENOKOT S) 8.6-50 mg per tablet, Take 1 tablet by mouth 2 (two) times a day (Patient taking differently: Take 1 tablet by mouth daily as needed for constipation), Disp: 60 tablet, Rfl: 0  No Known Allergies    Labs:     Chemistry        Component Value Date/Time    K 4.2 09/20/2024 1013    K 4.1 02/27/2024 0616    CL 96 09/20/2024 1013     02/27/2024 0616    CO2 20 (L) 09/20/2024 1013    CO2 27 02/27/2024 0616    BUN 21 09/20/2024 1013    BUN 21 (H) 02/27/2024 0616    BUN 20 11/22/2023 1358    CREATININE 1.08 09/20/2024 1013    CREATININE 1.2 02/27/2024 0616        Component Value Date/Time    CALCIUM 9.1 09/20/2024 1013    CALCIUM 8.6 02/27/2024 0616    ALKPHOS 121 (H) 09/20/2024 1013    ALKPHOS 57 11/22/2023 1358    AST 14 09/20/2024 1013    AST 12 11/22/2023 1358    ALT <3 (L) 09/20/2024 1013    ALT 7 11/22/2023 1358        Cardiac testing:     Lipid panel 1/9/2024: C 125. T 63. H 50. L 62     ECG 12/18/2023: Sinus bradycardia. Nonspecific ST abnormality.     Echocardiogram 9/27/2023 ([facility]):    Ascending Aorta: Aortic root is dilated, measures 4.6cm. Ascending   aorta not well visualized. Recommend dedicated thoracic imaging.     Left Ventricle: Left ventricle was not well visualized. Left ventricle   is normal in size. Definity contrast utilized. No evidence of LV thrombus.   There is mild hypertrophy. Systolic function is normal with an ejection   fraction of 55-60%. Wall motion is within normal limits. There is grade I   (mild) diastolic dysfunction.     Left Atrium: Left atrium cavity size is normal.     Right  "Ventricle: Right ventricle cavity is moderately dilated. Systolic   function is normal. Normal tricuspid annular plane systolic excursion   (TAPSE) >1.7 cm.     Aortic Valve: The aortic valve is trileaflet. There is mild to moderate   regurgitation.     Review of Systems   Constitutional: Negative.   HENT: Negative.     Cardiovascular:  Negative for chest pain, dyspnea on exertion, irregular heartbeat, leg swelling, near-syncope, orthopnea and palpitations.   Respiratory:  Negative for cough and snoring.    Endocrine: Negative.    Skin: Negative.    Musculoskeletal: Negative.    Gastrointestinal: Negative.    Genitourinary: Negative.    Neurological: Negative.    Psychiatric/Behavioral: Negative.         Vitals:    12/09/24 1447   BP: 120/50   Pulse:    Temp:    SpO2:      Vitals:    12/09/24 1415   Weight: 81.6 kg (179 lb 12.8 oz)     Height: 5' 11\" (180.3 cm)   Body mass index is 25.08 kg/m².    Physical Exam  Vitals and nursing note reviewed.   Constitutional:       General: He is not in acute distress.     Appearance: He is well-developed. He is not diaphoretic.   HENT:      Head: Normocephalic and atraumatic.   Neck:      Vascular: No carotid bruit or JVD.   Cardiovascular:      Rate and Rhythm: Normal rate and regular rhythm.      Pulses: Intact distal pulses.      Heart sounds: Normal heart sounds, S1 normal and S2 normal. No murmur heard.     No friction rub. No gallop.      Comments: No edema  Pulmonary:      Effort: Pulmonary effort is normal. No respiratory distress.      Breath sounds: Normal breath sounds.   Abdominal:      General: There is no distension.      Palpations: Abdomen is soft.      Tenderness: There is no abdominal tenderness.   Skin:     General: Skin is warm and dry.      Findings: No rash.   Neurological:      Mental Status: He is alert and oriented to person, place, and time.   Psychiatric:         Behavior: Behavior normal.                "

## 2025-03-11 ENCOUNTER — RESULTS FOLLOW-UP (OUTPATIENT)
Dept: NON INVASIVE DIAGNOSTICS | Facility: HOSPITAL | Age: OVER 89
End: 2025-03-11

## 2025-03-11 NOTE — TELEPHONE ENCOUNTER
----- Message from FRAN Arshad sent at 3/11/2025 12:37 PM EDT -----  Please let Mr. Ortega know his digoxin level is at goal. Thank you!

## 2025-03-11 NOTE — TELEPHONE ENCOUNTER
Elizabeth returning call. Relayed previous message from FRAN Mcintyre. Elizabeth verbalized understanding.

## 2025-03-14 ENCOUNTER — APPOINTMENT (EMERGENCY)
Dept: CT IMAGING | Facility: HOSPITAL | Age: OVER 89
End: 2025-03-14
Payer: MEDICARE

## 2025-03-14 ENCOUNTER — HOSPITAL ENCOUNTER (EMERGENCY)
Facility: HOSPITAL | Age: OVER 89
Discharge: HOME | End: 2025-03-14
Attending: EMERGENCY MEDICINE
Payer: MEDICARE

## 2025-03-14 VITALS
RESPIRATION RATE: 16 BRPM | DIASTOLIC BLOOD PRESSURE: 60 MMHG | SYSTOLIC BLOOD PRESSURE: 143 MMHG | BODY MASS INDEX: 24.63 KG/M2 | TEMPERATURE: 98.2 F | HEIGHT: 71 IN | OXYGEN SATURATION: 96 % | HEART RATE: 73 BPM | WEIGHT: 175.93 LBS

## 2025-03-14 DIAGNOSIS — R68.89 FLU-LIKE SYMPTOMS: Primary | ICD-10-CM

## 2025-03-14 DIAGNOSIS — K52.9 GASTROENTERITIS: ICD-10-CM

## 2025-03-14 LAB
2HR DELTA HS TROPONIN: 10 NG/L
ALBUMIN SERPL BCG-MCNC: 4 G/DL (ref 3.5–5)
ALP SERPL-CCNC: 186 U/L (ref 34–104)
ALT SERPL W P-5'-P-CCNC: 11 U/L (ref 7–52)
ANION GAP SERPL CALCULATED.3IONS-SCNC: 7 MMOL/L (ref 4–13)
APTT PPP: 31 SECONDS (ref 23–34)
AST SERPL W P-5'-P-CCNC: 27 U/L (ref 13–39)
ATRIAL RATE: 70 BPM
BASOPHILS # BLD AUTO: 0.02 THOUSANDS/ÂΜL (ref 0–0.1)
BASOPHILS NFR BLD AUTO: 0 % (ref 0–1)
BILIRUB SERPL-MCNC: 0.61 MG/DL (ref 0.2–1)
BNP SERPL-MCNC: 240 PG/ML (ref 0–100)
BUN SERPL-MCNC: 30 MG/DL (ref 5–25)
CALCIUM SERPL-MCNC: 8.2 MG/DL (ref 8.4–10.2)
CARDIAC TROPONIN I PNL SERPL HS: 22 NG/L (ref ?–50)
CARDIAC TROPONIN I PNL SERPL HS: 32 NG/L (ref ?–50)
CHLORIDE SERPL-SCNC: 100 MMOL/L (ref 96–108)
CO2 SERPL-SCNC: 23 MMOL/L (ref 21–32)
CREAT SERPL-MCNC: 1.15 MG/DL (ref 0.6–1.3)
EOSINOPHIL # BLD AUTO: 0.01 THOUSAND/ÂΜL (ref 0–0.61)
EOSINOPHIL NFR BLD AUTO: 0 % (ref 0–6)
ERYTHROCYTE [DISTWIDTH] IN BLOOD BY AUTOMATED COUNT: 13.6 % (ref 11.6–15.1)
FLUAV AG UPPER RESP QL IA.RAPID: NEGATIVE
FLUBV AG UPPER RESP QL IA.RAPID: NEGATIVE
GFR SERPL CREATININE-BSD FRML MDRD: 53 ML/MIN/1.73SQ M
GLUCOSE SERPL-MCNC: 110 MG/DL (ref 65–140)
HCT VFR BLD AUTO: 31.9 % (ref 36.5–49.3)
HGB BLD-MCNC: 10.9 G/DL (ref 12–17)
IMM GRANULOCYTES # BLD AUTO: 0.03 THOUSAND/UL (ref 0–0.2)
IMM GRANULOCYTES NFR BLD AUTO: 1 % (ref 0–2)
INR PPP: 1.01 (ref 0.85–1.19)
LACTATE SERPL-SCNC: 0.9 MMOL/L (ref 0.5–2)
LIPASE SERPL-CCNC: 28 U/L (ref 11–82)
LYMPHOCYTES # BLD AUTO: 0.32 THOUSANDS/ÂΜL (ref 0.6–4.47)
LYMPHOCYTES NFR BLD AUTO: 6 % (ref 14–44)
MAGNESIUM SERPL-MCNC: 2 MG/DL (ref 1.9–2.7)
MCH RBC QN AUTO: 32.5 PG (ref 26.8–34.3)
MCHC RBC AUTO-ENTMCNC: 34.2 G/DL (ref 31.4–37.4)
MCV RBC AUTO: 95 FL (ref 82–98)
MONOCYTES # BLD AUTO: 0.81 THOUSAND/ÂΜL (ref 0.17–1.22)
MONOCYTES NFR BLD AUTO: 16 % (ref 4–12)
NEUTROPHILS # BLD AUTO: 4.04 THOUSANDS/ÂΜL (ref 1.85–7.62)
NEUTS SEG NFR BLD AUTO: 77 % (ref 43–75)
NRBC BLD AUTO-RTO: 0 /100 WBCS
P AXIS: 38 DEGREES
PLATELET # BLD AUTO: 199 THOUSANDS/UL (ref 149–390)
PMV BLD AUTO: 8.3 FL (ref 8.9–12.7)
POTASSIUM SERPL-SCNC: 4.9 MMOL/L (ref 3.5–5.3)
PR INTERVAL: 176 MS
PROCALCITONIN SERPL-MCNC: 0.06 NG/ML
PROT SERPL-MCNC: 7 G/DL (ref 6.4–8.4)
PROTHROMBIN TIME: 13.7 SECONDS (ref 12.3–15)
QRS AXIS: 55 DEGREES
QRSD INTERVAL: 80 MS
QT INTERVAL: 352 MS
QTC INTERVAL: 380 MS
RBC # BLD AUTO: 3.35 MILLION/UL (ref 3.88–5.62)
SARS-COV+SARS-COV-2 AG RESP QL IA.RAPID: NEGATIVE
SODIUM SERPL-SCNC: 130 MMOL/L (ref 135–147)
T WAVE AXIS: 261 DEGREES
VENTRICULAR RATE: 70 BPM
WBC # BLD AUTO: 5.23 THOUSAND/UL (ref 4.31–10.16)

## 2025-03-14 PROCEDURE — 85730 THROMBOPLASTIN TIME PARTIAL: CPT | Performed by: EMERGENCY MEDICINE

## 2025-03-14 PROCEDURE — 84145 PROCALCITONIN (PCT): CPT | Performed by: EMERGENCY MEDICINE

## 2025-03-14 PROCEDURE — 96374 THER/PROPH/DIAG INJ IV PUSH: CPT

## 2025-03-14 PROCEDURE — 99284 EMERGENCY DEPT VISIT MOD MDM: CPT

## 2025-03-14 PROCEDURE — 93005 ELECTROCARDIOGRAM TRACING: CPT

## 2025-03-14 PROCEDURE — 85025 COMPLETE CBC W/AUTO DIFF WBC: CPT | Performed by: EMERGENCY MEDICINE

## 2025-03-14 PROCEDURE — 83605 ASSAY OF LACTIC ACID: CPT | Performed by: EMERGENCY MEDICINE

## 2025-03-14 PROCEDURE — 87040 BLOOD CULTURE FOR BACTERIA: CPT | Performed by: EMERGENCY MEDICINE

## 2025-03-14 PROCEDURE — 83880 ASSAY OF NATRIURETIC PEPTIDE: CPT | Performed by: EMERGENCY MEDICINE

## 2025-03-14 PROCEDURE — 87804 INFLUENZA ASSAY W/OPTIC: CPT | Performed by: EMERGENCY MEDICINE

## 2025-03-14 PROCEDURE — 83690 ASSAY OF LIPASE: CPT | Performed by: EMERGENCY MEDICINE

## 2025-03-14 PROCEDURE — 84484 ASSAY OF TROPONIN QUANT: CPT | Performed by: EMERGENCY MEDICINE

## 2025-03-14 PROCEDURE — 99285 EMERGENCY DEPT VISIT HI MDM: CPT | Performed by: EMERGENCY MEDICINE

## 2025-03-14 PROCEDURE — 74177 CT ABD & PELVIS W/CONTRAST: CPT

## 2025-03-14 PROCEDURE — 71260 CT THORAX DX C+: CPT

## 2025-03-14 PROCEDURE — 36415 COLL VENOUS BLD VENIPUNCTURE: CPT | Performed by: EMERGENCY MEDICINE

## 2025-03-14 PROCEDURE — 80053 COMPREHEN METABOLIC PANEL: CPT | Performed by: EMERGENCY MEDICINE

## 2025-03-14 PROCEDURE — 83735 ASSAY OF MAGNESIUM: CPT | Performed by: EMERGENCY MEDICINE

## 2025-03-14 PROCEDURE — 93010 ELECTROCARDIOGRAM REPORT: CPT | Performed by: INTERNAL MEDICINE

## 2025-03-14 PROCEDURE — 96361 HYDRATE IV INFUSION ADD-ON: CPT

## 2025-03-14 PROCEDURE — 85610 PROTHROMBIN TIME: CPT | Performed by: EMERGENCY MEDICINE

## 2025-03-14 PROCEDURE — 87811 SARS-COV-2 COVID19 W/OPTIC: CPT | Performed by: EMERGENCY MEDICINE

## 2025-03-14 RX ORDER — ONDANSETRON 4 MG/1
4 TABLET, FILM COATED ORAL EVERY 6 HOURS
Qty: 12 TABLET | Refills: 0 | Status: SHIPPED | OUTPATIENT
Start: 2025-03-14

## 2025-03-14 RX ORDER — ONDANSETRON 2 MG/ML
4 INJECTION INTRAMUSCULAR; INTRAVENOUS ONCE
Status: COMPLETED | OUTPATIENT
Start: 2025-03-14 | End: 2025-03-14

## 2025-03-14 RX ADMIN — IOHEXOL 100 ML: 350 INJECTION, SOLUTION INTRAVENOUS at 10:49

## 2025-03-14 RX ADMIN — ONDANSETRON 4 MG: 2 INJECTION, SOLUTION INTRAMUSCULAR; INTRAVENOUS at 10:06

## 2025-03-14 RX ADMIN — SODIUM CHLORIDE 1000 ML: 0.9 INJECTION, SOLUTION INTRAVENOUS at 10:05

## 2025-03-14 NOTE — DISCHARGE INSTRUCTIONS
You were seen in the emergency department for nausea, vomiting, and diarrhea, your CT scan results are below, we provided you with an ambulatory referral to gastroenterology, please call their office and schedule an appointment.  Your presentation is consistent with gastroenteritis or a stomach bug, we have provided you with a prescription for Zofran which is an antinausea medication.  Please pick it over the pharmacy and take it as directed.  If your symptoms worsen or persist, please return to the emerged department immediately.      CT chest abdomen pelvis w contrast: 1. No acute findings in the chest., 2. Fluid-filled loops of small and large bowel, likely diarrheal in etiology., 3. Stable cystic pancreatic lesions measuring up to 1.4 cm since 12/13/2023. Management recommendation: Follow-up 6 months once, then every 18 months. Currently, patient has demonstrate stability for approximately 1.5 years. Recommend next follow-up in , 18 months. Endpoint determined by clinician. Preferred imaging modality: abdomen MRI and MRCP with and without IV contrast, or triple phase abdomen CT with IV contrast, or abdomen MRI and MRCP without IV contrast., Management and follow up recommendations for cystic pancreatic lesions are based on Institutional consensus and international evidence-based Kyoto guidelines for the management of intraductal papillary mucinous neoplasm of the pancreas. Pancreatology 24 , 2024) [phone]. claudia., 4. Stable widespread sclerotic osseous metastases., 5. Stable 3.0 cm infrarenal abdominal aortic aneurysm. Recommend follow-up imaging in 3 years., 6. Stable right lower lobe solid pulmonary nodule since 7/16/2023. No new or enlarging pulmonary nodules.

## 2025-03-14 NOTE — ED PROVIDER NOTES
Time reflects when diagnosis was documented in both MDM as applicable and the Disposition within this note       Time User Action Codes Description Comment    3/14/2025  1:08 PM Jose Rafael Hinojosa Add [R68.89] Flu-like symptoms     3/14/2025  1:08 PM Jose Rafael Hinojosa Add [K52.9] Gastroenteritis           ED Disposition       ED Disposition   Discharge    Condition   Stable    Date/Time   Fri Mar 14, 2025  1:08 PM    Comment   Miguel Angel SO Jordan discharge to home/self care.                   Assessment & Plan       Medical Decision Making  89-year-old male presents to the emergency department with complaint of nausea, vomiting, diarrhea, differential diagnosis include COVID, flu, RSV, sepsis, pneumonia, intra-abdominal infection, diverticulitis, bowel obstruction, UTI, electrolyte abnormality, will perform CT abdomen pelvis, basic labs, and reassess.    Discussed all results with the patient, and daughter, presentation likely due to gastroenteritis, will provide patient with Zofran, and he will follow-up with gastroenterology outpatient for further evaluation of his pancreatic lesions.  Strict turn precautions given.  Patient and family understand and agree with treatment plan.    Amount and/or Complexity of Data Reviewed  Labs: ordered.  Radiology: ordered.    Risk  Prescription drug management.        ED Course as of 03/14/25 1413   Fri Mar 14, 2025   1240 IMPRESSION:     1. No acute findings in the chest.     2. Fluid-filled loops of small and large bowel, likely diarrheal in etiology.     3. Stable cystic pancreatic lesions measuring up to 1.4 cm since 12/13/2023. Management recommendation: Follow-up 6 months once, then every 18 months. Currently, patient has demonstrate stability for approximately 1.5 years. Recommend next follow-up in   18 months. Endpoint determined by clinician. Preferred imaging modality: abdomen MRI and MRCP with and without IV contrast, or triple phase abdomen CT with IV contrast, or abdomen MRI and  "MRCP without IV contrast.     Management and follow up recommendations for cystic pancreatic lesions are based on Institutional consensus and international evidence-based Kyoto guidelines for the management of intraductal papillary mucinous neoplasm of the pancreas. Pancreatology 24   2024) [phone]. claudia.     4. Stable widespread sclerotic osseous metastases.     5. Stable 3.0 cm infrarenal abdominal aortic aneurysm. Recommend follow-up imaging in 3 years.     6. Stable right lower lobe solid pulmonary nodule since 7/16/2023. No new or enlarging pulmonary nodules.         Medications   sodium chloride 0.9 % bolus 1,000 mL (0 mL Intravenous Stopped 3/14/25 1140)   ondansetron (ZOFRAN) injection 4 mg (4 mg Intravenous Given 3/14/25 1006)   iohexol (OMNIPAQUE) 350 MG/ML injection (MULTI-DOSE) 100 mL (100 mL Intravenous Given 3/14/25 1049)       ED Risk Strat Scores                                                History of Present Illness       Chief Complaint   Patient presents with    Flu Symptoms     Patient presents with N/V/D since yesterday, reports \"it came out of both ends 17 times since last night\".        Past Medical History:   Diagnosis Date    A-fib (HCC)     Coronary artery disease     History of angina     Iron deficiency anemia     Prostate cancer (HCC)     Renal disorder       Past Surgical History:   Procedure Laterality Date    CORONARY ARTERY BYPASS GRAFT  1995    [place] CYSTO W/IRRIG & EVAC MULTPLE OBSTRUCTING CLOTS N/A 12/27/2023    Procedure: CYSTOSCOPY EVACUATION OF CLOTS, fulguration of bleeding. insertion 24FR [address] zuniga CBI;  Surgeon: Carson Singer MD;  Location:  MAIN OR;  Service: Urology    TONSILLECTOMY        Family History   Problem Relation Age of Onset    Hypertension Father       Social History     Tobacco Use    Smoking status: Former     Types: Cigarettes    Smokeless tobacco: Never   Vaping Use    Vaping status: Never Used   Substance Use Topics    Alcohol use: Not Currently    " Drug use: Not Currently      E-Cigarette/Vaping    E-Cigarette Use Never User       E-Cigarette/Vaping Substances    Nicotine No     THC No     CBD No     Flavoring No     Other No     Unknown No       I have reviewed and agree with the history as documented.     89-year-old male with past medical history listed below presents to the emergency department with complaint of nausea, vomiting, and diarrhea that started yesterday.  Patient has had some chills, no fevers.  Patient denies any coughing, mucus, chest pain, shortness of breath, bloody stool, bloody urine, or pain with urination.  Patient reports that there is a stomach bug going around at the nursing home.      Flu Symptoms  Presenting symptoms: diarrhea, nausea and vomiting    Presenting symptoms: no cough, no fever, no shortness of breath and no sore throat    Associated symptoms: chills    Associated symptoms: no ear pain        Review of Systems   Constitutional:  Positive for chills. Negative for fever.   HENT:  Negative for ear pain and sore throat.    Eyes:  Negative for pain and visual disturbance.   Respiratory:  Negative for cough and shortness of breath.    Cardiovascular:  Negative for chest pain and palpitations.   Gastrointestinal:  Positive for diarrhea, nausea and vomiting. Negative for abdominal pain.   Genitourinary:  Negative for dysuria and hematuria.   Musculoskeletal:  Negative for arthralgias and back pain.   Skin:  Negative for color change and rash.   Neurological:  Negative for seizures and syncope.   All other systems reviewed and are negative.          Objective       ED Triage Vitals [03/14/25 0932]   Temperature Pulse Blood Pressure Respirations SpO2 Patient Position - Orthostatic VS   98.2 °F (36.8 °C) 77 117/60 16 97 % Lying      Temp Source Heart Rate Source BP Location FiO2 (%) Pain Score    Oral Monitor Left arm -- 3      Vitals      Date and Time Temp Pulse SpO2 Resp BP Pain Score FACES Pain Rating User   03/14/25 1300 --  -- -- -- 143/60 -- --    03/14/25 1245 -- 73 96 % -- -- -- --    03/14/25 1230 -- 72 92 % -- -- -- --    03/14/25 1215 -- 72 98 % -- -- -- --    03/14/25 1200 -- 68 95 % -- -- -- --    03/14/25 1145 -- 71 93 % -- -- -- --    03/14/25 1130 -- 71 93 % -- -- -- --    03/14/25 1115 -- 72 92 % -- -- -- --    03/14/25 1100 -- 75 94 % -- 140/63 -- --    03/14/25 1030 -- 70 96 % -- 129/58 -- --    03/14/25 1015 -- 71 96 % -- 126/60 -- --    03/14/25 0945 -- 73 96 % -- 120/56 -- --    03/14/25 0932 98.2 °F (36.8 °C) 77 97 % 16 117/60 3 -- SBE            Physical Exam  Vitals and nursing note reviewed.   Constitutional:       General: He is not in acute distress.     Appearance: He is well-developed.   HENT:      Head: Normocephalic and atraumatic.   Eyes:      Conjunctiva/sclera: Conjunctivae normal.   Cardiovascular:      Rate and Rhythm: Normal rate and regular rhythm.   Pulmonary:      Effort: Pulmonary effort is normal. No respiratory distress.      Breath sounds: Normal breath sounds.   Abdominal:      Palpations: Abdomen is soft.      Tenderness: There is no abdominal tenderness.   Musculoskeletal:         General: No swelling.      Cervical back: Neck supple.   Skin:     General: Skin is warm and dry.      Capillary Refill: Capillary refill takes less than 2 seconds.   Neurological:      General: No focal deficit present.      Mental Status: He is alert and oriented to person, place, and time.      Cranial Nerves: No cranial nerve deficit.      Sensory: No sensory deficit.      Motor: No weakness.      Coordination: Coordination normal.   Psychiatric:         Mood and Affect: Mood normal.         Results Reviewed       Procedure Component Value Units Date/Time    HS Troponin I 2hr [[id number]]  (Normal) Collected: 03/14/25 1140    Lab Status: Final result Specimen: Blood from Arm, Left Updated: 03/14/25 1205     hs TnI 2hr 32 ng/L      Delta 2hr hsTnI 10 ng/L     HS Troponin I 4hr [[id number]]      Lab Status: No result Specimen: Blood     Procalcitonin [[id number]]  (Normal) Collected: 03/14/25 1002    Lab Status: Final result Specimen: Blood from Arm, Left Updated: 03/14/25 1039     Procalcitonin 0.06 ng/ml     HS Troponin 0hr (reflex protocol) [[id number]]  (Normal) Collected: 03/14/25 1002    Lab Status: Final result Specimen: Blood from Arm, Left Updated: 03/14/25 1038     hs TnI 0hr 22 ng/L     B-Type Natriuretic Peptide(BNP) [[id number]]  (Abnormal) Collected: 03/14/25 1002    Lab Status: Final result Specimen: Blood from Arm, Left Updated: 03/14/25 1036      pg/mL     FLU/COVID Rapid Antigen (30 min. TAT) - Preferred screening test in ED [[id number]]  (Normal) Collected: 03/14/25 1002    Lab Status: Final result Specimen: Nares from Nose Updated: 03/14/25 1032     SARS COV Rapid Antigen Negative     Influenza A Rapid Antigen Negative     Influenza B Rapid Antigen Negative    Narrative:      This test has been performed using the Quidel Giovanna 2 FLU+SARS Antigen test under the Emergency Use Authorization (EUA). This test has been validated by the  and verified by the performing laboratory. The Giovanna uses lateral flow immunofluorescent sandwich assay to detect SARS-COV, Influenza A and Influenza B Antigen.     The Quidel Giovanna 2 SARS Antigen test does not differentiate between SARS-CoV and SARS-CoV-2.     Negative results are presumptive and may be confirmed with a molecular assay, if necessary, for patient management. Negative results do not rule out SARS-CoV-2 or influenza infection and should not be used as the sole basis for treatment or patient management decisions. A negative test result may occur if the level of antigen in a sample is below the limit of detection of this test.     Positive results are indicative of the presence of viral antigens, but do not rule out bacterial infection or co-infection with other viruses.     All test results should be used as an adjunct to  clinical observations and other information available to the provider.    FOR PEDIATRIC PATIENTS - copy/paste COVID Guidelines URL to browser: [email or web address]    Protime-INR [[id number]]  (Normal) Collected: 03/14/25 1002    Lab Status: Final result Specimen: Blood from Arm, Left Updated: 03/14/25 1031     Protime 13.7 seconds      INR 1.01    Narrative:      INR Therapeutic Range    Indication                                             INR Range      Atrial Fibrillation                                               2.0-3.0  Hypercoagulable State                                    2.0.2.3  Left Ventricular Asist Device                            2.0-3.0  Mechanical Heart Valve                                  -    Aortic(with afib, MI, embolism, HF, LA enlargement,    and/or coagulopathy)                                     2.0-3.0 (2.5-3.5)     Mitral                                                             2.5-3.5  Prosthetic/Bioprosthetic Heart Valve               2.0-3.0  Venous thromboembolism (VTE: VT, PE        2.0-3.0    APTT [[id number]]  (Normal) Collected: 03/14/25 1002    Lab Status: Final result Specimen: Blood from Arm, Left Updated: 03/14/25 1031     PTT 31 seconds     Comprehensive metabolic panel [[id number]]  (Abnormal) Collected: 03/14/25 1002    Lab Status: Final result Specimen: Blood from Arm, Left Updated: 03/14/25 1030     Sodium 130 mmol/L      Potassium 4.9 mmol/L      Chloride 100 mmol/L      CO2 23 mmol/L      ANION GAP 7 mmol/L      BUN 30 mg/dL      Creatinine 1.15 mg/dL      Glucose 110 mg/dL      Calcium 8.2 mg/dL      AST 27 U/L      ALT 11 U/L      Alkaline Phosphatase 186 U/L      Total Protein 7.0 g/dL      Albumin 4.0 g/dL      Total Bilirubin 0.61 mg/dL      eGFR 53 ml/min/1.73sq m     Narrative:      National Kidney Disease Foundation guidelines for Chronic Kidney Disease (CKD):     Stage 1 with normal or high GFR (GFR  > 90 mL/min/1.73 square meters)    Stage 2 Mild CKD (GFR = 60-89 mL/min/1.73 square meters)    Stage 3A Moderate CKD (GFR = 45-59 mL/min/1.73 square meters)    Stage 3B Moderate CKD (GFR = 30-44 mL/min/1.73 square meters)    Stage 4 Severe CKD (GFR = 15-29 mL/min/1.73 square meters)    Stage 5 End Stage CKD (GFR <15 mL/min/1.73 square meters)  Note: GFR calculation is accurate only with a steady state creatinine    Lactic acid [[id number]]  (Normal) Collected: 03/14/25 1002    Lab Status: Final result Specimen: Blood from Arm, Left Updated: 03/14/25 1030     LACTIC ACID 0.9 mmol/L     Narrative:      Result may be elevated if tourniquet was used during collection.    Lipase [[id number]]  (Normal) Collected: 03/14/25 1002    Lab Status: Final result Specimen: Blood from Arm, Left Updated: 03/14/25 1030     Lipase 28 u/L     Magnesium [[id number]]  (Normal) Collected: 03/14/25 1002    Lab Status: Final result Specimen: Blood from Arm, Left Updated: 03/14/25 1030     Magnesium 2.0 mg/dL     Blood culture #1 [[id number]] Collected: 03/14/25 1002    Lab Status: In process Specimen: Blood from Arm, Left Updated: 03/14/25 1016    Blood culture #2 [[id number]] Collected: 03/14/25 1002    Lab Status: In process Specimen: Blood from Arm, Left Updated: 03/14/25 1016    CBC and differential [[id number]]  (Abnormal) Collected: 03/14/25 1002    Lab Status: Final result Specimen: Blood from Arm, Left Updated: 03/14/25 1014     WBC 5.23 Thousand/uL      RBC 3.35 Million/uL      Hemoglobin 10.9 g/dL      Hematocrit 31.9 %      MCV 95 fL      MCH 32.5 pg      MCHC 34.2 g/dL      RDW 13.6 %      MPV 8.3 fL      Platelets 199 Thousands/uL      nRBC 0 /100 WBCs      Segmented % 77 %      Immature Grans % 1 %      Lymphocytes % 6 %      Monocytes % 16 %      Eosinophils Relative 0 %      Basophils Relative 0 %      Absolute Neutrophils 4.04 Thousands/µL      Absolute Immature Grans 0.03 Thousand/uL      Absolute Lymphocytes 0.32 Thousands/µL       Absolute Monocytes 0.81 Thousand/µL      Eosinophils Absolute 0.01 Thousand/µL      Basophils Absolute 0.02 Thousands/µL     UA w Reflex to Microscopic w Reflex to Culture [[id number]]     Lab Status: No result Specimen: Urine             CT chest abdomen pelvis w contrast   Final Interpretation by Henrietta Hobbs MD (03/14 1215)      1. No acute findings in the chest.      2. Fluid-filled loops of small and large bowel, likely diarrheal in etiology.      3. Stable cystic pancreatic lesions measuring up to 1.4 cm since 12/13/2023. Management recommendation: Follow-up 6 months once, then every 18 months. Currently, patient has demonstrate stability for approximately 1.5 years. Recommend next follow-up in    18 months. Endpoint determined by clinician. Preferred imaging modality: abdomen MRI and MRCP with and without IV contrast, or triple phase abdomen CT with IV contrast, or abdomen MRI and MRCP without IV contrast.      Management and follow up recommendations for cystic pancreatic lesions are based on Institutional consensus and international evidence-based Kyoto guidelines for the management of intraductal papillary mucinous neoplasm of the pancreas. Pancreatology 24    (2024) [phone]. claudia.      4. Stable widespread sclerotic osseous metastases.      5. Stable 3.0 cm infrarenal abdominal aortic aneurysm. Recommend follow-up imaging in 3 years.      6. Stable right lower lobe solid pulmonary nodule since 7/16/2023. No new or enlarging pulmonary nodules.      The study was marked in EPIC for immediate notification.         Workstation performed: [id number]             Procedures    ED Medication and Procedure Management   Prior to Admission Medications   Prescriptions Last Dose Informant Patient Reported? Taking?   HealthyLax 17 g packet   Yes No   Multiple Vitamins-Minerals (PreserVision AREDS) CAPS   Yes No   Sig: Take 1 capsule by mouth daily   Neomycin-Bacitracin-Polymyxin (GNP Triple Antibiotic) OINT    Yes No   Patient not taking: Reported on 12/9/2024   Refresh Tears 0.5 % SOLN   Yes No   SSD 1 % cream   Yes No   Saline GEL   Yes No   Sig: into each nostril   Vibegron 75 MG TABS   No No   Sig: Take 75 mg by mouth every other day   acetaminophen (TYLENOL) 325 mg tablet   No No   Sig: Take 2 tablets (650 mg total) by mouth every 6 (six) hours as needed for mild pain   aluminum-magnesium hydroxide-simethicone (Antacid) 2448-0260-442 mg/30 mL suspension   Yes No   Sig: Take by mouth   bisacodyl (Dulcolax) 10 mg suppository   Yes No   Sig: Insert 10 mg into the rectum daily as needed for constipation   clopidogrel (PLAVIX) 75 mg tablet   Yes No   Sig: Take 75 mg by mouth daily   cyanocobalamin (VITAMIN B-12) 1000 MCG tablet   No No   Sig: Take 1 tablet (1,000 mcg total) by mouth daily   digoxin (Digox) 0.125 mg tablet   Yes No   Sig: Take 125 mcg by mouth every other day   dronedarone (Multaq) 400 mg tablet   Yes No   Sig: Take 400 mg by mouth 2 (two) times a day with meals   folic acid (FOLVITE) 1 mg tablet   Yes No   Sig: Take 1 tablet by mouth daily   loperamide (IMODIUM) 2 mg capsule   Yes No   Sig: Take 1 capsule by mouth 4 (four) times a day as needed   melatonin 3 mg   No No   Sig: Take 1 tablet (3 mg total) by mouth daily at bedtime as needed (insomnia)   meloxicam (MOBIC) 15 mg tablet   Yes No   midodrine (PROAMATINE) 5 mg tablet   No No   Sig: Take 0.5 tablets (2.5 mg total) by mouth 2 (two) times a day Only give if sbp <120   ondansetron (ZOFRAN) 4 mg tablet   Yes No   pantoprazole (PROTONIX) 40 mg tablet   Yes No   Sig: Take 40 mg by mouth daily   primidone (MYSOLINE) 50 mg tablet   Yes No   Sig: Take 50 mg by mouth every 12 (twelve) hours   senna-docusate sodium (SENOKOT S) 8.6-50 mg per tablet   No No   Sig: Take 1 tablet by mouth 2 (two) times a day   Patient taking differently: Take 1 tablet by mouth daily as needed for constipation   tamsulosin (FLOMAX) 0.4 mg   No No   Sig: Take 1 capsule (0.4 mg  total) by mouth daily with dinner      Facility-Administered Medications: None     Discharge Medication List as of 3/14/2025  1:09 PM        START taking these medications    Details   !! ondansetron (ZOFRAN) 4 mg tablet Take 1 tablet (4 mg total) by mouth every 6 (six) hours, Starting Fri 3/14/2025, Normal       !! - Potential duplicate medications found. Please discuss with provider.        CONTINUE these medications which have NOT CHANGED    Details   acetaminophen (TYLENOL) 325 mg tablet Take 2 tablets (650 mg total) by mouth every 6 (six) hours as needed for mild pain, Starting Mon 6/17/2024, No Print      aluminum-magnesium hydroxide-simethicone (Antacid) 6113-8321-731 mg/30 mL suspension Take by mouth, Historical Med      bisacodyl (Dulcolax) 10 mg suppository Insert 10 mg into the rectum daily as needed for constipation, Historical Med      clopidogrel (PLAVIX) 75 mg tablet Take 75 mg by mouth daily, Historical Med      cyanocobalamin (VITAMIN B-12) 1000 MCG tablet Take 1 tablet (1,000 mcg total) by mouth daily, Starting Fri 1/5/2024, Print      digoxin (Digox) 0.125 mg tablet Take 125 mcg by mouth every other day, Historical Med      dronedarone (Multaq) 400 mg tablet Take 400 mg by mouth 2 (two) times a day with meals, Historical Med      folic acid (FOLVITE) 1 mg tablet Take 1 tablet by mouth daily, Historical Med      HealthyLax 17 g packet Historical Med      loperamide (IMODIUM) 2 mg capsule Take 1 capsule by mouth 4 (four) times a day as needed, Historical Med      melatonin 3 mg Take 1 tablet (3 mg total) by mouth daily at bedtime as needed (insomnia), Starting u 1/4/2024, Print      meloxicam (MOBIC) 15 mg tablet Historical Med      midodrine (PROAMATINE) 5 mg tablet Take 0.5 tablets (2.5 mg total) by mouth 2 (two) times a day Only give if sbp <120, Starting Mon 6/17/2024, Until Mon 12/9/2024, Print      Multiple Vitamins-Minerals (PreserVision AREDS) CAPS Take 1 capsule by mouth daily, Historical  Med      Neomycin-Bacitracin-Polymyxin (GNP Triple Antibiotic) OINT Historical Med      !! ondansetron (ZOFRAN) 4 mg tablet Historical Med      pantoprazole (PROTONIX) 40 mg tablet Take 40 mg by mouth daily, Historical Med      primidone (MYSOLINE) 50 mg tablet Take 50 mg by mouth every 12 (twelve) hours, Historical Med      Refresh Tears 0.5 % SOLN Historical Med      Saline GEL into each nostril, Historical Med      senna-docusate sodium (SENOKOT S) 8.6-50 mg per tablet Take 1 tablet by mouth 2 (two) times a day, Starting Thu 1/4/2024, Until Wed 10/23/2024, Print      SSD 1 % cream Historical Med      tamsulosin (FLOMAX) 0.4 mg Take 1 capsule (0.4 mg total) by mouth daily with dinner, Starting Mon 3/11/2024, Normal      Vibegron 75 MG TABS Take 75 mg by mouth every other day, Starting Mon 6/17/2024, No Print       !! - Potential duplicate medications found. Please discuss with provider.          ED SEPSIS DOCUMENTATION   Time reflects when diagnosis was documented in both MDM as applicable and the Disposition within this note       Time User Action Codes Description Comment    3/14/2025  1:08 PM Jose Rafael Hinojosa [R68.89] Flu-like symptoms     3/14/2025  1:08 PM Jose Rafael Hinojosa [K52.9] Gastroenteritis                  Jose Rafael Hinojosa DO  03/14/25 1418

## 2025-03-16 LAB
BACTERIA BLD CULT: NORMAL
BACTERIA BLD CULT: NORMAL

## 2025-03-18 ENCOUNTER — TELEPHONE (OUTPATIENT)
Age: OVER 89
End: 2025-03-18

## 2025-03-19 LAB
BACTERIA BLD CULT: NORMAL
BACTERIA BLD CULT: NORMAL

## 2025-03-21 ENCOUNTER — APPOINTMENT (EMERGENCY)
Dept: CT IMAGING | Facility: HOSPITAL | Age: OVER 89
End: 2025-03-21
Payer: MEDICARE

## 2025-03-21 ENCOUNTER — HOSPITAL ENCOUNTER (EMERGENCY)
Facility: HOSPITAL | Age: OVER 89
Discharge: HOME | End: 2025-03-21
Attending: STUDENT IN AN ORGANIZED HEALTH CARE EDUCATION/TRAINING PROGRAM
Payer: MEDICARE

## 2025-03-21 VITALS
TEMPERATURE: 98.2 F | SYSTOLIC BLOOD PRESSURE: 131 MMHG | BODY MASS INDEX: 25.37 KG/M2 | HEART RATE: 68 BPM | OXYGEN SATURATION: 98 % | RESPIRATION RATE: 18 BRPM | DIASTOLIC BLOOD PRESSURE: 60 MMHG | HEIGHT: 71 IN | WEIGHT: 181.22 LBS

## 2025-03-21 DIAGNOSIS — K59.00 CONSTIPATION, UNSPECIFIED CONSTIPATION TYPE: Primary | ICD-10-CM

## 2025-03-21 LAB
ANION GAP SERPL CALCULATED.3IONS-SCNC: 7 MMOL/L (ref 4–13)
BACTERIA UR QL AUTO: NORMAL /HPF
BASOPHILS # BLD AUTO: 0.02 THOUSANDS/ÂΜL (ref 0–0.1)
BASOPHILS NFR BLD AUTO: 0 % (ref 0–1)
BILIRUB UR QL STRIP: NEGATIVE
BUN SERPL-MCNC: 16 MG/DL (ref 5–25)
CALCIUM SERPL-MCNC: 8.4 MG/DL (ref 8.4–10.2)
CHLORIDE SERPL-SCNC: 101 MMOL/L (ref 96–108)
CLARITY UR: CLEAR
CO2 SERPL-SCNC: 23 MMOL/L (ref 21–32)
COLOR UR: YELLOW
CREAT SERPL-MCNC: 1.07 MG/DL (ref 0.6–1.3)
EOSINOPHIL # BLD AUTO: 0.04 THOUSAND/ÂΜL (ref 0–0.61)
EOSINOPHIL NFR BLD AUTO: 1 % (ref 0–6)
ERYTHROCYTE [DISTWIDTH] IN BLOOD BY AUTOMATED COUNT: 13.2 % (ref 11.6–15.1)
GFR SERPL CREATININE-BSD FRML MDRD: 57 ML/MIN/1.73SQ M
GLUCOSE SERPL-MCNC: 102 MG/DL (ref 65–140)
GLUCOSE UR STRIP-MCNC: NEGATIVE MG/DL
HCT VFR BLD AUTO: 30.3 % (ref 36.5–49.3)
HGB BLD-MCNC: 10.3 G/DL (ref 12–17)
HGB UR QL STRIP.AUTO: ABNORMAL
IMM GRANULOCYTES # BLD AUTO: 0.03 THOUSAND/UL (ref 0–0.2)
IMM GRANULOCYTES NFR BLD AUTO: 1 % (ref 0–2)
KETONES UR STRIP-MCNC: NEGATIVE MG/DL
LACTATE SERPL-SCNC: 1.2 MMOL/L (ref 0.5–2)
LEUKOCYTE ESTERASE UR QL STRIP: NEGATIVE
LYMPHOCYTES # BLD AUTO: 0.96 THOUSANDS/ÂΜL (ref 0.6–4.47)
LYMPHOCYTES NFR BLD AUTO: 17 % (ref 14–44)
MAGNESIUM SERPL-MCNC: 1.9 MG/DL (ref 1.9–2.7)
MCH RBC QN AUTO: 32.1 PG (ref 26.8–34.3)
MCHC RBC AUTO-ENTMCNC: 34 G/DL (ref 31.4–37.4)
MCV RBC AUTO: 94 FL (ref 82–98)
MONOCYTES # BLD AUTO: 0.57 THOUSAND/ÂΜL (ref 0.17–1.22)
MONOCYTES NFR BLD AUTO: 10 % (ref 4–12)
NEUTROPHILS # BLD AUTO: 3.97 THOUSANDS/ÂΜL (ref 1.85–7.62)
NEUTS SEG NFR BLD AUTO: 71 % (ref 43–75)
NITRITE UR QL STRIP: NEGATIVE
NON-SQ EPI CELLS URNS QL MICRO: NORMAL /HPF
NRBC BLD AUTO-RTO: 0 /100 WBCS
PH UR STRIP.AUTO: 6 [PH]
PLATELET # BLD AUTO: 189 THOUSANDS/UL (ref 149–390)
PMV BLD AUTO: 8.1 FL (ref 8.9–12.7)
POTASSIUM SERPL-SCNC: 4.4 MMOL/L (ref 3.5–5.3)
PROT UR STRIP-MCNC: NEGATIVE MG/DL
RBC # BLD AUTO: 3.21 MILLION/UL (ref 3.88–5.62)
RBC #/AREA URNS AUTO: NORMAL /HPF
SODIUM SERPL-SCNC: 131 MMOL/L (ref 135–147)
SP GR UR STRIP.AUTO: <=1.005 (ref 1–1.03)
UROBILINOGEN UR QL STRIP.AUTO: 0.2 E.U./DL
WBC # BLD AUTO: 5.59 THOUSAND/UL (ref 4.31–10.16)
WBC #/AREA URNS AUTO: NORMAL /HPF

## 2025-03-21 PROCEDURE — 96365 THER/PROPH/DIAG IV INF INIT: CPT

## 2025-03-21 PROCEDURE — 81001 URINALYSIS AUTO W/SCOPE: CPT | Performed by: STUDENT IN AN ORGANIZED HEALTH CARE EDUCATION/TRAINING PROGRAM

## 2025-03-21 PROCEDURE — 85025 COMPLETE CBC W/AUTO DIFF WBC: CPT | Performed by: STUDENT IN AN ORGANIZED HEALTH CARE EDUCATION/TRAINING PROGRAM

## 2025-03-21 PROCEDURE — 36415 COLL VENOUS BLD VENIPUNCTURE: CPT | Performed by: STUDENT IN AN ORGANIZED HEALTH CARE EDUCATION/TRAINING PROGRAM

## 2025-03-21 PROCEDURE — 96366 THER/PROPH/DIAG IV INF ADDON: CPT

## 2025-03-21 PROCEDURE — 83735 ASSAY OF MAGNESIUM: CPT | Performed by: STUDENT IN AN ORGANIZED HEALTH CARE EDUCATION/TRAINING PROGRAM

## 2025-03-21 PROCEDURE — 99285 EMERGENCY DEPT VISIT HI MDM: CPT | Performed by: STUDENT IN AN ORGANIZED HEALTH CARE EDUCATION/TRAINING PROGRAM

## 2025-03-21 PROCEDURE — 74177 CT ABD & PELVIS W/CONTRAST: CPT

## 2025-03-21 PROCEDURE — 83605 ASSAY OF LACTIC ACID: CPT | Performed by: STUDENT IN AN ORGANIZED HEALTH CARE EDUCATION/TRAINING PROGRAM

## 2025-03-21 PROCEDURE — 80048 BASIC METABOLIC PNL TOTAL CA: CPT | Performed by: STUDENT IN AN ORGANIZED HEALTH CARE EDUCATION/TRAINING PROGRAM

## 2025-03-21 PROCEDURE — 99284 EMERGENCY DEPT VISIT MOD MDM: CPT

## 2025-03-21 RX ORDER — SODIUM CHLORIDE, SODIUM GLUCONATE, SODIUM ACETATE, POTASSIUM CHLORIDE, MAGNESIUM CHLORIDE, SODIUM PHOSPHATE, DIBASIC, AND POTASSIUM PHOSPHATE .53; .5; .37; .037; .03; .012; .00082 G/100ML; G/100ML; G/100ML; G/100ML; G/100ML; G/100ML; G/100ML
1000 INJECTION, SOLUTION INTRAVENOUS ONCE
Status: COMPLETED | OUTPATIENT
Start: 2025-03-21 | End: 2025-03-21

## 2025-03-21 RX ORDER — SODIUM PHOSPHATE,MONO-DIBASIC 19G-7G/118
1 ENEMA (ML) RECTAL ONCE
Status: COMPLETED | OUTPATIENT
Start: 2025-03-21 | End: 2025-03-21

## 2025-03-21 RX ADMIN — SODIUM CHLORIDE, SODIUM GLUCONATE, SODIUM ACETATE, POTASSIUM CHLORIDE, MAGNESIUM CHLORIDE, SODIUM PHOSPHATE, DIBASIC, AND POTASSIUM PHOSPHATE 1000 ML: .53; .5; .37; .037; .03; .012; .00082 INJECTION, SOLUTION INTRAVENOUS at 11:47

## 2025-03-21 RX ADMIN — IOHEXOL 100 ML: 350 INJECTION, SOLUTION INTRAVENOUS at 12:22

## 2025-03-21 RX ADMIN — SODIUM PHOSPHATE 1 ENEMA: 7; 19 ENEMA RECTAL at 13:51

## 2025-03-21 NOTE — DISCHARGE INSTRUCTIONS
Continue all prescribed medications.    Take Miralax (one cap full) twice daily along with Colace/Senokot.     Plenty of clear/hydrating fluids over the next few days.  Return to the emergency department for any concerning signs or symptoms.

## 2025-05-28 ENCOUNTER — NURSE TRIAGE (OUTPATIENT)
Age: OVER 89
End: 2025-05-28

## 2025-05-28 NOTE — TELEPHONE ENCOUNTER
"Answer Assessment - Initial Assessment Questions  1. REASON FOR CALL: \"What is the main reason for your call?\" or \"How can I best help you?\"          Patient's friend called in to report patient obtained lab work from his PCP which included his PSA and results at a level of 8. He had this done at [facility]. I called the lab and they are faxing over the results to the office. She would like to know what the next steps are. Please review and advise.      # [phone]    Protocols used: Information Only Call - No Triage-Adult-[place]    "

## 2025-05-28 NOTE — TELEPHONE ENCOUNTER
Patient known to Dr Ji with prostate cancer metastatic to bone. Diagnosed approximately 2004 with PSA of 15.6. S/P XRT.   Last seen October 2024.  During this last visit, he was noted to have elevated PSA and was ordered to repeat this in March 2025.      According to these last office visit notes, Dr Ji would make recommendations on either androgen deprivation, repeat PET and next steps once the repeat psa value is confirmed.  We will await for these results.    Psa Trajectory  10/14/24-- 3.574  2/13/24-- 1.72  1/9/24-- 1.58

## 2025-05-29 NOTE — TELEPHONE ENCOUNTER
Noted, appreciate your help as this would be best discussed in person with primary urologist.  This will allow a more thorough discussion and ability to address all questions.

## 2025-05-29 NOTE — TELEPHONE ENCOUNTER
Spoke with Elizabeth, relayed message from Dr. Ji and confirmed appt 6/19/25. Nothing else needed at this time.

## 2025-06-09 PROBLEM — R97.21 RISING PSA FOLLOWING TREATMENT FOR MALIGNANT NEOPLASM OF PROSTATE: Status: ACTIVE | Noted: 2025-06-09

## 2025-06-09 PROBLEM — N28.1 RENAL CYST: Status: ACTIVE | Noted: 2025-06-09

## 2025-06-09 NOTE — PROGRESS NOTES
UROLOGY PROGRESS NOTE         NAME: Miguel Angel Ortega  AGE: 89 y.o. SEX: male  : 1928   MRN: [id number]    DATE: 2025  TIME: 10:48 AM    Assessment and Plan   Procedures     Impression:   1. Rising PSA following treatment for malignant neoplasm of prostate  2. Benign prostatic hyperplasia with urinary retention  3. Hypotonic bladder  4. History of urinary retention  5. Radiation cystitis  6. History of gross hematuria  7. Prostate cancer metastatic to bone (HCC)  8. Renal cyst       Plan: Patient, his daughter and myself had a long conversation regarding rising PSA with history of prostate cancer metastatic.  We talked about observation which is reasonable at his age.  We talked about Lupron therapy is trying to stabilize his PSA and disease.  He understands that it can weaken his bone and cause hot flashes but he has had it in the past and is tolerated very well.  We also discussed a course of medical oncology evaluation with measures to take to reduce pathological fractures because of his metastatic disease and other clinical medical options for stage D prostate cancer.    Patient would like to do the 6-month Lupron here today and see what his PSA is doing in September and if he would develop castrate resistant prostate cancer he would consider medical oncology opinion.    Regarding his voiding pattern he is doing well with Flomax and every other day Gemtesa with a PVR  290 cc and no bothersome irritative or obstructive voiding complaints.      Chief Complaint   No chief complaint on file.    History of Present Illness     HPI: Miguel Angel Ortega is a 89 y.o. year old male who presents with follow-up office visit 10/23/2024.  Patient with a history of prostate cancer status post radiation therapy 20 years ago.    Patient's PSA on 2024 was 1.72.  It had then gone up to 3.5 and we discussed the options of Lupron but the patient elected for observation.  His PSA now is 8.3.  And will discuss about  Lupron again.  He has incomplete emptying and is on timed voiding double void using daily Flomax and every other day Gemtesa.  We discussed other ways of managing his incomplete emptying with intermittent cath, Ascencio, SP tube but we are trying to stay as conservative as possible given his age.    Recently patient saw GI on 3/26/2025 for chronic constipation issues.  Recent imaging while in the ER on 3/21/2025 showed bladder wall thickening.  Some right upper tract renal collecting system fullness but no obstruction.  He had renal cyst that were large and simple.  Also showed widespread osseous metastatic disease with pathological fractures in the pelvis similar to other studies.        The following portions of the patient's history were reviewed and updated as appropriate: allergies, current medications, past family history, past medical history, past social history, past surgical history and problem list.  Past Medical History[1]  Past Surgical History[2]  shoulder  Review of Systems     Const: Denies chills, fever and weight loss.  CV: Denies chest pain.  Resp: Denies SOB.  GI: Denies abdominal pain, nausea and vomiting.  : Denies symptoms other than stated above.  Musculo: Denies back pain.    Objective   There were no vitals taken for this visit.    Physical Exam  Const: Appears healthy and well developed. No signs of acute distress present.  Resp: Respirations are regular and unlabored.   CV: Rate is regular. Rhythm is regular.  Abdomen: Abdomen is soft, nontender, and nondistended. Kidneys are not palpable.  : There was some mild left lower rib pain to palpation normal external genitalia exam no evidence of balanitis or phimosis.  Psych: Patient's attitude is cooperative. Mood is normal. Affect is normal.    Current Medications   Current Medications[3]        Cb Ji MD             [1]   Past Medical History:  Diagnosis Date    A-fib (HCC)     Coronary artery disease     History of angina     Iron  deficiency anemia     Prostate cancer (HCC)     Renal disorder    [2]   Past Surgical History:  Procedure Laterality Date    CORONARY ARTERY BYPASS GRAFT  1995    AR CYSTO W/IRRIG & EVAC MULTPLE OBSTRUCTING CLOTS N/A 12/27/2023    Procedure: CYSTOSCOPY EVACUATION OF CLOTS, fulguration of bleeding. insertion 24FR [address] zuniga CBI;  Surgeon: Carson Singer MD;  Location:  MAIN OR;  Service: Urology    TONSILLECTOMY     [3]   Current Outpatient Medications:     acetaminophen (TYLENOL) 325 mg tablet, Take 2 tablets (650 mg total) by mouth every 6 (six) hours as needed for mild pain, Disp: , Rfl:     aluminum-magnesium hydroxide-simethicone (Antacid) 2442-5082-106 mg/30 mL suspension, Take by mouth, Disp: , Rfl:     bisacodyl (Dulcolax) 10 mg suppository, Insert 10 mg into the rectum daily as needed for constipation, Disp: , Rfl:     clopidogrel (PLAVIX) 75 mg tablet, Take 75 mg by mouth daily, Disp: , Rfl:     cyanocobalamin (VITAMIN B-12) 1000 MCG tablet, Take 1 tablet (1,000 mcg total) by mouth daily, Disp: 30 tablet, Rfl: 0    digoxin (Digox) 0.125 mg tablet, Take 125 mcg by mouth every other day, Disp: , Rfl:     dronedarone (Multaq) 400 mg tablet, Take 400 mg by mouth 2 (two) times a day with meals, Disp: , Rfl:     folic acid (FOLVITE) 1 mg tablet, Take 1 tablet by mouth daily, Disp: , Rfl:     HealthyLax 17 g packet, , Disp: , Rfl:     loperamide (IMODIUM) 2 mg capsule, Take 1 capsule by mouth 4 (four) times a day as needed, Disp: , Rfl:     melatonin 3 mg, Take 1 tablet (3 mg total) by mouth daily at bedtime as needed (insomnia), Disp: 30 tablet, Rfl: 0    meloxicam (MOBIC) 15 mg tablet, , Disp: , Rfl:     midodrine (PROAMATINE) 5 mg tablet, Take 0.5 tablets (2.5 mg total) by mouth 2 (two) times a day Only give if sbp <120, Disp: 30 tablet, Rfl: 0    Multiple Vitamins-Minerals (PreserVision AREDS) CAPS, Take 1 capsule by mouth daily, Disp: , Rfl:     Neomycin-Bacitracin-Polymyxin (GNP Triple Antibiotic) OINT, ,  Disp: , Rfl:     ondansetron (ZOFRAN) 4 mg tablet, , Disp: , Rfl:     ondansetron (ZOFRAN) 4 mg tablet, Take 1 tablet (4 mg total) by mouth every 6 (six) hours, Disp: 12 tablet, Rfl: 0    pantoprazole (PROTONIX) 40 mg tablet, Take 40 mg by mouth daily, Disp: , Rfl:     primidone (MYSOLINE) 50 mg tablet, Take 50 mg by mouth every 12 (twelve) hours, Disp: , Rfl:     Refresh Tears 0.5 % SOLN, , Disp: , Rfl:     Saline GEL, into each nostril, Disp: , Rfl:     senna-docusate sodium (SENOKOT S) 8.6-50 mg per tablet, Take 1 tablet by mouth 2 (two) times a day (Patient taking differently: Take 1 tablet by mouth daily as needed for constipation), Disp: 60 tablet, Rfl: 0    SSD 1 % cream, , Disp: , Rfl:     tamsulosin (FLOMAX) 0.4 mg, Take 1 capsule (0.4 mg total) by mouth daily with dinner, Disp: 90 capsule, Rfl: 3    Vibegron 75 MG TABS, Take 75 mg by mouth every other day, Disp: , Rfl:

## 2025-06-16 RX ORDER — BICALUTAMIDE 50 MG/1
50 TABLET, FILM COATED ORAL
COMMUNITY

## 2025-06-16 RX ORDER — ALENDRONATE SODIUM 70 MG/1
70 TABLET ORAL
COMMUNITY

## 2025-06-16 RX ORDER — FLUDROCORTISONE ACETATE 0.1 MG/1
TABLET ORAL
COMMUNITY

## 2025-06-16 RX ORDER — MIDODRINE HYDROCHLORIDE 2.5 MG/1
TABLET ORAL
COMMUNITY
Start: 2025-04-19

## 2025-06-16 RX ORDER — GLUC/MSM/COLGN2/HYAL/ANTIARTH3 375-375-20
TABLET ORAL
COMMUNITY

## 2025-06-19 ENCOUNTER — CLINICAL SUPPORT (OUTPATIENT)
Dept: UROLOGY | Facility: CLINIC | Age: OVER 89
End: 2025-06-19
Payer: MEDICARE

## 2025-06-19 VITALS
BODY MASS INDEX: 25.03 KG/M2 | HEART RATE: 63 BPM | SYSTOLIC BLOOD PRESSURE: 132 MMHG | TEMPERATURE: 97.6 F | OXYGEN SATURATION: 98 % | DIASTOLIC BLOOD PRESSURE: 64 MMHG | HEIGHT: 71 IN | WEIGHT: 178.8 LBS

## 2025-06-19 DIAGNOSIS — Z87.898 HISTORY OF GROSS HEMATURIA: ICD-10-CM

## 2025-06-19 DIAGNOSIS — Z87.898 HISTORY OF URINARY RETENTION: ICD-10-CM

## 2025-06-19 DIAGNOSIS — C61 PROSTATE CANCER METASTATIC TO BONE (HCC): ICD-10-CM

## 2025-06-19 DIAGNOSIS — R97.21 RISING PSA FOLLOWING TREATMENT FOR MALIGNANT NEOPLASM OF PROSTATE: Primary | ICD-10-CM

## 2025-06-19 DIAGNOSIS — R33.8 BENIGN PROSTATIC HYPERPLASIA WITH URINARY RETENTION: ICD-10-CM

## 2025-06-19 DIAGNOSIS — N40.1 BENIGN PROSTATIC HYPERPLASIA WITH URINARY RETENTION: ICD-10-CM

## 2025-06-19 DIAGNOSIS — N28.1 RENAL CYST: ICD-10-CM

## 2025-06-19 DIAGNOSIS — N30.40 RADIATION CYSTITIS: ICD-10-CM

## 2025-06-19 DIAGNOSIS — N31.2 HYPOTONIC BLADDER: ICD-10-CM

## 2025-06-19 DIAGNOSIS — C79.51 PROSTATE CANCER METASTATIC TO BONE (HCC): ICD-10-CM

## 2025-06-19 LAB — POST-VOID RESIDUAL VOLUME, ML POC: 270 ML

## 2025-06-19 PROCEDURE — 99214 OFFICE O/P EST MOD 30 MIN: CPT | Performed by: UROLOGY

## 2025-06-19 PROCEDURE — 96402 CHEMO HORMON ANTINEOPL SQ/IM: CPT

## 2025-06-19 PROCEDURE — 51798 US URINE CAPACITY MEASURE: CPT | Performed by: UROLOGY

## (undated) DEVICE — STERILE SURGICAL LUBRICANT,  TUBE: Brand: SURGILUBE

## (undated) DEVICE — URO CATCHER BAG STERILE 0-UC32

## (undated) DEVICE — DISPOSABLE OR TOWEL: Brand: CARDINAL HEALTH

## (undated) DEVICE — CHLORHEXIDINE 4PCT 4 OZ

## (undated) DEVICE — TUBING SUCTION 5MM X 12 FT

## (undated) DEVICE — BAG URINE DRAINAGE 4000ML CONTINUOUS IRR

## (undated) DEVICE — GLOVE SRG BIOGEL 7

## (undated) DEVICE — BASIC SINGLE BASIN 2-LF: Brand: MEDLINE INDUSTRIES, INC.

## (undated) DEVICE — SCD SEQUENTIAL COMPRESSION COMFORT SLEEVE MEDIUM KNEE LENGTH: Brand: KENDALL SCD

## (undated) DEVICE — Device

## (undated) DEVICE — PACK TUR

## (undated) DEVICE — PREMIUM DRY TRAY LF: Brand: MEDLINE INDUSTRIES, INC.